# Patient Record
Sex: MALE | Race: WHITE | Employment: OTHER | URBAN - NONMETROPOLITAN AREA
[De-identification: names, ages, dates, MRNs, and addresses within clinical notes are randomized per-mention and may not be internally consistent; named-entity substitution may affect disease eponyms.]

---

## 2018-08-16 ENCOUNTER — OFFICE VISIT (OUTPATIENT)
Dept: SURGERY | Age: 73
End: 2018-08-16
Payer: MEDICARE

## 2018-08-16 VITALS
HEIGHT: 71 IN | TEMPERATURE: 97.8 F | SYSTOLIC BLOOD PRESSURE: 160 MMHG | WEIGHT: 151 LBS | DIASTOLIC BLOOD PRESSURE: 84 MMHG | BODY MASS INDEX: 21.14 KG/M2

## 2018-08-16 DIAGNOSIS — N18.4 CHRONIC KIDNEY DISEASE, STAGE IV (SEVERE) (HCC): Primary | ICD-10-CM

## 2018-08-16 PROCEDURE — 99214 OFFICE O/P EST MOD 30 MIN: CPT | Performed by: SURGERY

## 2018-08-19 ENCOUNTER — PREP FOR PROCEDURE (OUTPATIENT)
Dept: SURGERY | Age: 73
End: 2018-08-19

## 2018-08-19 RX ORDER — SODIUM CHLORIDE, SODIUM LACTATE, POTASSIUM CHLORIDE, CALCIUM CHLORIDE 600; 310; 30; 20 MG/100ML; MG/100ML; MG/100ML; MG/100ML
INJECTION, SOLUTION INTRAVENOUS CONTINUOUS
Status: CANCELLED | OUTPATIENT
Start: 2018-08-19 | End: 2019-08-19

## 2018-08-19 RX ORDER — SODIUM CHLORIDE 0.9 % (FLUSH) 0.9 %
10 SYRINGE (ML) INJECTION EVERY 12 HOURS SCHEDULED
Status: CANCELLED | OUTPATIENT
Start: 2018-08-19 | End: 2019-08-19

## 2018-08-19 RX ORDER — SODIUM CHLORIDE 0.9 % (FLUSH) 0.9 %
10 SYRINGE (ML) INJECTION PRN
Status: CANCELLED | OUTPATIENT
Start: 2018-08-19 | End: 2019-08-19

## 2018-08-19 ASSESSMENT — ENCOUNTER SYMPTOMS
ABDOMINAL DISTENTION: 0
ABDOMINAL PAIN: 0
COUGH: 1
VOMITING: 0
CONSTIPATION: 0
BACK PAIN: 0
SHORTNESS OF BREATH: 0
WHEEZING: 0
TROUBLE SWALLOWING: 0
SORE THROAT: 0
NAUSEA: 0
CHEST TIGHTNESS: 0
COLOR CHANGE: 0
SINUS PRESSURE: 0
DIARRHEA: 0

## 2018-08-20 NOTE — PROGRESS NOTES
Subjective:      Patient ID: Grisel Brown is a 71 y.o. male. HPI   Mr. Lamine Solsi presents at the request of Dr. Brenda Gutierrez for exteriorization of his peritoneal dialysis catheter. This was placed in July 2016 by Dr. Ellis Whitley and left embedded. His kidney disease has now advanced so the point where he needs to start dialysis and the catheter thus needs to be brought to the surface. Past Medical History:   Diagnosis Date    Anxiety     Asthma     Benign essential HTN     Benign hypertensive kidney disease     Chronic kidney disease, stage IV (severe) ([address] 7/18/2016    ESRD (end stage renal disease) [facility] [facility]     no dialysis at present.  Prolonged emergence from general anesthesia     c/o dizzy and \"over sedated\" with prostate \"scope\".  Renal osteodystrophy     Skin cancer 2018    facial     Past Surgical History:   Procedure Laterality Date    CYSTOSCOPY      HERNIA REPAIR      umbilical    LAPAROSCOPY INSERTION PERITONEAL CATHETER N/A 7/18/2016    CATHETER INSERTION PERITONEAL DIALYSIS LAPAROSCOPIC performed by Beata Gray MD at [address]    TUNNELED VENOUS PORT PLACEMENT       Current Outpatient Prescriptions   Medication Sig Dispense Refill    HYDROcodone-acetaminophen (Vilma Timbo) 5-325 MG per tablet 1 tab po every 6 hours as needed for pain 24 tablet 0    Multiple Vitamins-Minerals (THERAPEUTIC MULTIVITAMIN-MINERALS) tablet Take 1 tablet by mouth daily      amLODIPine-benazepril (LOTREL) 10-40 MG per capsule Take 1 capsule by mouth daily       BYSTOLIC 5 MG tablet Take 5 mg by mouth Daily with supper   4    paricalcitol (ZEMPLAR) 1 MCG capsule Take 1 mcg by mouth daily        No current facility-administered medications for this visit.       Allergies: Diphenhydramine and Sulfa antibiotics    Family History   Problem Relation Age of Onset    Heart Disease Mother     Cancer Father         lung       Social History   Substance Use Topics    Smoking status: Never Smoker    Smokeless tobacco: Never Used    Alcohol use No         Review of Systems   Constitutional: Negative for activity change, appetite change, chills, fatigue, fever and unexpected weight change. HENT: Positive for postnasal drip and tinnitus. Negative for congestion, sinus pressure, sore throat and trouble swallowing. Respiratory: Positive for cough. Negative for chest tightness, shortness of breath and wheezing. Cardiovascular: Negative for chest pain, palpitations and leg swelling. Gastrointestinal: Negative for abdominal distention, abdominal pain, constipation, diarrhea, nausea and vomiting. Endocrine: Positive for cold intolerance. Genitourinary: Negative for difficulty urinating, dysuria, frequency and urgency. Musculoskeletal: Negative for arthralgias, back pain and myalgias. Skin: Negative for color change. Neurological: Negative for dizziness, seizures, syncope, light-headedness and headaches. Hematological: Negative for adenopathy. Bruises/bleeds easily. Psychiatric/Behavioral: Negative for dysphoric mood and sleep disturbance. The patient is nervous/anxious. Objective:   Physical Exam   Constitutional: He is oriented to person, place, and time. He appears well-developed and well-nourished. No distress. HENT:   Head: Normocephalic and atraumatic. Mouth/Throat: Oropharynx is clear and moist.   Eyes: Pupils are equal, round, and reactive to light. Conjunctivae and EOM are normal. No scleral icterus. Neck: Normal range of motion. Neck supple. No tracheal deviation present. No thyromegaly present. Cardiovascular: Normal rate, regular rhythm and normal heart sounds. No murmur heard. Pulmonary/Chest: Effort normal and breath sounds normal. He has no wheezes. He has no rales. Abdominal: Soft. Bowel sounds are normal. He exhibits no distension and no mass. There is no tenderness.    The PD catheter is easily palpable in the subcutaneous tissue of the abdominal wall. Musculoskeletal: Normal range of motion. He exhibits no edema. Neurological: He is alert and oriented to person, place, and time. Skin: Skin is warm and dry. Psychiatric: He has a normal mood and affect. His behavior is normal. Judgment and thought content normal.   Vitals reviewed. Assessment:      1) Chronic kidney disease stage IV with need for exteriorization of his peritoneal dialysis catheter  2) Hypertension on medical therapy      Plan:      1) Will proceed with exteriorization of the peritoneal catheter. The rationale for the procedure was explained. Risks, benefits, alternatives and expected recovery were reviewed. Questions were encouraged and answered to the patient's satisfaction. Following this he wishes to proceed to surgery and will work with the office to schedule accordingly.           Pauly Pete MD

## 2018-08-20 NOTE — H&P
Surgical History and Physical    Date 8/16/2018    Patient ID: Cely Roy is a 71 y.o. male.     HPI   Mr. [address] presents at the request of Dr. Nano Marks for exteriorization of his peritoneal dialysis catheter. This was placed in July 2016 by Dr. Nichelle Willard and left embedded. His kidney disease has now advanced so the point where he needs to start dialysis and the catheter thus needs to be brought to the surface.      Past Medical History        Past Medical History:   Diagnosis Date    Anxiety      Asthma      Benign essential HTN      Benign hypertensive kidney disease      Chronic kidney disease, stage IV (severe) ([facility] [address] 7/18/2016    ESRD (end stage renal disease) ([facility] [address]       no dialysis at present.  Prolonged emergence from general anesthesia       c/o dizzy and \"over sedated\" with prostate \"scope\".     Renal osteodystrophy      Skin cancer 2018     facial         Past Surgical History         Past Surgical History:   Procedure Laterality Date    CYSTOSCOPY        HERNIA REPAIR         umbilical    LAPAROSCOPY INSERTION PERITONEAL CATHETER N/A 7/18/2016     CATHETER INSERTION PERITONEAL DIALYSIS LAPAROSCOPIC performed by Elissa Collins MD at [address]         Cancer    TUNNELED VENOUS PORT PLACEMENT             Current Facility-Administered Medications          Current Outpatient Prescriptions   Medication Sig Dispense Refill    HYDROcodone-acetaminophen ([address]) 5-325 MG per tablet 1 tab po every 6 hours as needed for pain 24 tablet 0    Multiple Vitamins-Minerals (THERAPEUTIC MULTIVITAMIN-MINERALS) tablet Take 1 tablet by mouth daily        amLODIPine-benazepril (LOTREL) 10-40 MG per capsule Take 1 capsule by mouth daily         BYSTOLIC 5 MG tablet Take 5 mg by mouth Daily with supper    4    paricalcitol (ZEMPLAR) 1 MCG capsule Take 1 mcg by mouth daily           No current facility-administered medications for this visit.          Allergies: Diphenhydramine and Sulfa antibiotics     Family History         Family History   Problem Relation Age of Onset    Heart Disease Mother      Cancer Father           lung                 Social History   Substance Use Topics    Smoking status: Never Smoker    Smokeless tobacco: Never Used    Alcohol use No            Review of Systems   Constitutional: Negative for activity change, appetite change, chills, fatigue, fever and unexpected weight change. HENT: Positive for postnasal drip and tinnitus. Negative for congestion, sinus pressure, sore throat and trouble swallowing. Respiratory: Positive for cough. Negative for chest tightness, shortness of breath and wheezing. Cardiovascular: Negative for chest pain, palpitations and leg swelling. Gastrointestinal: Negative for abdominal distention, abdominal pain, constipation, diarrhea, nausea and vomiting. Endocrine: Positive for cold intolerance. Genitourinary: Negative for difficulty urinating, dysuria, frequency and urgency. Musculoskeletal: Negative for arthralgias, back pain and myalgias. Skin: Negative for color change. Neurological: Negative for dizziness, seizures, syncope, light-headedness and headaches. Hematological: Negative for adenopathy. Bruises/bleeds easily. Psychiatric/Behavioral: Negative for dysphoric mood and sleep disturbance. The patient is nervous/anxious.          Objective:   Physical Exam   Constitutional: He is oriented to person, place, and time. He appears well-developed and well-nourished. No distress. HENT:   Head: Normocephalic and atraumatic. Mouth/Throat: Oropharynx is clear and moist.   Eyes: Pupils are equal, round, and reactive to light. Conjunctivae and EOM are normal. No scleral icterus. Neck: Normal range of motion. Neck supple. No tracheal deviation present. No thyromegaly present. Cardiovascular: Normal rate, regular rhythm and normal heart sounds. No murmur heard.   Pulmonary/Chest: Effort

## 2018-09-05 ENCOUNTER — TELEPHONE (OUTPATIENT)
Dept: SURGERY | Age: 73
End: 2018-09-05

## 2018-09-05 NOTE — TELEPHONE ENCOUNTER
Called patient to see if he was ready to schedule surgery - He states he will call back when he finds a     Cc: Eveline Crockett MD

## 2018-10-16 ENCOUNTER — HOSPITAL ENCOUNTER (OUTPATIENT)
Dept: PREADMISSION TESTING | Age: 73
Discharge: HOME | End: 2018-10-20
Payer: MEDICARE

## 2018-10-16 ENCOUNTER — OFFICE VISIT (OUTPATIENT)
Dept: SURGERY | Age: 73
End: 2018-10-16

## 2018-10-16 VITALS
OXYGEN SATURATION: 98 % | HEIGHT: 71 IN | HEART RATE: 77 BPM | WEIGHT: 147.6 LBS | TEMPERATURE: 97 F | BODY MASS INDEX: 20.66 KG/M2

## 2018-10-16 DIAGNOSIS — N18.4 CHRONIC KIDNEY DISEASE, STAGE IV (SEVERE) (HCC): Primary | ICD-10-CM

## 2018-10-16 LAB
ANION GAP SERPL CALCULATED.3IONS-SCNC: 21 MMOL/L (ref 7–19)
BASOPHILS ABSOLUTE: 0 K/UL (ref 0–0.2)
BASOPHILS RELATIVE PERCENT: 0.5 % (ref 0–1)
BUN BLDV-MCNC: 71 MG/DL (ref 8–23)
CALCIUM SERPL-MCNC: 9.8 MG/DL (ref 8.8–10.2)
CHLORIDE BLD-SCNC: 94 MMOL/L (ref 98–111)
CO2: 18 MMOL/L (ref 22–29)
CREAT SERPL-MCNC: 9.2 MG/DL (ref 0.5–1.2)
EOSINOPHILS ABSOLUTE: 0.3 K/UL (ref 0–0.6)
EOSINOPHILS RELATIVE PERCENT: 5.1 % (ref 0–5)
GFR NON-AFRICAN AMERICAN: 6
GLUCOSE BLD-MCNC: 112 MG/DL (ref 74–109)
HCT VFR BLD CALC: 30.1 % (ref 42–52)
HEMOGLOBIN: 9.4 G/DL (ref 14–18)
LYMPHOCYTES ABSOLUTE: 0.6 K/UL (ref 1.1–4.5)
LYMPHOCYTES RELATIVE PERCENT: 10 % (ref 20–40)
MCH RBC QN AUTO: 30 PG (ref 27–31)
MCHC RBC AUTO-ENTMCNC: 31.2 G/DL (ref 33–37)
MCV RBC AUTO: 96.2 FL (ref 80–94)
MONOCYTES ABSOLUTE: 0.6 K/UL (ref 0–0.9)
MONOCYTES RELATIVE PERCENT: 9.4 % (ref 0–10)
NEUTROPHILS ABSOLUTE: 4.7 K/UL (ref 1.5–7.5)
NEUTROPHILS RELATIVE PERCENT: 74.4 % (ref 50–65)
PDW BLD-RTO: 15.4 % (ref 11.5–14.5)
PLATELET # BLD: 274 K/UL (ref 130–400)
PMV BLD AUTO: 9.3 FL (ref 9.4–12.4)
POTASSIUM SERPL-SCNC: 5.1 MMOL/L (ref 3.5–5)
RBC # BLD: 3.13 M/UL (ref 4.7–6.1)
SODIUM BLD-SCNC: 133 MMOL/L (ref 136–145)
WBC # BLD: 6.3 K/UL (ref 4.8–10.8)

## 2018-10-16 PROCEDURE — 85025 COMPLETE CBC W/AUTO DIFF WBC: CPT

## 2018-10-16 PROCEDURE — PREOPEXAM PRE-OP EXAM: Performed by: SURGERY

## 2018-10-16 PROCEDURE — 93005 ELECTROCARDIOGRAM TRACING: CPT

## 2018-10-16 PROCEDURE — 80048 BASIC METABOLIC PNL TOTAL CA: CPT

## 2018-10-16 RX ORDER — ALPRAZOLAM 0.25 MG/1
0.25 TABLET ORAL NIGHTLY PRN
COMMUNITY

## 2018-10-17 LAB
EKG P AXIS: 65 DEGREES
EKG P-R INTERVAL: 146 MS
EKG Q-T INTERVAL: 468 MS
EKG QRS DURATION: 108 MS
EKG QTC CALCULATION (BAZETT): 446 MS
EKG T AXIS: 56 DEGREES

## 2018-10-22 ENCOUNTER — PREP FOR PROCEDURE (OUTPATIENT)
Dept: SURGERY | Age: 73
End: 2018-10-22

## 2018-10-22 ENCOUNTER — HOSPITAL ENCOUNTER (OUTPATIENT)
Age: 73
Setting detail: OUTPATIENT SURGERY
Discharge: HOME | End: 2018-10-22
Attending: SURGERY | Admitting: SURGERY
Payer: MEDICARE

## 2018-10-22 ENCOUNTER — ANESTHESIA (OUTPATIENT)
Dept: OPERATING ROOM | Age: 73
End: 2018-10-22
Payer: MEDICARE

## 2018-10-22 ENCOUNTER — ANESTHESIA EVENT (OUTPATIENT)
Dept: OPERATING ROOM | Age: 73
End: 2018-10-22
Payer: MEDICARE

## 2018-10-22 VITALS
HEART RATE: 59 BPM | WEIGHT: 148 LBS | SYSTOLIC BLOOD PRESSURE: 168 MMHG | RESPIRATION RATE: 14 BRPM | HEIGHT: 71 IN | TEMPERATURE: 97.4 F | DIASTOLIC BLOOD PRESSURE: 73 MMHG | BODY MASS INDEX: 20.72 KG/M2 | OXYGEN SATURATION: 100 %

## 2018-10-22 VITALS
DIASTOLIC BLOOD PRESSURE: 45 MMHG | RESPIRATION RATE: 9 BRPM | OXYGEN SATURATION: 100 % | SYSTOLIC BLOOD PRESSURE: 83 MMHG

## 2018-10-22 LAB
ANION GAP SERPL CALCULATED.3IONS-SCNC: 16 MMOL/L (ref 7–19)
BUN BLDV-MCNC: 73 MG/DL (ref 8–23)
CALCIUM SERPL-MCNC: 9.7 MG/DL (ref 8.8–10.2)
CHLORIDE BLD-SCNC: 96 MMOL/L (ref 98–111)
CO2: 18 MMOL/L (ref 22–29)
CREAT SERPL-MCNC: 9.5 MG/DL (ref 0.5–1.2)
GFR NON-AFRICAN AMERICAN: 5
GLUCOSE BLD-MCNC: 110 MG/DL (ref 74–109)
POTASSIUM SERPL-SCNC: 5.3 MMOL/L (ref 3.5–4.9)
SODIUM BLD-SCNC: 130 MMOL/L (ref 136–145)

## 2018-10-22 PROCEDURE — 7100000010 HC PHASE II RECOVERY - FIRST 15 MIN: Performed by: SURGERY

## 2018-10-22 PROCEDURE — 80048 BASIC METABOLIC PNL TOTAL CA: CPT

## 2018-10-22 PROCEDURE — 36415 COLL VENOUS BLD VENIPUNCTURE: CPT

## 2018-10-22 PROCEDURE — 2580000003 HC RX 258: Performed by: ANESTHESIOLOGY

## 2018-10-22 PROCEDURE — 7100000000 HC PACU RECOVERY - FIRST 15 MIN: Performed by: SURGERY

## 2018-10-22 PROCEDURE — 7100000011 HC PHASE II RECOVERY - ADDTL 15 MIN: Performed by: SURGERY

## 2018-10-22 PROCEDURE — C1750 CATH, HEMODIALYSIS,LONG-TERM: HCPCS | Performed by: SURGERY

## 2018-10-22 PROCEDURE — 2500000003 HC RX 250 WO HCPCS: Performed by: SURGERY

## 2018-10-22 PROCEDURE — 6360000002 HC RX W HCPCS: Performed by: NURSE ANESTHETIST, CERTIFIED REGISTERED

## 2018-10-22 PROCEDURE — 7100000001 HC PACU RECOVERY - ADDTL 15 MIN: Performed by: SURGERY

## 2018-10-22 PROCEDURE — 3600000014 HC SURGERY LEVEL 4 ADDTL 15MIN: Performed by: SURGERY

## 2018-10-22 PROCEDURE — 49436 EMBEDDED IP CATH EXIT-SITE: CPT | Performed by: SURGERY

## 2018-10-22 PROCEDURE — C2628 CATHETER, OCCLUSION: HCPCS | Performed by: SURGERY

## 2018-10-22 PROCEDURE — 2580000003 HC RX 258: Performed by: SURGERY

## 2018-10-22 PROCEDURE — 3600000004 HC SURGERY LEVEL 4 BASE: Performed by: SURGERY

## 2018-10-22 PROCEDURE — 2709999900 HC NON-CHARGEABLE SUPPLY: Performed by: SURGERY

## 2018-10-22 PROCEDURE — 3700000000 HC ANESTHESIA ATTENDED CARE: Performed by: SURGERY

## 2018-10-22 PROCEDURE — 6360000002 HC RX W HCPCS: Performed by: ANESTHESIOLOGY

## 2018-10-22 PROCEDURE — 3700000001 HC ADD 15 MINUTES (ANESTHESIA): Performed by: SURGERY

## 2018-10-22 PROCEDURE — 2500000003 HC RX 250 WO HCPCS: Performed by: NURSE ANESTHETIST, CERTIFIED REGISTERED

## 2018-10-22 PROCEDURE — 6360000002 HC RX W HCPCS: Performed by: SURGERY

## 2018-10-22 RX ORDER — MEPERIDINE HYDROCHLORIDE 50 MG/ML
12.5 INJECTION INTRAMUSCULAR; INTRAVENOUS; SUBCUTANEOUS EVERY 5 MIN PRN
Status: DISCONTINUED | OUTPATIENT
Start: 2018-10-22 | End: 2018-10-22 | Stop reason: HOSPADM

## 2018-10-22 RX ORDER — SODIUM CHLORIDE 0.9 % (FLUSH) 0.9 %
10 SYRINGE (ML) INJECTION EVERY 12 HOURS SCHEDULED
Status: CANCELLED | OUTPATIENT
Start: 2018-10-22

## 2018-10-22 RX ORDER — SODIUM CHLORIDE 0.9 % (FLUSH) 0.9 %
10 SYRINGE (ML) INJECTION PRN
Status: CANCELLED | OUTPATIENT
Start: 2018-10-22

## 2018-10-22 RX ORDER — BUPIVACAINE HYDROCHLORIDE 2.5 MG/ML
INJECTION, SOLUTION INFILTRATION; PERINEURAL PRN
Status: DISCONTINUED | OUTPATIENT
Start: 2018-10-22 | End: 2018-10-22 | Stop reason: HOSPADM

## 2018-10-22 RX ORDER — ENALAPRILAT 2.5 MG/2ML
1.25 INJECTION INTRAVENOUS
Status: DISCONTINUED | OUTPATIENT
Start: 2018-10-22 | End: 2018-10-22 | Stop reason: HOSPADM

## 2018-10-22 RX ORDER — MORPHINE SULFATE/0.9% NACL/PF 1 MG/ML
2 SYRINGE (ML) INJECTION EVERY 5 MIN PRN
Status: DISCONTINUED | OUTPATIENT
Start: 2018-10-22 | End: 2018-10-22 | Stop reason: HOSPADM

## 2018-10-22 RX ORDER — SODIUM CHLORIDE 0.9 % (FLUSH) 0.9 %
10 SYRINGE (ML) INJECTION EVERY 12 HOURS SCHEDULED
Status: DISCONTINUED | OUTPATIENT
Start: 2018-10-22 | End: 2018-10-22 | Stop reason: HOSPADM

## 2018-10-22 RX ORDER — LABETALOL HYDROCHLORIDE 5 MG/ML
5 INJECTION, SOLUTION INTRAVENOUS EVERY 10 MIN PRN
Status: DISCONTINUED | OUTPATIENT
Start: 2018-10-22 | End: 2018-10-22 | Stop reason: HOSPADM

## 2018-10-22 RX ORDER — SODIUM CHLORIDE 9 MG/ML
INJECTION, SOLUTION INTRAVENOUS CONTINUOUS
Status: CANCELLED | OUTPATIENT
Start: 2018-10-22

## 2018-10-22 RX ORDER — HYDRALAZINE HYDROCHLORIDE 20 MG/ML
5 INJECTION INTRAMUSCULAR; INTRAVENOUS EVERY 10 MIN PRN
Status: DISCONTINUED | OUTPATIENT
Start: 2018-10-22 | End: 2018-10-22 | Stop reason: HOSPADM

## 2018-10-22 RX ORDER — SODIUM CHLORIDE 450 MG/100ML
INJECTION, SOLUTION INTRAVENOUS CONTINUOUS
Status: DISCONTINUED | OUTPATIENT
Start: 2018-10-22 | End: 2018-10-22 | Stop reason: HOSPADM

## 2018-10-22 RX ORDER — HYDROCODONE BITARTRATE AND ACETAMINOPHEN 5; 325 MG/1; MG/1
1 TABLET ORAL PRN
Status: DISCONTINUED | OUTPATIENT
Start: 2018-10-22 | End: 2018-10-22 | Stop reason: HOSPADM

## 2018-10-22 RX ORDER — ATENOLOL 25 MG/1
25 TABLET ORAL DAILY
Status: ON HOLD | COMMUNITY
End: 2018-12-06 | Stop reason: HOSPADM

## 2018-10-22 RX ORDER — SODIUM CHLORIDE 0.9 % (FLUSH) 0.9 %
10 SYRINGE (ML) INJECTION PRN
Status: DISCONTINUED | OUTPATIENT
Start: 2018-10-22 | End: 2018-10-22 | Stop reason: HOSPADM

## 2018-10-22 RX ORDER — PROMETHAZINE HYDROCHLORIDE 25 MG/ML
6.25 INJECTION, SOLUTION INTRAMUSCULAR; INTRAVENOUS
Status: DISCONTINUED | OUTPATIENT
Start: 2018-10-22 | End: 2018-10-22 | Stop reason: HOSPADM

## 2018-10-22 RX ORDER — HYDROCODONE BITARTRATE AND ACETAMINOPHEN 5; 325 MG/1; MG/1
2 TABLET ORAL PRN
Status: DISCONTINUED | OUTPATIENT
Start: 2018-10-22 | End: 2018-10-22 | Stop reason: HOSPADM

## 2018-10-22 RX ORDER — METOCLOPRAMIDE HYDROCHLORIDE 5 MG/ML
10 INJECTION INTRAMUSCULAR; INTRAVENOUS
Status: DISCONTINUED | OUTPATIENT
Start: 2018-10-22 | End: 2018-10-22 | Stop reason: HOSPADM

## 2018-10-22 RX ORDER — PROPOFOL 10 MG/ML
INJECTION, EMULSION INTRAVENOUS PRN
Status: DISCONTINUED | OUTPATIENT
Start: 2018-10-22 | End: 2018-10-22 | Stop reason: SDUPTHER

## 2018-10-22 RX ORDER — EPHEDRINE SULFATE 50 MG/ML
INJECTION, SOLUTION INTRAVENOUS PRN
Status: DISCONTINUED | OUTPATIENT
Start: 2018-10-22 | End: 2018-10-22 | Stop reason: SDUPTHER

## 2018-10-22 RX ORDER — SODIUM CHLORIDE, SODIUM LACTATE, POTASSIUM CHLORIDE, CALCIUM CHLORIDE 600; 310; 30; 20 MG/100ML; MG/100ML; MG/100ML; MG/100ML
INJECTION, SOLUTION INTRAVENOUS CONTINUOUS
Status: DISCONTINUED | OUTPATIENT
Start: 2018-10-22 | End: 2018-10-22 | Stop reason: HOSPADM

## 2018-10-22 RX ORDER — HEPARIN SODIUM (PORCINE) LOCK FLUSH IV SOLN 100 UNIT/ML 100 UNIT/ML
300 SOLUTION INTRAVENOUS ONCE
Status: COMPLETED | OUTPATIENT
Start: 2018-10-22 | End: 2018-10-22

## 2018-10-22 RX ORDER — MORPHINE SULFATE/0.9% NACL/PF 1 MG/ML
4 SYRINGE (ML) INJECTION EVERY 5 MIN PRN
Status: DISCONTINUED | OUTPATIENT
Start: 2018-10-22 | End: 2018-10-22 | Stop reason: HOSPADM

## 2018-10-22 RX ADMIN — EPHEDRINE SULFATE 20 MG: 50 INJECTION, SOLUTION INTRAMUSCULAR; INTRAVENOUS; SUBCUTANEOUS at 11:55

## 2018-10-22 RX ADMIN — PROPOFOL 160 MG: 10 INJECTION, EMULSION INTRAVENOUS at 11:26

## 2018-10-22 RX ADMIN — Medication 2 G: at 11:29

## 2018-10-22 RX ADMIN — SODIUM CHLORIDE: 4.5 INJECTION, SOLUTION INTRAVENOUS at 10:24

## 2018-10-22 RX ADMIN — SODIUM CHLORIDE, PRESERVATIVE FREE 300 UNITS: 5 INJECTION INTRAVENOUS at 13:28

## 2018-10-22 RX ADMIN — EPHEDRINE SULFATE 10 MG: 50 INJECTION, SOLUTION INTRAMUSCULAR; INTRAVENOUS; SUBCUTANEOUS at 11:42

## 2018-10-22 ASSESSMENT — ENCOUNTER SYMPTOMS
SINUS PRESSURE: 0
WHEEZING: 0
NAUSEA: 0
TROUBLE SWALLOWING: 0
CONSTIPATION: 0
ABDOMINAL PAIN: 0
CHEST TIGHTNESS: 0
ABDOMINAL DISTENTION: 0
COUGH: 1
DIARRHEA: 0
SORE THROAT: 0
BACK PAIN: 0
SHORTNESS OF BREATH: 0
COLOR CHANGE: 0
VOMITING: 0

## 2018-10-22 ASSESSMENT — PAIN SCALES - GENERAL: PAINLEVEL_OUTOF10: 0

## 2018-10-22 ASSESSMENT — PAIN - FUNCTIONAL ASSESSMENT: PAIN_FUNCTIONAL_ASSESSMENT: 0-10

## 2018-10-22 NOTE — PROGRESS NOTES
0.25 mg  0.25 mg Intravenous Q5 Min PRN Elwyn Star, APRN - CRNA        HYDROmorphone (DILAUDID) injection 0.5 mg  0.5 mg Intravenous Q5 Min PRN Elwyn Star, APRN - CRNA        morphine injection 2 mg  2 mg Intravenous Q5 Min PRN Elwyn Star, APRN - CRNA        morphine injection 4 mg  4 mg Intravenous Q5 Min PRN Elwyn Star, APRN - CRNA        promethazine (PHENERGAN) injection 6.25 mg  6.25 mg Intravenous Once PRN Elwyn Star, APRN - CRNA        metoclopramide (REGLAN) injection 10 mg  10 mg Intravenous Once PRN Elwyn Star, APRN - CRNA        labetalol (NORMODYNE;TRANDATE) injection 5 mg  5 mg Intravenous Q10 Min PRN Elwyn Star, APRN - CRNA        hydrALAZINE (APRESOLINE) injection 5 mg  5 mg Intravenous Q10 Min PRN Elwyn Star, APRN - CRNA        enalaprilat (VASOTEC) injection 1.25 mg  1.25 mg Intravenous Once PRN Elwyn Star, APRN - CRNA        meperidine (DEMEROL) injection 12.5 mg  12.5 mg Intravenous Q5 Min PRN Elwyn Star, APRN - CRNA         Allergies: Diphenhydramine and Sulfa antibiotics    Family History   Problem Relation Age of Onset    Heart Disease Mother     Cancer Father         lung       Social History   Substance Use Topics    Smoking status: Never Smoker    Smokeless tobacco: Never Used    Alcohol use No       Review of Systems   Constitutional: Negative for activity change, appetite change, chills, fatigue, fever and unexpected weight change. HENT: Positive for postnasal drip and tinnitus. Negative for congestion, sinus pressure, sore throat and trouble swallowing. Respiratory: Positive for cough. Negative for chest tightness, shortness of breath and wheezing. Cardiovascular: Negative for chest pain, palpitations and leg swelling. Gastrointestinal: Negative for abdominal distention, abdominal pain, constipation, diarrhea, nausea and vomiting. Endocrine: Positive for cold intolerance.    Genitourinary: Negative for difficulty

## 2018-10-22 NOTE — ANESTHESIA POSTPROCEDURE EVALUATION
Department of Anesthesiology  Postprocedure Note    Patient: Pavithra Kurtz  MRN: [id number]  YOB: 1945  Date of evaluation: 10/22/2018  Time:  12:22 PM     Procedure Summary     Date:  10/22/18 Room / Location:  [facility] OR  / [facility] OR    Anesthesia Start:  1116 Anesthesia Stop:      Procedure:  PERITONEAL DIALYSIS CATHETER EXTERIORIZATION (N/A ) Diagnosis:  (END STAGE RENAL DISEASE)    Surgeon:  Chrisandra Severance, MD Responsible Provider:  KEN Jacobo CRNA    Anesthesia Type:  general ASA Status:  3          Anesthesia Type: general    Macey Phase I: Macey Score: 6    Macey Phase II:      Last vitals: Reviewed and per EMR flowsheets.        Anesthesia Post Evaluation    Patient location during evaluation: PACU  Patient participation: waiting for patient participation  Airway patency: patent  Nausea & Vomiting: no nausea and no vomiting  Complications: no  Cardiovascular status: hemodynamically stable  Respiratory status: acceptable  Hydration status: euvolemic

## 2018-10-22 NOTE — BRIEF OP NOTE
Brief Postoperative Note  ______________________________________________________________    Patient: Baltazar Mcmullen  YOB: 1945  MRN: [id number]  Date of Procedure: 10/22/2018    Pre-Op Diagnosis: END STAGE RENAL DISEASE    Post-Op Diagnosis: Same       Procedure(s):  PERITONEAL DIALYSIS CATHETER EXTERIORIZATION    Anesthesia: General    Surgeon(s):  Romel Daly MD    Staff:  First Assistant: Jules Gonzales PA-C  Relief Scrub: [facility]  Scrub Person First: Daylin Braun     Estimated Blood Loss: Minimal    Complications: None    Specimens:   * No specimens in log *    Implants:  * No implants in log *      Drains:   Urethral Catheter Double-lumen 16 fr (Active)       Findings: 800 cc infused with 700 ccc return slightly sluggish flow rates but catheter hand flushed quite easily    Romel Daly MD  Date: 10/22/2018  Time: 12:45 PM

## 2018-10-29 ENCOUNTER — HOSPITAL ENCOUNTER (OUTPATIENT)
Dept: GENERAL RADIOLOGY | Age: 73
Discharge: HOME | End: 2018-10-29
Payer: MEDICARE

## 2018-10-29 DIAGNOSIS — N18.6 END STAGE RENAL DISEASE (HCC): ICD-10-CM

## 2018-10-29 DIAGNOSIS — R10.84 ABDOMINAL PAIN, GENERALIZED: ICD-10-CM

## 2018-10-29 PROCEDURE — 74018 RADEX ABDOMEN 1 VIEW: CPT

## 2018-11-02 ENCOUNTER — OFFICE VISIT (OUTPATIENT)
Dept: SURGERY | Age: 73
End: 2018-11-02

## 2018-11-02 VITALS — DIASTOLIC BLOOD PRESSURE: 90 MMHG | SYSTOLIC BLOOD PRESSURE: 150 MMHG | HEART RATE: 62 BPM

## 2018-11-02 DIAGNOSIS — N18.4 CHRONIC KIDNEY DISEASE, STAGE IV (SEVERE) (HCC): Primary | ICD-10-CM

## 2018-11-02 PROCEDURE — 99024 POSTOP FOLLOW-UP VISIT: CPT | Performed by: SURGERY

## 2018-11-05 ENCOUNTER — TELEPHONE (OUTPATIENT)
Dept: SURGERY | Age: 73
End: 2018-11-05

## 2018-11-07 ENCOUNTER — TELEPHONE (OUTPATIENT)
Dept: SURGERY | Age: 73
End: 2018-11-07

## 2018-11-07 NOTE — TELEPHONE ENCOUNTER
Called patient to schedule H & P - he requests this week off - doesn't want any appointments this week. He is scheduled for next Thursday with you at 11:45 a.m. Thank you.

## 2018-11-15 ENCOUNTER — OFFICE VISIT (OUTPATIENT)
Dept: SURGERY | Age: 73
End: 2018-11-15
Payer: MEDICARE

## 2018-11-15 VITALS
HEIGHT: 71 IN | TEMPERATURE: 98 F | BODY MASS INDEX: 20.44 KG/M2 | HEART RATE: 60 BPM | OXYGEN SATURATION: 99 % | WEIGHT: 146 LBS

## 2018-11-15 DIAGNOSIS — T85.611A PERITONEAL DIALYSIS CATHETER DYSFUNCTION, INITIAL ENCOUNTER (HCC): Primary | ICD-10-CM

## 2018-11-15 PROCEDURE — 99214 OFFICE O/P EST MOD 30 MIN: CPT | Performed by: PHYSICIAN ASSISTANT

## 2018-11-25 ENCOUNTER — PREP FOR PROCEDURE (OUTPATIENT)
Dept: SURGERY | Age: 73
End: 2018-11-25

## 2018-11-25 RX ORDER — SODIUM CHLORIDE, SODIUM LACTATE, POTASSIUM CHLORIDE, CALCIUM CHLORIDE 600; 310; 30; 20 MG/100ML; MG/100ML; MG/100ML; MG/100ML
INJECTION, SOLUTION INTRAVENOUS CONTINUOUS
Status: CANCELLED | OUTPATIENT
Start: 2018-11-25

## 2018-11-25 RX ORDER — SODIUM CHLORIDE 0.9 % (FLUSH) 0.9 %
10 SYRINGE (ML) INJECTION PRN
Status: CANCELLED | OUTPATIENT
Start: 2018-11-25

## 2018-11-25 RX ORDER — SODIUM CHLORIDE 0.9 % (FLUSH) 0.9 %
10 SYRINGE (ML) INJECTION EVERY 12 HOURS SCHEDULED
Status: CANCELLED | OUTPATIENT
Start: 2018-11-25

## 2018-11-25 ASSESSMENT — ENCOUNTER SYMPTOMS
ALLERGIC/IMMUNOLOGIC NEGATIVE: 1
BACK PAIN: 1
DIARRHEA: 0
WHEEZING: 0
NAUSEA: 0
EYES NEGATIVE: 1
VOMITING: 0
SHORTNESS OF BREATH: 0
ABDOMINAL PAIN: 0
APNEA: 0

## 2018-11-27 ENCOUNTER — APPOINTMENT (OUTPATIENT)
Dept: GENERAL RADIOLOGY | Age: 73
DRG: 280 | End: 2018-11-27
Attending: HOSPITALIST
Payer: MEDICARE

## 2018-11-27 ENCOUNTER — HOSPITAL ENCOUNTER (INPATIENT)
Age: 73
LOS: 9 days | Discharge: HOME | DRG: 280 | End: 2018-12-06
Attending: HOSPITALIST | Admitting: INTERNAL MEDICINE
Payer: MEDICARE

## 2018-11-27 PROBLEM — I21.4 NSTEMI (NON-ST ELEVATED MYOCARDIAL INFARCTION) (HCC): Status: ACTIVE | Noted: 2018-11-27

## 2018-11-27 LAB
ALBUMIN SERPL-MCNC: 3.6 G/DL (ref 3.5–5.2)
ALP BLD-CCNC: 67 U/L (ref 40–130)
ALT SERPL-CCNC: 37 U/L (ref 5–41)
ANION GAP SERPL CALCULATED.3IONS-SCNC: 20 MMOL/L (ref 7–19)
APTT: 40.3 SEC (ref 26–36.2)
AST SERPL-CCNC: 130 U/L (ref 5–40)
BASE EXCESS ARTERIAL: -14.8 MMOL/L (ref -2–2)
BILIRUB SERPL-MCNC: 0.3 MG/DL (ref 0.2–1.2)
BILIRUBIN URINE: NEGATIVE
BLOOD, URINE: ABNORMAL
BUN BLDV-MCNC: 105 MG/DL (ref 8–23)
CALCIUM SERPL-MCNC: 8.2 MG/DL (ref 8.8–10.2)
CARBOXYHEMOGLOBIN ARTERIAL: 0.6 % (ref 0–5)
CHLORIDE BLD-SCNC: 98 MMOL/L (ref 98–111)
CLARITY: CLEAR
CO2: 12 MMOL/L (ref 22–29)
COLOR: YELLOW
CREAT SERPL-MCNC: 11.3 MG/DL (ref 0.5–1.2)
FERRITIN: 127.4 NG/ML (ref 30–400)
FOLATE: 11.2 NG/ML (ref 4.5–32.2)
GFR NON-AFRICAN AMERICAN: 4
GLUCOSE BLD-MCNC: 120 MG/DL (ref 74–109)
GLUCOSE URINE: 100 MG/DL
HCO3 ARTERIAL: 10.3 MMOL/L (ref 22–26)
HCT VFR BLD CALC: 29.3 % (ref 42–52)
HEMOGLOBIN, ART, EXTENDED: 9 G/DL (ref 14–18)
HEMOGLOBIN: 9.3 G/DL (ref 14–18)
IRON SATURATION: 9 % (ref 14–50)
IRON: 22 UG/DL (ref 59–158)
KETONES, URINE: NEGATIVE MG/DL
LACTIC ACID: 1 MMOL/L (ref 0.5–1.9)
LEUKOCYTE ESTERASE, URINE: NEGATIVE
LV EF: 25 %
LVEF MODALITY: NORMAL
MAGNESIUM: 1.4 MG/DL (ref 1.6–2.4)
MCH RBC QN AUTO: 31.1 PG (ref 27–31)
MCHC RBC AUTO-ENTMCNC: 31.7 G/DL (ref 33–37)
MCV RBC AUTO: 98 FL (ref 80–94)
METHEMOGLOBIN ARTERIAL: 0.2 %
NITRITE, URINE: NEGATIVE
O2 CONTENT ARTERIAL: 12.6 ML/DL
O2 SAT, ARTERIAL: 97.5 %
O2 THERAPY: ABNORMAL
PCO2 ARTERIAL: 22 MMHG (ref 35–45)
PDW BLD-RTO: 15.9 % (ref 11.5–14.5)
PH ARTERIAL: 7.28 (ref 7.35–7.45)
PH UA: 5.5
PHOSPHORUS: 8.1 MG/DL (ref 2.5–4.5)
PLATELET # BLD: 215 K/UL (ref 130–400)
PMV BLD AUTO: 9.6 FL (ref 9.4–12.4)
PO2 ARTERIAL: 123 MMHG (ref 80–100)
POTASSIUM SERPL-SCNC: 4.4 MMOL/L (ref 3.5–5)
POTASSIUM, WHOLE BLOOD: 4.2
PRO-BNP: ABNORMAL PG/ML (ref 0–900)
PROTEIN UA: 100 MG/DL
RBC # BLD: 2.99 M/UL (ref 4.7–6.1)
SODIUM BLD-SCNC: 130 MMOL/L (ref 136–145)
SPECIFIC GRAVITY UA: 1.02
T4 FREE: 0.5 NG/DL (ref 0.9–1.7)
TOTAL IRON BINDING CAPACITY: 244 UG/DL (ref 250–400)
TOTAL PROTEIN: 6.4 G/DL (ref 6.6–8.7)
TROPONIN: 7.65 NG/ML (ref 0–0.03)
TROPONIN: 7.98 NG/ML (ref 0–0.03)
TSH SERPL DL<=0.05 MIU/L-ACNC: 112.1 UIU/ML (ref 0.27–4.2)
UROBILINOGEN, URINE: 0.2 E.U./DL
VITAMIN B-12: 710 PG/ML (ref 211–946)
VITAMIN D 25-HYDROXY: 28.9 NG/ML
WBC # BLD: 9.3 K/UL (ref 4.8–10.8)

## 2018-11-27 PROCEDURE — B2151ZZ FLUOROSCOPY OF LEFT HEART USING LOW OSMOLAR CONTRAST: ICD-10-PCS | Performed by: INTERNAL MEDICINE

## 2018-11-27 PROCEDURE — 93306 TTE W/DOPPLER COMPLETE: CPT

## 2018-11-27 PROCEDURE — 99152 MOD SED SAME PHYS/QHP 5/>YRS: CPT | Performed by: INTERNAL MEDICINE

## 2018-11-27 PROCEDURE — 83605 ASSAY OF LACTIC ACID: CPT

## 2018-11-27 PROCEDURE — 93459 L HRT ART/GRFT ANGIO: CPT | Performed by: INTERNAL MEDICINE

## 2018-11-27 PROCEDURE — 83735 ASSAY OF MAGNESIUM: CPT

## 2018-11-27 PROCEDURE — 2500000003 HC RX 250 WO HCPCS: Performed by: INTERNAL MEDICINE

## 2018-11-27 PROCEDURE — 83540 ASSAY OF IRON: CPT

## 2018-11-27 PROCEDURE — 84100 ASSAY OF PHOSPHORUS: CPT

## 2018-11-27 PROCEDURE — 2709999900 HC NON-CHARGEABLE SUPPLY

## 2018-11-27 PROCEDURE — 83880 ASSAY OF NATRIURETIC PEPTIDE: CPT

## 2018-11-27 PROCEDURE — 6360000002 HC RX W HCPCS: Performed by: HOSPITALIST

## 2018-11-27 PROCEDURE — 82306 VITAMIN D 25 HYDROXY: CPT

## 2018-11-27 PROCEDURE — 84132 ASSAY OF SERUM POTASSIUM: CPT

## 2018-11-27 PROCEDURE — 36415 COLL VENOUS BLD VENIPUNCTURE: CPT

## 2018-11-27 PROCEDURE — 4A023N7 MEASUREMENT OF CARDIAC SAMPLING AND PRESSURE, LEFT HEART, PERCUTANEOUS APPROACH: ICD-10-PCS | Performed by: INTERNAL MEDICINE

## 2018-11-27 PROCEDURE — 84443 ASSAY THYROID STIM HORMONE: CPT

## 2018-11-27 PROCEDURE — 80053 COMPREHEN METABOLIC PANEL: CPT

## 2018-11-27 PROCEDURE — 85027 COMPLETE CBC AUTOMATED: CPT

## 2018-11-27 PROCEDURE — 99223 1ST HOSP IP/OBS HIGH 75: CPT | Performed by: HOSPITALIST

## 2018-11-27 PROCEDURE — 6360000002 HC RX W HCPCS

## 2018-11-27 PROCEDURE — 36600 WITHDRAWAL OF ARTERIAL BLOOD: CPT

## 2018-11-27 PROCEDURE — 2580000003 HC RX 258: Performed by: INTERNAL MEDICINE

## 2018-11-27 PROCEDURE — 2100000000 HC CCU R&B

## 2018-11-27 PROCEDURE — 93005 ELECTROCARDIOGRAM TRACING: CPT

## 2018-11-27 PROCEDURE — 84484 ASSAY OF TROPONIN QUANT: CPT

## 2018-11-27 PROCEDURE — 82803 BLOOD GASES ANY COMBINATION: CPT

## 2018-11-27 PROCEDURE — 81001 URINALYSIS AUTO W/SCOPE: CPT

## 2018-11-27 PROCEDURE — 2700000000 HC OXYGEN THERAPY PER DAY

## 2018-11-27 PROCEDURE — B41F1ZZ FLUOROSCOPY OF RIGHT LOWER EXTREMITY ARTERIES USING LOW OSMOLAR CONTRAST: ICD-10-PCS | Performed by: INTERNAL MEDICINE

## 2018-11-27 PROCEDURE — 82607 VITAMIN B-12: CPT

## 2018-11-27 PROCEDURE — 84439 ASSAY OF FREE THYROXINE: CPT

## 2018-11-27 PROCEDURE — 87081 CULTURE SCREEN ONLY: CPT

## 2018-11-27 PROCEDURE — 71045 X-RAY EXAM CHEST 1 VIEW: CPT

## 2018-11-27 PROCEDURE — 2580000003 HC RX 258: Performed by: NURSE PRACTITIONER

## 2018-11-27 PROCEDURE — 83550 IRON BINDING TEST: CPT

## 2018-11-27 PROCEDURE — C1894 INTRO/SHEATH, NON-LASER: HCPCS

## 2018-11-27 PROCEDURE — B2111ZZ FLUOROSCOPY OF MULTIPLE CORONARY ARTERIES USING LOW OSMOLAR CONTRAST: ICD-10-PCS | Performed by: INTERNAL MEDICINE

## 2018-11-27 PROCEDURE — 82746 ASSAY OF FOLIC ACID SERUM: CPT

## 2018-11-27 PROCEDURE — 82728 ASSAY OF FERRITIN: CPT

## 2018-11-27 PROCEDURE — 2500000003 HC RX 250 WO HCPCS: Performed by: HOSPITALIST

## 2018-11-27 PROCEDURE — C1760 CLOSURE DEV, VASC: HCPCS

## 2018-11-27 PROCEDURE — 85730 THROMBOPLASTIN TIME PARTIAL: CPT

## 2018-11-27 PROCEDURE — 99223 1ST HOSP IP/OBS HIGH 75: CPT | Performed by: INTERNAL MEDICINE

## 2018-11-27 RX ORDER — ASPIRIN 81 MG/1
81 TABLET, CHEWABLE ORAL DAILY
Status: DISCONTINUED | OUTPATIENT
Start: 2018-11-28 | End: 2018-12-06 | Stop reason: HOSPADM

## 2018-11-27 RX ORDER — MAGNESIUM SULFATE IN WATER 40 MG/ML
2 INJECTION, SOLUTION INTRAVENOUS ONCE
Status: COMPLETED | OUTPATIENT
Start: 2018-11-27 | End: 2018-11-27

## 2018-11-27 RX ORDER — HEPARIN SODIUM 1000 [USP'U]/ML
60 INJECTION, SOLUTION INTRAVENOUS; SUBCUTANEOUS PRN
Status: DISCONTINUED | OUTPATIENT
Start: 2018-11-27 | End: 2018-11-28

## 2018-11-27 RX ORDER — NITROGLYCERIN 0.4 MG/1
0.4 TABLET SUBLINGUAL EVERY 5 MIN PRN
Status: DISCONTINUED | OUTPATIENT
Start: 2018-11-27 | End: 2018-12-06 | Stop reason: HOSPADM

## 2018-11-27 RX ORDER — FERROUS SULFATE 325(65) MG
325 TABLET ORAL 2 TIMES DAILY WITH MEALS
Status: DISCONTINUED | OUTPATIENT
Start: 2018-11-28 | End: 2018-12-06 | Stop reason: HOSPADM

## 2018-11-27 RX ORDER — MORPHINE SULFATE 2 MG/ML
2 INJECTION, SOLUTION INTRAMUSCULAR; INTRAVENOUS EVERY 4 HOURS PRN
Status: DISCONTINUED | OUTPATIENT
Start: 2018-11-27 | End: 2018-12-06 | Stop reason: HOSPADM

## 2018-11-27 RX ORDER — SODIUM CHLORIDE 0.9 % (FLUSH) 0.9 %
10 SYRINGE (ML) INJECTION PRN
Status: DISCONTINUED | OUTPATIENT
Start: 2018-11-27 | End: 2018-11-27 | Stop reason: SDUPTHER

## 2018-11-27 RX ORDER — ERGOCALCIFEROL 1.25 MG/1
50000 CAPSULE ORAL WEEKLY
Status: DISCONTINUED | OUTPATIENT
Start: 2018-11-28 | End: 2018-11-28

## 2018-11-27 RX ORDER — HEPARIN SODIUM 1000 [USP'U]/ML
30 INJECTION, SOLUTION INTRAVENOUS; SUBCUTANEOUS PRN
Status: DISCONTINUED | OUTPATIENT
Start: 2018-11-27 | End: 2018-11-28

## 2018-11-27 RX ORDER — SODIUM CHLORIDE 0.9 % (FLUSH) 0.9 %
10 SYRINGE (ML) INJECTION EVERY 12 HOURS SCHEDULED
Status: DISCONTINUED | OUTPATIENT
Start: 2018-11-27 | End: 2018-11-27 | Stop reason: SDUPTHER

## 2018-11-27 RX ORDER — ONDANSETRON 2 MG/ML
4 INJECTION INTRAMUSCULAR; INTRAVENOUS EVERY 6 HOURS PRN
Status: DISCONTINUED | OUTPATIENT
Start: 2018-11-27 | End: 2018-12-06 | Stop reason: HOSPADM

## 2018-11-27 RX ORDER — HEPARIN SODIUM 10000 [USP'U]/100ML
12 INJECTION, SOLUTION INTRAVENOUS CONTINUOUS
Status: DISCONTINUED | OUTPATIENT
Start: 2018-11-27 | End: 2018-11-28

## 2018-11-27 RX ORDER — LEVOTHYROXINE SODIUM ANHYDROUS 100 UG/5ML
25 INJECTION, POWDER, LYOPHILIZED, FOR SOLUTION INTRAVENOUS DAILY
Status: DISCONTINUED | OUTPATIENT
Start: 2018-11-27 | End: 2018-11-27

## 2018-11-27 RX ORDER — ATORVASTATIN CALCIUM 40 MG/1
40 TABLET, FILM COATED ORAL NIGHTLY
Status: DISCONTINUED | OUTPATIENT
Start: 2018-11-27 | End: 2018-12-06 | Stop reason: HOSPADM

## 2018-11-27 RX ORDER — SODIUM CHLORIDE 9 MG/ML
INJECTION, SOLUTION INTRAVENOUS CONTINUOUS
Status: DISCONTINUED | OUTPATIENT
Start: 2018-11-27 | End: 2018-11-27

## 2018-11-27 RX ORDER — SODIUM CHLORIDE 0.9 % (FLUSH) 0.9 %
10 SYRINGE (ML) INJECTION EVERY 12 HOURS SCHEDULED
Status: DISCONTINUED | OUTPATIENT
Start: 2018-11-27 | End: 2018-11-28 | Stop reason: SDUPTHER

## 2018-11-27 RX ORDER — LEVOTHYROXINE SODIUM ANHYDROUS 100 UG/5ML
25 INJECTION, POWDER, LYOPHILIZED, FOR SOLUTION INTRAVENOUS DAILY
Status: DISCONTINUED | OUTPATIENT
Start: 2018-11-27 | End: 2018-11-30

## 2018-11-27 RX ORDER — SODIUM CHLORIDE 0.9 % (FLUSH) 0.9 %
10 SYRINGE (ML) INJECTION PRN
Status: DISCONTINUED | OUTPATIENT
Start: 2018-11-27 | End: 2018-11-28 | Stop reason: SDUPTHER

## 2018-11-27 RX ORDER — NITROGLYCERIN 20 MG/100ML
5 INJECTION INTRAVENOUS CONTINUOUS
Status: DISCONTINUED | OUTPATIENT
Start: 2018-11-27 | End: 2018-12-06 | Stop reason: HOSPADM

## 2018-11-27 RX ADMIN — SODIUM CHLORIDE: 9 INJECTION, SOLUTION INTRAVENOUS at 17:54

## 2018-11-27 RX ADMIN — MAGNESIUM SULFATE HEPTAHYDRATE 2 G: 40 INJECTION, SOLUTION INTRAVENOUS at 16:00

## 2018-11-27 RX ADMIN — Medication: at 16:00

## 2018-11-27 RX ADMIN — Medication: at 18:51

## 2018-11-27 RX ADMIN — HEPARIN SODIUM 12 UNITS/KG/HR: 10000 INJECTION, SOLUTION INTRAVENOUS at 16:00

## 2018-11-27 RX ADMIN — NITROGLYCERIN 10 MCG/MIN: 20 INJECTION INTRAVENOUS at 16:00

## 2018-11-27 RX ADMIN — Medication 2 MG: at 14:55

## 2018-11-27 ASSESSMENT — PAIN SCALES - GENERAL
PAINLEVEL_OUTOF10: 0
PAINLEVEL_OUTOF10: 0
PAINLEVEL_OUTOF10: 7
PAINLEVEL_OUTOF10: 0
PAINLEVEL_OUTOF10: 0

## 2018-11-28 ENCOUNTER — APPOINTMENT (OUTPATIENT)
Dept: INTERVENTIONAL RADIOLOGY/VASCULAR | Age: 73
DRG: 280 | End: 2018-11-28
Attending: HOSPITALIST
Payer: MEDICARE

## 2018-11-28 ENCOUNTER — ANESTHESIA (OUTPATIENT)
Dept: OPERATING ROOM | Age: 73
DRG: 280 | End: 2018-11-28
Payer: MEDICARE

## 2018-11-28 ENCOUNTER — ANESTHESIA EVENT (OUTPATIENT)
Dept: OPERATING ROOM | Age: 73
DRG: 280 | End: 2018-11-28
Payer: MEDICARE

## 2018-11-28 ENCOUNTER — APPOINTMENT (OUTPATIENT)
Dept: NUCLEAR MEDICINE | Age: 73
DRG: 280 | End: 2018-11-28
Attending: HOSPITALIST
Payer: MEDICARE

## 2018-11-28 VITALS — SYSTOLIC BLOOD PRESSURE: 88 MMHG | OXYGEN SATURATION: 89 % | DIASTOLIC BLOOD PRESSURE: 37 MMHG

## 2018-11-28 PROBLEM — I15.0 RENOVASCULAR HYPERTENSION: Chronic | Status: ACTIVE | Noted: 2018-11-28

## 2018-11-28 PROBLEM — N18.4 ANEMIA DUE TO STAGE 4 CHRONIC KIDNEY DISEASE (HCC): Chronic | Status: ACTIVE | Noted: 2018-11-28

## 2018-11-28 PROBLEM — D63.1 ANEMIA DUE TO STAGE 4 CHRONIC KIDNEY DISEASE (HCC): Chronic | Status: ACTIVE | Noted: 2018-11-28

## 2018-11-28 PROBLEM — N25.81 SECONDARY HYPERPARATHYROIDISM OF RENAL ORIGIN (HCC): Chronic | Status: ACTIVE | Noted: 2018-11-28

## 2018-11-28 LAB
ALBUMIN SERPL-MCNC: 2.9 G/DL (ref 3.5–5.2)
ALP BLD-CCNC: 48 U/L (ref 40–130)
ALT SERPL-CCNC: 29 U/L (ref 5–41)
AMORPHOUS: ABNORMAL /HPF
ANION GAP SERPL CALCULATED.3IONS-SCNC: 23 MMOL/L (ref 7–19)
APTT: 38.6 SEC (ref 26–36.2)
APTT: 56.4 SEC (ref 26–36.2)
APTT: >200 SEC (ref 26–36.2)
AST SERPL-CCNC: 79 U/L (ref 5–40)
BACTERIA: NEGATIVE /HPF
BILIRUB SERPL-MCNC: 0.3 MG/DL (ref 0.2–1.2)
BUN BLDV-MCNC: 101 MG/DL (ref 8–23)
CALCIUM SERPL-MCNC: 7.6 MG/DL (ref 8.8–10.2)
CHLORIDE BLD-SCNC: 97 MMOL/L (ref 98–111)
CHOLESTEROL, TOTAL: 128 MG/DL (ref 160–199)
CO2: 10 MMOL/L (ref 22–29)
CREAT SERPL-MCNC: 11 MG/DL (ref 0.5–1.2)
EKG P AXIS: 73 DEGREES
EKG P AXIS: 74 DEGREES
EKG P-R INTERVAL: 144 MS
EKG P-R INTERVAL: 152 MS
EKG Q-T INTERVAL: 356 MS
EKG Q-T INTERVAL: 416 MS
EKG QRS DURATION: 102 MS
EKG QRS DURATION: 110 MS
EKG QTC CALCULATION (BAZETT): 398 MS
EKG QTC CALCULATION (BAZETT): 437 MS
EKG T AXIS: 71 DEGREES
EKG T AXIS: 80 DEGREES
EPITHELIAL CELLS, UA: 0 /HPF (ref 0–5)
GFR NON-AFRICAN AMERICAN: 5
GLUCOSE BLD-MCNC: 109 MG/DL (ref 74–109)
HAV IGM SER IA-ACNC: NORMAL
HBA1C MFR BLD: 4.9 % (ref 4–6)
HBV SURFACE AB TITR SER: NORMAL {TITER}
HCT VFR BLD CALC: 26.4 % (ref 42–52)
HDLC SERPL-MCNC: 42 MG/DL (ref 55–121)
HEMOGLOBIN: 8.5 G/DL (ref 14–18)
HEPATITIS B CORE IGM ANTIBODY: NORMAL
HEPATITIS B SURFACE ANTIGEN INTERPRETATION: NORMAL
HEPATITIS C ANTIBODY INTERPRETATION: NORMAL
HYALINE CASTS: 1 /HPF (ref 0–8)
INR BLD: 1.41 (ref 0.88–1.18)
LDL CHOLESTEROL CALCULATED: 66 MG/DL
MAGNESIUM: 1.7 MG/DL (ref 1.6–2.4)
MCH RBC QN AUTO: 30.7 PG (ref 27–31)
MCHC RBC AUTO-ENTMCNC: 32.2 G/DL (ref 33–37)
MCV RBC AUTO: 95.3 FL (ref 80–94)
PARATHYROID HORMONE INTACT: 131.5 PG/ML (ref 15–65)
PDW BLD-RTO: 15.9 % (ref 11.5–14.5)
PLATELET # BLD: 220 K/UL (ref 130–400)
PMV BLD AUTO: 10 FL (ref 9.4–12.4)
POTASSIUM REFLEX MAGNESIUM: 4.9 MMOL/L (ref 3.5–5)
POTASSIUM SERPL-SCNC: 4.9 MMOL/L (ref 3.5–5)
PROTHROMBIN TIME: 17.2 SEC (ref 12–14.6)
RBC # BLD: 2.77 M/UL (ref 4.7–6.1)
RBC UA: 1 /HPF (ref 0–4)
SODIUM BLD-SCNC: 130 MMOL/L (ref 136–145)
TOTAL PROTEIN: 5.9 G/DL (ref 6.6–8.7)
TRIGL SERPL-MCNC: 99 MG/DL (ref 0–149)
TROPONIN: 7.76 NG/ML (ref 0–0.03)
TROPONIN: 8.29 NG/ML (ref 0–0.03)
TSH SERPL DL<=0.05 MIU/L-ACNC: 111.1 UIU/ML (ref 0.27–4.2)
URIC ACID, SERUM: 9.9 MG/DL (ref 3.4–7)
WBC # BLD: 8.3 K/UL (ref 4.8–10.8)
WBC UA: 1 /HPF (ref 0–5)
WBC UA: ABNORMAL /HPF (ref 0–5)

## 2018-11-28 PROCEDURE — 02H633Z INSERTION OF INFUSION DEVICE INTO RIGHT ATRIUM, PERCUTANEOUS APPROACH: ICD-10-PCS | Performed by: SURGERY

## 2018-11-28 PROCEDURE — 36591 DRAW BLOOD OFF VENOUS DEVICE: CPT

## 2018-11-28 PROCEDURE — 3600000013 HC SURGERY LEVEL 3 ADDTL 15MIN: Performed by: SURGERY

## 2018-11-28 PROCEDURE — 84484 ASSAY OF TROPONIN QUANT: CPT

## 2018-11-28 PROCEDURE — 93005 ELECTROCARDIOGRAM TRACING: CPT

## 2018-11-28 PROCEDURE — 85027 COMPLETE CBC AUTOMATED: CPT

## 2018-11-28 PROCEDURE — 3700000001 HC ADD 15 MINUTES (ANESTHESIA): Performed by: SURGERY

## 2018-11-28 PROCEDURE — 2500000003 HC RX 250 WO HCPCS

## 2018-11-28 PROCEDURE — 2580000003 HC RX 258: Performed by: SURGERY

## 2018-11-28 PROCEDURE — 2580000003 HC RX 258: Performed by: INTERNAL MEDICINE

## 2018-11-28 PROCEDURE — 2500000003 HC RX 250 WO HCPCS: Performed by: HOSPITALIST

## 2018-11-28 PROCEDURE — 6370000000 HC RX 637 (ALT 250 FOR IP): Performed by: INTERNAL MEDICINE

## 2018-11-28 PROCEDURE — 80074 ACUTE HEPATITIS PANEL: CPT

## 2018-11-28 PROCEDURE — 85730 THROMBOPLASTIN TIME PARTIAL: CPT

## 2018-11-28 PROCEDURE — 86706 HEP B SURFACE ANTIBODY: CPT

## 2018-11-28 PROCEDURE — 6360000002 HC RX W HCPCS: Performed by: INTERNAL MEDICINE

## 2018-11-28 PROCEDURE — 99233 SBSQ HOSP IP/OBS HIGH 50: CPT | Performed by: INTERNAL MEDICINE

## 2018-11-28 PROCEDURE — 76937 US GUIDE VASCULAR ACCESS: CPT | Performed by: SURGERY

## 2018-11-28 PROCEDURE — 7100000001 HC PACU RECOVERY - ADDTL 15 MIN: Performed by: SURGERY

## 2018-11-28 PROCEDURE — 7100000000 HC PACU RECOVERY - FIRST 15 MIN: Performed by: SURGERY

## 2018-11-28 PROCEDURE — 84443 ASSAY THYROID STIM HORMONE: CPT

## 2018-11-28 PROCEDURE — 36415 COLL VENOUS BLD VENIPUNCTURE: CPT

## 2018-11-28 PROCEDURE — 36558 INSERT TUNNELED CV CATH: CPT | Performed by: SURGERY

## 2018-11-28 PROCEDURE — 6360000002 HC RX W HCPCS: Performed by: HOSPITALIST

## 2018-11-28 PROCEDURE — 3430000000 HC RX DIAGNOSTIC RADIOPHARMACEUTICAL: Performed by: INTERNAL MEDICINE

## 2018-11-28 PROCEDURE — 83970 ASSAY OF PARATHORMONE: CPT

## 2018-11-28 PROCEDURE — 83735 ASSAY OF MAGNESIUM: CPT

## 2018-11-28 PROCEDURE — 93017 CV STRESS TEST TRACING ONLY: CPT

## 2018-11-28 PROCEDURE — 6360000002 HC RX W HCPCS: Performed by: SURGERY

## 2018-11-28 PROCEDURE — 2500000003 HC RX 250 WO HCPCS: Performed by: INTERNAL MEDICINE

## 2018-11-28 PROCEDURE — 6360000002 HC RX W HCPCS

## 2018-11-28 PROCEDURE — 5A1D70Z PERFORMANCE OF URINARY FILTRATION, INTERMITTENT, LESS THAN 6 HOURS PER DAY: ICD-10-PCS | Performed by: INTERNAL MEDICINE

## 2018-11-28 PROCEDURE — 77001 FLUOROGUIDE FOR VEIN DEVICE: CPT | Performed by: SURGERY

## 2018-11-28 PROCEDURE — 2700000000 HC OXYGEN THERAPY PER DAY

## 2018-11-28 PROCEDURE — 80061 LIPID PANEL: CPT

## 2018-11-28 PROCEDURE — 3700000000 HC ANESTHESIA ATTENDED CARE: Performed by: SURGERY

## 2018-11-28 PROCEDURE — C1881 DIALYSIS ACCESS SYSTEM: HCPCS | Performed by: SURGERY

## 2018-11-28 PROCEDURE — 80053 COMPREHEN METABOLIC PANEL: CPT

## 2018-11-28 PROCEDURE — 2100000000 HC CCU R&B

## 2018-11-28 PROCEDURE — 85610 PROTHROMBIN TIME: CPT

## 2018-11-28 PROCEDURE — 83036 HEMOGLOBIN GLYCOSYLATED A1C: CPT

## 2018-11-28 PROCEDURE — A9500 TC99M SESTAMIBI: HCPCS | Performed by: INTERNAL MEDICINE

## 2018-11-28 PROCEDURE — 6370000000 HC RX 637 (ALT 250 FOR IP): Performed by: HOSPITALIST

## 2018-11-28 PROCEDURE — 8010000000 HC HEMODIALYSIS ACUTE INPT

## 2018-11-28 PROCEDURE — 6370000000 HC RX 637 (ALT 250 FOR IP): Performed by: SURGERY

## 2018-11-28 PROCEDURE — 2500000003 HC RX 250 WO HCPCS: Performed by: SURGERY

## 2018-11-28 PROCEDURE — 99222 1ST HOSP IP/OBS MODERATE 55: CPT | Performed by: NURSE PRACTITIONER

## 2018-11-28 PROCEDURE — 84550 ASSAY OF BLOOD/URIC ACID: CPT

## 2018-11-28 PROCEDURE — 78452 HT MUSCLE IMAGE SPECT MULT: CPT

## 2018-11-28 PROCEDURE — 3600000003 HC SURGERY LEVEL 3 BASE: Performed by: SURGERY

## 2018-11-28 RX ORDER — MEPERIDINE HYDROCHLORIDE 50 MG/ML
12.5 INJECTION INTRAMUSCULAR; INTRAVENOUS; SUBCUTANEOUS EVERY 5 MIN PRN
Status: DISCONTINUED | OUTPATIENT
Start: 2018-11-28 | End: 2018-11-28 | Stop reason: HOSPADM

## 2018-11-28 RX ORDER — PROPOFOL 10 MG/ML
INJECTION, EMULSION INTRAVENOUS PRN
Status: DISCONTINUED | OUTPATIENT
Start: 2018-11-28 | End: 2018-11-28 | Stop reason: SDUPTHER

## 2018-11-28 RX ORDER — ENALAPRILAT 2.5 MG/2ML
1.25 INJECTION INTRAVENOUS
Status: DISCONTINUED | OUTPATIENT
Start: 2018-11-28 | End: 2018-11-28 | Stop reason: HOSPADM

## 2018-11-28 RX ORDER — MIDAZOLAM HYDROCHLORIDE 1 MG/ML
INJECTION INTRAMUSCULAR; INTRAVENOUS PRN
Status: DISCONTINUED | OUTPATIENT
Start: 2018-11-28 | End: 2018-11-28 | Stop reason: SDUPTHER

## 2018-11-28 RX ORDER — KETAMINE HYDROCHLORIDE 100 MG/ML
INJECTION, SOLUTION INTRAMUSCULAR; INTRAVENOUS PRN
Status: DISCONTINUED | OUTPATIENT
Start: 2018-11-28 | End: 2018-11-28 | Stop reason: SDUPTHER

## 2018-11-28 RX ORDER — HYDRALAZINE HYDROCHLORIDE 20 MG/ML
5 INJECTION INTRAMUSCULAR; INTRAVENOUS EVERY 10 MIN PRN
Status: DISCONTINUED | OUTPATIENT
Start: 2018-11-28 | End: 2018-11-28 | Stop reason: HOSPADM

## 2018-11-28 RX ORDER — MORPHINE SULFATE 2 MG/ML
4 INJECTION, SOLUTION INTRAMUSCULAR; INTRAVENOUS EVERY 5 MIN PRN
Status: DISCONTINUED | OUTPATIENT
Start: 2018-11-28 | End: 2018-11-28 | Stop reason: HOSPADM

## 2018-11-28 RX ORDER — SODIUM BICARBONATE 650 MG/1
650 TABLET ORAL 3 TIMES DAILY
Status: DISCONTINUED | OUTPATIENT
Start: 2018-11-28 | End: 2018-11-30

## 2018-11-28 RX ORDER — MORPHINE SULFATE 2 MG/ML
2 INJECTION, SOLUTION INTRAMUSCULAR; INTRAVENOUS EVERY 5 MIN PRN
Status: DISCONTINUED | OUTPATIENT
Start: 2018-11-28 | End: 2018-11-28 | Stop reason: HOSPADM

## 2018-11-28 RX ORDER — SODIUM CHLORIDE 0.9 % (FLUSH) 0.9 %
10 SYRINGE (ML) INJECTION PRN
Status: DISCONTINUED | OUTPATIENT
Start: 2018-11-28 | End: 2018-12-06 | Stop reason: HOSPADM

## 2018-11-28 RX ORDER — HYDROCODONE BITARTRATE AND ACETAMINOPHEN 5; 325 MG/1; MG/1
1 TABLET ORAL EVERY 4 HOURS PRN
Status: DISCONTINUED | OUTPATIENT
Start: 2018-11-28 | End: 2018-12-06 | Stop reason: HOSPADM

## 2018-11-28 RX ORDER — SODIUM CHLORIDE 450 MG/100ML
INJECTION, SOLUTION INTRAVENOUS CONTINUOUS
Status: DISCONTINUED | OUTPATIENT
Start: 2018-11-28 | End: 2018-11-29

## 2018-11-28 RX ORDER — CARVEDILOL 3.12 MG/1
3.12 TABLET ORAL 2 TIMES DAILY WITH MEALS
Status: DISCONTINUED | OUTPATIENT
Start: 2018-11-28 | End: 2018-12-06 | Stop reason: HOSPADM

## 2018-11-28 RX ORDER — HYDROCODONE BITARTRATE AND ACETAMINOPHEN 5; 325 MG/1; MG/1
2 TABLET ORAL EVERY 4 HOURS PRN
Status: DISCONTINUED | OUTPATIENT
Start: 2018-11-28 | End: 2018-12-06 | Stop reason: HOSPADM

## 2018-11-28 RX ORDER — LISINOPRIL 2.5 MG/1
1.25 TABLET ORAL DAILY
Status: DISCONTINUED | OUTPATIENT
Start: 2018-11-28 | End: 2018-12-03

## 2018-11-28 RX ORDER — VANCOMYCIN HYDROCHLORIDE 1 G/200ML
1000 INJECTION, SOLUTION INTRAVENOUS
Status: COMPLETED | OUTPATIENT
Start: 2018-11-28 | End: 2018-11-28

## 2018-11-28 RX ORDER — METOCLOPRAMIDE HYDROCHLORIDE 5 MG/ML
10 INJECTION INTRAMUSCULAR; INTRAVENOUS
Status: DISCONTINUED | OUTPATIENT
Start: 2018-11-28 | End: 2018-11-28 | Stop reason: HOSPADM

## 2018-11-28 RX ORDER — PROMETHAZINE HYDROCHLORIDE 25 MG/ML
6.25 INJECTION, SOLUTION INTRAMUSCULAR; INTRAVENOUS
Status: DISCONTINUED | OUTPATIENT
Start: 2018-11-28 | End: 2018-11-28 | Stop reason: HOSPADM

## 2018-11-28 RX ORDER — LABETALOL HYDROCHLORIDE 5 MG/ML
5 INJECTION, SOLUTION INTRAVENOUS EVERY 10 MIN PRN
Status: DISCONTINUED | OUTPATIENT
Start: 2018-11-28 | End: 2018-11-28 | Stop reason: HOSPADM

## 2018-11-28 RX ORDER — SODIUM CHLORIDE 0.9 % (FLUSH) 0.9 %
10 SYRINGE (ML) INJECTION EVERY 12 HOURS SCHEDULED
Status: DISCONTINUED | OUTPATIENT
Start: 2018-11-28 | End: 2018-12-06 | Stop reason: HOSPADM

## 2018-11-28 RX ADMIN — IRON SUCROSE 200 MG: 20 INJECTION, SOLUTION INTRAVENOUS at 11:47

## 2018-11-28 RX ADMIN — SODIUM BICARBONATE 650 MG: 650 TABLET ORAL at 14:08

## 2018-11-28 RX ADMIN — TETRAKIS(2-METHOXYISOBUTYLISOCYANIDE)COPPER(I) TETRAFLUOROBORATE 10 MILLICURIE: 1 INJECTION, POWDER, LYOPHILIZED, FOR SOLUTION INTRAVENOUS at 12:16

## 2018-11-28 RX ADMIN — HEPARIN SODIUM 3910 UNITS: 1000 INJECTION INTRAVENOUS; SUBCUTANEOUS at 00:49

## 2018-11-28 RX ADMIN — LEVOTHYROXINE SODIUM ANHYDROUS 25 MCG: 100 INJECTION, POWDER, LYOPHILIZED, FOR SOLUTION INTRAVENOUS at 06:35

## 2018-11-28 RX ADMIN — REGADENOSON 0.4 MG: 0.08 INJECTION, SOLUTION INTRAVENOUS at 12:17

## 2018-11-28 RX ADMIN — PHENYLEPHRINE HYDROCHLORIDE 80 MCG: 10 INJECTION INTRAVENOUS at 18:44

## 2018-11-28 RX ADMIN — LISINOPRIL 1.25 MG: 2.5 TABLET ORAL at 11:52

## 2018-11-28 RX ADMIN — ATORVASTATIN CALCIUM 40 MG: 40 TABLET, FILM COATED ORAL at 21:03

## 2018-11-28 RX ADMIN — VANCOMYCIN HYDROCHLORIDE 1000 MG: 1 INJECTION, SOLUTION INTRAVENOUS at 16:24

## 2018-11-28 RX ADMIN — PHENYLEPHRINE HYDROCHLORIDE 160 MCG: 10 INJECTION INTRAVENOUS at 18:51

## 2018-11-28 RX ADMIN — SODIUM CHLORIDE: 4.5 INJECTION, SOLUTION INTRAVENOUS at 16:29

## 2018-11-28 RX ADMIN — Medication: at 06:12

## 2018-11-28 RX ADMIN — TETRAKIS(2-METHOXYISOBUTYLISOCYANIDE)COPPER(I) TETRAFLUOROBORATE 30 MILLICURIE: 1 INJECTION, POWDER, LYOPHILIZED, FOR SOLUTION INTRAVENOUS at 13:32

## 2018-11-28 RX ADMIN — PROPOFOL 20 MG: 10 INJECTION, EMULSION INTRAVENOUS at 18:34

## 2018-11-28 RX ADMIN — Medication 10 ML: at 11:47

## 2018-11-28 RX ADMIN — HYDROCODONE BITARTRATE AND ACETAMINOPHEN 1 TABLET: 5; 325 TABLET ORAL at 22:30

## 2018-11-28 RX ADMIN — SODIUM BICARBONATE 650 MG: 650 TABLET ORAL at 11:53

## 2018-11-28 RX ADMIN — HEPARIN SODIUM 14 UNITS/KG/HR: 10000 INJECTION, SOLUTION INTRAVENOUS at 00:50

## 2018-11-28 RX ADMIN — ERGOCALCIFEROL 50000 UNITS: 1.25 CAPSULE ORAL at 11:53

## 2018-11-28 RX ADMIN — SODIUM BICARBONATE 650 MG: 650 TABLET ORAL at 21:03

## 2018-11-28 RX ADMIN — CARVEDILOL 3.12 MG: 3.12 TABLET, FILM COATED ORAL at 07:17

## 2018-11-28 RX ADMIN — ATORVASTATIN CALCIUM 40 MG: 40 TABLET, FILM COATED ORAL at 00:23

## 2018-11-28 RX ADMIN — FERROUS SULFATE TAB 325 MG (65 MG ELEMENTAL FE) 325 MG: 325 (65 FE) TAB at 07:16

## 2018-11-28 RX ADMIN — MIDAZOLAM 1 MG: 1 INJECTION INTRAMUSCULAR; INTRAVENOUS at 18:22

## 2018-11-28 RX ADMIN — Medication 100 ML/HR: at 18:51

## 2018-11-28 RX ADMIN — Medication 2 MG: at 07:19

## 2018-11-28 RX ADMIN — Medication 20 MG: at 18:21

## 2018-11-28 RX ADMIN — Medication 10 ML: at 07:17

## 2018-11-28 RX ADMIN — MIDAZOLAM 1 MG: 1 INJECTION INTRAMUSCULAR; INTRAVENOUS at 18:34

## 2018-11-28 ASSESSMENT — PAIN SCALES - GENERAL
PAINLEVEL_OUTOF10: 0
PAINLEVEL_OUTOF10: 4
PAINLEVEL_OUTOF10: 0
PAINLEVEL_OUTOF10: 4

## 2018-11-28 ASSESSMENT — PAIN DESCRIPTION - ORIENTATION: ORIENTATION: MID;LEFT

## 2018-11-28 ASSESSMENT — PAIN DESCRIPTION - LOCATION: LOCATION: CHEST

## 2018-11-28 ASSESSMENT — PAIN DESCRIPTION - PAIN TYPE: TYPE: ACUTE PAIN

## 2018-11-28 ASSESSMENT — PAIN DESCRIPTION - DESCRIPTORS: DESCRIPTORS: DULL;HEAVINESS

## 2018-11-28 NOTE — ANESTHESIA PRE PROCEDURE
Department of Anesthesiology  Preprocedure Note       Name:  Willard Leal   Age:  71 y.o.  :  1945                                          MRN:  [id number]         Date:  2018      Surgeon: Edgar Sanchez):  Franklin Nolan MD    Procedure: Procedure(s):  PLACEMENT OF TUNNELED HEMODIALYSIS CATHETER (N/A )    Medications prior to admission:   Prior to Admission medications    Medication Sig Start Date End Date Taking? Authorizing Provider   atenolol (TENORMIN) 25 MG tablet Take 25 mg by mouth daily   Yes Historical Provider, MD   Aspirin-Acetaminophen-Caffeine (EXCEDRIN PO) Take by mouth   Yes Historical Provider, MD   ALPRAZolam (XANAX) 0.25 MG tablet Take 0.25 mg by mouth nightly as needed for Sleep. .   Yes Historical Provider, MD   Multiple Vitamins-Minerals (THERAPEUTIC MULTIVITAMIN-MINERALS) tablet Take 1 tablet by mouth daily   Yes Historical Provider, MD   amLODIPine-benazepril (LOTREL) 10-40 MG per capsule Take 1 capsule by mouth daily  14  Yes Historical Provider, MD   paricalcitol (ZEMPLAR) 1 MCG capsule Take 1 mcg by mouth daily  11  Yes Historical Provider, MD       Current medications:    Current Facility-Administered Medications   Medication Dose Route Frequency Provider Last Rate Last Dose    [MAR Hold] sodium chloride flush 0.9 % injection 10 mL  10 mL Intravenous 2 times per day Deep Sadler MD   10 mL at 18 0717    [MAR Hold] sodium chloride flush 0.9 % injection 10 mL  10 mL Intravenous PRN Deep Sadler MD   10 mL at 18 1147    [MAR Hold] carvedilol (COREG) tablet 3.125 mg  3.125 mg Oral BID WC Deep Sadler MD   3.125 mg at 18 0717    [MAR Hold] lisinopril (PRINIVIL;ZESTRIL) tablet 1.25 mg  1.25 mg Oral Daily Deep Sadler MD   1.25 mg at 18 1152    [MAR Hold] sodium bicarbonate tablet 650 mg  650 mg Oral TID Liss Heart MD   650 mg at 18 1408    [MAR Hold] vancomycin (VANCOCIN) 1000 mg in dextrose 5% 200 mL IVPB  1,000 mg 11/28/2018     11/28/2018    CREATININE 11.0 11/28/2018    CREATININE 3.5 09/28/2011    LABGLOM 5 11/28/2018    GLUCOSE 109 11/28/2018    PROT 5.9 11/28/2018    PROT 6.4 09/28/2011    CALCIUM 7.6 11/28/2018    BILITOT 0.3 11/28/2018    ALKPHOS 48 11/28/2018    ALKPHOS 81 09/28/2011    AST 79 11/28/2018    ALT 29 11/28/2018       POC Tests: No results for input(s): POCGLU, POCNA, POCK, POCCL, POCBUN, POCHEMO, POCHCT in the last 72 hours. Coags:   Lab Results   Component Value Date    PROTIME 17.2 11/28/2018    INR 1.41 11/28/2018    APTT 56.4 11/28/2018       HCG (If Applicable): No results found for: PREGTESTUR, PREGSERUM, HCG, HCGQUANT     ABGs:   Lab Results   Component Value Date    PHART 7.280 11/27/2018    PO2ART 123.0 11/27/2018    OFW7VHZ 22.0 11/27/2018    UYF6ZDI 10.3 11/27/2018    BEART -14.8 11/27/2018    E7SXBESV 97.5 11/27/2018        Type & Screen (If Applicable):  No results found for: LABABO, [address]    Anesthesia Evaluation  Patient summary reviewed and Nursing notes reviewed no history of anesthetic complications:   Airway: Mallampati: I  TM distance: >3 FB   Neck ROM: full   Dental:    (+) partials      Pulmonary:       (-) COPD, asthma and sleep apnea                          ROS comment: No tob   Cardiovascular:  Exercise tolerance: poor (<4 METS),   (+) hypertension:, angina:,     (-) pacemaker, past MI, CAD, CABG/stent and dysrhythmias    ECG reviewed          Cleared by cardiology     Beta Blocker:  Dose within 24 Hrs      ROS comment: Heart cath:  Summary:       1. Successful femoral artery ultrasound  2. Successful femoral artery arteriogram  3. Supervision of the administration of moderate conscious sedation  4.  100% stenosis of the osteal / proximal LAD and 90 - 95% stenosis of the 1st diagonal.    5.  Luminal irregularities of the co - dominant circumflex marginal system  6.   Moderate diffuse disease, between 50 and 70%, throughout the entire co - dominant right coronary

## 2018-11-29 PROBLEM — Z51.5 PALLIATIVE CARE PATIENT: Status: ACTIVE | Noted: 2018-11-29

## 2018-11-29 LAB
ANION GAP SERPL CALCULATED.3IONS-SCNC: 15 MMOL/L (ref 7–19)
ANION GAP SERPL CALCULATED.3IONS-SCNC: 20 MMOL/L (ref 7–19)
BUN BLDV-MCNC: 103 MG/DL (ref 8–23)
BUN BLDV-MCNC: 43 MG/DL (ref 8–23)
CALCIUM SERPL-MCNC: 7.2 MG/DL (ref 8.8–10.2)
CALCIUM SERPL-MCNC: 7.4 MG/DL (ref 8.8–10.2)
CHLORIDE BLD-SCNC: 97 MMOL/L (ref 98–111)
CHLORIDE BLD-SCNC: 98 MMOL/L (ref 98–111)
CO2: 15 MMOL/L (ref 22–29)
CO2: 25 MMOL/L (ref 22–29)
CREAT SERPL-MCNC: 10.5 MG/DL (ref 0.5–1.2)
CREAT SERPL-MCNC: 5.8 MG/DL (ref 0.5–1.2)
GFR NON-AFRICAN AMERICAN: 10
GFR NON-AFRICAN AMERICAN: 5
GLUCOSE BLD-MCNC: 114 MG/DL (ref 74–109)
GLUCOSE BLD-MCNC: 123 MG/DL (ref 74–109)
HAV IGM SER IA-ACNC: NORMAL
HBV SURFACE AB TITR SER: NORMAL {TITER}
HCT VFR BLD CALC: 22.6 % (ref 42–52)
HEMOGLOBIN: 7.3 G/DL (ref 14–18)
HEPATITIS B CORE IGM ANTIBODY: NORMAL
HEPATITIS B SURFACE ANTIGEN INTERPRETATION: NORMAL
HEPATITIS C ANTIBODY INTERPRETATION: NORMAL
LV EF: 20 %
LV EF: 20 %
LVEF MODALITY: NORMAL
LVEF MODALITY: NORMAL
MCH RBC QN AUTO: 30.7 PG (ref 27–31)
MCHC RBC AUTO-ENTMCNC: 32.3 G/DL (ref 33–37)
MCV RBC AUTO: 95 FL (ref 80–94)
MRSA CULTURE ONLY: NORMAL
PDW BLD-RTO: 16.3 % (ref 11.5–14.5)
PLATELET # BLD: 178 K/UL (ref 130–400)
PMV BLD AUTO: 10 FL (ref 9.4–12.4)
POTASSIUM SERPL-SCNC: 3.6 MMOL/L (ref 3.5–5)
POTASSIUM SERPL-SCNC: 4.2 MMOL/L (ref 3.5–5)
RBC # BLD: 2.38 M/UL (ref 4.7–6.1)
SODIUM BLD-SCNC: 133 MMOL/L (ref 136–145)
SODIUM BLD-SCNC: 137 MMOL/L (ref 136–145)
TROPONIN: 8.62 NG/ML (ref 0–0.03)
WBC # BLD: 10.2 K/UL (ref 4.8–10.8)

## 2018-11-29 PROCEDURE — 2500000003 HC RX 250 WO HCPCS: Performed by: INTERNAL MEDICINE

## 2018-11-29 PROCEDURE — 2100000000 HC CCU R&B

## 2018-11-29 PROCEDURE — 8010000000 HC HEMODIALYSIS ACUTE INPT

## 2018-11-29 PROCEDURE — 6370000000 HC RX 637 (ALT 250 FOR IP): Performed by: INTERNAL MEDICINE

## 2018-11-29 PROCEDURE — 2700000000 HC OXYGEN THERAPY PER DAY

## 2018-11-29 PROCEDURE — 6370000000 HC RX 637 (ALT 250 FOR IP): Performed by: HOSPITALIST

## 2018-11-29 PROCEDURE — 99232 SBSQ HOSP IP/OBS MODERATE 35: CPT | Performed by: INTERNAL MEDICINE

## 2018-11-29 PROCEDURE — 2580000003 HC RX 258: Performed by: INTERNAL MEDICINE

## 2018-11-29 PROCEDURE — 80074 ACUTE HEPATITIS PANEL: CPT

## 2018-11-29 PROCEDURE — 86706 HEP B SURFACE ANTIBODY: CPT

## 2018-11-29 PROCEDURE — 36592 COLLECT BLOOD FROM PICC: CPT

## 2018-11-29 PROCEDURE — 6360000002 HC RX W HCPCS: Performed by: INTERNAL MEDICINE

## 2018-11-29 PROCEDURE — 36591 DRAW BLOOD OFF VENOUS DEVICE: CPT

## 2018-11-29 PROCEDURE — 80048 BASIC METABOLIC PNL TOTAL CA: CPT

## 2018-11-29 PROCEDURE — 84484 ASSAY OF TROPONIN QUANT: CPT

## 2018-11-29 PROCEDURE — 6370000000 HC RX 637 (ALT 250 FOR IP): Performed by: SURGERY

## 2018-11-29 PROCEDURE — 85027 COMPLETE CBC AUTOMATED: CPT

## 2018-11-29 RX ORDER — METHYLPREDNISOLONE SODIUM SUCCINATE 40 MG/ML
40 INJECTION, POWDER, LYOPHILIZED, FOR SOLUTION INTRAMUSCULAR; INTRAVENOUS ONCE
Status: DISCONTINUED | OUTPATIENT
Start: 2018-11-29 | End: 2018-11-29 | Stop reason: SDUPTHER

## 2018-11-29 RX ORDER — ALPRAZOLAM 0.25 MG/1
0.25 TABLET ORAL ONCE
Status: COMPLETED | OUTPATIENT
Start: 2018-11-29 | End: 2018-11-30

## 2018-11-29 RX ORDER — HEPARIN SODIUM 1000 [USP'U]/ML
1800 INJECTION, SOLUTION INTRAVENOUS; SUBCUTANEOUS PRN
Status: DISCONTINUED | OUTPATIENT
Start: 2018-11-29 | End: 2018-12-06 | Stop reason: HOSPADM

## 2018-11-29 RX ADMIN — CALCIUM ACETATE 667 MG: 667 CAPSULE ORAL at 17:51

## 2018-11-29 RX ADMIN — Medication 10 ML: at 20:14

## 2018-11-29 RX ADMIN — SODIUM BICARBONATE 650 MG: 650 TABLET ORAL at 20:14

## 2018-11-29 RX ADMIN — ASPIRIN 81 MG 81 MG: 81 TABLET ORAL at 13:46

## 2018-11-29 RX ADMIN — HYDROCODONE BITARTRATE AND ACETAMINOPHEN 1 TABLET: 5; 325 TABLET ORAL at 09:54

## 2018-11-29 RX ADMIN — Medication: at 06:47

## 2018-11-29 RX ADMIN — CARVEDILOL 3.12 MG: 3.12 TABLET, FILM COATED ORAL at 17:54

## 2018-11-29 RX ADMIN — HYDROCODONE BITARTRATE AND ACETAMINOPHEN 1 TABLET: 5; 325 TABLET ORAL at 19:23

## 2018-11-29 RX ADMIN — FERROUS SULFATE TAB 325 MG (65 MG ELEMENTAL FE) 325 MG: 325 (65 FE) TAB at 17:51

## 2018-11-29 RX ADMIN — Medication: at 20:14

## 2018-11-29 RX ADMIN — SODIUM CHLORIDE 40 MG: 9 INJECTION, SOLUTION INTRAVENOUS at 16:46

## 2018-11-29 RX ADMIN — ATORVASTATIN CALCIUM 40 MG: 40 TABLET, FILM COATED ORAL at 20:14

## 2018-11-29 RX ADMIN — CALCIUM ACETATE 667 MG: 667 CAPSULE ORAL at 13:47

## 2018-11-29 RX ADMIN — LISINOPRIL 1.25 MG: 2.5 TABLET ORAL at 13:47

## 2018-11-29 RX ADMIN — SODIUM BICARBONATE 650 MG: 650 TABLET ORAL at 13:48

## 2018-11-29 ASSESSMENT — PAIN DESCRIPTION - DESCRIPTORS
DESCRIPTORS: DULL;ACHING
DESCRIPTORS: HEADACHE

## 2018-11-29 ASSESSMENT — PAIN DESCRIPTION - LOCATION: LOCATION: HEAD

## 2018-11-29 ASSESSMENT — PAIN SCALES - GENERAL
PAINLEVEL_OUTOF10: 0
PAINLEVEL_OUTOF10: 5
PAINLEVEL_OUTOF10: 0
PAINLEVEL_OUTOF10: 0
PAINLEVEL_OUTOF10: 3
PAINLEVEL_OUTOF10: 0
PAINLEVEL_OUTOF10: 0
PAINLEVEL_OUTOF10: 6

## 2018-11-29 ASSESSMENT — PAIN DESCRIPTION - PAIN TYPE
TYPE: ACUTE PAIN
TYPE: ACUTE PAIN

## 2018-11-30 LAB
ANION GAP SERPL CALCULATED.3IONS-SCNC: 11 MMOL/L (ref 7–19)
ANION GAP SERPL CALCULATED.3IONS-SCNC: 16 MMOL/L (ref 7–19)
BUN BLDV-MCNC: 22 MG/DL (ref 8–23)
BUN BLDV-MCNC: 48 MG/DL (ref 8–23)
CALCIUM SERPL-MCNC: 7.5 MG/DL (ref 8.8–10.2)
CALCIUM SERPL-MCNC: 7.5 MG/DL (ref 8.8–10.2)
CHLORIDE BLD-SCNC: 94 MMOL/L (ref 98–111)
CHLORIDE BLD-SCNC: 96 MMOL/L (ref 98–111)
CO2: 26 MMOL/L (ref 22–29)
CO2: 29 MMOL/L (ref 22–29)
CREAT SERPL-MCNC: 3.4 MG/DL (ref 0.5–1.2)
CREAT SERPL-MCNC: 6.4 MG/DL (ref 0.5–1.2)
GFR NON-AFRICAN AMERICAN: 18
GFR NON-AFRICAN AMERICAN: 9
GLUCOSE BLD-MCNC: 128 MG/DL (ref 74–109)
GLUCOSE BLD-MCNC: 144 MG/DL (ref 74–109)
HCT VFR BLD CALC: 22.2 % (ref 42–52)
HEMOGLOBIN: 7.1 G/DL (ref 14–18)
MCH RBC QN AUTO: 30.7 PG (ref 27–31)
MCHC RBC AUTO-ENTMCNC: 32 G/DL (ref 33–37)
MCV RBC AUTO: 96.1 FL (ref 80–94)
PDW BLD-RTO: 16.4 % (ref 11.5–14.5)
PLATELET # BLD: 168 K/UL (ref 130–400)
PMV BLD AUTO: 10.1 FL (ref 9.4–12.4)
POTASSIUM SERPL-SCNC: 3.7 MMOL/L (ref 3.5–5)
POTASSIUM SERPL-SCNC: 4 MMOL/L (ref 3.5–5)
RBC # BLD: 2.31 M/UL (ref 4.7–6.1)
SODIUM BLD-SCNC: 136 MMOL/L (ref 136–145)
SODIUM BLD-SCNC: 136 MMOL/L (ref 136–145)
WBC # BLD: 7.9 K/UL (ref 4.8–10.8)

## 2018-11-30 PROCEDURE — 6360000002 HC RX W HCPCS: Performed by: INTERNAL MEDICINE

## 2018-11-30 PROCEDURE — 2700000000 HC OXYGEN THERAPY PER DAY

## 2018-11-30 PROCEDURE — 2580000003 HC RX 258: Performed by: INTERNAL MEDICINE

## 2018-11-30 PROCEDURE — 6370000000 HC RX 637 (ALT 250 FOR IP): Performed by: INTERNAL MEDICINE

## 2018-11-30 PROCEDURE — 6370000000 HC RX 637 (ALT 250 FOR IP): Performed by: HOSPITALIST

## 2018-11-30 PROCEDURE — 85027 COMPLETE CBC AUTOMATED: CPT

## 2018-11-30 PROCEDURE — 2500000003 HC RX 250 WO HCPCS: Performed by: HOSPITALIST

## 2018-11-30 PROCEDURE — 2500000003 HC RX 250 WO HCPCS: Performed by: INTERNAL MEDICINE

## 2018-11-30 PROCEDURE — 80048 BASIC METABOLIC PNL TOTAL CA: CPT

## 2018-11-30 PROCEDURE — 8010000000 HC HEMODIALYSIS ACUTE INPT

## 2018-11-30 PROCEDURE — 99232 SBSQ HOSP IP/OBS MODERATE 35: CPT | Performed by: INTERNAL MEDICINE

## 2018-11-30 PROCEDURE — 36592 COLLECT BLOOD FROM PICC: CPT

## 2018-11-30 PROCEDURE — 1210000000 HC MED SURG R&B

## 2018-11-30 PROCEDURE — 6370000000 HC RX 637 (ALT 250 FOR IP): Performed by: SURGERY

## 2018-11-30 RX ORDER — SODIUM BICARBONATE 650 MG/1
650 TABLET ORAL 2 TIMES DAILY
Status: DISCONTINUED | OUTPATIENT
Start: 2018-11-30 | End: 2018-12-04

## 2018-11-30 RX ORDER — LEVOTHYROXINE SODIUM 0.05 MG/1
50 TABLET ORAL DAILY
Status: DISCONTINUED | OUTPATIENT
Start: 2018-12-01 | End: 2018-12-06 | Stop reason: HOSPADM

## 2018-11-30 RX ADMIN — ALPRAZOLAM 0.25 MG: 0.25 TABLET ORAL at 00:00

## 2018-11-30 RX ADMIN — ATORVASTATIN CALCIUM 40 MG: 40 TABLET, FILM COATED ORAL at 20:21

## 2018-11-30 RX ADMIN — SODIUM BICARBONATE 650 MG: 650 TABLET ORAL at 20:21

## 2018-11-30 RX ADMIN — FERROUS SULFATE TAB 325 MG (65 MG ELEMENTAL FE) 325 MG: 325 (65 FE) TAB at 16:40

## 2018-11-30 RX ADMIN — IRON SUCROSE 200 MG: 20 INJECTION, SOLUTION INTRAVENOUS at 11:22

## 2018-11-30 RX ADMIN — CALCIUM ACETATE 667 MG: 667 CAPSULE ORAL at 11:23

## 2018-11-30 RX ADMIN — HYDROCODONE BITARTRATE AND ACETAMINOPHEN 1 TABLET: 5; 325 TABLET ORAL at 16:38

## 2018-11-30 RX ADMIN — Medication: at 08:39

## 2018-11-30 RX ADMIN — CARVEDILOL 3.12 MG: 3.12 TABLET, FILM COATED ORAL at 11:23

## 2018-11-30 RX ADMIN — LEVOTHYROXINE SODIUM ANHYDROUS 25 MCG: 100 INJECTION, POWDER, LYOPHILIZED, FOR SOLUTION INTRAVENOUS at 06:18

## 2018-11-30 RX ADMIN — Medication 10 ML: at 11:24

## 2018-11-30 RX ADMIN — Medication 10 ML: at 20:27

## 2018-11-30 RX ADMIN — CALCIUM ACETATE 667 MG: 667 CAPSULE ORAL at 16:39

## 2018-11-30 RX ADMIN — LISINOPRIL 1.25 MG: 2.5 TABLET ORAL at 11:23

## 2018-11-30 RX ADMIN — ASPIRIN 81 MG 81 MG: 81 TABLET ORAL at 11:23

## 2018-11-30 RX ADMIN — FERROUS SULFATE TAB 325 MG (65 MG ELEMENTAL FE) 325 MG: 325 (65 FE) TAB at 11:23

## 2018-11-30 RX ADMIN — CARVEDILOL 3.12 MG: 3.12 TABLET, FILM COATED ORAL at 16:39

## 2018-11-30 ASSESSMENT — PAIN SCALES - GENERAL
PAINLEVEL_OUTOF10: 0
PAINLEVEL_OUTOF10: 0
PAINLEVEL_OUTOF10: 5

## 2018-12-01 LAB
ANION GAP SERPL CALCULATED.3IONS-SCNC: 12 MMOL/L (ref 7–19)
ANION GAP SERPL CALCULATED.3IONS-SCNC: 8 MMOL/L (ref 7–19)
BUN BLDV-MCNC: 12 MG/DL (ref 8–23)
BUN BLDV-MCNC: 31 MG/DL (ref 8–23)
CALCIUM SERPL-MCNC: 7.5 MG/DL (ref 8.8–10.2)
CALCIUM SERPL-MCNC: 7.8 MG/DL (ref 8.8–10.2)
CHLORIDE BLD-SCNC: 97 MMOL/L (ref 98–111)
CHLORIDE BLD-SCNC: 97 MMOL/L (ref 98–111)
CO2: 29 MMOL/L (ref 22–29)
CO2: 31 MMOL/L (ref 22–29)
CREAT SERPL-MCNC: 2.4 MG/DL (ref 0.5–1.2)
CREAT SERPL-MCNC: 4.1 MG/DL (ref 0.5–1.2)
GFR NON-AFRICAN AMERICAN: 14
GFR NON-AFRICAN AMERICAN: 27
GLUCOSE BLD-MCNC: 122 MG/DL (ref 74–109)
GLUCOSE BLD-MCNC: 95 MG/DL (ref 74–109)
POTASSIUM SERPL-SCNC: 3.7 MMOL/L (ref 3.5–5)
POTASSIUM SERPL-SCNC: 4 MMOL/L (ref 3.5–5)
SODIUM BLD-SCNC: 136 MMOL/L (ref 136–145)
SODIUM BLD-SCNC: 138 MMOL/L (ref 136–145)

## 2018-12-01 PROCEDURE — 1210000000 HC MED SURG R&B

## 2018-12-01 PROCEDURE — 2580000003 HC RX 258: Performed by: INTERNAL MEDICINE

## 2018-12-01 PROCEDURE — 6370000000 HC RX 637 (ALT 250 FOR IP): Performed by: INTERNAL MEDICINE

## 2018-12-01 PROCEDURE — 6360000002 HC RX W HCPCS: Performed by: INTERNAL MEDICINE

## 2018-12-01 PROCEDURE — 6370000000 HC RX 637 (ALT 250 FOR IP): Performed by: HOSPITALIST

## 2018-12-01 PROCEDURE — 99231 SBSQ HOSP IP/OBS SF/LOW 25: CPT | Performed by: INTERNAL MEDICINE

## 2018-12-01 PROCEDURE — 6360000002 HC RX W HCPCS: Performed by: HOSPITALIST

## 2018-12-01 PROCEDURE — 8010000000 HC HEMODIALYSIS ACUTE INPT

## 2018-12-01 PROCEDURE — 80048 BASIC METABOLIC PNL TOTAL CA: CPT

## 2018-12-01 RX ORDER — ALPRAZOLAM 0.25 MG/1
0.25 TABLET ORAL NIGHTLY PRN
Status: DISCONTINUED | OUTPATIENT
Start: 2018-12-01 | End: 2018-12-06 | Stop reason: HOSPADM

## 2018-12-01 RX ADMIN — Medication 10 ML: at 08:11

## 2018-12-01 RX ADMIN — FERROUS SULFATE TAB 325 MG (65 MG ELEMENTAL FE) 325 MG: 325 (65 FE) TAB at 08:09

## 2018-12-01 RX ADMIN — ASPIRIN 81 MG 81 MG: 81 TABLET ORAL at 08:09

## 2018-12-01 RX ADMIN — LISINOPRIL 1.25 MG: 2.5 TABLET ORAL at 08:09

## 2018-12-01 RX ADMIN — Medication 2 MG: at 00:53

## 2018-12-01 RX ADMIN — SODIUM BICARBONATE 650 MG: 650 TABLET ORAL at 08:09

## 2018-12-01 RX ADMIN — ATORVASTATIN CALCIUM 40 MG: 40 TABLET, FILM COATED ORAL at 21:09

## 2018-12-01 RX ADMIN — FERROUS SULFATE TAB 325 MG (65 MG ELEMENTAL FE) 325 MG: 325 (65 FE) TAB at 17:34

## 2018-12-01 RX ADMIN — ONDANSETRON 4 MG: 2 INJECTION INTRAMUSCULAR; INTRAVENOUS at 17:33

## 2018-12-01 RX ADMIN — Medication 10 ML: at 21:10

## 2018-12-01 RX ADMIN — CALCIUM ACETATE 667 MG: 667 CAPSULE ORAL at 08:10

## 2018-12-01 RX ADMIN — SODIUM BICARBONATE 650 MG: 650 TABLET ORAL at 21:09

## 2018-12-01 RX ADMIN — CARVEDILOL 3.12 MG: 3.12 TABLET, FILM COATED ORAL at 08:10

## 2018-12-01 RX ADMIN — CALCIUM ACETATE 667 MG: 667 CAPSULE ORAL at 17:34

## 2018-12-01 RX ADMIN — LEVOTHYROXINE SODIUM 50 MCG: 50 TABLET ORAL at 05:36

## 2018-12-01 RX ADMIN — IRON SUCROSE 200 MG: 20 INJECTION, SOLUTION INTRAVENOUS at 08:10

## 2018-12-01 RX ADMIN — CARVEDILOL 3.12 MG: 3.12 TABLET, FILM COATED ORAL at 17:34

## 2018-12-01 RX ADMIN — MAGNESIUM HYDROXIDE 30 ML: 400 SUSPENSION ORAL at 17:39

## 2018-12-01 ASSESSMENT — PAIN SCALES - GENERAL
PAINLEVEL_OUTOF10: 0
PAINLEVEL_OUTOF10: 1
PAINLEVEL_OUTOF10: 0
PAINLEVEL_OUTOF10: 2
PAINLEVEL_OUTOF10: 0

## 2018-12-02 LAB
ANION GAP SERPL CALCULATED.3IONS-SCNC: 11 MMOL/L (ref 7–19)
ANION GAP SERPL CALCULATED.3IONS-SCNC: 13 MMOL/L (ref 7–19)
BUN BLDV-MCNC: 18 MG/DL (ref 8–23)
BUN BLDV-MCNC: 30 MG/DL (ref 8–23)
CALCIUM SERPL-MCNC: 7.6 MG/DL (ref 8.8–10.2)
CALCIUM SERPL-MCNC: 7.9 MG/DL (ref 8.8–10.2)
CHLORIDE BLD-SCNC: 94 MMOL/L (ref 98–111)
CHLORIDE BLD-SCNC: 98 MMOL/L (ref 98–111)
CO2: 29 MMOL/L (ref 22–29)
CO2: 29 MMOL/L (ref 22–29)
CREAT SERPL-MCNC: 3.2 MG/DL (ref 0.5–1.2)
CREAT SERPL-MCNC: 4.2 MG/DL (ref 0.5–1.2)
GFR NON-AFRICAN AMERICAN: 14
GFR NON-AFRICAN AMERICAN: 19
GLUCOSE BLD-MCNC: 117 MG/DL (ref 74–109)
GLUCOSE BLD-MCNC: 147 MG/DL (ref 74–109)
PLATELET # BLD: 171 K/UL (ref 130–400)
POTASSIUM SERPL-SCNC: 4 MMOL/L (ref 3.5–5)
POTASSIUM SERPL-SCNC: 4.3 MMOL/L (ref 3.5–5)
SODIUM BLD-SCNC: 136 MMOL/L (ref 136–145)
SODIUM BLD-SCNC: 138 MMOL/L (ref 136–145)

## 2018-12-02 PROCEDURE — 6370000000 HC RX 637 (ALT 250 FOR IP): Performed by: INTERNAL MEDICINE

## 2018-12-02 PROCEDURE — 2580000003 HC RX 258: Performed by: INTERNAL MEDICINE

## 2018-12-02 PROCEDURE — 80048 BASIC METABOLIC PNL TOTAL CA: CPT

## 2018-12-02 PROCEDURE — 1210000000 HC MED SURG R&B

## 2018-12-02 PROCEDURE — 6370000000 HC RX 637 (ALT 250 FOR IP): Performed by: HOSPITALIST

## 2018-12-02 PROCEDURE — 85049 AUTOMATED PLATELET COUNT: CPT

## 2018-12-02 PROCEDURE — 99231 SBSQ HOSP IP/OBS SF/LOW 25: CPT | Performed by: INTERNAL MEDICINE

## 2018-12-02 PROCEDURE — 99232 SBSQ HOSP IP/OBS MODERATE 35: CPT | Performed by: INTERNAL MEDICINE

## 2018-12-02 PROCEDURE — 8010000000 HC HEMODIALYSIS ACUTE INPT

## 2018-12-02 RX ADMIN — ASPIRIN 81 MG 81 MG: 81 TABLET ORAL at 08:10

## 2018-12-02 RX ADMIN — FERROUS SULFATE TAB 325 MG (65 MG ELEMENTAL FE) 325 MG: 325 (65 FE) TAB at 08:10

## 2018-12-02 RX ADMIN — CALCIUM ACETATE 667 MG: 667 CAPSULE ORAL at 17:58

## 2018-12-02 RX ADMIN — CALCIUM ACETATE 667 MG: 667 CAPSULE ORAL at 08:10

## 2018-12-02 RX ADMIN — LEVOTHYROXINE SODIUM 50 MCG: 50 TABLET ORAL at 06:15

## 2018-12-02 RX ADMIN — ALPRAZOLAM 0.25 MG: 0.25 TABLET ORAL at 00:21

## 2018-12-02 RX ADMIN — FERROUS SULFATE TAB 325 MG (65 MG ELEMENTAL FE) 325 MG: 325 (65 FE) TAB at 17:59

## 2018-12-02 RX ADMIN — Medication 10 ML: at 20:27

## 2018-12-02 RX ADMIN — ATORVASTATIN CALCIUM 40 MG: 40 TABLET, FILM COATED ORAL at 20:27

## 2018-12-02 RX ADMIN — Medication 10 ML: at 08:11

## 2018-12-02 RX ADMIN — SODIUM BICARBONATE 650 MG: 650 TABLET ORAL at 20:27

## 2018-12-02 RX ADMIN — CARVEDILOL 3.12 MG: 3.12 TABLET, FILM COATED ORAL at 08:10

## 2018-12-02 RX ADMIN — LISINOPRIL 1.25 MG: 2.5 TABLET ORAL at 08:10

## 2018-12-02 RX ADMIN — SODIUM BICARBONATE 650 MG: 650 TABLET ORAL at 08:10

## 2018-12-02 RX ADMIN — CARVEDILOL 3.12 MG: 3.12 TABLET, FILM COATED ORAL at 17:58

## 2018-12-02 RX ADMIN — CALCIUM ACETATE 667 MG: 667 CAPSULE ORAL at 12:37

## 2018-12-02 ASSESSMENT — PAIN SCALES - GENERAL
PAINLEVEL_OUTOF10: 0
PAINLEVEL_OUTOF10: 0

## 2018-12-03 LAB
ANION GAP SERPL CALCULATED.3IONS-SCNC: 7 MMOL/L (ref 7–19)
BUN BLDV-MCNC: 16 MG/DL (ref 8–23)
CALCIUM SERPL-MCNC: 7.8 MG/DL (ref 8.8–10.2)
CHLORIDE BLD-SCNC: 97 MMOL/L (ref 98–111)
CO2: 33 MMOL/L (ref 22–29)
CREAT SERPL-MCNC: 2.7 MG/DL (ref 0.5–1.2)
GFR NON-AFRICAN AMERICAN: 23
GLUCOSE BLD-MCNC: 125 MG/DL (ref 74–109)
PLATELET # BLD: 146 K/UL (ref 130–400)
POTASSIUM SERPL-SCNC: 3.8 MMOL/L (ref 3.5–5)
SODIUM BLD-SCNC: 137 MMOL/L (ref 136–145)

## 2018-12-03 PROCEDURE — 6370000000 HC RX 637 (ALT 250 FOR IP): Performed by: HOSPITALIST

## 2018-12-03 PROCEDURE — 1210000000 HC MED SURG R&B

## 2018-12-03 PROCEDURE — 8010000000 HC HEMODIALYSIS ACUTE INPT

## 2018-12-03 PROCEDURE — 80048 BASIC METABOLIC PNL TOTAL CA: CPT

## 2018-12-03 PROCEDURE — 99233 SBSQ HOSP IP/OBS HIGH 50: CPT | Performed by: INTERNAL MEDICINE

## 2018-12-03 PROCEDURE — 85049 AUTOMATED PLATELET COUNT: CPT

## 2018-12-03 PROCEDURE — 99231 SBSQ HOSP IP/OBS SF/LOW 25: CPT | Performed by: INTERNAL MEDICINE

## 2018-12-03 PROCEDURE — 2700000000 HC OXYGEN THERAPY PER DAY

## 2018-12-03 PROCEDURE — 36592 COLLECT BLOOD FROM PICC: CPT

## 2018-12-03 PROCEDURE — 6360000002 HC RX W HCPCS: Performed by: INTERNAL MEDICINE

## 2018-12-03 PROCEDURE — 6370000000 HC RX 637 (ALT 250 FOR IP): Performed by: INTERNAL MEDICINE

## 2018-12-03 PROCEDURE — 2580000003 HC RX 258: Performed by: INTERNAL MEDICINE

## 2018-12-03 RX ORDER — LISINOPRIL 2.5 MG/1
2.5 TABLET ORAL DAILY
Status: DISCONTINUED | OUTPATIENT
Start: 2018-12-04 | End: 2018-12-06 | Stop reason: HOSPADM

## 2018-12-03 RX ORDER — SPIRONOLACTONE 25 MG/1
25 TABLET ORAL DAILY
Status: DISCONTINUED | OUTPATIENT
Start: 2018-12-03 | End: 2018-12-04

## 2018-12-03 RX ORDER — HEPARIN SODIUM 5000 [USP'U]/ML
5000 INJECTION, SOLUTION INTRAVENOUS; SUBCUTANEOUS EVERY 8 HOURS SCHEDULED
Status: DISCONTINUED | OUTPATIENT
Start: 2018-12-03 | End: 2018-12-06 | Stop reason: HOSPADM

## 2018-12-03 RX ADMIN — LEVOTHYROXINE SODIUM 50 MCG: 50 TABLET ORAL at 05:50

## 2018-12-03 RX ADMIN — CALCIUM ACETATE 667 MG: 667 CAPSULE ORAL at 17:36

## 2018-12-03 RX ADMIN — Medication 10 ML: at 13:37

## 2018-12-03 RX ADMIN — HEPARIN SODIUM 5000 UNITS: 5000 INJECTION, SOLUTION INTRAVENOUS; SUBCUTANEOUS at 22:14

## 2018-12-03 RX ADMIN — ATORVASTATIN CALCIUM 40 MG: 40 TABLET, FILM COATED ORAL at 20:41

## 2018-12-03 RX ADMIN — ASPIRIN 81 MG 81 MG: 81 TABLET ORAL at 11:58

## 2018-12-03 RX ADMIN — FERROUS SULFATE TAB 325 MG (65 MG ELEMENTAL FE) 325 MG: 325 (65 FE) TAB at 11:58

## 2018-12-03 RX ADMIN — CARVEDILOL 3.12 MG: 3.12 TABLET, FILM COATED ORAL at 17:36

## 2018-12-03 RX ADMIN — Medication 10 ML: at 20:42

## 2018-12-03 RX ADMIN — CALCIUM ACETATE 667 MG: 667 CAPSULE ORAL at 11:57

## 2018-12-03 RX ADMIN — FERROUS SULFATE TAB 325 MG (65 MG ELEMENTAL FE) 325 MG: 325 (65 FE) TAB at 17:36

## 2018-12-03 RX ADMIN — ALPRAZOLAM 0.25 MG: 0.25 TABLET ORAL at 00:27

## 2018-12-03 RX ADMIN — SODIUM BICARBONATE 650 MG: 650 TABLET ORAL at 20:41

## 2018-12-03 RX ADMIN — HEPARIN SODIUM 5000 UNITS: 5000 INJECTION, SOLUTION INTRAVENOUS; SUBCUTANEOUS at 13:37

## 2018-12-03 RX ADMIN — SODIUM BICARBONATE 650 MG: 650 TABLET ORAL at 11:58

## 2018-12-03 RX ADMIN — LISINOPRIL 1.25 MG: 2.5 TABLET ORAL at 11:57

## 2018-12-03 RX ADMIN — CARVEDILOL 3.12 MG: 3.12 TABLET, FILM COATED ORAL at 11:57

## 2018-12-03 ASSESSMENT — PAIN SCALES - GENERAL
PAINLEVEL_OUTOF10: 0

## 2018-12-04 PROBLEM — B99.9 ANEMIA OF INFECTION AND CHRONIC DISEASE: Status: ACTIVE | Noted: 2018-12-04

## 2018-12-04 PROBLEM — D63.8 ANEMIA OF INFECTION AND CHRONIC DISEASE: Status: ACTIVE | Noted: 2018-12-04

## 2018-12-04 LAB
ABO/RH: NORMAL
ALBUMIN SERPL-MCNC: 2.7 G/DL (ref 3.5–5.2)
ALP BLD-CCNC: 55 U/L (ref 40–130)
ALT SERPL-CCNC: 26 U/L (ref 5–41)
ANION GAP SERPL CALCULATED.3IONS-SCNC: 10 MMOL/L (ref 7–19)
ANTIBODY SCREEN: NORMAL
AST SERPL-CCNC: 30 U/L (ref 5–40)
BILIRUB SERPL-MCNC: 0.3 MG/DL (ref 0.2–1.2)
BLOOD BANK DISPENSE STATUS: NORMAL
BLOOD BANK PRODUCT CODE: NORMAL
BPU ID: NORMAL
BUN BLDV-MCNC: 24 MG/DL (ref 8–23)
CALCIUM SERPL-MCNC: 7.4 MG/DL (ref 8.8–10.2)
CHLORIDE BLD-SCNC: 99 MMOL/L (ref 98–111)
CO2: 29 MMOL/L (ref 22–29)
CREAT SERPL-MCNC: 4 MG/DL (ref 0.5–1.2)
DESCRIPTION BLOOD BANK: NORMAL
GFR NON-AFRICAN AMERICAN: 15
GLUCOSE BLD-MCNC: 96 MG/DL (ref 74–109)
HCT VFR BLD CALC: 20.1 % (ref 42–52)
HEMOGLOBIN: 6 G/DL (ref 14–18)
MCH RBC QN AUTO: 30.8 PG (ref 27–31)
MCHC RBC AUTO-ENTMCNC: 29.9 G/DL (ref 33–37)
MCV RBC AUTO: 103.1 FL (ref 80–94)
PDW BLD-RTO: 15.4 % (ref 11.5–14.5)
PLATELET # BLD: 148 K/UL (ref 130–400)
PMV BLD AUTO: 10.7 FL (ref 9.4–12.4)
POTASSIUM SERPL-SCNC: 3.9 MMOL/L (ref 3.5–5)
RBC # BLD: 1.95 M/UL (ref 4.7–6.1)
SODIUM BLD-SCNC: 138 MMOL/L (ref 136–145)
TOTAL PROTEIN: 4.9 G/DL (ref 6.6–8.7)
WBC # BLD: 7.3 K/UL (ref 4.8–10.8)

## 2018-12-04 PROCEDURE — 86850 RBC ANTIBODY SCREEN: CPT

## 2018-12-04 PROCEDURE — 1210000000 HC MED SURG R&B

## 2018-12-04 PROCEDURE — 6360000002 HC RX W HCPCS: Performed by: INTERNAL MEDICINE

## 2018-12-04 PROCEDURE — 2580000003 HC RX 258: Performed by: INTERNAL MEDICINE

## 2018-12-04 PROCEDURE — 86901 BLOOD TYPING SEROLOGIC RH(D): CPT

## 2018-12-04 PROCEDURE — 80053 COMPREHEN METABOLIC PANEL: CPT

## 2018-12-04 PROCEDURE — 86900 BLOOD TYPING SEROLOGIC ABO: CPT

## 2018-12-04 PROCEDURE — 6370000000 HC RX 637 (ALT 250 FOR IP): Performed by: HOSPITALIST

## 2018-12-04 PROCEDURE — 85027 COMPLETE CBC AUTOMATED: CPT

## 2018-12-04 PROCEDURE — P9016 RBC LEUKOCYTES REDUCED: HCPCS

## 2018-12-04 PROCEDURE — 36415 COLL VENOUS BLD VENIPUNCTURE: CPT

## 2018-12-04 PROCEDURE — 6370000000 HC RX 637 (ALT 250 FOR IP): Performed by: INTERNAL MEDICINE

## 2018-12-04 PROCEDURE — 2700000000 HC OXYGEN THERAPY PER DAY

## 2018-12-04 PROCEDURE — 99232 SBSQ HOSP IP/OBS MODERATE 35: CPT | Performed by: INTERNAL MEDICINE

## 2018-12-04 PROCEDURE — 99231 SBSQ HOSP IP/OBS SF/LOW 25: CPT | Performed by: INTERNAL MEDICINE

## 2018-12-04 RX ORDER — 0.9 % SODIUM CHLORIDE 0.9 %
250 INTRAVENOUS SOLUTION INTRAVENOUS ONCE
Status: COMPLETED | OUTPATIENT
Start: 2018-12-04 | End: 2018-12-04

## 2018-12-04 RX ADMIN — LEVOTHYROXINE SODIUM 50 MCG: 50 TABLET ORAL at 06:10

## 2018-12-04 RX ADMIN — SODIUM CHLORIDE 250 ML: 9 INJECTION, SOLUTION INTRAVENOUS at 06:11

## 2018-12-04 RX ADMIN — LISINOPRIL 2.5 MG: 2.5 TABLET ORAL at 08:48

## 2018-12-04 RX ADMIN — ALPRAZOLAM 0.25 MG: 0.25 TABLET ORAL at 00:07

## 2018-12-04 RX ADMIN — CARVEDILOL 3.12 MG: 3.12 TABLET, FILM COATED ORAL at 16:54

## 2018-12-04 RX ADMIN — ASPIRIN 81 MG 81 MG: 81 TABLET ORAL at 08:48

## 2018-12-04 RX ADMIN — HEPARIN SODIUM 5000 UNITS: 5000 INJECTION, SOLUTION INTRAVENOUS; SUBCUTANEOUS at 22:19

## 2018-12-04 RX ADMIN — FERROUS SULFATE TAB 325 MG (65 MG ELEMENTAL FE) 325 MG: 325 (65 FE) TAB at 16:54

## 2018-12-04 RX ADMIN — CALCIUM ACETATE 667 MG: 667 CAPSULE ORAL at 08:49

## 2018-12-04 RX ADMIN — ATORVASTATIN CALCIUM 40 MG: 40 TABLET, FILM COATED ORAL at 20:37

## 2018-12-04 RX ADMIN — CALCIUM ACETATE 667 MG: 667 CAPSULE ORAL at 12:15

## 2018-12-04 RX ADMIN — FERROUS SULFATE TAB 325 MG (65 MG ELEMENTAL FE) 325 MG: 325 (65 FE) TAB at 08:48

## 2018-12-04 RX ADMIN — SODIUM BICARBONATE 650 MG: 650 TABLET ORAL at 08:48

## 2018-12-04 RX ADMIN — Medication 10 ML: at 08:53

## 2018-12-04 RX ADMIN — SPIRONOLACTONE 25 MG: 25 TABLET ORAL at 08:48

## 2018-12-04 RX ADMIN — DARBEPOETIN ALFA 60 MCG: 60 SOLUTION INTRAVENOUS; SUBCUTANEOUS at 12:15

## 2018-12-04 RX ADMIN — CARVEDILOL 3.12 MG: 3.12 TABLET, FILM COATED ORAL at 08:48

## 2018-12-04 RX ADMIN — CALCIUM ACETATE 667 MG: 667 CAPSULE ORAL at 16:54

## 2018-12-04 RX ADMIN — Medication 10 ML: at 20:38

## 2018-12-04 RX ADMIN — HEPARIN SODIUM 5000 UNITS: 5000 INJECTION, SOLUTION INTRAVENOUS; SUBCUTANEOUS at 15:16

## 2018-12-04 ASSESSMENT — PAIN SCALES - GENERAL
PAINLEVEL_OUTOF10: 0

## 2018-12-05 LAB
ALBUMIN SERPL-MCNC: 2.8 G/DL (ref 3.5–5.2)
ALP BLD-CCNC: 58 U/L (ref 40–130)
ALT SERPL-CCNC: 26 U/L (ref 5–41)
ANION GAP SERPL CALCULATED.3IONS-SCNC: 12 MMOL/L (ref 7–19)
AST SERPL-CCNC: 27 U/L (ref 5–40)
BILIRUB SERPL-MCNC: 0.3 MG/DL (ref 0.2–1.2)
BLOOD BANK DISPENSE STATUS: NORMAL
BLOOD BANK PRODUCT CODE: NORMAL
BPU ID: NORMAL
BUN BLDV-MCNC: 35 MG/DL (ref 8–23)
CALCIUM SERPL-MCNC: 7.3 MG/DL (ref 8.8–10.2)
CHLORIDE BLD-SCNC: 94 MMOL/L (ref 98–111)
CO2: 27 MMOL/L (ref 22–29)
CREAT SERPL-MCNC: 5.7 MG/DL (ref 0.5–1.2)
DESCRIPTION BLOOD BANK: NORMAL
GFR NON-AFRICAN AMERICAN: 10
GLUCOSE BLD-MCNC: 102 MG/DL (ref 74–109)
HCT VFR BLD CALC: 21.5 % (ref 42–52)
HEMOGLOBIN: 6.6 G/DL (ref 14–18)
MCH RBC QN AUTO: 30.4 PG (ref 27–31)
MCHC RBC AUTO-ENTMCNC: 30.7 G/DL (ref 33–37)
MCV RBC AUTO: 99.1 FL (ref 80–94)
PDW BLD-RTO: 17.7 % (ref 11.5–14.5)
PLATELET # BLD: 153 K/UL (ref 130–400)
PMV BLD AUTO: 10.6 FL (ref 9.4–12.4)
POTASSIUM SERPL-SCNC: 4 MMOL/L (ref 3.5–5)
RBC # BLD: 2.17 M/UL (ref 4.7–6.1)
SODIUM BLD-SCNC: 133 MMOL/L (ref 136–145)
TOTAL PROTEIN: 4.9 G/DL (ref 6.6–8.7)
WBC # BLD: 7.4 K/UL (ref 4.8–10.8)

## 2018-12-05 PROCEDURE — 85027 COMPLETE CBC AUTOMATED: CPT

## 2018-12-05 PROCEDURE — 1210000000 HC MED SURG R&B

## 2018-12-05 PROCEDURE — 6370000000 HC RX 637 (ALT 250 FOR IP): Performed by: HOSPITALIST

## 2018-12-05 PROCEDURE — 2580000003 HC RX 258: Performed by: INTERNAL MEDICINE

## 2018-12-05 PROCEDURE — 6360000002 HC RX W HCPCS: Performed by: INTERNAL MEDICINE

## 2018-12-05 PROCEDURE — 36430 TRANSFUSION BLD/BLD COMPNT: CPT

## 2018-12-05 PROCEDURE — P9016 RBC LEUKOCYTES REDUCED: HCPCS

## 2018-12-05 PROCEDURE — 99231 SBSQ HOSP IP/OBS SF/LOW 25: CPT | Performed by: INTERNAL MEDICINE

## 2018-12-05 PROCEDURE — 6370000000 HC RX 637 (ALT 250 FOR IP): Performed by: INTERNAL MEDICINE

## 2018-12-05 PROCEDURE — 80053 COMPREHEN METABOLIC PANEL: CPT

## 2018-12-05 PROCEDURE — 99232 SBSQ HOSP IP/OBS MODERATE 35: CPT | Performed by: INTERNAL MEDICINE

## 2018-12-05 PROCEDURE — 8010000000 HC HEMODIALYSIS ACUTE INPT

## 2018-12-05 RX ORDER — HEPARIN SODIUM 1000 [USP'U]/ML
1800 INJECTION, SOLUTION INTRAVENOUS; SUBCUTANEOUS ONCE
Status: DISCONTINUED | OUTPATIENT
Start: 2018-12-05 | End: 2018-12-06 | Stop reason: HOSPADM

## 2018-12-05 RX ORDER — 0.9 % SODIUM CHLORIDE 0.9 %
250 INTRAVENOUS SOLUTION INTRAVENOUS ONCE
Status: DISCONTINUED | OUTPATIENT
Start: 2018-12-05 | End: 2018-12-06 | Stop reason: HOSPADM

## 2018-12-05 RX ADMIN — CARVEDILOL 3.12 MG: 3.12 TABLET, FILM COATED ORAL at 11:53

## 2018-12-05 RX ADMIN — HEPARIN SODIUM 5000 UNITS: 5000 INJECTION, SOLUTION INTRAVENOUS; SUBCUTANEOUS at 13:49

## 2018-12-05 RX ADMIN — HEPARIN SODIUM 5000 UNITS: 5000 INJECTION, SOLUTION INTRAVENOUS; SUBCUTANEOUS at 21:55

## 2018-12-05 RX ADMIN — CALCIUM ACETATE 667 MG: 667 CAPSULE ORAL at 11:53

## 2018-12-05 RX ADMIN — HEPARIN SODIUM 5000 UNITS: 5000 INJECTION, SOLUTION INTRAVENOUS; SUBCUTANEOUS at 06:07

## 2018-12-05 RX ADMIN — Medication 10 ML: at 19:54

## 2018-12-05 RX ADMIN — ASPIRIN 81 MG 81 MG: 81 TABLET ORAL at 11:53

## 2018-12-05 RX ADMIN — ATORVASTATIN CALCIUM 40 MG: 40 TABLET, FILM COATED ORAL at 19:54

## 2018-12-05 RX ADMIN — LEVOTHYROXINE SODIUM 50 MCG: 50 TABLET ORAL at 06:07

## 2018-12-05 RX ADMIN — FERROUS SULFATE TAB 325 MG (65 MG ELEMENTAL FE) 325 MG: 325 (65 FE) TAB at 17:29

## 2018-12-05 RX ADMIN — FERROUS SULFATE TAB 325 MG (65 MG ELEMENTAL FE) 325 MG: 325 (65 FE) TAB at 11:52

## 2018-12-05 RX ADMIN — CARVEDILOL 3.12 MG: 3.12 TABLET, FILM COATED ORAL at 17:29

## 2018-12-05 RX ADMIN — ALPRAZOLAM 0.25 MG: 0.25 TABLET ORAL at 00:56

## 2018-12-05 RX ADMIN — CALCIUM ACETATE 667 MG: 667 CAPSULE ORAL at 17:29

## 2018-12-06 VITALS
HEART RATE: 68 BPM | OXYGEN SATURATION: 97 % | HEIGHT: 71 IN | WEIGHT: 156.38 LBS | BODY MASS INDEX: 21.89 KG/M2 | DIASTOLIC BLOOD PRESSURE: 66 MMHG | RESPIRATION RATE: 18 BRPM | SYSTOLIC BLOOD PRESSURE: 103 MMHG | TEMPERATURE: 98.6 F

## 2018-12-06 LAB
ALBUMIN SERPL-MCNC: 2.6 G/DL (ref 3.5–5.2)
ALP BLD-CCNC: 57 U/L (ref 40–130)
ALT SERPL-CCNC: 25 U/L (ref 5–41)
ANION GAP SERPL CALCULATED.3IONS-SCNC: 8 MMOL/L (ref 7–19)
ANION GAP SERPL CALCULATED.3IONS-SCNC: 9 MMOL/L (ref 7–19)
AST SERPL-CCNC: 24 U/L (ref 5–40)
BILIRUB SERPL-MCNC: 0.3 MG/DL (ref 0.2–1.2)
BUN BLDV-MCNC: 22 MG/DL (ref 8–23)
BUN BLDV-MCNC: 22 MG/DL (ref 8–23)
CALCIUM SERPL-MCNC: 7.1 MG/DL (ref 8.8–10.2)
CALCIUM SERPL-MCNC: 7.2 MG/DL (ref 8.8–10.2)
CHLORIDE BLD-SCNC: 96 MMOL/L (ref 98–111)
CHLORIDE BLD-SCNC: 97 MMOL/L (ref 98–111)
CO2: 28 MMOL/L (ref 22–29)
CO2: 29 MMOL/L (ref 22–29)
CREAT SERPL-MCNC: 4.1 MG/DL (ref 0.5–1.2)
CREAT SERPL-MCNC: 4.3 MG/DL (ref 0.5–1.2)
GFR NON-AFRICAN AMERICAN: 14
GFR NON-AFRICAN AMERICAN: 14
GLUCOSE BLD-MCNC: 92 MG/DL (ref 74–109)
GLUCOSE BLD-MCNC: 98 MG/DL (ref 74–109)
HCT VFR BLD CALC: 23.7 % (ref 42–52)
HEMOGLOBIN: 7.4 G/DL (ref 14–18)
MCH RBC QN AUTO: 31 PG (ref 27–31)
MCHC RBC AUTO-ENTMCNC: 31.2 G/DL (ref 33–37)
MCV RBC AUTO: 99.2 FL (ref 80–94)
PDW BLD-RTO: 17.2 % (ref 11.5–14.5)
PLATELET # BLD: 140 K/UL (ref 130–400)
PMV BLD AUTO: 11 FL (ref 9.4–12.4)
POTASSIUM SERPL-SCNC: 3.9 MMOL/L (ref 3.5–5)
POTASSIUM SERPL-SCNC: 4 MMOL/L (ref 3.5–5)
RBC # BLD: 2.39 M/UL (ref 4.7–6.1)
SODIUM BLD-SCNC: 133 MMOL/L (ref 136–145)
SODIUM BLD-SCNC: 134 MMOL/L (ref 136–145)
TOTAL PROTEIN: 4.9 G/DL (ref 6.6–8.7)
WBC # BLD: 5.4 K/UL (ref 4.8–10.8)

## 2018-12-06 PROCEDURE — 80053 COMPREHEN METABOLIC PANEL: CPT

## 2018-12-06 PROCEDURE — 2580000003 HC RX 258: Performed by: INTERNAL MEDICINE

## 2018-12-06 PROCEDURE — 6370000000 HC RX 637 (ALT 250 FOR IP): Performed by: INTERNAL MEDICINE

## 2018-12-06 PROCEDURE — 99238 HOSP IP/OBS DSCHRG MGMT 30/<: CPT | Performed by: INTERNAL MEDICINE

## 2018-12-06 PROCEDURE — 6370000000 HC RX 637 (ALT 250 FOR IP): Performed by: HOSPITALIST

## 2018-12-06 PROCEDURE — 36415 COLL VENOUS BLD VENIPUNCTURE: CPT

## 2018-12-06 PROCEDURE — 85027 COMPLETE CBC AUTOMATED: CPT

## 2018-12-06 PROCEDURE — 6360000002 HC RX W HCPCS: Performed by: INTERNAL MEDICINE

## 2018-12-06 RX ORDER — CARVEDILOL 3.12 MG/1
3.12 TABLET ORAL 2 TIMES DAILY WITH MEALS
Qty: 60 TABLET | Refills: 3 | Status: SHIPPED | OUTPATIENT
Start: 2018-12-06 | End: 2020-01-01 | Stop reason: SDUPTHER

## 2018-12-06 RX ORDER — ATORVASTATIN CALCIUM 40 MG/1
40 TABLET, FILM COATED ORAL NIGHTLY
Qty: 30 TABLET | Refills: 3 | Status: SHIPPED | OUTPATIENT
Start: 2018-12-06 | End: 2020-01-01 | Stop reason: SDUPTHER

## 2018-12-06 RX ORDER — LEVOTHYROXINE SODIUM 0.05 MG/1
50 TABLET ORAL DAILY
Qty: 30 TABLET | Refills: 3 | Status: SHIPPED | OUTPATIENT
Start: 2018-12-07

## 2018-12-06 RX ORDER — LISINOPRIL 2.5 MG/1
2.5 TABLET ORAL DAILY
Qty: 30 TABLET | Refills: 3 | Status: SHIPPED | OUTPATIENT
Start: 2018-12-07 | End: 2019-06-19 | Stop reason: DRUGHIGH

## 2018-12-06 RX ORDER — ASPIRIN 81 MG/1
81 TABLET, CHEWABLE ORAL DAILY
Qty: 30 TABLET | Refills: 3 | COMMUNITY
Start: 2018-12-07

## 2018-12-06 RX ADMIN — HEPARIN SODIUM 5000 UNITS: 5000 INJECTION, SOLUTION INTRAVENOUS; SUBCUTANEOUS at 05:38

## 2018-12-06 RX ADMIN — Medication 10 ML: at 08:27

## 2018-12-06 RX ADMIN — LEVOTHYROXINE SODIUM 50 MCG: 50 TABLET ORAL at 05:38

## 2018-12-06 RX ADMIN — CARVEDILOL 3.12 MG: 3.12 TABLET, FILM COATED ORAL at 08:25

## 2018-12-06 RX ADMIN — ALPRAZOLAM 0.25 MG: 0.25 TABLET ORAL at 00:21

## 2018-12-06 RX ADMIN — CALCIUM ACETATE 667 MG: 667 CAPSULE ORAL at 12:33

## 2018-12-06 RX ADMIN — FERROUS SULFATE TAB 325 MG (65 MG ELEMENTAL FE) 325 MG: 325 (65 FE) TAB at 08:25

## 2018-12-06 RX ADMIN — LISINOPRIL 2.5 MG: 2.5 TABLET ORAL at 08:25

## 2018-12-06 RX ADMIN — CALCIUM ACETATE 667 MG: 667 CAPSULE ORAL at 08:25

## 2018-12-06 RX ADMIN — ASPIRIN 81 MG 81 MG: 81 TABLET ORAL at 08:25

## 2019-01-03 ENCOUNTER — OFFICE VISIT (OUTPATIENT)
Dept: CARDIOLOGY | Age: 74
End: 2019-01-03
Payer: MEDICARE

## 2019-01-03 VITALS
WEIGHT: 150 LBS | SYSTOLIC BLOOD PRESSURE: 146 MMHG | DIASTOLIC BLOOD PRESSURE: 78 MMHG | HEIGHT: 71 IN | BODY MASS INDEX: 21 KG/M2 | HEART RATE: 80 BPM

## 2019-01-03 DIAGNOSIS — I50.42 CHRONIC COMBINED SYSTOLIC AND DIASTOLIC CONGESTIVE HEART FAILURE (HCC): ICD-10-CM

## 2019-01-03 DIAGNOSIS — I15.0 RENOVASCULAR HYPERTENSION: Chronic | ICD-10-CM

## 2019-01-03 DIAGNOSIS — N18.6 ESRD (END STAGE RENAL DISEASE) ON DIALYSIS (HCC): ICD-10-CM

## 2019-01-03 DIAGNOSIS — I25.10 CORONARY ARTERY DISEASE INVOLVING NATIVE CORONARY ARTERY OF NATIVE HEART WITHOUT ANGINA PECTORIS: ICD-10-CM

## 2019-01-03 DIAGNOSIS — Z99.2 ESRD (END STAGE RENAL DISEASE) ON DIALYSIS (HCC): ICD-10-CM

## 2019-01-03 DIAGNOSIS — I25.5 ISCHEMIC CARDIOMYOPATHY: ICD-10-CM

## 2019-01-03 DIAGNOSIS — D63.8 ANEMIA OF CHRONIC DISEASE: ICD-10-CM

## 2019-01-03 DIAGNOSIS — I21.4 NSTEMI (NON-ST ELEVATED MYOCARDIAL INFARCTION) (HCC): Primary | ICD-10-CM

## 2019-01-03 PROCEDURE — 93000 ELECTROCARDIOGRAM COMPLETE: CPT | Performed by: NURSE PRACTITIONER

## 2019-01-03 PROCEDURE — 99214 OFFICE O/P EST MOD 30 MIN: CPT | Performed by: NURSE PRACTITIONER

## 2019-01-24 ENCOUNTER — OFFICE VISIT (OUTPATIENT)
Dept: SURGERY | Age: 74
End: 2019-01-24
Payer: MEDICARE

## 2019-01-24 ENCOUNTER — HOSPITAL ENCOUNTER (OUTPATIENT)
Dept: PREADMISSION TESTING | Age: 74
Discharge: HOME | End: 2019-01-28
Payer: MEDICARE

## 2019-01-24 VITALS
BODY MASS INDEX: 21.84 KG/M2 | DIASTOLIC BLOOD PRESSURE: 88 MMHG | SYSTOLIC BLOOD PRESSURE: 138 MMHG | TEMPERATURE: 98 F | WEIGHT: 156 LBS | HEART RATE: 85 BPM | OXYGEN SATURATION: 99 % | HEIGHT: 71 IN

## 2019-01-24 DIAGNOSIS — Z99.2 STAGE 5 CHRONIC KIDNEY DISEASE ON CHRONIC DIALYSIS (HCC): Primary | ICD-10-CM

## 2019-01-24 DIAGNOSIS — N18.6 STAGE 5 CHRONIC KIDNEY DISEASE ON CHRONIC DIALYSIS (HCC): Primary | ICD-10-CM

## 2019-01-24 PROCEDURE — 99214 OFFICE O/P EST MOD 30 MIN: CPT | Performed by: PHYSICIAN ASSISTANT

## 2019-01-27 ENCOUNTER — PREP FOR PROCEDURE (OUTPATIENT)
Dept: SURGERY | Age: 74
End: 2019-01-27

## 2019-01-27 RX ORDER — SODIUM CHLORIDE 0.9 % (FLUSH) 0.9 %
10 SYRINGE (ML) INJECTION EVERY 12 HOURS SCHEDULED
Status: CANCELLED | OUTPATIENT
Start: 2019-01-27

## 2019-01-27 RX ORDER — SODIUM CHLORIDE 0.9 % (FLUSH) 0.9 %
10 SYRINGE (ML) INJECTION PRN
Status: CANCELLED | OUTPATIENT
Start: 2019-01-27

## 2019-01-27 RX ORDER — SODIUM CHLORIDE, SODIUM LACTATE, POTASSIUM CHLORIDE, CALCIUM CHLORIDE 600; 310; 30; 20 MG/100ML; MG/100ML; MG/100ML; MG/100ML
INJECTION, SOLUTION INTRAVENOUS CONTINUOUS
Status: CANCELLED | OUTPATIENT
Start: 2019-01-27

## 2019-01-27 ASSESSMENT — ENCOUNTER SYMPTOMS
WHEEZING: 0
EYES NEGATIVE: 1
VOMITING: 0
APNEA: 0
CONSTIPATION: 1
ABDOMINAL DISTENTION: 0
ALLERGIC/IMMUNOLOGIC NEGATIVE: 1
SHORTNESS OF BREATH: 0
ABDOMINAL PAIN: 0
NAUSEA: 0
DIARRHEA: 0
COUGH: 1
BACK PAIN: 1

## 2019-01-28 ENCOUNTER — ANESTHESIA (OUTPATIENT)
Dept: OPERATING ROOM | Age: 74
End: 2019-01-28
Payer: MEDICARE

## 2019-01-28 ENCOUNTER — HOSPITAL ENCOUNTER (OUTPATIENT)
Age: 74
Setting detail: OUTPATIENT SURGERY
Discharge: HOME | End: 2019-01-28
Attending: SURGERY | Admitting: SURGERY
Payer: MEDICARE

## 2019-01-28 ENCOUNTER — ANESTHESIA EVENT (OUTPATIENT)
Dept: OPERATING ROOM | Age: 74
End: 2019-01-28
Payer: MEDICARE

## 2019-01-28 VITALS
SYSTOLIC BLOOD PRESSURE: 136 MMHG | HEART RATE: 73 BPM | DIASTOLIC BLOOD PRESSURE: 83 MMHG | BODY MASS INDEX: 21 KG/M2 | RESPIRATION RATE: 18 BRPM | WEIGHT: 150 LBS | HEIGHT: 71 IN | TEMPERATURE: 98.2 F | OXYGEN SATURATION: 95 %

## 2019-01-28 VITALS
DIASTOLIC BLOOD PRESSURE: 56 MMHG | SYSTOLIC BLOOD PRESSURE: 96 MMHG | RESPIRATION RATE: 11 BRPM | OXYGEN SATURATION: 100 %

## 2019-01-28 DIAGNOSIS — T85.611A PERITONEAL DIALYSIS CATHETER DYSFUNCTION, INITIAL ENCOUNTER (HCC): Primary | ICD-10-CM

## 2019-01-28 PROBLEM — L98.9 SKIN LESION OF FACE: Status: ACTIVE | Noted: 2019-01-28

## 2019-01-28 LAB
ANION GAP SERPL CALCULATED.3IONS-SCNC: 14 MMOL/L (ref 7–19)
BUN BLDV-MCNC: 30 MG/DL (ref 8–23)
CALCIUM SERPL-MCNC: 9.9 MG/DL (ref 8.8–10.2)
CHLORIDE BLD-SCNC: 95 MMOL/L (ref 98–111)
CO2: 28 MMOL/L (ref 22–29)
CREAT SERPL-MCNC: 7.1 MG/DL (ref 0.5–1.2)
GFR NON-AFRICAN AMERICAN: 8
GLUCOSE BLD-MCNC: 93 MG/DL (ref 74–109)
HCT VFR BLD CALC: 33.2 % (ref 42–52)
HEMOGLOBIN: 10.4 G/DL (ref 14–18)
MCH RBC QN AUTO: 32.2 PG (ref 27–31)
MCHC RBC AUTO-ENTMCNC: 31.3 G/DL (ref 33–37)
MCV RBC AUTO: 102.8 FL (ref 80–94)
PDW BLD-RTO: 15.5 % (ref 11.5–14.5)
PLATELET # BLD: 188 K/UL (ref 130–400)
PMV BLD AUTO: 9.8 FL (ref 9.4–12.4)
POTASSIUM SERPL-SCNC: 4.1 MMOL/L (ref 3.5–4.9)
RBC # BLD: 3.23 M/UL (ref 4.7–6.1)
SODIUM BLD-SCNC: 137 MMOL/L (ref 136–145)
WBC # BLD: 6.5 K/UL (ref 4.8–10.8)

## 2019-01-28 PROCEDURE — 88305 TISSUE EXAM BY PATHOLOGIST: CPT

## 2019-01-28 PROCEDURE — 3700000001 HC ADD 15 MINUTES (ANESTHESIA): Performed by: SURGERY

## 2019-01-28 PROCEDURE — 7100000011 HC PHASE II RECOVERY - ADDTL 15 MIN: Performed by: SURGERY

## 2019-01-28 PROCEDURE — 85027 COMPLETE CBC AUTOMATED: CPT

## 2019-01-28 PROCEDURE — 7100000010 HC PHASE II RECOVERY - FIRST 15 MIN: Performed by: SURGERY

## 2019-01-28 PROCEDURE — 14040 TIS TRNFR F/C/C/M/N/A/G/H/F: CPT | Performed by: SURGERY

## 2019-01-28 PROCEDURE — 3600000013 HC SURGERY LEVEL 3 ADDTL 15MIN: Performed by: SURGERY

## 2019-01-28 PROCEDURE — 6360000002 HC RX W HCPCS: Performed by: NURSE ANESTHETIST, CERTIFIED REGISTERED

## 2019-01-28 PROCEDURE — 49325 LAP REVISION PERM IP CATH: CPT | Performed by: PHYSICIAN ASSISTANT

## 2019-01-28 PROCEDURE — 11622 EXC S/N/H/F/G MAL+MRG 1.1-2: CPT | Performed by: SURGERY

## 2019-01-28 PROCEDURE — 2580000003 HC RX 258: Performed by: ANESTHESIOLOGY

## 2019-01-28 PROCEDURE — 3700000000 HC ANESTHESIA ATTENDED CARE: Performed by: SURGERY

## 2019-01-28 PROCEDURE — 80048 BASIC METABOLIC PNL TOTAL CA: CPT

## 2019-01-28 PROCEDURE — 6360000002 HC RX W HCPCS

## 2019-01-28 PROCEDURE — 2500000003 HC RX 250 WO HCPCS: Performed by: NURSE ANESTHETIST, CERTIFIED REGISTERED

## 2019-01-28 PROCEDURE — 36415 COLL VENOUS BLD VENIPUNCTURE: CPT

## 2019-01-28 PROCEDURE — 7100000001 HC PACU RECOVERY - ADDTL 15 MIN: Performed by: SURGERY

## 2019-01-28 PROCEDURE — 7100000000 HC PACU RECOVERY - FIRST 15 MIN: Performed by: SURGERY

## 2019-01-28 PROCEDURE — C1750 CATH, HEMODIALYSIS,LONG-TERM: HCPCS | Performed by: SURGERY

## 2019-01-28 PROCEDURE — 6360000002 HC RX W HCPCS: Performed by: PHYSICIAN ASSISTANT

## 2019-01-28 PROCEDURE — 2709999900 HC NON-CHARGEABLE SUPPLY: Performed by: SURGERY

## 2019-01-28 PROCEDURE — 3600000003 HC SURGERY LEVEL 3 BASE: Performed by: SURGERY

## 2019-01-28 PROCEDURE — 49325 LAP REVISION PERM IP CATH: CPT | Performed by: SURGERY

## 2019-01-28 RX ORDER — DEXAMETHASONE SODIUM PHOSPHATE 10 MG/ML
INJECTION INTRAMUSCULAR; INTRAVENOUS PRN
Status: DISCONTINUED | OUTPATIENT
Start: 2019-01-28 | End: 2019-01-28 | Stop reason: SDUPTHER

## 2019-01-28 RX ORDER — EPHEDRINE SULFATE 50 MG/ML
INJECTION, SOLUTION INTRAVENOUS PRN
Status: DISCONTINUED | OUTPATIENT
Start: 2019-01-28 | End: 2019-01-28 | Stop reason: SDUPTHER

## 2019-01-28 RX ORDER — SODIUM CHLORIDE, SODIUM LACTATE, POTASSIUM CHLORIDE, CALCIUM CHLORIDE 600; 310; 30; 20 MG/100ML; MG/100ML; MG/100ML; MG/100ML
INJECTION, SOLUTION INTRAVENOUS CONTINUOUS
Status: DISCONTINUED | OUTPATIENT
Start: 2019-01-28 | End: 2019-01-28 | Stop reason: HOSPADM

## 2019-01-28 RX ORDER — MIDAZOLAM HYDROCHLORIDE 1 MG/ML
2 INJECTION INTRAMUSCULAR; INTRAVENOUS
Status: DISCONTINUED | OUTPATIENT
Start: 2019-01-28 | End: 2019-01-28 | Stop reason: HOSPADM

## 2019-01-28 RX ORDER — HYDRALAZINE HYDROCHLORIDE 20 MG/ML
5 INJECTION INTRAMUSCULAR; INTRAVENOUS EVERY 10 MIN PRN
Status: DISCONTINUED | OUTPATIENT
Start: 2019-01-28 | End: 2019-01-28 | Stop reason: HOSPADM

## 2019-01-28 RX ORDER — PROMETHAZINE HYDROCHLORIDE 25 MG/ML
6.25 INJECTION, SOLUTION INTRAMUSCULAR; INTRAVENOUS
Status: DISCONTINUED | OUTPATIENT
Start: 2019-01-28 | End: 2019-01-28 | Stop reason: HOSPADM

## 2019-01-28 RX ORDER — PROPOFOL 10 MG/ML
INJECTION, EMULSION INTRAVENOUS PRN
Status: DISCONTINUED | OUTPATIENT
Start: 2019-01-28 | End: 2019-01-28 | Stop reason: SDUPTHER

## 2019-01-28 RX ORDER — LIDOCAINE HYDROCHLORIDE 10 MG/ML
1 INJECTION, SOLUTION EPIDURAL; INFILTRATION; INTRACAUDAL; PERINEURAL
Status: DISCONTINUED | OUTPATIENT
Start: 2019-01-28 | End: 2019-01-28 | Stop reason: HOSPADM

## 2019-01-28 RX ORDER — HEPARIN SODIUM (PORCINE) LOCK FLUSH IV SOLN 100 UNIT/ML 100 UNIT/ML
300 SOLUTION INTRAVENOUS PRN
Status: DISCONTINUED | OUTPATIENT
Start: 2019-01-28 | End: 2019-01-28 | Stop reason: HOSPADM

## 2019-01-28 RX ORDER — HYDROCODONE BITARTRATE AND ACETAMINOPHEN 5; 325 MG/1; MG/1
1 TABLET ORAL PRN
Status: DISCONTINUED | OUTPATIENT
Start: 2019-01-28 | End: 2019-01-28 | Stop reason: HOSPADM

## 2019-01-28 RX ORDER — SODIUM CHLORIDE 0.9 % (FLUSH) 0.9 %
10 SYRINGE (ML) INJECTION EVERY 12 HOURS SCHEDULED
Status: DISCONTINUED | OUTPATIENT
Start: 2019-01-28 | End: 2019-01-28 | Stop reason: HOSPADM

## 2019-01-28 RX ORDER — FENTANYL CITRATE 50 UG/ML
50 INJECTION, SOLUTION INTRAMUSCULAR; INTRAVENOUS
Status: DISCONTINUED | OUTPATIENT
Start: 2019-01-28 | End: 2019-01-28 | Stop reason: HOSPADM

## 2019-01-28 RX ORDER — METOCLOPRAMIDE HYDROCHLORIDE 5 MG/ML
10 INJECTION INTRAMUSCULAR; INTRAVENOUS
Status: DISCONTINUED | OUTPATIENT
Start: 2019-01-28 | End: 2019-01-28 | Stop reason: HOSPADM

## 2019-01-28 RX ORDER — LIDOCAINE HYDROCHLORIDE 10 MG/ML
INJECTION, SOLUTION INFILTRATION; PERINEURAL PRN
Status: DISCONTINUED | OUTPATIENT
Start: 2019-01-28 | End: 2019-01-28 | Stop reason: SDUPTHER

## 2019-01-28 RX ORDER — HYDROCODONE BITARTRATE AND ACETAMINOPHEN 5; 325 MG/1; MG/1
2 TABLET ORAL PRN
Status: DISCONTINUED | OUTPATIENT
Start: 2019-01-28 | End: 2019-01-28 | Stop reason: HOSPADM

## 2019-01-28 RX ORDER — FENTANYL CITRATE 50 UG/ML
25 INJECTION, SOLUTION INTRAMUSCULAR; INTRAVENOUS
Status: DISCONTINUED | OUTPATIENT
Start: 2019-01-28 | End: 2019-01-28 | Stop reason: HOSPADM

## 2019-01-28 RX ORDER — MORPHINE SULFATE/0.9% NACL/PF 1 MG/ML
2 SYRINGE (ML) INJECTION EVERY 5 MIN PRN
Status: DISCONTINUED | OUTPATIENT
Start: 2019-01-28 | End: 2019-01-28 | Stop reason: HOSPADM

## 2019-01-28 RX ORDER — FENTANYL CITRATE 50 UG/ML
INJECTION, SOLUTION INTRAMUSCULAR; INTRAVENOUS PRN
Status: DISCONTINUED | OUTPATIENT
Start: 2019-01-28 | End: 2019-01-28 | Stop reason: SDUPTHER

## 2019-01-28 RX ORDER — MORPHINE SULFATE/0.9% NACL/PF 1 MG/ML
4 SYRINGE (ML) INJECTION EVERY 5 MIN PRN
Status: DISCONTINUED | OUTPATIENT
Start: 2019-01-28 | End: 2019-01-28 | Stop reason: HOSPADM

## 2019-01-28 RX ORDER — ROCURONIUM BROMIDE 10 MG/ML
INJECTION, SOLUTION INTRAVENOUS PRN
Status: DISCONTINUED | OUTPATIENT
Start: 2019-01-28 | End: 2019-01-28 | Stop reason: SDUPTHER

## 2019-01-28 RX ORDER — GLYCOPYRROLATE 0.2 MG/ML
INJECTION INTRAMUSCULAR; INTRAVENOUS PRN
Status: DISCONTINUED | OUTPATIENT
Start: 2019-01-28 | End: 2019-01-28 | Stop reason: SDUPTHER

## 2019-01-28 RX ORDER — SODIUM CHLORIDE 0.9 % (FLUSH) 0.9 %
10 SYRINGE (ML) INJECTION PRN
Status: DISCONTINUED | OUTPATIENT
Start: 2019-01-28 | End: 2019-01-28 | Stop reason: HOSPADM

## 2019-01-28 RX ORDER — SODIUM CHLORIDE 450 MG/100ML
INJECTION, SOLUTION INTRAVENOUS CONTINUOUS
Status: DISCONTINUED | OUTPATIENT
Start: 2019-01-28 | End: 2019-01-28 | Stop reason: HOSPADM

## 2019-01-28 RX ORDER — HYDROCODONE BITARTRATE AND ACETAMINOPHEN 5; 325 MG/1; MG/1
1 TABLET ORAL EVERY 4 HOURS PRN
Qty: 20 TABLET | Refills: 0 | Status: SHIPPED | OUTPATIENT
Start: 2019-01-28 | End: 2019-02-04

## 2019-01-28 RX ORDER — LABETALOL HYDROCHLORIDE 5 MG/ML
5 INJECTION, SOLUTION INTRAVENOUS EVERY 10 MIN PRN
Status: DISCONTINUED | OUTPATIENT
Start: 2019-01-28 | End: 2019-01-28 | Stop reason: HOSPADM

## 2019-01-28 RX ORDER — MEPERIDINE HYDROCHLORIDE 50 MG/ML
12.5 INJECTION INTRAMUSCULAR; INTRAVENOUS; SUBCUTANEOUS EVERY 5 MIN PRN
Status: DISCONTINUED | OUTPATIENT
Start: 2019-01-28 | End: 2019-01-28 | Stop reason: HOSPADM

## 2019-01-28 RX ORDER — SODIUM CHLORIDE 9 MG/ML
INJECTION, SOLUTION INTRAVENOUS CONTINUOUS
Status: DISCONTINUED | OUTPATIENT
Start: 2019-01-28 | End: 2019-01-28 | Stop reason: HOSPADM

## 2019-01-28 RX ORDER — ONDANSETRON 2 MG/ML
INJECTION INTRAMUSCULAR; INTRAVENOUS PRN
Status: DISCONTINUED | OUTPATIENT
Start: 2019-01-28 | End: 2019-01-28 | Stop reason: SDUPTHER

## 2019-01-28 RX ADMIN — FENTANYL CITRATE 25 MCG: 50 INJECTION INTRAMUSCULAR; INTRAVENOUS at 10:47

## 2019-01-28 RX ADMIN — HEPARIN SODIUM (PORCINE) LOCK FLUSH IV SOLN 100 UNIT/ML 300 UNITS: 100 SOLUTION at 12:47

## 2019-01-28 RX ADMIN — GLYCOPYRROLATE 0.4 MG: 0.2 INJECTION INTRAMUSCULAR; INTRAVENOUS at 11:03

## 2019-01-28 RX ADMIN — PHENYLEPHRINE HYDROCHLORIDE 80 MCG: 10 INJECTION INTRAVENOUS at 10:37

## 2019-01-28 RX ADMIN — SODIUM CHLORIDE: 4.5 INJECTION, SOLUTION INTRAVENOUS at 09:38

## 2019-01-28 RX ADMIN — ROCURONIUM BROMIDE 35 MG: 10 INJECTION INTRAVENOUS at 10:18

## 2019-01-28 RX ADMIN — PHENYLEPHRINE HYDROCHLORIDE 80 MCG: 10 INJECTION INTRAVENOUS at 10:54

## 2019-01-28 RX ADMIN — SODIUM CHLORIDE, PRESERVATIVE FREE 300 UNITS: 5 INJECTION INTRAVENOUS at 12:47

## 2019-01-28 RX ADMIN — ONDANSETRON HYDROCHLORIDE 4 MG: 2 INJECTION, SOLUTION INTRAMUSCULAR; INTRAVENOUS at 10:56

## 2019-01-28 RX ADMIN — Medication 2 G: at 10:24

## 2019-01-28 RX ADMIN — FENTANYL CITRATE 50 MCG: 50 INJECTION INTRAMUSCULAR; INTRAVENOUS at 10:18

## 2019-01-28 RX ADMIN — EPHEDRINE SULFATE 10 MG: 50 INJECTION, SOLUTION INTRAMUSCULAR; INTRAVENOUS; SUBCUTANEOUS at 10:29

## 2019-01-28 RX ADMIN — LIDOCAINE HYDROCHLORIDE 5 ML: 10 INJECTION, SOLUTION INFILTRATION; PERINEURAL at 10:18

## 2019-01-28 RX ADMIN — NEOSTIGMINE METHYLSULFATE 3 MG: 1 INJECTION, SOLUTION INTRAMUSCULAR; INTRAVENOUS; SUBCUTANEOUS at 11:03

## 2019-01-28 RX ADMIN — PROPOFOL 150 MG: 10 INJECTION, EMULSION INTRAVENOUS at 10:18

## 2019-01-28 RX ADMIN — DEXAMETHASONE SODIUM PHOSPHATE 8 MG: 10 INJECTION INTRAMUSCULAR; INTRAVENOUS at 10:30

## 2019-01-28 RX ADMIN — FENTANYL CITRATE 25 MCG: 50 INJECTION INTRAMUSCULAR; INTRAVENOUS at 10:43

## 2019-01-28 ASSESSMENT — PAIN - FUNCTIONAL ASSESSMENT: PAIN_FUNCTIONAL_ASSESSMENT: 0-10

## 2019-01-28 ASSESSMENT — PAIN SCALES - GENERAL
PAINLEVEL_OUTOF10: 0
PAINLEVEL_OUTOF10: 0

## 2019-02-12 ENCOUNTER — OFFICE VISIT (OUTPATIENT)
Dept: SURGERY | Age: 74
End: 2019-02-12

## 2019-02-12 VITALS — HEART RATE: 90 BPM | OXYGEN SATURATION: 97 % | TEMPERATURE: 97.9 F | WEIGHT: 152 LBS | BODY MASS INDEX: 21.2 KG/M2

## 2019-02-12 DIAGNOSIS — C44.41 BASAL CELL CARCINOMA OF HEAD: ICD-10-CM

## 2019-02-12 DIAGNOSIS — Z99.2 STAGE 5 CHRONIC KIDNEY DISEASE ON CHRONIC DIALYSIS (HCC): Primary | ICD-10-CM

## 2019-02-12 DIAGNOSIS — N18.6 STAGE 5 CHRONIC KIDNEY DISEASE ON CHRONIC DIALYSIS (HCC): Primary | ICD-10-CM

## 2019-02-12 PROCEDURE — 99024 POSTOP FOLLOW-UP VISIT: CPT | Performed by: PHYSICIAN ASSISTANT

## 2019-02-14 ENCOUNTER — OFFICE VISIT (OUTPATIENT)
Dept: CARDIOLOGY | Age: 74
End: 2019-02-14
Payer: MEDICARE

## 2019-02-14 VITALS
WEIGHT: 154 LBS | HEIGHT: 71 IN | DIASTOLIC BLOOD PRESSURE: 80 MMHG | HEART RATE: 74 BPM | BODY MASS INDEX: 21.56 KG/M2 | SYSTOLIC BLOOD PRESSURE: 138 MMHG

## 2019-02-14 DIAGNOSIS — I25.10 CORONARY ARTERY DISEASE INVOLVING NATIVE CORONARY ARTERY OF NATIVE HEART WITHOUT ANGINA PECTORIS: ICD-10-CM

## 2019-02-14 DIAGNOSIS — Z99.2 ESRD (END STAGE RENAL DISEASE) ON DIALYSIS (HCC): ICD-10-CM

## 2019-02-14 DIAGNOSIS — I50.42 CHRONIC COMBINED SYSTOLIC AND DIASTOLIC CHF (CONGESTIVE HEART FAILURE) (HCC): ICD-10-CM

## 2019-02-14 DIAGNOSIS — Z51.5 PALLIATIVE CARE PATIENT: ICD-10-CM

## 2019-02-14 DIAGNOSIS — I21.4 NSTEMI (NON-ST ELEVATED MYOCARDIAL INFARCTION) (HCC): ICD-10-CM

## 2019-02-14 DIAGNOSIS — I15.0 RENOVASCULAR HYPERTENSION: Primary | Chronic | ICD-10-CM

## 2019-02-14 DIAGNOSIS — I25.5 ISCHEMIC CARDIOMYOPATHY: ICD-10-CM

## 2019-02-14 DIAGNOSIS — N18.6 ESRD (END STAGE RENAL DISEASE) ON DIALYSIS (HCC): ICD-10-CM

## 2019-02-14 PROCEDURE — 99213 OFFICE O/P EST LOW 20 MIN: CPT | Performed by: NURSE PRACTITIONER

## 2019-02-28 ENCOUNTER — TELEPHONE (OUTPATIENT)
Dept: VASCULAR SURGERY | Age: 74
End: 2019-02-28

## 2019-03-04 ENCOUNTER — HOSPITAL ENCOUNTER (OUTPATIENT)
Age: 74
Setting detail: OBSERVATION
Discharge: HOME | End: 2019-03-07
Attending: EMERGENCY MEDICINE | Admitting: INTERNAL MEDICINE
Payer: MEDICARE

## 2019-03-04 ENCOUNTER — APPOINTMENT (OUTPATIENT)
Dept: GENERAL RADIOLOGY | Age: 74
End: 2019-03-04
Payer: MEDICARE

## 2019-03-04 DIAGNOSIS — R07.89 CHEST DISCOMFORT: Primary | ICD-10-CM

## 2019-03-04 DIAGNOSIS — N18.6 ESRD (END STAGE RENAL DISEASE) (HCC): ICD-10-CM

## 2019-03-04 PROBLEM — I25.119 CORONARY ARTERY DISEASE INVOLVING NATIVE CORONARY ARTERY OF NATIVE HEART WITH ANGINA PECTORIS (HCC): Status: ACTIVE | Noted: 2019-02-14

## 2019-03-04 PROBLEM — R07.9 CHEST PAIN: Status: ACTIVE | Noted: 2019-03-04

## 2019-03-04 LAB
ALBUMIN SERPL-MCNC: 4.7 G/DL (ref 3.5–5.2)
ALP BLD-CCNC: 66 U/L (ref 40–130)
ALT SERPL-CCNC: 8 U/L (ref 5–41)
ANION GAP SERPL CALCULATED.3IONS-SCNC: 15 MMOL/L (ref 7–19)
AST SERPL-CCNC: 13 U/L (ref 5–40)
BASOPHILS ABSOLUTE: 0 K/UL (ref 0–0.2)
BASOPHILS RELATIVE PERCENT: 0.7 % (ref 0–1)
BILIRUB SERPL-MCNC: 0.4 MG/DL (ref 0.2–1.2)
BUN BLDV-MCNC: 41 MG/DL (ref 8–23)
CALCIUM SERPL-MCNC: 10.3 MG/DL (ref 8.8–10.2)
CHLORIDE BLD-SCNC: 95 MMOL/L (ref 98–111)
CO2: 28 MMOL/L (ref 22–29)
CREAT SERPL-MCNC: 9.5 MG/DL (ref 0.5–1.2)
EOSINOPHILS ABSOLUTE: 0.4 K/UL (ref 0–0.6)
EOSINOPHILS RELATIVE PERCENT: 6.4 % (ref 0–5)
GFR NON-AFRICAN AMERICAN: 5
GLUCOSE BLD-MCNC: 113 MG/DL (ref 74–109)
HCT VFR BLD CALC: 38.4 % (ref 42–52)
HEMOGLOBIN: 12.2 G/DL (ref 14–18)
LYMPHOCYTES ABSOLUTE: 0.9 K/UL (ref 1.1–4.5)
LYMPHOCYTES RELATIVE PERCENT: 15.4 % (ref 20–40)
MCH RBC QN AUTO: 33 PG (ref 27–31)
MCHC RBC AUTO-ENTMCNC: 31.8 G/DL (ref 33–37)
MCV RBC AUTO: 103.8 FL (ref 80–94)
MONOCYTES ABSOLUTE: 0.5 K/UL (ref 0–0.9)
MONOCYTES RELATIVE PERCENT: 7.6 % (ref 0–10)
NEUTROPHILS ABSOLUTE: 4.2 K/UL (ref 1.5–7.5)
NEUTROPHILS RELATIVE PERCENT: 69.6 % (ref 50–65)
PDW BLD-RTO: 15.1 % (ref 11.5–14.5)
PLATELET # BLD: 172 K/UL (ref 130–400)
PMV BLD AUTO: 9.7 FL (ref 9.4–12.4)
POTASSIUM REFLEX MAGNESIUM: 4.1 MMOL/L (ref 3.5–5)
RBC # BLD: 3.7 M/UL (ref 4.7–6.1)
SODIUM BLD-SCNC: 138 MMOL/L (ref 136–145)
TOTAL PROTEIN: 7.7 G/DL (ref 6.6–8.7)
TROPONIN: 0.09 NG/ML (ref 0–0.03)
TROPONIN: 0.1 NG/ML (ref 0–0.03)
TROPONIN: 0.11 NG/ML (ref 0–0.03)
WBC # BLD: 6.1 K/UL (ref 4.8–10.8)

## 2019-03-04 PROCEDURE — G0378 HOSPITAL OBSERVATION PER HR: HCPCS

## 2019-03-04 PROCEDURE — G0257 UNSCHED DIALYSIS ESRD PT HOS: HCPCS

## 2019-03-04 PROCEDURE — 85025 COMPLETE CBC W/AUTO DIFF WBC: CPT

## 2019-03-04 PROCEDURE — 6360000002 HC RX W HCPCS: Performed by: INTERNAL MEDICINE

## 2019-03-04 PROCEDURE — 6370000000 HC RX 637 (ALT 250 FOR IP): Performed by: INTERNAL MEDICINE

## 2019-03-04 PROCEDURE — 99223 1ST HOSP IP/OBS HIGH 75: CPT | Performed by: INTERNAL MEDICINE

## 2019-03-04 PROCEDURE — 80053 COMPREHEN METABOLIC PANEL: CPT

## 2019-03-04 PROCEDURE — 71045 X-RAY EXAM CHEST 1 VIEW: CPT

## 2019-03-04 PROCEDURE — 2580000003 HC RX 258: Performed by: INTERNAL MEDICINE

## 2019-03-04 PROCEDURE — 2140000000 HC CCU INTERMEDIATE R&B

## 2019-03-04 PROCEDURE — 96372 THER/PROPH/DIAG INJ SC/IM: CPT

## 2019-03-04 PROCEDURE — 93005 ELECTROCARDIOGRAM TRACING: CPT

## 2019-03-04 PROCEDURE — 84484 ASSAY OF TROPONIN QUANT: CPT

## 2019-03-04 PROCEDURE — 36415 COLL VENOUS BLD VENIPUNCTURE: CPT

## 2019-03-04 PROCEDURE — 99285 EMERGENCY DEPT VISIT HI MDM: CPT

## 2019-03-04 PROCEDURE — 99285 EMERGENCY DEPT VISIT HI MDM: CPT | Performed by: EMERGENCY MEDICINE

## 2019-03-04 PROCEDURE — 8040000000 HC CCPD INPATIENT

## 2019-03-04 PROCEDURE — 6370000000 HC RX 637 (ALT 250 FOR IP): Performed by: EMERGENCY MEDICINE

## 2019-03-04 RX ORDER — SODIUM CHLORIDE 0.9 % (FLUSH) 0.9 %
10 SYRINGE (ML) INJECTION PRN
Status: DISCONTINUED | OUTPATIENT
Start: 2019-03-04 | End: 2019-03-07 | Stop reason: HOSPADM

## 2019-03-04 RX ORDER — CARVEDILOL 3.12 MG/1
3.12 TABLET ORAL 2 TIMES DAILY WITH MEALS
Status: DISCONTINUED | OUTPATIENT
Start: 2019-03-04 | End: 2019-03-07 | Stop reason: HOSPADM

## 2019-03-04 RX ORDER — SODIUM CHLORIDE 0.9 % (FLUSH) 0.9 %
10 SYRINGE (ML) INJECTION EVERY 12 HOURS SCHEDULED
Status: DISCONTINUED | OUTPATIENT
Start: 2019-03-04 | End: 2019-03-07 | Stop reason: HOSPADM

## 2019-03-04 RX ORDER — LEVOTHYROXINE SODIUM 0.05 MG/1
50 TABLET ORAL DAILY
Status: DISCONTINUED | OUTPATIENT
Start: 2019-03-05 | End: 2019-03-07 | Stop reason: HOSPADM

## 2019-03-04 RX ORDER — ASPIRIN 81 MG/1
324 TABLET, CHEWABLE ORAL ONCE
Status: COMPLETED | OUTPATIENT
Start: 2019-03-04 | End: 2019-03-04

## 2019-03-04 RX ORDER — HEPARIN SODIUM 5000 [USP'U]/ML
5000 INJECTION, SOLUTION INTRAVENOUS; SUBCUTANEOUS EVERY 8 HOURS SCHEDULED
Status: DISCONTINUED | OUTPATIENT
Start: 2019-03-04 | End: 2019-03-07 | Stop reason: HOSPADM

## 2019-03-04 RX ORDER — ALPRAZOLAM 0.25 MG/1
0.25 TABLET ORAL NIGHTLY PRN
Status: DISCONTINUED | OUTPATIENT
Start: 2019-03-04 | End: 2019-03-07 | Stop reason: HOSPADM

## 2019-03-04 RX ORDER — ASPIRIN 81 MG/1
81 TABLET, CHEWABLE ORAL DAILY
Status: DISCONTINUED | OUTPATIENT
Start: 2019-03-05 | End: 2019-03-07 | Stop reason: HOSPADM

## 2019-03-04 RX ORDER — ATORVASTATIN CALCIUM 40 MG/1
40 TABLET, FILM COATED ORAL NIGHTLY
Status: DISCONTINUED | OUTPATIENT
Start: 2019-03-04 | End: 2019-03-07 | Stop reason: HOSPADM

## 2019-03-04 RX ORDER — HYDRALAZINE HYDROCHLORIDE 20 MG/ML
10 INJECTION INTRAMUSCULAR; INTRAVENOUS EVERY 6 HOURS PRN
Status: DISCONTINUED | OUTPATIENT
Start: 2019-03-04 | End: 2019-03-07 | Stop reason: HOSPADM

## 2019-03-04 RX ORDER — LISINOPRIL 5 MG/1
2.5 TABLET ORAL DAILY
Status: DISCONTINUED | OUTPATIENT
Start: 2019-03-05 | End: 2019-03-07 | Stop reason: HOSPADM

## 2019-03-04 RX ORDER — AMLODIPINE BESYLATE 5 MG/1
5 TABLET ORAL DAILY
Status: DISCONTINUED | OUTPATIENT
Start: 2019-03-04 | End: 2019-03-07 | Stop reason: HOSPADM

## 2019-03-04 RX ORDER — ONDANSETRON 2 MG/ML
4 INJECTION INTRAMUSCULAR; INTRAVENOUS EVERY 6 HOURS PRN
Status: DISCONTINUED | OUTPATIENT
Start: 2019-03-04 | End: 2019-03-07 | Stop reason: HOSPADM

## 2019-03-04 RX ADMIN — Medication 10 ML: at 21:10

## 2019-03-04 RX ADMIN — ASPIRIN 81 MG 324 MG: 81 TABLET ORAL at 12:12

## 2019-03-04 RX ADMIN — AMLODIPINE BESYLATE 5 MG: 5 TABLET ORAL at 17:09

## 2019-03-04 RX ADMIN — CARVEDILOL 3.12 MG: 3.12 TABLET, FILM COATED ORAL at 17:09

## 2019-03-04 RX ADMIN — ATORVASTATIN CALCIUM 40 MG: 40 TABLET, FILM COATED ORAL at 21:10

## 2019-03-04 RX ADMIN — HEPARIN SODIUM 5000 UNITS: 5000 INJECTION INTRAVENOUS; SUBCUTANEOUS at 21:10

## 2019-03-04 RX ADMIN — ALPRAZOLAM 0.25 MG: 0.25 TABLET ORAL at 23:56

## 2019-03-04 RX ADMIN — CALCIUM ACETATE 667 MG: 667 CAPSULE ORAL at 17:09

## 2019-03-04 RX ADMIN — HEPARIN SODIUM 5000 UNITS: 5000 INJECTION INTRAVENOUS; SUBCUTANEOUS at 17:10

## 2019-03-04 ASSESSMENT — PAIN SCALES - GENERAL
PAINLEVEL_OUTOF10: 0
PAINLEVEL_OUTOF10: 0
PAINLEVEL_OUTOF10: 3
PAINLEVEL_OUTOF10: 0
PAINLEVEL_OUTOF10: 0

## 2019-03-04 ASSESSMENT — ENCOUNTER SYMPTOMS
SHORTNESS OF BREATH: 0
BACK PAIN: 0
VOMITING: 0
NAUSEA: 0

## 2019-03-05 ENCOUNTER — APPOINTMENT (OUTPATIENT)
Dept: NUCLEAR MEDICINE | Age: 74
End: 2019-03-05
Payer: MEDICARE

## 2019-03-05 LAB
ANION GAP SERPL CALCULATED.3IONS-SCNC: 14 MMOL/L (ref 7–19)
BUN BLDV-MCNC: 40 MG/DL (ref 8–23)
CALCIUM SERPL-MCNC: 9.1 MG/DL (ref 8.8–10.2)
CHLORIDE BLD-SCNC: 98 MMOL/L (ref 98–111)
CO2: 27 MMOL/L (ref 22–29)
CREAT SERPL-MCNC: 9.8 MG/DL (ref 0.5–1.2)
EKG P AXIS: 63 DEGREES
EKG P AXIS: 82 DEGREES
EKG P-R INTERVAL: 128 MS
EKG P-R INTERVAL: 132 MS
EKG Q-T INTERVAL: 394 MS
EKG Q-T INTERVAL: 412 MS
EKG QRS DURATION: 98 MS
EKG QRS DURATION: 98 MS
EKG QTC CALCULATION (BAZETT): 423 MS
EKG QTC CALCULATION (BAZETT): 436 MS
EKG T AXIS: 103 DEGREES
EKG T AXIS: 114 DEGREES
GFR NON-AFRICAN AMERICAN: 5
GLUCOSE BLD-MCNC: 83 MG/DL (ref 74–109)
HCT VFR BLD CALC: 29.3 % (ref 42–52)
HEMOGLOBIN: 9.4 G/DL (ref 14–18)
MAGNESIUM: 1.8 MG/DL (ref 1.6–2.4)
MCH RBC QN AUTO: 33.5 PG (ref 27–31)
MCHC RBC AUTO-ENTMCNC: 32.1 G/DL (ref 33–37)
MCV RBC AUTO: 104.3 FL (ref 80–94)
PDW BLD-RTO: 14.9 % (ref 11.5–14.5)
PLATELET # BLD: 137 K/UL (ref 130–400)
PMV BLD AUTO: 10.5 FL (ref 9.4–12.4)
POTASSIUM REFLEX MAGNESIUM: 3.9 MMOL/L (ref 3.5–5)
RBC # BLD: 2.81 M/UL (ref 4.7–6.1)
SODIUM BLD-SCNC: 139 MMOL/L (ref 136–145)
WBC # BLD: 4.6 K/UL (ref 4.8–10.8)

## 2019-03-05 PROCEDURE — 93005 ELECTROCARDIOGRAM TRACING: CPT

## 2019-03-05 PROCEDURE — 83735 ASSAY OF MAGNESIUM: CPT

## 2019-03-05 PROCEDURE — A9500 TC99M SESTAMIBI: HCPCS | Performed by: INTERNAL MEDICINE

## 2019-03-05 PROCEDURE — 99225 PR SBSQ OBSERVATION CARE/DAY 25 MINUTES: CPT | Performed by: HOSPITALIST

## 2019-03-05 PROCEDURE — 6360000002 HC RX W HCPCS: Performed by: INTERNAL MEDICINE

## 2019-03-05 PROCEDURE — 80048 BASIC METABOLIC PNL TOTAL CA: CPT

## 2019-03-05 PROCEDURE — 85027 COMPLETE CBC AUTOMATED: CPT

## 2019-03-05 PROCEDURE — 36415 COLL VENOUS BLD VENIPUNCTURE: CPT

## 2019-03-05 PROCEDURE — 8040000000 HC CCPD INPATIENT

## 2019-03-05 PROCEDURE — 3430000000 HC RX DIAGNOSTIC RADIOPHARMACEUTICAL: Performed by: INTERNAL MEDICINE

## 2019-03-05 PROCEDURE — 99223 1ST HOSP IP/OBS HIGH 75: CPT | Performed by: INTERNAL MEDICINE

## 2019-03-05 PROCEDURE — G0378 HOSPITAL OBSERVATION PER HR: HCPCS

## 2019-03-05 PROCEDURE — 78452 HT MUSCLE IMAGE SPECT MULT: CPT

## 2019-03-05 PROCEDURE — 6370000000 HC RX 637 (ALT 250 FOR IP): Performed by: INTERNAL MEDICINE

## 2019-03-05 PROCEDURE — 2580000003 HC RX 258: Performed by: INTERNAL MEDICINE

## 2019-03-05 PROCEDURE — 93017 CV STRESS TEST TRACING ONLY: CPT

## 2019-03-05 PROCEDURE — G0257 UNSCHED DIALYSIS ESRD PT HOS: HCPCS

## 2019-03-05 PROCEDURE — 96372 THER/PROPH/DIAG INJ SC/IM: CPT

## 2019-03-05 RX ORDER — ACETAMINOPHEN 325 MG/1
650 TABLET ORAL EVERY 6 HOURS PRN
Status: DISCONTINUED | OUTPATIENT
Start: 2019-03-05 | End: 2019-03-07 | Stop reason: HOSPADM

## 2019-03-05 RX ADMIN — HEPARIN SODIUM 5000 UNITS: 5000 INJECTION INTRAVENOUS; SUBCUTANEOUS at 15:08

## 2019-03-05 RX ADMIN — REGADENOSON 0.4 MG: 0.08 INJECTION, SOLUTION INTRAVENOUS at 09:15

## 2019-03-05 RX ADMIN — AMLODIPINE BESYLATE 5 MG: 5 TABLET ORAL at 10:33

## 2019-03-05 RX ADMIN — REGADENOSON 0.4 MG: 0.08 INJECTION, SOLUTION INTRAVENOUS at 09:12

## 2019-03-05 RX ADMIN — CARVEDILOL 3.12 MG: 3.12 TABLET, FILM COATED ORAL at 10:33

## 2019-03-05 RX ADMIN — CALCIUM ACETATE 667 MG: 667 CAPSULE ORAL at 17:17

## 2019-03-05 RX ADMIN — CALCIUM ACETATE 667 MG: 667 CAPSULE ORAL at 10:34

## 2019-03-05 RX ADMIN — Medication 10 ML: at 10:40

## 2019-03-05 RX ADMIN — CARVEDILOL 3.12 MG: 3.12 TABLET, FILM COATED ORAL at 17:17

## 2019-03-05 RX ADMIN — ASPIRIN 81 MG 81 MG: 81 TABLET ORAL at 10:33

## 2019-03-05 RX ADMIN — ATORVASTATIN CALCIUM 40 MG: 40 TABLET, FILM COATED ORAL at 19:33

## 2019-03-05 RX ADMIN — HEPARIN SODIUM 5000 UNITS: 5000 INJECTION INTRAVENOUS; SUBCUTANEOUS at 05:39

## 2019-03-05 RX ADMIN — TETRAKIS(2-METHOXYISOBUTYLISOCYANIDE)COPPER(I) TETRAFLUOROBORATE 10 MILLICURIE: 1 INJECTION, POWDER, LYOPHILIZED, FOR SOLUTION INTRAVENOUS at 10:15

## 2019-03-05 RX ADMIN — Medication 10 ML: at 19:34

## 2019-03-05 RX ADMIN — HEPARIN SODIUM 5000 UNITS: 5000 INJECTION INTRAVENOUS; SUBCUTANEOUS at 19:34

## 2019-03-05 RX ADMIN — LISINOPRIL 2.5 MG: 5 TABLET ORAL at 10:33

## 2019-03-05 RX ADMIN — LEVOTHYROXINE SODIUM 50 MCG: 50 TABLET ORAL at 05:39

## 2019-03-05 RX ADMIN — TETRAKIS(2-METHOXYISOBUTYLISOCYANIDE)COPPER(I) TETRAFLUOROBORATE 30 MILLICURIE: 1 INJECTION, POWDER, LYOPHILIZED, FOR SOLUTION INTRAVENOUS at 10:15

## 2019-03-05 ASSESSMENT — PAIN SCALES - GENERAL
PAINLEVEL_OUTOF10: 0

## 2019-03-06 LAB
ANION GAP SERPL CALCULATED.3IONS-SCNC: 17 MMOL/L (ref 7–19)
BASOPHILS ABSOLUTE: 0.1 K/UL (ref 0–0.2)
BASOPHILS RELATIVE PERCENT: 1.1 % (ref 0–1)
BUN BLDV-MCNC: 46 MG/DL (ref 8–23)
CALCIUM SERPL-MCNC: 9.1 MG/DL (ref 8.8–10.2)
CHLORIDE BLD-SCNC: 98 MMOL/L (ref 98–111)
CO2: 25 MMOL/L (ref 22–29)
CREAT SERPL-MCNC: 10.3 MG/DL (ref 0.5–1.2)
EOSINOPHILS ABSOLUTE: 0.2 K/UL (ref 0–0.6)
EOSINOPHILS RELATIVE PERCENT: 5.3 % (ref 0–5)
GFR NON-AFRICAN AMERICAN: 5
GLUCOSE BLD-MCNC: 134 MG/DL (ref 74–109)
HCT VFR BLD CALC: 30.9 % (ref 42–52)
HEMOGLOBIN: 9.6 G/DL (ref 14–18)
LYMPHOCYTES ABSOLUTE: 0.7 K/UL (ref 1.1–4.5)
LYMPHOCYTES RELATIVE PERCENT: 14.4 % (ref 20–40)
MCH RBC QN AUTO: 32.8 PG (ref 27–31)
MCHC RBC AUTO-ENTMCNC: 31.1 G/DL (ref 33–37)
MCV RBC AUTO: 105.5 FL (ref 80–94)
MONOCYTES ABSOLUTE: 0.5 K/UL (ref 0–0.9)
MONOCYTES RELATIVE PERCENT: 10.1 % (ref 0–10)
NEUTROPHILS ABSOLUTE: 3.2 K/UL (ref 1.5–7.5)
NEUTROPHILS RELATIVE PERCENT: 68.9 % (ref 50–65)
PDW BLD-RTO: 14.6 % (ref 11.5–14.5)
PLATELET # BLD: 146 K/UL (ref 130–400)
PMV BLD AUTO: 10.4 FL (ref 9.4–12.4)
POTASSIUM SERPL-SCNC: 3.8 MMOL/L (ref 3.5–5)
RBC # BLD: 2.93 M/UL (ref 4.7–6.1)
SODIUM BLD-SCNC: 140 MMOL/L (ref 136–145)
WBC # BLD: 4.6 K/UL (ref 4.8–10.8)

## 2019-03-06 PROCEDURE — 36415 COLL VENOUS BLD VENIPUNCTURE: CPT

## 2019-03-06 PROCEDURE — G0378 HOSPITAL OBSERVATION PER HR: HCPCS

## 2019-03-06 PROCEDURE — 96372 THER/PROPH/DIAG INJ SC/IM: CPT

## 2019-03-06 PROCEDURE — 6370000000 HC RX 637 (ALT 250 FOR IP): Performed by: INTERNAL MEDICINE

## 2019-03-06 PROCEDURE — 99225 PR SBSQ OBSERVATION CARE/DAY 25 MINUTES: CPT | Performed by: HOSPITALIST

## 2019-03-06 PROCEDURE — 8040000000 HC CCPD INPATIENT

## 2019-03-06 PROCEDURE — 80048 BASIC METABOLIC PNL TOTAL CA: CPT

## 2019-03-06 PROCEDURE — G0257 UNSCHED DIALYSIS ESRD PT HOS: HCPCS

## 2019-03-06 PROCEDURE — 85025 COMPLETE CBC W/AUTO DIFF WBC: CPT

## 2019-03-06 PROCEDURE — 2580000003 HC RX 258: Performed by: INTERNAL MEDICINE

## 2019-03-06 PROCEDURE — 99233 SBSQ HOSP IP/OBS HIGH 50: CPT | Performed by: INTERNAL MEDICINE

## 2019-03-06 PROCEDURE — 6360000002 HC RX W HCPCS: Performed by: INTERNAL MEDICINE

## 2019-03-06 PROCEDURE — 6370000000 HC RX 637 (ALT 250 FOR IP): Performed by: HOSPITALIST

## 2019-03-06 RX ORDER — CLOPIDOGREL BISULFATE 75 MG/1
75 TABLET ORAL DAILY
Status: DISCONTINUED | OUTPATIENT
Start: 2019-03-06 | End: 2019-03-07 | Stop reason: HOSPADM

## 2019-03-06 RX ADMIN — CLOPIDOGREL BISULFATE 75 MG: 75 TABLET, FILM COATED ORAL at 17:03

## 2019-03-06 RX ADMIN — CARVEDILOL 3.12 MG: 3.12 TABLET, FILM COATED ORAL at 17:03

## 2019-03-06 RX ADMIN — CALCIUM ACETATE 667 MG: 667 CAPSULE ORAL at 17:03

## 2019-03-06 RX ADMIN — AMLODIPINE BESYLATE 5 MG: 5 TABLET ORAL at 08:58

## 2019-03-06 RX ADMIN — LISINOPRIL 2.5 MG: 5 TABLET ORAL at 08:57

## 2019-03-06 RX ADMIN — CALCIUM ACETATE 667 MG: 667 CAPSULE ORAL at 08:58

## 2019-03-06 RX ADMIN — Medication 10 ML: at 21:55

## 2019-03-06 RX ADMIN — LEVOTHYROXINE SODIUM 50 MCG: 50 TABLET ORAL at 05:37

## 2019-03-06 RX ADMIN — Medication 10 ML: at 08:58

## 2019-03-06 RX ADMIN — CARVEDILOL 3.12 MG: 3.12 TABLET, FILM COATED ORAL at 08:58

## 2019-03-06 RX ADMIN — ALPRAZOLAM 0.25 MG: 0.25 TABLET ORAL at 00:39

## 2019-03-06 RX ADMIN — ASPIRIN 81 MG 81 MG: 81 TABLET ORAL at 08:58

## 2019-03-06 RX ADMIN — HEPARIN SODIUM 5000 UNITS: 5000 INJECTION INTRAVENOUS; SUBCUTANEOUS at 21:55

## 2019-03-06 RX ADMIN — CALCIUM ACETATE 667 MG: 667 CAPSULE ORAL at 11:40

## 2019-03-06 RX ADMIN — HEPARIN SODIUM 5000 UNITS: 5000 INJECTION INTRAVENOUS; SUBCUTANEOUS at 05:37

## 2019-03-06 RX ADMIN — ATORVASTATIN CALCIUM 40 MG: 40 TABLET, FILM COATED ORAL at 21:55

## 2019-03-06 RX ADMIN — HEPARIN SODIUM 5000 UNITS: 5000 INJECTION INTRAVENOUS; SUBCUTANEOUS at 14:35

## 2019-03-06 ASSESSMENT — PAIN SCALES - GENERAL
PAINLEVEL_OUTOF10: 0

## 2019-03-07 VITALS
WEIGHT: 151.8 LBS | TEMPERATURE: 99.2 F | SYSTOLIC BLOOD PRESSURE: 134 MMHG | HEART RATE: 71 BPM | DIASTOLIC BLOOD PRESSURE: 81 MMHG | HEIGHT: 71 IN | BODY MASS INDEX: 21.25 KG/M2 | OXYGEN SATURATION: 98 % | RESPIRATION RATE: 16 BRPM

## 2019-03-07 LAB
ANION GAP SERPL CALCULATED.3IONS-SCNC: 14 MMOL/L (ref 7–19)
BASOPHILS ABSOLUTE: 0 K/UL (ref 0–0.2)
BASOPHILS RELATIVE PERCENT: 0.7 % (ref 0–1)
BUN BLDV-MCNC: 48 MG/DL (ref 8–23)
CALCIUM SERPL-MCNC: 9.1 MG/DL (ref 8.8–10.2)
CHLORIDE BLD-SCNC: 99 MMOL/L (ref 98–111)
CO2: 27 MMOL/L (ref 22–29)
CREAT SERPL-MCNC: 10.1 MG/DL (ref 0.5–1.2)
EOSINOPHILS ABSOLUTE: 0.3 K/UL (ref 0–0.6)
EOSINOPHILS RELATIVE PERCENT: 4.4 % (ref 0–5)
GFR NON-AFRICAN AMERICAN: 5
GLUCOSE BLD-MCNC: 125 MG/DL (ref 74–109)
HCT VFR BLD CALC: 30.9 % (ref 42–52)
HEMOGLOBIN: 9.8 G/DL (ref 14–18)
LYMPHOCYTES ABSOLUTE: 0.8 K/UL (ref 1.1–4.5)
LYMPHOCYTES RELATIVE PERCENT: 13.8 % (ref 20–40)
MCH RBC QN AUTO: 32.9 PG (ref 27–31)
MCHC RBC AUTO-ENTMCNC: 31.7 G/DL (ref 33–37)
MCV RBC AUTO: 103.7 FL (ref 80–94)
MONOCYTES ABSOLUTE: 0.5 K/UL (ref 0–0.9)
MONOCYTES RELATIVE PERCENT: 9.2 % (ref 0–10)
NEUTROPHILS ABSOLUTE: 4 K/UL (ref 1.5–7.5)
NEUTROPHILS RELATIVE PERCENT: 71.5 % (ref 50–65)
PDW BLD-RTO: 14.5 % (ref 11.5–14.5)
PLATELET # BLD: 142 K/UL (ref 130–400)
PMV BLD AUTO: 10.4 FL (ref 9.4–12.4)
POTASSIUM SERPL-SCNC: 3.8 MMOL/L (ref 3.5–5)
RBC # BLD: 2.98 M/UL (ref 4.7–6.1)
SODIUM BLD-SCNC: 140 MMOL/L (ref 136–145)
WBC # BLD: 5.7 K/UL (ref 4.8–10.8)

## 2019-03-07 PROCEDURE — 96372 THER/PROPH/DIAG INJ SC/IM: CPT

## 2019-03-07 PROCEDURE — 6360000002 HC RX W HCPCS: Performed by: INTERNAL MEDICINE

## 2019-03-07 PROCEDURE — 99217 PR OBSERVATION CARE DISCHARGE MANAGEMENT: CPT | Performed by: PHYSICIAN ASSISTANT

## 2019-03-07 PROCEDURE — 6370000000 HC RX 637 (ALT 250 FOR IP): Performed by: INTERNAL MEDICINE

## 2019-03-07 PROCEDURE — 80048 BASIC METABOLIC PNL TOTAL CA: CPT

## 2019-03-07 PROCEDURE — 85025 COMPLETE CBC W/AUTO DIFF WBC: CPT

## 2019-03-07 PROCEDURE — 2580000003 HC RX 258: Performed by: INTERNAL MEDICINE

## 2019-03-07 PROCEDURE — 36415 COLL VENOUS BLD VENIPUNCTURE: CPT

## 2019-03-07 PROCEDURE — 6370000000 HC RX 637 (ALT 250 FOR IP): Performed by: HOSPITALIST

## 2019-03-07 PROCEDURE — 99232 SBSQ HOSP IP/OBS MODERATE 35: CPT | Performed by: INTERNAL MEDICINE

## 2019-03-07 PROCEDURE — G0378 HOSPITAL OBSERVATION PER HR: HCPCS

## 2019-03-07 RX ORDER — CLOPIDOGREL BISULFATE 75 MG/1
75 TABLET ORAL DAILY
Qty: 30 TABLET | Refills: 0 | Status: SHIPPED | OUTPATIENT
Start: 2019-03-08 | End: 2019-04-05 | Stop reason: SDUPTHER

## 2019-03-07 RX ORDER — AMLODIPINE BESYLATE 5 MG/1
5 TABLET ORAL DAILY
Qty: 30 TABLET | Refills: 0 | Status: SHIPPED | OUTPATIENT
Start: 2019-03-08 | End: 2019-06-19 | Stop reason: DRUGHIGH

## 2019-03-07 RX ADMIN — HEPARIN SODIUM 5000 UNITS: 5000 INJECTION INTRAVENOUS; SUBCUTANEOUS at 05:49

## 2019-03-07 RX ADMIN — CLOPIDOGREL BISULFATE 75 MG: 75 TABLET, FILM COATED ORAL at 08:14

## 2019-03-07 RX ADMIN — ASPIRIN 81 MG 81 MG: 81 TABLET ORAL at 08:13

## 2019-03-07 RX ADMIN — CALCIUM ACETATE 667 MG: 667 CAPSULE ORAL at 12:37

## 2019-03-07 RX ADMIN — CALCIUM ACETATE 667 MG: 667 CAPSULE ORAL at 08:14

## 2019-03-07 RX ADMIN — ALPRAZOLAM 0.25 MG: 0.25 TABLET ORAL at 01:19

## 2019-03-07 RX ADMIN — CARVEDILOL 3.12 MG: 3.12 TABLET, FILM COATED ORAL at 08:14

## 2019-03-07 RX ADMIN — LEVOTHYROXINE SODIUM 50 MCG: 50 TABLET ORAL at 05:49

## 2019-03-07 RX ADMIN — LISINOPRIL 2.5 MG: 5 TABLET ORAL at 08:14

## 2019-03-07 RX ADMIN — AMLODIPINE BESYLATE 5 MG: 5 TABLET ORAL at 08:14

## 2019-03-07 RX ADMIN — Medication 10 ML: at 08:14

## 2019-03-07 ASSESSMENT — PAIN SCALES - GENERAL
PAINLEVEL_OUTOF10: 0

## 2019-03-11 ENCOUNTER — TELEPHONE (OUTPATIENT)
Dept: SURGERY | Age: 74
End: 2019-03-11

## 2019-03-11 ENCOUNTER — TELEPHONE (OUTPATIENT)
Dept: CARDIOLOGY | Age: 74
End: 2019-03-11

## 2019-03-13 ENCOUNTER — HOSPITAL ENCOUNTER (OUTPATIENT)
Dept: GENERAL RADIOLOGY | Age: 74
Discharge: HOME | End: 2019-03-13
Payer: MEDICARE

## 2019-03-13 DIAGNOSIS — R10.9 STOMACH DISCOMFORT: ICD-10-CM

## 2019-03-13 PROCEDURE — 74018 RADEX ABDOMEN 1 VIEW: CPT

## 2019-03-18 ENCOUNTER — TELEPHONE (OUTPATIENT)
Dept: SURGERY | Age: 74
End: 2019-03-18

## 2019-03-19 ENCOUNTER — OFFICE VISIT (OUTPATIENT)
Dept: CARDIOLOGY | Age: 74
End: 2019-03-19
Payer: MEDICARE

## 2019-03-19 VITALS
BODY MASS INDEX: 21.05 KG/M2 | SYSTOLIC BLOOD PRESSURE: 122 MMHG | DIASTOLIC BLOOD PRESSURE: 82 MMHG | HEART RATE: 58 BPM | WEIGHT: 147 LBS | HEIGHT: 70 IN

## 2019-03-19 DIAGNOSIS — I42.0 DILATED CARDIOMYOPATHY (HCC): ICD-10-CM

## 2019-03-19 DIAGNOSIS — I10 ESSENTIAL HYPERTENSION: ICD-10-CM

## 2019-03-19 DIAGNOSIS — E78.2 MIXED HYPERLIPIDEMIA: ICD-10-CM

## 2019-03-19 DIAGNOSIS — I25.119 CORONARY ARTERY DISEASE INVOLVING NATIVE CORONARY ARTERY OF NATIVE HEART WITH ANGINA PECTORIS (HCC): Primary | ICD-10-CM

## 2019-03-19 LAB
LV EF: 25 %
LVEF MODALITY: NORMAL

## 2019-03-19 PROCEDURE — 99212 OFFICE O/P EST SF 10 MIN: CPT | Performed by: INTERNAL MEDICINE

## 2019-04-05 RX ORDER — CLOPIDOGREL BISULFATE 75 MG/1
75 TABLET ORAL DAILY
Qty: 30 TABLET | Refills: 3 | Status: SHIPPED | OUTPATIENT
Start: 2019-04-05 | End: 2019-05-28 | Stop reason: SDUPTHER

## 2019-05-28 RX ORDER — CLOPIDOGREL BISULFATE 75 MG/1
TABLET ORAL
Qty: 30 TABLET | Refills: 5 | Status: SHIPPED | OUTPATIENT
Start: 2019-05-28 | End: 2019-11-25 | Stop reason: SDUPTHER

## 2019-05-30 ENCOUNTER — TELEPHONE (OUTPATIENT)
Dept: VASCULAR SURGERY | Age: 74
End: 2019-05-30

## 2019-05-30 NOTE — TELEPHONE ENCOUNTER
Due to Pt going to PD, [facility] no longer is needed Pt has been scheduled for [facility] Cath Removal. Pt: Izabela Au has been notified of upcoming appointment for procedure with [facility]. Patient is to arrive at [address] [place] [address]  on 06/07/19 at 6:00am. Details/Instructions (See letter) have been reviewed with Pt and a written copy has been mailed to Pt. Izabela Au voiced understanding.  OB

## 2019-06-06 ENCOUNTER — PREP FOR PROCEDURE (OUTPATIENT)
Dept: VASCULAR SURGERY | Age: 74
End: 2019-06-06

## 2019-06-06 RX ORDER — LIDOCAINE HYDROCHLORIDE 10 MG/ML
20 INJECTION, SOLUTION EPIDURAL; INFILTRATION; INTRACAUDAL; PERINEURAL ONCE
Status: CANCELLED | OUTPATIENT
Start: 2019-06-06 | End: 2019-06-06

## 2019-06-06 NOTE — H&P
Patient Care Team:  Jess Simons DO as PCP - General (Family Medicine)  Bienvenido Gonzalez (Hematology and Oncology)  KEN Bautista as Nurse Practitioner (Family Nurse Practitioner)        Emerald Marroquin 68 y.o. male with a history of renal failure requiring hemodialysis access. Patient no longer needs permacath    Patient Active Problem List   Diagnosis    Chronic kidney disease, stage IV (severe) (HCC)    NSTEMI (non-ST elevated myocardial infarction) ([address]    Renovascular hypertension    Secondary hyperparathyroidism of renal origin ([address]    Anemia due to stage 4 chronic kidney disease (HCC)    Acute renal failure with acute tubular necrosis superimposed on stage 4 chronic kidney disease ([address]    Palliative care patient    ESRD (end stage renal disease) on dialysis ([address]    Anemia of chronic disease    Peritoneal dialysis catheter dysfunction ([address]    Skin lesion of face    Coronary artery disease involving native coronary artery of native heart with angina pectoris (HCC)    Basal cell carcinoma (BCC) of face    Chest pain    Chest discomfort      See attached meds  Allergies:  Diphenhydramine and Sulfa antibiotics  Past Medical History:   Diagnosis Date    Anxiety     Asthma     Benign essential HTN     Benign hypertensive kidney disease     CAD (coronary artery disease)     sees dr. Prakash Chronic kidney disease, stage IV (severe) ([address] 7/18/2016    ESRD (end stage renal disease) ([address]     no dialysis at present.  Hemodialysis patient [facility])     mon wed fri at [place] [id number] care patient 11/29/2018    Prolonged emergence from general anesthesia     c/o dizzy and \"over sedated\" with prostate \"scope\".     Renal osteodystrophy     Skin cancer 2018    facial    Throat cancer ([address] 2012    Thyroid disease       Past Surgical History:   Procedure Laterality Date    CYSTOSCOPY      HERNIA REPAIR      umbilical    LAPAROSCOPY INSERTION PERITONEAL CATHETER N/A

## 2019-06-12 ENCOUNTER — TELEPHONE (OUTPATIENT)
Dept: VASCULAR SURGERY | Age: 74
End: 2019-06-12

## 2019-06-18 ENCOUNTER — TELEPHONE (OUTPATIENT)
Dept: VASCULAR SURGERY | Age: 74
End: 2019-06-18

## 2019-06-18 NOTE — TELEPHONE ENCOUNTER
Per Chelsea Benton Pt is refusing to have [facility] removed at this time, Pt has many excuses one being that the 6:00am arrival time is too early for him. I explained we only preform 1 of these a day and they always are at 7:00am with a 6:00am arrival time. Chelsea Benton feels Pt is just trying to hold onto the [facility]. This was the 2nd attempt, Pt no showed to original appt. Chlesea Benton will discuss this with nephrology.  OB

## 2019-06-19 ENCOUNTER — OFFICE VISIT (OUTPATIENT)
Dept: CARDIOLOGY | Age: 74
End: 2019-06-19
Payer: MEDICARE

## 2019-06-19 VITALS
WEIGHT: 150 LBS | HEIGHT: 71 IN | BODY MASS INDEX: 21 KG/M2 | SYSTOLIC BLOOD PRESSURE: 138 MMHG | HEART RATE: 68 BPM | DIASTOLIC BLOOD PRESSURE: 80 MMHG

## 2019-06-19 DIAGNOSIS — N18.6 ESRD (END STAGE RENAL DISEASE) ON DIALYSIS (HCC): ICD-10-CM

## 2019-06-19 DIAGNOSIS — I25.10 CORONARY ARTERY DISEASE INVOLVING NATIVE CORONARY ARTERY OF NATIVE HEART WITHOUT ANGINA PECTORIS: ICD-10-CM

## 2019-06-19 DIAGNOSIS — I25.5 ISCHEMIC CARDIOMYOPATHY: ICD-10-CM

## 2019-06-19 DIAGNOSIS — Z99.2 ESRD (END STAGE RENAL DISEASE) ON DIALYSIS (HCC): ICD-10-CM

## 2019-06-19 DIAGNOSIS — I10 ESSENTIAL HYPERTENSION: Primary | ICD-10-CM

## 2019-06-19 PROCEDURE — 99214 OFFICE O/P EST MOD 30 MIN: CPT | Performed by: CLINICAL NURSE SPECIALIST

## 2019-06-19 RX ORDER — LISINOPRIL 20 MG/1
20 TABLET ORAL DAILY
Refills: 5 | COMMUNITY
Start: 2019-06-01 | End: 2020-01-01 | Stop reason: DRUGHIGH

## 2019-06-19 RX ORDER — AMLODIPINE BESYLATE 10 MG/1
10 TABLET ORAL DAILY
Refills: 5 | COMMUNITY
Start: 2019-06-02

## 2019-06-19 NOTE — PATIENT INSTRUCTIONS
of device you have. · Wear medical alert jewelry that says you have an ICD. You can buy this at most drugstores. · If you get a shock, follow your action plan for what to do. · Ask your doctor what sort of activity and intensity is safe for you. You can lead an active life with an ICD. As you plan for your future and the end of life, be sure to include plans for your ICD. You can make the decision to turn off your ICD as part of the medical treatment you want at the end of life. Follow-up care is a key part of your treatment and safety. Be sure to make and go to all appointments, and call your doctor if you are having problems. It's also a good idea to know your test results and keep a list of the medicines you take. Where can you learn more? Go to [email or web address]://[email or web address].[email or web address] org and sign in to your [employer] account. Enter V921 in the [employer] box to learn more about \"Learning About an ICD (Implantable Cardioverter-Defibrillator). \"     If you do not have an account, please click on the \"Sign Up Now\" link. Current as of: July 22, 2018  Content Version: 12.0  © 0465-2079 Healthwise, Inc[employer]. Care instructions adapted under license by [facility] ([facility]). If you have questions about a medical condition or this instruction, always ask your healthcare professional. [place] [address] any warranty or liability for your use of this information.

## 2019-06-19 NOTE — PROGRESS NOTES
[address] [facility]  [place] [address]  Phone: [phone]  Fax: [phone]    OFFICE VISIT:  2019    Romel Patterson - : 1945    Reason For Visit:  Rhoda Santizo is a 68 y.o. male who is here for 3 Month Follow-Up (Med list verified through RX benefits in chart.) and Coronary Artery Disease  Story of ischemic cardiomyopathy. Heart catheterization in 2018 showed 100% proximal LAD, 90% proximal diagonal with diffuse disease. EF 25%. Admitted in March for chest pain. Elevated troponins thought due to renal failure. He has end-stage renal disease on dialysis. Had a Mckeon Johns but was treated medically with Plavix added to his medical regimen. Before AICD but has dialysis port in same area. Was scheduled to have permacath removed but procedure was canceled by patient    Turns today for follow-up. He states he canceled the removal of his permacath because he had to be here too early. He states he does home peritoneal dialysis and it usually goes through 6:30 AM.  He lives an hour away. He is concerned if he has this removed and something happens to his PD catheter he will have an access. He states he does not want an AV fistula    Ankle he is been doing fairly well. He states his peritoneal dialysis is been going well. His weight is been stable and his blood pressures been well controlled. He continues to make a little bit of urine      Subjective  Rhoda Santizo denies exertional chest pain, shortness of breath, orthopnea, paroxysmal nocturnal dyspnea, syncope, presyncope, arrhythmia, edema and fatigue. The patient denies numbness or weakness to suggest cerebrovascular accident or transient ischemic attack. Danie Marie DO is PCP and follows  Nephrology follows his labs.   Romel Patterson has the following history as recorded in [facility]:    Patient Active Problem List    Diagnosis Date Noted    Chest discomfort      Priority: High    Chest pain 2019    Basal cell carcinoma (BCC) of face     Coronary artery disease involving native coronary artery of native heart with angina pectoris ([address] 02/14/2019    Peritoneal dialysis catheter dysfunction ([address] 01/28/2019    Skin lesion of face 01/28/2019    Anemia of chronic disease 12/04/2018    ESRD (end stage renal disease) on dialysis [facility])     Palliative care patient 11/29/2018    Renovascular hypertension 11/28/2018    Secondary hyperparathyroidism of renal origin ([address] 11/28/2018    Anemia due to stage 4 chronic kidney disease ([address] 11/28/2018    Acute renal failure with acute tubular necrosis superimposed on stage 4 chronic kidney disease ([address]     NSTEMI (non-ST elevated myocardial infarction) ([address] 11/27/2018    Chronic kidney disease, stage IV (severe) ([address] 07/18/2016     Past Medical History:   Diagnosis Date    Anxiety     Asthma     Benign essential HTN     Benign hypertensive kidney disease     CAD (coronary artery disease)     sees dr. Alex Aceves Chronic kidney disease, stage IV (severe) ([address] 7/18/2016    ESRD (end stage renal disease) ([address]     no dialysis at present.  Hemodialysis patient [facility])     mon wed fri at [address] care patient 11/29/2018    Prolonged emergence from general anesthesia     c/o dizzy and \"over sedated\" with prostate \"scope\".     Renal osteodystrophy     Skin cancer 2018    facial    Throat cancer ([address] 2012    Thyroid disease      Past Surgical History:   Procedure Laterality Date    CYSTOSCOPY      HERNIA REPAIR      umbilical    LAPAROSCOPY INSERTION PERITONEAL CATHETER N/A 7/18/2016    CATHETER INSERTION PERITONEAL DIALYSIS LAPAROSCOPIC performed by Khadijah Ramos MD at [address] N/A 1/28/2019    LAPAROSCOPIC REVISION OF PD CATHETER performed by Mervyn Moritz, MD at [facility] CATH DIAL OPEN N/A 11/28/2018    PLACEMENT OF TUNNELED HEMODIALYSIS CATHETER performed cough.  No dyspnea on exertion or shortness of breath. Cardiovascular - no exertional chest pain, orthopnea or PND. No sensation of arrhythmia or slow heart rate. No claudication or leg edema. Gastrointestinal - no abdominal swelling or pain. No blood in stool. No severe constipation, diarrhea, nausea, or vomiting. Genitourinary - no difficulty urinating, dysuria, frequency, or urgency. No flank pain or hematuria. Musculoskeletal - no back pain, gait disturbance, or myalgia. Skin - no color change or rash. No pallor. No new surgical incision. Neurologic - no speech difficulty, facial asymmetry or lateralizing weakness. No seizures, presyncope, syncope, or significant dizziness. Hematologic - no easy bruising or excessive bleeding. Psychiatric - no severe anxiety or insomnia. No confusion. All other review of systems are negative. Objective  Vital Signs - /80   Pulse 68   Ht 5' 11\" (1.803 m)   Wt 150 lb (68 kg)   BMI 20.92 kg/m²   General - Clearance Falter is alert, cooperative, and pleasant. Well groomed. No acute distress. Body habitus is thin. HEENT - The head is normocephalic. No circumoral cyanosis. Dentition is normal.   EYES -  No Xanthelasma, no arcus senilis, no conjunctival hemorrhages or discharge. Neck - Supple, without increased jugular venous pressures. No carotid bruits. No mass. Respiratory - Lungs are clear bilaterally. No wheezes or rales. Normal effort without use of accessory muscles. Cardiovascular - Heart has regular rhythm and rate. No murmurs, rubs or gallops. + pedal pulses and no varicosities. Abdominal -  Soft, nontender, nondistended. Bowel sounds are intact. Extremities - No clubbing, cyanosis, or  edema. Musculoskeletal -  No clubbing . No Osler's nodes. Gait normal .  No kyphosis or scoliosis. Skin -  no statis ulcers or dermatitis. Permacath noted in right subclavian area. Neurological - No focal signs are identified. Oriented to person, place and time. Psychiatric -  Appropriate affect and mood. Assessment:     Diagnosis Orders   1. Essential hypertension     2. Coronary artery disease involving native coronary artery of native heart without angina pectoris     3. ESRD (end stage renal disease) on dialysis ([facility]     4. Ischemic cardiomyopathy       Data:  BP Readings from Last 3 Encounters:   06/19/19 138/80   03/19/19 122/82   03/07/19 134/81    Pulse Readings from Last 3 Encounters:   06/19/19 68   03/19/19 58   03/07/19 71        Wt Readings from Last 3 Encounters:   06/19/19 150 lb (68 kg)   03/19/19 147 lb (66.7 kg)   03/07/19 151 lb 12.8 oz (68.9 kg)   Viewed records from recent hospitalizations along with labs  Blood pressure and heart rate controlled. Medical management includes aspirin, statin, Plavix. On beta-blocker and CCB along with ACE inhibitor  End-stage renal disease currently doing home peritoneal dialysis    Weight is stable. Makes a little bit of urine. Keeping daily log of blood pressure, heart rate and weight for PD. We discussed eligibility for AICD for primary prevention of sudden cardiac death due to his persistent low LV function. This cannot be performed until his permacath is removed. We will have him work with vascular surgery in regards to timing of this procedure due to the distance he drives and having to do peritoneal dialysis   States taking medications as prescribed  Stable cardiovascular status. No evidence of overt heart failure, angina or dysrhythmia. Plan    Work with Vascular surgery to have Permacath  removed  Cannot put in ICD until it is removed  Let us know once catheter is removed  Follow up in 3 mos   Call with any questions or concerns  Follow up with Josiane Forrester DO for non cardiac problems  Report any new problems  Cardiovascular Fitness-Exercise as tolerated. Strive for 30 minutes of exercise most days of the week.     Cardiac / Healthy Diet- low sodium  Continue current medications as directed  Continue plan of treatment  It is always recommended that you bring your medications bottles with you to each visit - this is for your safety! KEN Akhtar dragon/transcription disclaimer: Much of this encounter note is electronic transcription/translation of spoken language to printed tach. Electronic translation of spoken language may be erroneous, or at times, nonsensical words or phrases may be inadvertently transcribed.  Although, I have reviewed the note for such errors, some may still exist.

## 2019-07-09 ENCOUNTER — TELEPHONE (OUTPATIENT)
Dept: VASCULAR SURGERY | Age: 74
End: 2019-07-09

## 2019-07-09 ENCOUNTER — PREP FOR PROCEDURE (OUTPATIENT)
Dept: VASCULAR SURGERY | Age: 74
End: 2019-07-09

## 2019-07-09 RX ORDER — LIDOCAINE HYDROCHLORIDE 10 MG/ML
20 INJECTION, SOLUTION EPIDURAL; INFILTRATION; INTRACAUDAL; PERINEURAL ONCE
Status: CANCELLED | OUTPATIENT
Start: 2019-07-09 | End: 2019-07-09

## 2019-07-10 ENCOUNTER — HOSPITAL ENCOUNTER (OUTPATIENT)
Dept: OTHER | Age: 74
Discharge: HOME | End: 2019-07-10
Payer: MEDICARE

## 2019-07-10 VITALS
OXYGEN SATURATION: 98 % | RESPIRATION RATE: 12 BRPM | HEIGHT: 71 IN | SYSTOLIC BLOOD PRESSURE: 134 MMHG | DIASTOLIC BLOOD PRESSURE: 72 MMHG | BODY MASS INDEX: 21 KG/M2 | WEIGHT: 150 LBS | HEART RATE: 60 BPM | TEMPERATURE: 97.5 F

## 2019-07-10 PROCEDURE — 2500000003 HC RX 250 WO HCPCS: Performed by: SURGERY

## 2019-07-10 PROCEDURE — 36589 REMOVAL TUNNELED CV CATH: CPT

## 2019-07-10 PROCEDURE — 36589 REMOVAL TUNNELED CV CATH: CPT | Performed by: SURGERY

## 2019-07-10 RX ORDER — HYDROCODONE BITARTRATE AND ACETAMINOPHEN 5; 325 MG/1; MG/1
1 TABLET ORAL EVERY 4 HOURS PRN
Status: DISCONTINUED | OUTPATIENT
Start: 2019-07-10 | End: 2019-07-12 | Stop reason: HOSPADM

## 2019-07-10 RX ORDER — ONDANSETRON 2 MG/ML
4 INJECTION INTRAMUSCULAR; INTRAVENOUS EVERY 8 HOURS PRN
Status: DISCONTINUED | OUTPATIENT
Start: 2019-07-10 | End: 2019-07-12 | Stop reason: HOSPADM

## 2019-07-10 RX ORDER — HYDROCODONE BITARTRATE AND ACETAMINOPHEN 5; 325 MG/1; MG/1
2 TABLET ORAL EVERY 4 HOURS PRN
Status: DISCONTINUED | OUTPATIENT
Start: 2019-07-10 | End: 2019-07-12 | Stop reason: HOSPADM

## 2019-07-10 RX ORDER — LIDOCAINE HYDROCHLORIDE 10 MG/ML
20 INJECTION, SOLUTION EPIDURAL; INFILTRATION; INTRACAUDAL; PERINEURAL ONCE
Status: DISCONTINUED | OUTPATIENT
Start: 2019-07-10 | End: 2019-07-10

## 2019-07-10 RX ORDER — LIDOCAINE HYDROCHLORIDE 10 MG/ML
30 INJECTION, SOLUTION EPIDURAL; INFILTRATION; INTRACAUDAL; PERINEURAL ONCE
Status: COMPLETED | OUTPATIENT
Start: 2019-07-10 | End: 2019-07-10

## 2019-07-10 RX ORDER — ACETAMINOPHEN 325 MG/1
650 TABLET ORAL EVERY 4 HOURS PRN
Status: DISCONTINUED | OUTPATIENT
Start: 2019-07-10 | End: 2019-07-12 | Stop reason: HOSPADM

## 2019-07-10 RX ADMIN — LIDOCAINE HYDROCHLORIDE 10 ML: 10 INJECTION, SOLUTION EPIDURAL; INFILTRATION; INTRACAUDAL; PERINEURAL at 07:40

## 2019-07-10 NOTE — LETTER
[facility]  [address] [address]  Phone: [phone]             July 10, 2019    Patient: Niki Elliott   YOB: 1945   Date of Visit: 7/10/2019       To Whom It May Concern:    Monie Taylor was at [facility] 7/10/19  To help provide transportation for his grandfather who had a procedure complete on this date.       Sincerely,       Margo Connors RN         Signature:__________________________________

## 2019-07-10 NOTE — PROGRESS NOTES
Patient and grandson instructed on care of right chest site post perm cath removal.  Given written instructions. Both stated understanding.

## 2019-11-25 RX ORDER — CLOPIDOGREL BISULFATE 75 MG/1
TABLET ORAL
Qty: 90 TABLET | Refills: 3 | Status: SHIPPED | OUTPATIENT
Start: 2019-11-25 | End: 2020-01-01 | Stop reason: SDUPTHER

## 2019-12-20 ENCOUNTER — TELEPHONE (OUTPATIENT)
Dept: CARDIOLOGY | Age: 74
End: 2019-12-20

## 2020-01-01 ENCOUNTER — ANESTHESIA (OUTPATIENT)
Dept: OPERATING ROOM | Age: 75
DRG: 208 | End: 2020-01-01
Payer: MEDICARE

## 2020-01-01 ENCOUNTER — TELEPHONE (OUTPATIENT)
Dept: CARDIOLOGY | Age: 75
End: 2020-01-01

## 2020-01-01 ENCOUNTER — APPOINTMENT (OUTPATIENT)
Dept: GENERAL RADIOLOGY | Age: 75
DRG: 208 | End: 2020-01-01
Attending: INTERNAL MEDICINE
Payer: MEDICARE

## 2020-01-01 ENCOUNTER — HOSPITAL ENCOUNTER (INPATIENT)
Age: 75
LOS: 19 days | DRG: 208 | End: 2021-01-10
Attending: INTERNAL MEDICINE
Payer: MEDICARE

## 2020-01-01 ENCOUNTER — ANESTHESIA EVENT (OUTPATIENT)
Dept: OPERATING ROOM | Age: 75
DRG: 208 | End: 2020-01-01
Payer: MEDICARE

## 2020-01-01 ENCOUNTER — OFFICE VISIT (OUTPATIENT)
Dept: CARDIOLOGY | Age: 75
End: 2020-01-01
Payer: MEDICARE

## 2020-01-01 ENCOUNTER — APPOINTMENT (OUTPATIENT)
Dept: CT IMAGING | Age: 75
DRG: 208 | End: 2020-01-01
Attending: INTERNAL MEDICINE
Payer: MEDICARE

## 2020-01-01 ENCOUNTER — HOSPITAL ENCOUNTER (OUTPATIENT)
Dept: NON INVASIVE DIAGNOSTICS | Age: 75
Discharge: HOME | End: 2020-09-01
Payer: MEDICARE

## 2020-01-01 VITALS
HEART RATE: 86 BPM | WEIGHT: 153 LBS | HEIGHT: 71 IN | DIASTOLIC BLOOD PRESSURE: 68 MMHG | BODY MASS INDEX: 21.42 KG/M2 | SYSTOLIC BLOOD PRESSURE: 116 MMHG

## 2020-01-01 VITALS
WEIGHT: 150 LBS | HEART RATE: 74 BPM | OXYGEN SATURATION: 98 % | SYSTOLIC BLOOD PRESSURE: 162 MMHG | HEIGHT: 71 IN | DIASTOLIC BLOOD PRESSURE: 88 MMHG | BODY MASS INDEX: 21 KG/M2

## 2020-01-01 VITALS
RESPIRATION RATE: 2 BRPM | DIASTOLIC BLOOD PRESSURE: 69 MMHG | OXYGEN SATURATION: 81 % | SYSTOLIC BLOOD PRESSURE: 119 MMHG

## 2020-01-01 DIAGNOSIS — J96.01 ACUTE HYPOXEMIC RESPIRATORY FAILURE (HCC): Primary | ICD-10-CM

## 2020-01-01 LAB
ABO/RH: NORMAL
ALBUMIN SERPL-MCNC: 1.7 G/DL (ref 3.5–5.2)
ALBUMIN SERPL-MCNC: 1.8 G/DL (ref 3.5–5.2)
ALBUMIN SERPL-MCNC: 1.9 G/DL (ref 3.5–5.2)
ALBUMIN SERPL-MCNC: 2 G/DL (ref 3.5–5.2)
ALBUMIN SERPL-MCNC: 2 G/DL (ref 3.5–5.2)
ALBUMIN SERPL-MCNC: 2.1 G/DL (ref 3.5–5.2)
ALBUMIN SERPL-MCNC: 2.3 G/DL (ref 3.5–5.2)
ALP BLD-CCNC: 59 U/L (ref 40–130)
ALP BLD-CCNC: 61 U/L (ref 40–130)
ALP BLD-CCNC: 62 U/L (ref 40–130)
ALP BLD-CCNC: 62 U/L (ref 40–130)
ALP BLD-CCNC: 63 U/L (ref 40–130)
ALP BLD-CCNC: 65 U/L (ref 40–130)
ALP BLD-CCNC: 67 U/L (ref 40–130)
ALT SERPL-CCNC: 28 U/L (ref 5–41)
ALT SERPL-CCNC: 30 U/L (ref 5–41)
ALT SERPL-CCNC: 31 U/L (ref 5–41)
ALT SERPL-CCNC: 48 U/L (ref 5–41)
ALT SERPL-CCNC: 50 U/L (ref 5–41)
ALT SERPL-CCNC: 68 U/L (ref 5–41)
ALT SERPL-CCNC: 8 U/L (ref 5–41)
ALT SERPL-CCNC: 80 U/L (ref 5–41)
ALT SERPL-CCNC: <5 U/L (ref 5–41)
ANION GAP SERPL CALCULATED.3IONS-SCNC: 11 MMOL/L (ref 7–19)
ANION GAP SERPL CALCULATED.3IONS-SCNC: 15 MMOL/L (ref 7–19)
ANION GAP SERPL CALCULATED.3IONS-SCNC: 15 MMOL/L (ref 7–19)
ANION GAP SERPL CALCULATED.3IONS-SCNC: 16 MMOL/L (ref 7–19)
ANION GAP SERPL CALCULATED.3IONS-SCNC: 17 MMOL/L (ref 7–19)
ANION GAP SERPL CALCULATED.3IONS-SCNC: 18 MMOL/L (ref 7–19)
ANISOCYTOSIS: ABNORMAL
ANISOCYTOSIS: ABNORMAL
ANTIBODY SCREEN: NORMAL
ANTIBODY SCREEN: NORMAL
APTT: 144.6 SEC (ref 26–36.2)
APTT: 29 SEC (ref 26–36.2)
APTT: 33.7 SEC (ref 26–36.2)
APTT: 38.2 SEC (ref 26–36.2)
APTT: 60.6 SEC (ref 26–36.2)
APTT: 62.8 SEC (ref 26–36.2)
APTT: 67.9 SEC (ref 26–36.2)
AST SERPL-CCNC: 15 U/L (ref 5–40)
AST SERPL-CCNC: 17 U/L (ref 5–40)
AST SERPL-CCNC: 20 U/L (ref 5–40)
AST SERPL-CCNC: 21 U/L (ref 5–40)
AST SERPL-CCNC: 34 U/L (ref 5–40)
AST SERPL-CCNC: 35 U/L (ref 5–40)
AST SERPL-CCNC: 37 U/L (ref 5–40)
AST SERPL-CCNC: 55 U/L (ref 5–40)
AST SERPL-CCNC: 90 U/L (ref 5–40)
ATYPICAL LYMPHOCYTE RELATIVE PERCENT: 1 % (ref 0–8)
BANDED NEUTROPHILS RELATIVE PERCENT: 1 % (ref 0–5)
BANDED NEUTROPHILS RELATIVE PERCENT: 10 % (ref 0–5)
BANDED NEUTROPHILS RELATIVE PERCENT: 12 % (ref 0–5)
BANDED NEUTROPHILS RELATIVE PERCENT: 3 % (ref 0–5)
BASE EXCESS ARTERIAL: -3.4 MMOL/L (ref -2–2)
BASOPHILS ABSOLUTE: 0 K/UL (ref 0–0.2)
BASOPHILS RELATIVE PERCENT: 0 % (ref 0–1)
BASOPHILS RELATIVE PERCENT: 0.1 % (ref 0–1)
BILIRUB SERPL-MCNC: 0.3 MG/DL (ref 0.2–1.2)
BILIRUB SERPL-MCNC: <0.2 MG/DL (ref 0.2–1.2)
BLOOD CULTURE, ROUTINE: ABNORMAL
BUN BLDV-MCNC: 74 MG/DL (ref 8–23)
BUN BLDV-MCNC: 74 MG/DL (ref 8–23)
BUN BLDV-MCNC: 75 MG/DL (ref 8–23)
BUN BLDV-MCNC: 75 MG/DL (ref 8–23)
BUN BLDV-MCNC: 76 MG/DL (ref 8–23)
BUN BLDV-MCNC: 76 MG/DL (ref 8–23)
BUN BLDV-MCNC: 80 MG/DL (ref 8–23)
BUN BLDV-MCNC: 81 MG/DL (ref 8–23)
BUN BLDV-MCNC: 83 MG/DL (ref 8–23)
C-REACTIVE PROTEIN: 32.68 MG/DL (ref 0–0.5)
CALCIUM SERPL-MCNC: 7.4 MG/DL (ref 8.8–10.2)
CALCIUM SERPL-MCNC: 7.5 MG/DL (ref 8.8–10.2)
CALCIUM SERPL-MCNC: 7.5 MG/DL (ref 8.8–10.2)
CALCIUM SERPL-MCNC: 7.6 MG/DL (ref 8.8–10.2)
CALCIUM SERPL-MCNC: 7.6 MG/DL (ref 8.8–10.2)
CALCIUM SERPL-MCNC: 7.9 MG/DL (ref 8.8–10.2)
CALCIUM SERPL-MCNC: 7.9 MG/DL (ref 8.8–10.2)
CALCIUM SERPL-MCNC: 8.1 MG/DL (ref 8.8–10.2)
CALCIUM SERPL-MCNC: 8.3 MG/DL (ref 8.8–10.2)
CARBOXYHEMOGLOBIN ARTERIAL: 0 % (ref 0–5)
CHLORIDE BLD-SCNC: 89 MMOL/L (ref 98–111)
CHLORIDE BLD-SCNC: 89 MMOL/L (ref 98–111)
CHLORIDE BLD-SCNC: 90 MMOL/L (ref 98–111)
CHLORIDE BLD-SCNC: 91 MMOL/L (ref 98–111)
CHLORIDE BLD-SCNC: 92 MMOL/L (ref 98–111)
CHLORIDE BLD-SCNC: 95 MMOL/L (ref 98–111)
CHLORIDE BLD-SCNC: 95 MMOL/L (ref 98–111)
CO2: 19 MMOL/L (ref 22–29)
CO2: 20 MMOL/L (ref 22–29)
CO2: 21 MMOL/L (ref 22–29)
CO2: 21 MMOL/L (ref 22–29)
CO2: 22 MMOL/L (ref 22–29)
CO2: 22 MMOL/L (ref 22–29)
CO2: 23 MMOL/L (ref 22–29)
CO2: 24 MMOL/L (ref 22–29)
CO2: 25 MMOL/L (ref 22–29)
CREAT SERPL-MCNC: 6 MG/DL (ref 0.5–1.2)
CREAT SERPL-MCNC: 6.2 MG/DL (ref 0.5–1.2)
CREAT SERPL-MCNC: 6.2 MG/DL (ref 0.5–1.2)
CREAT SERPL-MCNC: 6.3 MG/DL (ref 0.5–1.2)
CREAT SERPL-MCNC: 6.4 MG/DL (ref 0.5–1.2)
CREAT SERPL-MCNC: 6.5 MG/DL (ref 0.5–1.2)
CREAT SERPL-MCNC: 7 MG/DL (ref 0.5–1.2)
CREAT SERPL-MCNC: 7.7 MG/DL (ref 0.5–1.2)
CREAT SERPL-MCNC: 8 MG/DL (ref 0.5–1.2)
CULTURE, BLOOD 2: ABNORMAL
CULTURE, BLOOD 2: ABNORMAL
CULTURE, RESPIRATORY: ABNORMAL
D DIMER: 2.41 UG/ML FEU (ref 0–0.48)
D DIMER: 2.5 UG/ML FEU (ref 0–0.48)
D DIMER: 2.78 UG/ML FEU (ref 0–0.48)
D DIMER: 2.96 UG/ML FEU (ref 0–0.48)
EOSINOPHILS ABSOLUTE: 0 K/UL (ref 0–0.6)
EOSINOPHILS RELATIVE PERCENT: 0 % (ref 0–5)
EOSINOPHILS RELATIVE PERCENT: 0.1 % (ref 0–5)
FERRITIN: >2000 NG/ML (ref 30–400)
FIBRINOGEN: 628 MG/DL (ref 238–505)
FIBRINOGEN: 633 MG/DL (ref 238–505)
FIBRINOGEN: 711 MG/DL (ref 238–505)
FIBRINOGEN: 772 MG/DL (ref 238–505)
GFR AFRICAN AMERICAN: 10
GFR AFRICAN AMERICAN: 10
GFR AFRICAN AMERICAN: 11
GFR AFRICAN AMERICAN: 8
GFR AFRICAN AMERICAN: 8
GFR AFRICAN AMERICAN: 9
GFR NON-AFRICAN AMERICAN: 7
GFR NON-AFRICAN AMERICAN: 7
GFR NON-AFRICAN AMERICAN: 8
GFR NON-AFRICAN AMERICAN: 8
GFR NON-AFRICAN AMERICAN: 9
GLUCOSE BLD-MCNC: 102 MG/DL (ref 70–99)
GLUCOSE BLD-MCNC: 102 MG/DL (ref 74–109)
GLUCOSE BLD-MCNC: 104 MG/DL (ref 74–109)
GLUCOSE BLD-MCNC: 109 MG/DL (ref 70–99)
GLUCOSE BLD-MCNC: 113 MG/DL (ref 70–99)
GLUCOSE BLD-MCNC: 113 MG/DL (ref 70–99)
GLUCOSE BLD-MCNC: 121 MG/DL (ref 74–109)
GLUCOSE BLD-MCNC: 122 MG/DL (ref 70–99)
GLUCOSE BLD-MCNC: 122 MG/DL (ref 70–99)
GLUCOSE BLD-MCNC: 123 MG/DL (ref 74–109)
GLUCOSE BLD-MCNC: 125 MG/DL (ref 70–99)
GLUCOSE BLD-MCNC: 127 MG/DL (ref 70–99)
GLUCOSE BLD-MCNC: 130 MG/DL (ref 70–99)
GLUCOSE BLD-MCNC: 133 MG/DL (ref 70–99)
GLUCOSE BLD-MCNC: 133 MG/DL (ref 70–99)
GLUCOSE BLD-MCNC: 137 MG/DL (ref 70–99)
GLUCOSE BLD-MCNC: 142 MG/DL (ref 70–99)
GLUCOSE BLD-MCNC: 148 MG/DL (ref 74–109)
GLUCOSE BLD-MCNC: 151 MG/DL (ref 74–109)
GLUCOSE BLD-MCNC: 152 MG/DL (ref 70–99)
GLUCOSE BLD-MCNC: 160 MG/DL (ref 70–99)
GLUCOSE BLD-MCNC: 161 MG/DL (ref 74–109)
GLUCOSE BLD-MCNC: 174 MG/DL (ref 74–109)
GLUCOSE BLD-MCNC: 176 MG/DL (ref 70–99)
GLUCOSE BLD-MCNC: 190 MG/DL (ref 70–99)
GLUCOSE BLD-MCNC: 196 MG/DL (ref 70–99)
GLUCOSE BLD-MCNC: 198 MG/DL (ref 70–99)
GLUCOSE BLD-MCNC: 208 MG/DL (ref 70–99)
GLUCOSE BLD-MCNC: 211 MG/DL (ref 70–99)
GLUCOSE BLD-MCNC: 211 MG/DL (ref 70–99)
GLUCOSE BLD-MCNC: 215 MG/DL (ref 70–99)
GLUCOSE BLD-MCNC: 218 MG/DL (ref 70–99)
GLUCOSE BLD-MCNC: 222 MG/DL (ref 70–99)
GLUCOSE BLD-MCNC: 82 MG/DL (ref 70–99)
GLUCOSE BLD-MCNC: 89 MG/DL (ref 70–99)
GLUCOSE BLD-MCNC: 96 MG/DL (ref 70–99)
GLUCOSE BLD-MCNC: 96 MG/DL (ref 70–99)
GLUCOSE BLD-MCNC: 99 MG/DL (ref 74–109)
GRAM STAIN RESULT: ABNORMAL
HCO3 ARTERIAL: 18.6 MMOL/L (ref 22–26)
HCT VFR BLD CALC: 23.7 % (ref 42–52)
HCT VFR BLD CALC: 23.9 % (ref 42–52)
HCT VFR BLD CALC: 24.9 % (ref 42–52)
HCT VFR BLD CALC: 25.5 % (ref 42–52)
HCT VFR BLD CALC: 25.6 % (ref 42–52)
HCT VFR BLD CALC: 26.2 % (ref 42–52)
HCT VFR BLD CALC: 27.9 % (ref 42–52)
HCT VFR BLD CALC: 28.3 % (ref 42–52)
HCT VFR BLD CALC: 29.4 % (ref 42–52)
HCT VFR BLD CALC: 32 % (ref 42–52)
HEMOGLOBIN, ART, EXTENDED: 12.5 G/DL (ref 14–18)
HEMOGLOBIN: 10.4 G/DL (ref 14–18)
HEMOGLOBIN: 7.7 G/DL (ref 14–18)
HEMOGLOBIN: 7.8 G/DL (ref 14–18)
HEMOGLOBIN: 8.3 G/DL (ref 14–18)
HEMOGLOBIN: 8.7 G/DL (ref 14–18)
HEMOGLOBIN: 9.1 G/DL (ref 14–18)
HEMOGLOBIN: 9.2 G/DL (ref 14–18)
HEMOGLOBIN: 9.6 G/DL (ref 14–18)
IMMATURE GRANULOCYTES #: 0 K/UL
IMMATURE GRANULOCYTES #: 0.1 K/UL
INR BLD: 1.09 (ref 0.88–1.18)
INR BLD: 1.09 (ref 0.88–1.18)
INR BLD: 1.1 (ref 0.88–1.18)
INR BLD: 1.18 (ref 0.88–1.18)
INR BLD: 1.2 (ref 0.88–1.18)
INR BLD: 1.2 (ref 0.88–1.18)
INR BLD: 1.21 (ref 0.88–1.18)
INR BLD: 1.22 (ref 0.88–1.18)
INR BLD: 1.23 (ref 0.88–1.18)
L. PNEUMOPHILA SEROGP 1 UR AG: NORMAL
LACTATE DEHYDROGENASE: 388 U/L (ref 91–215)
LV EF: 40 %
LVEF MODALITY: NORMAL
LYMPHOCYTES ABSOLUTE: 0 K/UL (ref 1.1–4.5)
LYMPHOCYTES ABSOLUTE: 0.2 K/UL (ref 1.1–4.5)
LYMPHOCYTES ABSOLUTE: 0.3 K/UL (ref 1.1–4.5)
LYMPHOCYTES RELATIVE PERCENT: 1 % (ref 20–40)
LYMPHOCYTES RELATIVE PERCENT: 2 % (ref 20–40)
LYMPHOCYTES RELATIVE PERCENT: 2 % (ref 20–40)
LYMPHOCYTES RELATIVE PERCENT: 3.1 % (ref 20–40)
LYMPHOCYTES RELATIVE PERCENT: 3.2 % (ref 20–40)
LYMPHOCYTES RELATIVE PERCENT: 4 % (ref 20–40)
LYMPHOCYTES RELATIVE PERCENT: 4 % (ref 20–40)
LYMPHOCYTES RELATIVE PERCENT: 5 % (ref 20–40)
LYMPHOCYTES RELATIVE PERCENT: 6 % (ref 20–40)
MACROCYTES: ABNORMAL
MACROCYTES: ABNORMAL
MCH RBC QN AUTO: 31.7 PG (ref 27–31)
MCH RBC QN AUTO: 31.8 PG (ref 27–31)
MCH RBC QN AUTO: 31.9 PG (ref 27–31)
MCH RBC QN AUTO: 32 PG (ref 27–31)
MCH RBC QN AUTO: 32 PG (ref 27–31)
MCH RBC QN AUTO: 32.1 PG (ref 27–31)
MCH RBC QN AUTO: 32.2 PG (ref 27–31)
MCH RBC QN AUTO: 32.2 PG (ref 27–31)
MCH RBC QN AUTO: 32.3 PG (ref 27–31)
MCH RBC QN AUTO: 32.5 PG (ref 27–31)
MCHC RBC AUTO-ENTMCNC: 32.2 G/DL (ref 33–37)
MCHC RBC AUTO-ENTMCNC: 32.4 G/DL (ref 33–37)
MCHC RBC AUTO-ENTMCNC: 32.5 G/DL (ref 33–37)
MCHC RBC AUTO-ENTMCNC: 32.6 G/DL (ref 33–37)
MCHC RBC AUTO-ENTMCNC: 32.7 G/DL (ref 33–37)
MCHC RBC AUTO-ENTMCNC: 32.9 G/DL (ref 33–37)
MCHC RBC AUTO-ENTMCNC: 33.2 G/DL (ref 33–37)
MCHC RBC AUTO-ENTMCNC: 33.3 G/DL (ref 33–37)
MCV RBC AUTO: 96.3 FL (ref 80–94)
MCV RBC AUTO: 96.5 FL (ref 80–94)
MCV RBC AUTO: 97.3 FL (ref 80–94)
MCV RBC AUTO: 97.5 FL (ref 80–94)
MCV RBC AUTO: 98.3 FL (ref 80–94)
MCV RBC AUTO: 98.8 FL (ref 80–94)
MCV RBC AUTO: 98.8 FL (ref 80–94)
MCV RBC AUTO: 99 FL (ref 80–94)
MCV RBC AUTO: 99.1 FL (ref 80–94)
MCV RBC AUTO: 99.6 FL (ref 80–94)
METHEMOGLOBIN ARTERIAL: 0.7 %
MONOCYTES ABSOLUTE: 0 K/UL (ref 0–0.9)
MONOCYTES ABSOLUTE: 0.1 K/UL (ref 0–0.9)
MONOCYTES ABSOLUTE: 0.2 K/UL (ref 0–0.9)
MONOCYTES ABSOLUTE: 0.3 K/UL (ref 0–0.9)
MONOCYTES RELATIVE PERCENT: 0 % (ref 0–10)
MONOCYTES RELATIVE PERCENT: 1 % (ref 0–10)
MONOCYTES RELATIVE PERCENT: 1.2 % (ref 0–10)
MONOCYTES RELATIVE PERCENT: 3.2 % (ref 0–10)
MONOCYTES RELATIVE PERCENT: 5 % (ref 0–10)
NEUTROPHILS ABSOLUTE: 3.8 K/UL (ref 1.5–7.5)
NEUTROPHILS ABSOLUTE: 4.7 K/UL (ref 1.5–7.5)
NEUTROPHILS ABSOLUTE: 5.5 K/UL (ref 1.5–7.5)
NEUTROPHILS ABSOLUTE: 6 K/UL (ref 1.5–7.5)
NEUTROPHILS ABSOLUTE: 7.2 K/UL (ref 1.5–7.5)
NEUTROPHILS ABSOLUTE: 7.4 K/UL (ref 1.5–7.5)
NEUTROPHILS ABSOLUTE: 8 K/UL (ref 1.5–7.5)
NEUTROPHILS ABSOLUTE: 8.1 K/UL (ref 1.5–7.5)
NEUTROPHILS ABSOLUTE: 8.4 K/UL (ref 1.5–7.5)
NEUTROPHILS RELATIVE PERCENT: 83 % (ref 50–65)
NEUTROPHILS RELATIVE PERCENT: 88 % (ref 50–65)
NEUTROPHILS RELATIVE PERCENT: 88 % (ref 50–65)
NEUTROPHILS RELATIVE PERCENT: 92.7 % (ref 50–65)
NEUTROPHILS RELATIVE PERCENT: 94 % (ref 50–65)
NEUTROPHILS RELATIVE PERCENT: 95 % (ref 50–65)
NEUTROPHILS RELATIVE PERCENT: 95 % (ref 50–65)
NEUTROPHILS RELATIVE PERCENT: 95.3 % (ref 50–65)
NEUTROPHILS RELATIVE PERCENT: 98 % (ref 50–65)
O2 CONTENT ARTERIAL: 17 ML/DL
O2 SAT, ARTERIAL: 95.7 %
O2 THERAPY: ABNORMAL
ORGANISM: ABNORMAL
OVALOCYTES: ABNORMAL
OVALOCYTES: ABNORMAL
PCO2 ARTERIAL: 25 MMHG (ref 35–45)
PDW BLD-RTO: 14.1 % (ref 11.5–14.5)
PDW BLD-RTO: 14.2 % (ref 11.5–14.5)
PDW BLD-RTO: 14.3 % (ref 11.5–14.5)
PDW BLD-RTO: 14.3 % (ref 11.5–14.5)
PDW BLD-RTO: 14.6 % (ref 11.5–14.5)
PDW BLD-RTO: 15 % (ref 11.5–14.5)
PDW BLD-RTO: 15.1 % (ref 11.5–14.5)
PDW BLD-RTO: 15.1 % (ref 11.5–14.5)
PERFORMED ON: ABNORMAL
PERFORMED ON: NORMAL
PH ARTERIAL: 7.48 (ref 7.35–7.45)
PLATELET # BLD: 194 K/UL (ref 130–400)
PLATELET # BLD: 200 K/UL (ref 130–400)
PLATELET # BLD: 209 K/UL (ref 130–400)
PLATELET # BLD: 221 K/UL (ref 130–400)
PLATELET # BLD: 235 K/UL (ref 130–400)
PLATELET # BLD: 261 K/UL (ref 130–400)
PLATELET # BLD: 292 K/UL (ref 130–400)
PLATELET # BLD: 324 K/UL (ref 130–400)
PLATELET # BLD: 338 K/UL (ref 130–400)
PLATELET # BLD: 363 K/UL (ref 130–400)
PLATELET SLIDE REVIEW: ADEQUATE
PMV BLD AUTO: 10 FL (ref 9.4–12.4)
PMV BLD AUTO: 10.1 FL (ref 9.4–12.4)
PMV BLD AUTO: 10.2 FL (ref 9.4–12.4)
PMV BLD AUTO: 9.6 FL (ref 9.4–12.4)
PMV BLD AUTO: 9.8 FL (ref 9.4–12.4)
PMV BLD AUTO: 9.9 FL (ref 9.4–12.4)
PO2 ARTERIAL: 114 MMHG (ref 80–100)
POLYCHROMASIA: ABNORMAL
POLYCHROMASIA: ABNORMAL
POTASSIUM REFLEX MAGNESIUM: 3.7 MMOL/L (ref 3.5–5)
POTASSIUM REFLEX MAGNESIUM: 4 MMOL/L (ref 3.5–5)
POTASSIUM REFLEX MAGNESIUM: 4.2 MMOL/L (ref 3.5–5)
POTASSIUM REFLEX MAGNESIUM: 4.3 MMOL/L (ref 3.5–5)
POTASSIUM REFLEX MAGNESIUM: 4.3 MMOL/L (ref 3.5–5)
POTASSIUM REFLEX MAGNESIUM: 4.5 MMOL/L (ref 3.5–5)
POTASSIUM REFLEX MAGNESIUM: 4.6 MMOL/L (ref 3.5–5)
POTASSIUM REFLEX MAGNESIUM: 4.7 MMOL/L (ref 3.5–5)
POTASSIUM REFLEX MAGNESIUM: 5.1 MMOL/L (ref 3.5–5)
POTASSIUM, WHOLE BLOOD: 4.5
PROCALCITONIN: 2.73 NG/ML (ref 0–0.09)
PROTHROMBIN TIME: 14 SEC (ref 12–14.6)
PROTHROMBIN TIME: 14 SEC (ref 12–14.6)
PROTHROMBIN TIME: 14.2 SEC (ref 12–14.6)
PROTHROMBIN TIME: 15 SEC (ref 12–14.6)
PROTHROMBIN TIME: 15.2 SEC (ref 12–14.6)
PROTHROMBIN TIME: 15.2 SEC (ref 12–14.6)
PROTHROMBIN TIME: 15.3 SEC (ref 12–14.6)
PROTHROMBIN TIME: 15.4 SEC (ref 12–14.6)
PROTHROMBIN TIME: 15.5 SEC (ref 12–14.6)
RBC # BLD: 2.42 M/UL (ref 4.7–6.1)
RBC # BLD: 2.43 M/UL (ref 4.7–6.1)
RBC # BLD: 2.58 M/UL (ref 4.7–6.1)
RBC # BLD: 2.59 M/UL (ref 4.7–6.1)
RBC # BLD: 2.62 M/UL (ref 4.7–6.1)
RBC # BLD: 2.72 M/UL (ref 4.7–6.1)
RBC # BLD: 2.8 M/UL (ref 4.7–6.1)
RBC # BLD: 2.88 M/UL (ref 4.7–6.1)
RBC # BLD: 2.97 M/UL (ref 4.7–6.1)
RBC # BLD: 3.23 M/UL (ref 4.7–6.1)
REASON FOR REJECTION: NORMAL
REASON FOR REJECTION: NORMAL
REJECTED TEST: NORMAL
REJECTED TEST: NORMAL
SARS-COV-2: ABNORMAL
SODIUM BLD-SCNC: 125 MMOL/L (ref 136–145)
SODIUM BLD-SCNC: 127 MMOL/L (ref 136–145)
SODIUM BLD-SCNC: 127 MMOL/L (ref 136–145)
SODIUM BLD-SCNC: 128 MMOL/L (ref 136–145)
SODIUM BLD-SCNC: 129 MMOL/L (ref 136–145)
SODIUM BLD-SCNC: 129 MMOL/L (ref 136–145)
SODIUM BLD-SCNC: 130 MMOL/L (ref 136–145)
SODIUM BLD-SCNC: 131 MMOL/L (ref 136–145)
SODIUM BLD-SCNC: 132 MMOL/L (ref 136–145)
STREP PNEUMONIAE ANTIGEN, URINE: NORMAL
TOTAL PROTEIN: 4.7 G/DL (ref 6.6–8.7)
TOTAL PROTEIN: 5 G/DL (ref 6.6–8.7)
TOTAL PROTEIN: 5.2 G/DL (ref 6.6–8.7)
TOTAL PROTEIN: 5.2 G/DL (ref 6.6–8.7)
TOTAL PROTEIN: 5.3 G/DL (ref 6.6–8.7)
TOTAL PROTEIN: 5.3 G/DL (ref 6.6–8.7)
TOTAL PROTEIN: 5.7 G/DL (ref 6.6–8.7)
TOTAL PROTEIN: 5.9 G/DL (ref 6.6–8.7)
TOTAL PROTEIN: 5.9 G/DL (ref 6.6–8.7)
VANCOMYCIN RANDOM: 21.9 UG/ML
VANCOMYCIN RANDOM: 7.5 UG/ML
VITAMIN D 25-HYDROXY: 21.8 NG/ML
WBC # BLD: 4.3 K/UL (ref 4.8–10.8)
WBC # BLD: 4.8 K/UL (ref 4.8–10.8)
WBC # BLD: 5.9 K/UL (ref 4.8–10.8)
WBC # BLD: 6.3 K/UL (ref 4.8–10.8)
WBC # BLD: 7.5 K/UL (ref 4.8–10.8)
WBC # BLD: 7.6 K/UL (ref 4.8–10.8)
WBC # BLD: 8.5 K/UL (ref 4.8–10.8)
WBC # BLD: 8.6 K/UL (ref 4.8–10.8)
WBC # BLD: 8.6 K/UL (ref 4.8–10.8)
WBC # BLD: 8.9 K/UL (ref 4.8–10.8)

## 2020-01-01 PROCEDURE — 2580000003 HC RX 258: Performed by: HOSPITALIST

## 2020-01-01 PROCEDURE — 6360000002 HC RX W HCPCS: Performed by: HOSPITALIST

## 2020-01-01 PROCEDURE — 80053 COMPREHEN METABOLIC PANEL: CPT

## 2020-01-01 PROCEDURE — 3700000000 HC ANESTHESIA ATTENDED CARE: Performed by: SURGERY

## 2020-01-01 PROCEDURE — 6360000002 HC RX W HCPCS: Performed by: SURGERY

## 2020-01-01 PROCEDURE — 6360000002 HC RX W HCPCS: Performed by: INTERNAL MEDICINE

## 2020-01-01 PROCEDURE — 87040 BLOOD CULTURE FOR BACTERIA: CPT

## 2020-01-01 PROCEDURE — 6370000000 HC RX 637 (ALT 250 FOR IP): Performed by: SURGERY

## 2020-01-01 PROCEDURE — 86901 BLOOD TYPING SEROLOGIC RH(D): CPT

## 2020-01-01 PROCEDURE — 85610 PROTHROMBIN TIME: CPT

## 2020-01-01 PROCEDURE — 6370000000 HC RX 637 (ALT 250 FOR IP): Performed by: INTERNAL MEDICINE

## 2020-01-01 PROCEDURE — 6370000000 HC RX 637 (ALT 250 FOR IP): Performed by: HOSPITALIST

## 2020-01-01 PROCEDURE — 85384 FIBRINOGEN ACTIVITY: CPT

## 2020-01-01 PROCEDURE — 2580000003 HC RX 258: Performed by: INTERNAL MEDICINE

## 2020-01-01 PROCEDURE — 94660 CPAP INITIATION&MGMT: CPT

## 2020-01-01 PROCEDURE — 99221 1ST HOSP IP/OBS SF/LOW 40: CPT | Performed by: SURGERY

## 2020-01-01 PROCEDURE — 86850 RBC ANTIBODY SCREEN: CPT

## 2020-01-01 PROCEDURE — 84132 ASSAY OF SERUM POTASSIUM: CPT

## 2020-01-01 PROCEDURE — 8040000000 HC CCPD INPATIENT

## 2020-01-01 PROCEDURE — 82947 ASSAY GLUCOSE BLOOD QUANT: CPT

## 2020-01-01 PROCEDURE — 85730 THROMBOPLASTIN TIME PARTIAL: CPT

## 2020-01-01 PROCEDURE — 1210000000 HC MED SURG R&B

## 2020-01-01 PROCEDURE — C1894 INTRO/SHEATH, NON-LASER: HCPCS | Performed by: SURGERY

## 2020-01-01 PROCEDURE — 2100000000 HC CCU R&B

## 2020-01-01 PROCEDURE — 71045 X-RAY EXAM CHEST 1 VIEW: CPT

## 2020-01-01 PROCEDURE — 85025 COMPLETE CBC W/AUTO DIFF WBC: CPT

## 2020-01-01 PROCEDURE — 6360000004 HC RX CONTRAST MEDICATION: Performed by: HOSPITALIST

## 2020-01-01 PROCEDURE — 2700000000 HC OXYGEN THERAPY PER DAY

## 2020-01-01 PROCEDURE — 3209999900 FLUORO FOR SURGICAL PROCEDURES

## 2020-01-01 PROCEDURE — 87070 CULTURE OTHR SPECIMN AEROBIC: CPT

## 2020-01-01 PROCEDURE — 97165 OT EVAL LOW COMPLEX 30 MIN: CPT

## 2020-01-01 PROCEDURE — 85379 FIBRIN DEGRADATION QUANT: CPT

## 2020-01-01 PROCEDURE — 6360000002 HC RX W HCPCS: Performed by: STUDENT IN AN ORGANIZED HEALTH CARE EDUCATION/TRAINING PROGRAM

## 2020-01-01 PROCEDURE — 71275 CT ANGIOGRAPHY CHEST: CPT

## 2020-01-01 PROCEDURE — 87186 SC STD MICRODIL/AGAR DIL: CPT

## 2020-01-01 PROCEDURE — 0JPT0WZ REMOVAL OF TOTALLY IMPLANTABLE VASCULAR ACCESS DEVICE FROM TRUNK SUBCUTANEOUS TISSUE AND FASCIA, OPEN APPROACH: ICD-10-PCS | Performed by: SURGERY

## 2020-01-01 PROCEDURE — 7100000001 HC PACU RECOVERY - ADDTL 15 MIN: Performed by: SURGERY

## 2020-01-01 PROCEDURE — 2580000003 HC RX 258: Performed by: SURGERY

## 2020-01-01 PROCEDURE — 3E1M39Z IRRIGATION OF PERITONEAL CAVITY USING DIALYSATE, PERCUTANEOUS APPROACH: ICD-10-PCS | Performed by: SURGERY

## 2020-01-01 PROCEDURE — 84145 PROCALCITONIN (PCT): CPT

## 2020-01-01 PROCEDURE — 80202 ASSAY OF VANCOMYCIN: CPT

## 2020-01-01 PROCEDURE — 2709999900 HC NON-CHARGEABLE SUPPLY: Performed by: SURGERY

## 2020-01-01 PROCEDURE — 36010 PLACE CATHETER IN VEIN: CPT | Performed by: SURGERY

## 2020-01-01 PROCEDURE — 85027 COMPLETE CBC AUTOMATED: CPT

## 2020-01-01 PROCEDURE — 87449 NOS EACH ORGANISM AG IA: CPT

## 2020-01-01 PROCEDURE — 6360000002 HC RX W HCPCS: Performed by: ANESTHESIOLOGY

## 2020-01-01 PROCEDURE — 86900 BLOOD TYPING SEROLOGIC ABO: CPT

## 2020-01-01 PROCEDURE — 92526 ORAL FUNCTION THERAPY: CPT

## 2020-01-01 PROCEDURE — 86403 PARTICLE AGGLUT ANTBDY SCRN: CPT

## 2020-01-01 PROCEDURE — 94761 N-INVAS EAR/PLS OXIMETRY MLT: CPT

## 2020-01-01 PROCEDURE — 83615 LACTATE (LD) (LDH) ENZYME: CPT

## 2020-01-01 PROCEDURE — 3600000002 HC SURGERY LEVEL 2 BASE: Performed by: SURGERY

## 2020-01-01 PROCEDURE — 3600000012 HC SURGERY LEVEL 2 ADDTL 15MIN: Performed by: SURGERY

## 2020-01-01 PROCEDURE — 99024 POSTOP FOLLOW-UP VISIT: CPT | Performed by: SURGERY

## 2020-01-01 PROCEDURE — 2580000003 HC RX 258

## 2020-01-01 PROCEDURE — 92610 EVALUATE SWALLOWING FUNCTION: CPT

## 2020-01-01 PROCEDURE — 2500000003 HC RX 250 WO HCPCS: Performed by: SURGERY

## 2020-01-01 PROCEDURE — 2500000003 HC RX 250 WO HCPCS

## 2020-01-01 PROCEDURE — 87205 SMEAR GRAM STAIN: CPT

## 2020-01-01 PROCEDURE — 97530 THERAPEUTIC ACTIVITIES: CPT

## 2020-01-01 PROCEDURE — C1773 RET DEV, INSERTABLE: HCPCS | Performed by: SURGERY

## 2020-01-01 PROCEDURE — 37197 REMOVE INTRVAS FOREIGN BODY: CPT | Performed by: SURGERY

## 2020-01-01 PROCEDURE — 3700000001 HC ADD 15 MINUTES (ANESTHESIA): Performed by: SURGERY

## 2020-01-01 PROCEDURE — 82803 BLOOD GASES ANY COMBINATION: CPT

## 2020-01-01 PROCEDURE — 99214 OFFICE O/P EST MOD 30 MIN: CPT | Performed by: CLINICAL NURSE SPECIALIST

## 2020-01-01 PROCEDURE — 36600 WITHDRAWAL OF ARTERIAL BLOOD: CPT

## 2020-01-01 PROCEDURE — 86140 C-REACTIVE PROTEIN: CPT

## 2020-01-01 PROCEDURE — 02PY33Z REMOVAL OF INFUSION DEVICE FROM GREAT VESSEL, PERCUTANEOUS APPROACH: ICD-10-PCS | Performed by: SURGERY

## 2020-01-01 PROCEDURE — 93000 ELECTROCARDIOGRAM COMPLETE: CPT | Performed by: NURSE PRACTITIONER

## 2020-01-01 PROCEDURE — 2500000003 HC RX 250 WO HCPCS: Performed by: HOSPITALIST

## 2020-01-01 PROCEDURE — 82306 VITAMIN D 25 HYDROXY: CPT

## 2020-01-01 PROCEDURE — 99214 OFFICE O/P EST MOD 30 MIN: CPT | Performed by: NURSE PRACTITIONER

## 2020-01-01 PROCEDURE — 93308 TTE F-UP OR LMTD: CPT

## 2020-01-01 PROCEDURE — C1769 GUIDE WIRE: HCPCS | Performed by: SURGERY

## 2020-01-01 PROCEDURE — 97162 PT EVAL MOD COMPLEX 30 MIN: CPT

## 2020-01-01 PROCEDURE — 36415 COLL VENOUS BLD VENIPUNCTURE: CPT

## 2020-01-01 PROCEDURE — 2500000003 HC RX 250 WO HCPCS: Performed by: ANESTHESIOLOGY

## 2020-01-01 PROCEDURE — 36590 REMOVAL TUNNELED CV CATH: CPT | Performed by: SURGERY

## 2020-01-01 PROCEDURE — 7100000000 HC PACU RECOVERY - FIRST 15 MIN: Performed by: SURGERY

## 2020-01-01 PROCEDURE — 82728 ASSAY OF FERRITIN: CPT

## 2020-01-01 PROCEDURE — 8010000000 HC HEMODIALYSIS ACUTE INPT

## 2020-01-01 RX ORDER — LACTULOSE 10 G/15ML
20 SOLUTION ORAL 3 TIMES DAILY
Status: DISCONTINUED | OUTPATIENT
Start: 2020-01-01 | End: 2021-01-01 | Stop reason: HOSPADM

## 2020-01-01 RX ORDER — NICOTINE POLACRILEX 4 MG
15 LOZENGE BUCCAL PRN
Status: DISCONTINUED | OUTPATIENT
Start: 2020-01-01 | End: 2021-01-01 | Stop reason: HOSPADM

## 2020-01-01 RX ORDER — MEPERIDINE HYDROCHLORIDE 50 MG/ML
12.5 INJECTION INTRAMUSCULAR; INTRAVENOUS; SUBCUTANEOUS EVERY 5 MIN PRN
Status: DISCONTINUED | OUTPATIENT
Start: 2020-01-01 | End: 2020-01-01 | Stop reason: HOSPADM

## 2020-01-01 RX ORDER — MORPHINE SULFATE 4 MG/ML
2 INJECTION, SOLUTION INTRAMUSCULAR; INTRAVENOUS EVERY 5 MIN PRN
Status: DISCONTINUED | OUTPATIENT
Start: 2020-01-01 | End: 2020-01-01 | Stop reason: HOSPADM

## 2020-01-01 RX ORDER — PROPOFOL 10 MG/ML
INJECTION, EMULSION INTRAVENOUS PRN
Status: DISCONTINUED | OUTPATIENT
Start: 2020-01-01 | End: 2020-01-01 | Stop reason: SDUPTHER

## 2020-01-01 RX ORDER — LISINOPRIL 5 MG/1
2.5 TABLET ORAL DAILY
Status: DISCONTINUED | OUTPATIENT
Start: 2020-01-01 | End: 2020-01-01

## 2020-01-01 RX ORDER — HEPARIN SODIUM 5000 [USP'U]/ML
5000 INJECTION, SOLUTION INTRAVENOUS; SUBCUTANEOUS EVERY 8 HOURS SCHEDULED
Status: DISCONTINUED | OUTPATIENT
Start: 2020-01-01 | End: 2020-01-01

## 2020-01-01 RX ORDER — HEPARIN SODIUM 1000 [USP'U]/ML
80 INJECTION, SOLUTION INTRAVENOUS; SUBCUTANEOUS ONCE
Status: COMPLETED | OUTPATIENT
Start: 2020-01-01 | End: 2020-01-01

## 2020-01-01 RX ORDER — ALPRAZOLAM 0.25 MG/1
0.25 TABLET ORAL NIGHTLY PRN
Status: DISCONTINUED | OUTPATIENT
Start: 2020-01-01 | End: 2021-01-01 | Stop reason: HOSPADM

## 2020-01-01 RX ORDER — HYDRALAZINE HYDROCHLORIDE 20 MG/ML
5 INJECTION INTRAMUSCULAR; INTRAVENOUS EVERY 10 MIN PRN
Status: DISCONTINUED | OUTPATIENT
Start: 2020-01-01 | End: 2020-01-01 | Stop reason: HOSPADM

## 2020-01-01 RX ORDER — ERGOCALCIFEROL 1.25 MG/1
50000 CAPSULE ORAL
Status: DISCONTINUED | OUTPATIENT
Start: 2020-01-01 | End: 2021-01-01

## 2020-01-01 RX ORDER — MORPHINE SULFATE 4 MG/ML
4 INJECTION, SOLUTION INTRAMUSCULAR; INTRAVENOUS EVERY 5 MIN PRN
Status: DISCONTINUED | OUTPATIENT
Start: 2020-01-01 | End: 2020-01-01 | Stop reason: HOSPADM

## 2020-01-01 RX ORDER — HEPARIN SODIUM 1000 [USP'U]/ML
40 INJECTION, SOLUTION INTRAVENOUS; SUBCUTANEOUS PRN
Status: DISCONTINUED | OUTPATIENT
Start: 2020-01-01 | End: 2020-01-01 | Stop reason: ALTCHOICE

## 2020-01-01 RX ORDER — HEPARIN SODIUM 10000 [USP'U]/100ML
18 INJECTION, SOLUTION INTRAVENOUS CONTINUOUS
Status: DISCONTINUED | OUTPATIENT
Start: 2020-01-01 | End: 2020-01-01

## 2020-01-01 RX ORDER — AMLODIPINE BESYLATE 5 MG/1
5 TABLET ORAL DAILY
Status: DISCONTINUED | OUTPATIENT
Start: 2020-01-01 | End: 2020-01-01

## 2020-01-01 RX ORDER — LISINOPRIL 40 MG/1
40 TABLET ORAL DAILY
COMMUNITY
Start: 2020-01-01

## 2020-01-01 RX ORDER — BUDESONIDE AND FORMOTEROL FUMARATE DIHYDRATE 160; 4.5 UG/1; UG/1
2 AEROSOL RESPIRATORY (INHALATION) 2 TIMES DAILY
Status: DISCONTINUED | OUTPATIENT
Start: 2020-01-01 | End: 2020-01-01

## 2020-01-01 RX ORDER — HYDROMORPHONE HYDROCHLORIDE 1 MG/ML
0.25 INJECTION, SOLUTION INTRAMUSCULAR; INTRAVENOUS; SUBCUTANEOUS EVERY 5 MIN PRN
Status: DISCONTINUED | OUTPATIENT
Start: 2020-01-01 | End: 2020-01-01 | Stop reason: HOSPADM

## 2020-01-01 RX ORDER — CLOPIDOGREL BISULFATE 75 MG/1
75 TABLET ORAL DAILY
Status: DISCONTINUED | OUTPATIENT
Start: 2020-01-01 | End: 2020-01-01

## 2020-01-01 RX ORDER — CLOPIDOGREL BISULFATE 75 MG/1
75 TABLET ORAL DAILY
Status: DISCONTINUED | OUTPATIENT
Start: 2020-01-01 | End: 2021-01-01

## 2020-01-01 RX ORDER — BUDESONIDE AND FORMOTEROL FUMARATE DIHYDRATE 160; 4.5 UG/1; UG/1
2 AEROSOL RESPIRATORY (INHALATION) 2 TIMES DAILY
Status: DISCONTINUED | OUTPATIENT
Start: 2020-01-01 | End: 2021-01-01 | Stop reason: HOSPADM

## 2020-01-01 RX ORDER — DEXTROSE MONOHYDRATE 25 G/50ML
12.5 INJECTION, SOLUTION INTRAVENOUS PRN
Status: DISCONTINUED | OUTPATIENT
Start: 2020-01-01 | End: 2021-01-01 | Stop reason: HOSPADM

## 2020-01-01 RX ORDER — BISACODYL 10 MG
10 SUPPOSITORY, RECTAL RECTAL DAILY PRN
Status: DISCONTINUED | OUTPATIENT
Start: 2020-01-01 | End: 2021-01-01 | Stop reason: HOSPADM

## 2020-01-01 RX ORDER — ACETAMINOPHEN 650 MG/1
650 SUPPOSITORY RECTAL EVERY 6 HOURS PRN
Status: DISCONTINUED | OUTPATIENT
Start: 2020-01-01 | End: 2021-01-01 | Stop reason: HOSPADM

## 2020-01-01 RX ORDER — CARVEDILOL 3.12 MG/1
3.12 TABLET ORAL 2 TIMES DAILY WITH MEALS
Status: DISCONTINUED | OUTPATIENT
Start: 2020-01-01 | End: 2021-01-01

## 2020-01-01 RX ORDER — ENALAPRILAT 2.5 MG/2ML
1.25 INJECTION INTRAVENOUS
Status: DISCONTINUED | OUTPATIENT
Start: 2020-01-01 | End: 2020-01-01 | Stop reason: HOSPADM

## 2020-01-01 RX ORDER — ASPIRIN 81 MG/1
81 TABLET, CHEWABLE ORAL DAILY
Status: DISCONTINUED | OUTPATIENT
Start: 2020-01-01 | End: 2021-01-01

## 2020-01-01 RX ORDER — LISINOPRIL 20 MG/1
40 TABLET ORAL DAILY
Status: DISCONTINUED | OUTPATIENT
Start: 2020-01-01 | End: 2020-01-01

## 2020-01-01 RX ORDER — ALBUTEROL SULFATE 90 UG/1
2 AEROSOL, METERED RESPIRATORY (INHALATION) EVERY 6 HOURS PRN
Status: DISCONTINUED | OUTPATIENT
Start: 2020-01-01 | End: 2021-01-01 | Stop reason: HOSPADM

## 2020-01-01 RX ORDER — AMLODIPINE BESYLATE 10 MG/1
10 TABLET ORAL DAILY
Status: DISCONTINUED | OUTPATIENT
Start: 2020-01-01 | End: 2020-01-01

## 2020-01-01 RX ORDER — HYDROMORPHONE HYDROCHLORIDE 1 MG/ML
0.5 INJECTION, SOLUTION INTRAMUSCULAR; INTRAVENOUS; SUBCUTANEOUS EVERY 5 MIN PRN
Status: DISCONTINUED | OUTPATIENT
Start: 2020-01-01 | End: 2020-01-01 | Stop reason: HOSPADM

## 2020-01-01 RX ORDER — CARVEDILOL 3.12 MG/1
TABLET ORAL
Qty: 180 TABLET | Refills: 3 | Status: SHIPPED | OUTPATIENT
Start: 2020-01-01

## 2020-01-01 RX ORDER — 0.9 % SODIUM CHLORIDE 0.9 %
30 INTRAVENOUS SOLUTION INTRAVENOUS PRN
Status: DISCONTINUED | OUTPATIENT
Start: 2020-01-01 | End: 2021-01-01 | Stop reason: HOSPADM

## 2020-01-01 RX ORDER — CARVEDILOL 3.12 MG/1
TABLET ORAL
Qty: 180 TABLET | Refills: 1 | Status: SHIPPED | OUTPATIENT
Start: 2020-01-01 | End: 2020-01-01 | Stop reason: SDUPTHER

## 2020-01-01 RX ORDER — ACETAMINOPHEN 325 MG/1
650 TABLET ORAL EVERY 6 HOURS PRN
Status: DISCONTINUED | OUTPATIENT
Start: 2020-01-01 | End: 2021-01-01 | Stop reason: HOSPADM

## 2020-01-01 RX ORDER — SENNA PLUS 8.6 MG/1
1 TABLET ORAL NIGHTLY
Status: DISCONTINUED | OUTPATIENT
Start: 2020-01-01 | End: 2021-01-01 | Stop reason: HOSPADM

## 2020-01-01 RX ORDER — ATORVASTATIN CALCIUM 40 MG/1
40 TABLET, FILM COATED ORAL NIGHTLY
Qty: 30 TABLET | Refills: 0 | Status: SHIPPED | OUTPATIENT
Start: 2020-01-01

## 2020-01-01 RX ORDER — CLOPIDOGREL BISULFATE 75 MG/1
TABLET ORAL
Qty: 90 TABLET | Refills: 3 | Status: SHIPPED | OUTPATIENT
Start: 2020-01-01

## 2020-01-01 RX ORDER — CARVEDILOL 3.12 MG/1
3.12 TABLET ORAL 2 TIMES DAILY WITH MEALS
Qty: 60 TABLET | Refills: 3 | Status: SHIPPED | OUTPATIENT
Start: 2020-01-01 | End: 2020-01-01

## 2020-01-01 RX ORDER — ATORVASTATIN CALCIUM 40 MG/1
40 TABLET, FILM COATED ORAL NIGHTLY
Status: DISCONTINUED | OUTPATIENT
Start: 2020-01-01 | End: 2021-01-01

## 2020-01-01 RX ORDER — GUAIFENESIN/DEXTROMETHORPHAN 100-10MG/5
5 SYRUP ORAL EVERY 4 HOURS PRN
Status: DISCONTINUED | OUTPATIENT
Start: 2020-01-01 | End: 2021-01-01 | Stop reason: HOSPADM

## 2020-01-01 RX ORDER — DEXTROSE MONOHYDRATE 50 MG/ML
100 INJECTION, SOLUTION INTRAVENOUS PRN
Status: DISCONTINUED | OUTPATIENT
Start: 2020-01-01 | End: 2021-01-01 | Stop reason: HOSPADM

## 2020-01-01 RX ORDER — VITAMIN B COMPLEX
6000 TABLET ORAL DAILY
Status: DISCONTINUED | OUTPATIENT
Start: 2020-01-01 | End: 2021-01-01

## 2020-01-01 RX ORDER — ERGOCALCIFEROL 1.25 MG/1
50000 CAPSULE ORAL
COMMUNITY
Start: 2020-01-01

## 2020-01-01 RX ORDER — DOCUSATE SODIUM 100 MG/1
100 CAPSULE, LIQUID FILLED ORAL DAILY
Status: DISCONTINUED | OUTPATIENT
Start: 2020-01-01 | End: 2021-01-01 | Stop reason: HOSPADM

## 2020-01-01 RX ORDER — SEVELAMER CARBONATE 800 MG/1
800 TABLET, FILM COATED ORAL 3 TIMES DAILY
COMMUNITY
Start: 2019-12-24

## 2020-01-01 RX ORDER — LISINOPRIL 20 MG/1
20 TABLET ORAL DAILY
Status: DISCONTINUED | OUTPATIENT
Start: 2020-01-01 | End: 2020-01-01

## 2020-01-01 RX ORDER — LEVOTHYROXINE SODIUM 0.05 MG/1
50 TABLET ORAL DAILY
Status: DISCONTINUED | OUTPATIENT
Start: 2020-01-01 | End: 2021-01-01 | Stop reason: HOSPADM

## 2020-01-01 RX ORDER — ONDANSETRON 2 MG/ML
INJECTION INTRAMUSCULAR; INTRAVENOUS PRN
Status: DISCONTINUED | OUTPATIENT
Start: 2020-01-01 | End: 2020-01-01 | Stop reason: SDUPTHER

## 2020-01-01 RX ORDER — HEPARIN SODIUM 1000 [USP'U]/ML
80 INJECTION, SOLUTION INTRAVENOUS; SUBCUTANEOUS PRN
Status: DISCONTINUED | OUTPATIENT
Start: 2020-01-01 | End: 2020-01-01 | Stop reason: ALTCHOICE

## 2020-01-01 RX ORDER — SEVELAMER CARBONATE 800 MG/1
800 TABLET, FILM COATED ORAL 3 TIMES DAILY
Status: DISCONTINUED | OUTPATIENT
Start: 2020-01-01 | End: 2021-01-01 | Stop reason: HOSPADM

## 2020-01-01 RX ORDER — LISINOPRIL 2.5 MG/1
2.5 TABLET ORAL DAILY
Status: DISCONTINUED | OUTPATIENT
Start: 2020-01-01 | End: 2021-01-01

## 2020-01-01 RX ORDER — LABETALOL HYDROCHLORIDE 5 MG/ML
5 INJECTION, SOLUTION INTRAVENOUS EVERY 10 MIN PRN
Status: DISCONTINUED | OUTPATIENT
Start: 2020-01-01 | End: 2020-01-01 | Stop reason: HOSPADM

## 2020-01-01 RX ORDER — ASPIRIN 81 MG/1
81 TABLET, CHEWABLE ORAL DAILY
Status: DISCONTINUED | OUTPATIENT
Start: 2020-01-01 | End: 2020-01-01

## 2020-01-01 RX ORDER — EPHEDRINE SULFATE 50 MG/ML
INJECTION, SOLUTION INTRAVENOUS PRN
Status: DISCONTINUED | OUTPATIENT
Start: 2020-01-01 | End: 2020-01-01 | Stop reason: SDUPTHER

## 2020-01-01 RX ORDER — HEPARIN SODIUM 5000 [USP'U]/ML
5000 INJECTION, SOLUTION INTRAVENOUS; SUBCUTANEOUS EVERY 8 HOURS SCHEDULED
Status: DISCONTINUED | OUTPATIENT
Start: 2020-01-01 | End: 2021-01-01

## 2020-01-01 RX ORDER — PROMETHAZINE HYDROCHLORIDE 25 MG/ML
6.25 INJECTION, SOLUTION INTRAMUSCULAR; INTRAVENOUS
Status: DISCONTINUED | OUTPATIENT
Start: 2020-01-01 | End: 2020-01-01 | Stop reason: HOSPADM

## 2020-01-01 RX ADMIN — INSULIN LISPRO 1 UNITS: 100 INJECTION, SOLUTION INTRAVENOUS; SUBCUTANEOUS at 17:16

## 2020-01-01 RX ADMIN — STANDARDIZED SENNA CONCENTRATE 8.6 MG: 8.6 TABLET ORAL at 21:28

## 2020-01-01 RX ADMIN — ATORVASTATIN CALCIUM 40 MG: 40 TABLET, FILM COATED ORAL at 22:48

## 2020-01-01 RX ADMIN — BUDESONIDE AND FORMOTEROL FUMARATE DIHYDRATE 2 PUFF: 160; 4.5 AEROSOL RESPIRATORY (INHALATION) at 08:01

## 2020-01-01 RX ADMIN — CARVEDILOL 3.12 MG: 6.25 TABLET, FILM COATED ORAL at 18:55

## 2020-01-01 RX ADMIN — HEPARIN SODIUM 5000 UNITS: 5000 INJECTION INTRAVENOUS; SUBCUTANEOUS at 07:11

## 2020-01-01 RX ADMIN — SEVELAMER CARBONATE 800 MG: 800 TABLET, FILM COATED ORAL at 08:00

## 2020-01-01 RX ADMIN — BUDESONIDE AND FORMOTEROL FUMARATE DIHYDRATE 2 PUFF: 160; 4.5 AEROSOL RESPIRATORY (INHALATION) at 09:15

## 2020-01-01 RX ADMIN — LEVOTHYROXINE SODIUM 50 MCG: 50 TABLET ORAL at 09:15

## 2020-01-01 RX ADMIN — ATORVASTATIN CALCIUM 40 MG: 40 TABLET, FILM COATED ORAL at 20:05

## 2020-01-01 RX ADMIN — BUDESONIDE AND FORMOTEROL FUMARATE DIHYDRATE 2 PUFF: 160; 4.5 AEROSOL RESPIRATORY (INHALATION) at 20:29

## 2020-01-01 RX ADMIN — IOPAMIDOL 90 ML: 755 INJECTION, SOLUTION INTRAVENOUS at 14:01

## 2020-01-01 RX ADMIN — ASPIRIN 81 MG: 81 TABLET, CHEWABLE ORAL at 08:02

## 2020-01-01 RX ADMIN — PROPOFOL 50 MG: 10 INJECTION, EMULSION INTRAVENOUS at 14:52

## 2020-01-01 RX ADMIN — CLOPIDOGREL BISULFATE 75 MG: 75 TABLET ORAL at 09:35

## 2020-01-01 RX ADMIN — SEVELAMER CARBONATE 800 MG: 800 TABLET, FILM COATED ORAL at 15:33

## 2020-01-01 RX ADMIN — DEXAMETHASONE 6 MG: 2 TABLET ORAL at 08:37

## 2020-01-01 RX ADMIN — DOCUSATE SODIUM 100 MG: 100 CAPSULE ORAL at 09:34

## 2020-01-01 RX ADMIN — REMDESIVIR 200 MG: 100 INJECTION, POWDER, LYOPHILIZED, FOR SOLUTION INTRAVENOUS at 17:14

## 2020-01-01 RX ADMIN — INSULIN LISPRO 2 UNITS: 100 INJECTION, SOLUTION INTRAVENOUS; SUBCUTANEOUS at 16:28

## 2020-01-01 RX ADMIN — BUDESONIDE AND FORMOTEROL FUMARATE DIHYDRATE 2 PUFF: 160; 4.5 AEROSOL RESPIRATORY (INHALATION) at 21:16

## 2020-01-01 RX ADMIN — LACTULOSE 20 G: 20 SOLUTION ORAL at 21:17

## 2020-01-01 RX ADMIN — INSULIN LISPRO 1 UNITS: 100 INJECTION, SOLUTION INTRAVENOUS; SUBCUTANEOUS at 20:49

## 2020-01-01 RX ADMIN — Medication 6000 UNITS: at 09:35

## 2020-01-01 RX ADMIN — LACTULOSE 20 G: 20 SOLUTION ORAL at 09:32

## 2020-01-01 RX ADMIN — VANCOMYCIN HYDROCHLORIDE 1000 MG: 10 INJECTION, POWDER, LYOPHILIZED, FOR SOLUTION INTRAVENOUS at 14:51

## 2020-01-01 RX ADMIN — ASPIRIN 81 MG: 81 TABLET, CHEWABLE ORAL at 09:32

## 2020-01-01 RX ADMIN — ATORVASTATIN CALCIUM 40 MG: 40 TABLET, FILM COATED ORAL at 19:21

## 2020-01-01 RX ADMIN — CARVEDILOL 3.12 MG: 6.25 TABLET, FILM COATED ORAL at 17:32

## 2020-01-01 RX ADMIN — BUDESONIDE AND FORMOTEROL FUMARATE DIHYDRATE 2 PUFF: 160; 4.5 AEROSOL RESPIRATORY (INHALATION) at 22:48

## 2020-01-01 RX ADMIN — LEVOTHYROXINE SODIUM 50 MCG: 50 TABLET ORAL at 06:41

## 2020-01-01 RX ADMIN — ASPIRIN 81 MG: 81 TABLET, CHEWABLE ORAL at 09:35

## 2020-01-01 RX ADMIN — PHENYLEPHRINE HYDROCHLORIDE 200 MCG: 10 INJECTION INTRAVENOUS at 16:15

## 2020-01-01 RX ADMIN — SEVELAMER CARBONATE 800 MG: 800 TABLET, FILM COATED ORAL at 19:54

## 2020-01-01 RX ADMIN — CARVEDILOL 3.12 MG: 6.25 TABLET, FILM COATED ORAL at 08:45

## 2020-01-01 RX ADMIN — HEPARIN SODIUM 5000 UNITS: 5000 INJECTION INTRAVENOUS; SUBCUTANEOUS at 05:40

## 2020-01-01 RX ADMIN — SODIUM CHLORIDE, PRESERVATIVE FREE 1 G: 5 INJECTION INTRAVENOUS at 12:40

## 2020-01-01 RX ADMIN — SUGAMMADEX 130 MG: 100 INJECTION, SOLUTION INTRAVENOUS at 16:25

## 2020-01-01 RX ADMIN — ATORVASTATIN CALCIUM 40 MG: 40 TABLET, FILM COATED ORAL at 21:19

## 2020-01-01 RX ADMIN — SEVELAMER CARBONATE 800 MG: 800 TABLET, FILM COATED ORAL at 23:41

## 2020-01-01 RX ADMIN — BUDESONIDE AND FORMOTEROL FUMARATE DIHYDRATE 2 PUFF: 160; 4.5 AEROSOL RESPIRATORY (INHALATION) at 19:29

## 2020-01-01 RX ADMIN — Medication 6000 UNITS: at 08:37

## 2020-01-01 RX ADMIN — ALPRAZOLAM 0.25 MG: 0.25 TABLET ORAL at 01:16

## 2020-01-01 RX ADMIN — CARVEDILOL 3.12 MG: 6.25 TABLET, FILM COATED ORAL at 16:28

## 2020-01-01 RX ADMIN — ATORVASTATIN CALCIUM 40 MG: 40 TABLET, FILM COATED ORAL at 21:17

## 2020-01-01 RX ADMIN — SEVELAMER CARBONATE 800 MG: 800 TABLET, FILM COATED ORAL at 20:04

## 2020-01-01 RX ADMIN — SODIUM CHLORIDE, PRESERVATIVE FREE 1 G: 5 INJECTION INTRAVENOUS at 14:01

## 2020-01-01 RX ADMIN — SEVELAMER CARBONATE 800 MG: 800 TABLET, FILM COATED ORAL at 13:51

## 2020-01-01 RX ADMIN — BUDESONIDE AND FORMOTEROL FUMARATE DIHYDRATE 2 PUFF: 160; 4.5 AEROSOL RESPIRATORY (INHALATION) at 21:45

## 2020-01-01 RX ADMIN — INSULIN LISPRO 2 UNITS: 100 INJECTION, SOLUTION INTRAVENOUS; SUBCUTANEOUS at 17:33

## 2020-01-01 RX ADMIN — HEPARIN SODIUM 5000 UNITS: 5000 INJECTION INTRAVENOUS; SUBCUTANEOUS at 20:49

## 2020-01-01 RX ADMIN — PHENYLEPHRINE HYDROCHLORIDE 200 MCG: 10 INJECTION INTRAVENOUS at 15:42

## 2020-01-01 RX ADMIN — DEXAMETHASONE 6 MG: 2 TABLET ORAL at 08:03

## 2020-01-01 RX ADMIN — INSULIN LISPRO 1 UNITS: 100 INJECTION, SOLUTION INTRAVENOUS; SUBCUTANEOUS at 21:36

## 2020-01-01 RX ADMIN — CARVEDILOL 3.12 MG: 6.25 TABLET, FILM COATED ORAL at 17:03

## 2020-01-01 RX ADMIN — CLOPIDOGREL BISULFATE 75 MG: 75 TABLET ORAL at 08:45

## 2020-01-01 RX ADMIN — CARVEDILOL 3.12 MG: 6.25 TABLET, FILM COATED ORAL at 17:16

## 2020-01-01 RX ADMIN — SEVELAMER CARBONATE 800 MG: 800 TABLET, FILM COATED ORAL at 13:19

## 2020-01-01 RX ADMIN — SEVELAMER CARBONATE 800 MG: 800 TABLET, FILM COATED ORAL at 08:37

## 2020-01-01 RX ADMIN — HEPARIN SODIUM 5000 UNITS: 5000 INJECTION INTRAVENOUS; SUBCUTANEOUS at 14:11

## 2020-01-01 RX ADMIN — LEVOTHYROXINE SODIUM 50 MCG: 50 TABLET ORAL at 08:03

## 2020-01-01 RX ADMIN — Medication 6000 UNITS: at 09:32

## 2020-01-01 RX ADMIN — CLOPIDOGREL BISULFATE 75 MG: 75 TABLET ORAL at 08:03

## 2020-01-01 RX ADMIN — DEXAMETHASONE 6 MG: 2 TABLET ORAL at 09:16

## 2020-01-01 RX ADMIN — PROPOFOL 50 MG: 10 INJECTION, EMULSION INTRAVENOUS at 15:04

## 2020-01-01 RX ADMIN — LACTULOSE 20 G: 20 SOLUTION ORAL at 21:19

## 2020-01-01 RX ADMIN — REMDESIVIR 100 MG: 100 INJECTION, POWDER, LYOPHILIZED, FOR SOLUTION INTRAVENOUS at 16:27

## 2020-01-01 RX ADMIN — SEVELAMER CARBONATE 800 MG: 800 TABLET, FILM COATED ORAL at 09:16

## 2020-01-01 RX ADMIN — HEPARIN SODIUM 5000 UNITS: 5000 INJECTION INTRAVENOUS; SUBCUTANEOUS at 23:53

## 2020-01-01 RX ADMIN — LEVOTHYROXINE SODIUM 50 MCG: 50 TABLET ORAL at 04:53

## 2020-01-01 RX ADMIN — EPHEDRINE SULFATE 15 MG: 50 INJECTION INTRAMUSCULAR; INTRAVENOUS; SUBCUTANEOUS at 16:23

## 2020-01-01 RX ADMIN — HEPARIN SODIUM 5000 UNITS: 5000 INJECTION INTRAVENOUS; SUBCUTANEOUS at 21:28

## 2020-01-01 RX ADMIN — DARBEPOETIN ALFA 60 MCG: 60 SOLUTION INTRAVENOUS; SUBCUTANEOUS at 15:34

## 2020-01-01 RX ADMIN — STANDARDIZED SENNA CONCENTRATE 8.6 MG: 8.6 TABLET ORAL at 21:17

## 2020-01-01 RX ADMIN — CEFAZOLIN SODIUM 2 G: 1 INJECTION, POWDER, FOR SOLUTION INTRAMUSCULAR; INTRAVENOUS at 00:30

## 2020-01-01 RX ADMIN — DEXAMETHASONE 6 MG: 2 TABLET ORAL at 08:45

## 2020-01-01 RX ADMIN — CARVEDILOL 3.12 MG: 6.25 TABLET, FILM COATED ORAL at 09:36

## 2020-01-01 RX ADMIN — LEVOTHYROXINE SODIUM 50 MCG: 50 TABLET ORAL at 06:55

## 2020-01-01 RX ADMIN — SEVELAMER CARBONATE 800 MG: 800 TABLET, FILM COATED ORAL at 19:21

## 2020-01-01 RX ADMIN — SEVELAMER CARBONATE 800 MG: 800 TABLET, FILM COATED ORAL at 14:00

## 2020-01-01 RX ADMIN — BUDESONIDE AND FORMOTEROL FUMARATE DIHYDRATE 2 PUFF: 160; 4.5 AEROSOL RESPIRATORY (INHALATION) at 20:51

## 2020-01-01 RX ADMIN — CEFAZOLIN SODIUM 1 G: 1 INJECTION, POWDER, FOR SOLUTION INTRAMUSCULAR; INTRAVENOUS at 23:41

## 2020-01-01 RX ADMIN — BUDESONIDE AND FORMOTEROL FUMARATE DIHYDRATE 2 PUFF: 160; 4.5 AEROSOL RESPIRATORY (INHALATION) at 23:44

## 2020-01-01 RX ADMIN — ASPIRIN 81 MG: 81 TABLET, CHEWABLE ORAL at 08:45

## 2020-01-01 RX ADMIN — BUDESONIDE AND FORMOTEROL FUMARATE DIHYDRATE 2 PUFF: 160; 4.5 AEROSOL RESPIRATORY (INHALATION) at 09:35

## 2020-01-01 RX ADMIN — DEXAMETHASONE 6 MG: 2 TABLET ORAL at 08:00

## 2020-01-01 RX ADMIN — HEPARIN SODIUM 5000 UNITS: 5000 INJECTION INTRAVENOUS; SUBCUTANEOUS at 04:53

## 2020-01-01 RX ADMIN — HEPARIN SODIUM AND DEXTROSE 18 UNITS/KG/HR: 10000; 5 INJECTION INTRAVENOUS at 14:03

## 2020-01-01 RX ADMIN — SEVELAMER CARBONATE 800 MG: 800 TABLET, FILM COATED ORAL at 09:34

## 2020-01-01 RX ADMIN — Medication 6000 UNITS: at 09:16

## 2020-01-01 RX ADMIN — CLOPIDOGREL BISULFATE 75 MG: 75 TABLET ORAL at 08:00

## 2020-01-01 RX ADMIN — CARVEDILOL 3.12 MG: 6.25 TABLET, FILM COATED ORAL at 08:37

## 2020-01-01 RX ADMIN — INSULIN LISPRO 1 UNITS: 100 INJECTION, SOLUTION INTRAVENOUS; SUBCUTANEOUS at 21:29

## 2020-01-01 RX ADMIN — ATORVASTATIN CALCIUM 40 MG: 40 TABLET, FILM COATED ORAL at 21:44

## 2020-01-01 RX ADMIN — SEVELAMER CARBONATE 800 MG: 800 TABLET, FILM COATED ORAL at 09:35

## 2020-01-01 RX ADMIN — EPHEDRINE SULFATE 10 MG: 50 INJECTION INTRAMUSCULAR; INTRAVENOUS; SUBCUTANEOUS at 16:10

## 2020-01-01 RX ADMIN — Medication 6000 UNITS: at 08:00

## 2020-01-01 RX ADMIN — HEPARIN SODIUM 4900 UNITS: 1000 INJECTION INTRAVENOUS; SUBCUTANEOUS at 14:02

## 2020-01-01 RX ADMIN — VANCOMYCIN HYDROCHLORIDE 1000 MG: 10 INJECTION, POWDER, LYOPHILIZED, FOR SOLUTION INTRAVENOUS at 15:17

## 2020-01-01 RX ADMIN — CLOPIDOGREL BISULFATE 75 MG: 75 TABLET ORAL at 09:32

## 2020-01-01 RX ADMIN — SEVELAMER CARBONATE 800 MG: 800 TABLET, FILM COATED ORAL at 08:03

## 2020-01-01 RX ADMIN — INSULIN LISPRO 1 UNITS: 100 INJECTION, SOLUTION INTRAVENOUS; SUBCUTANEOUS at 20:33

## 2020-01-01 RX ADMIN — SEVELAMER CARBONATE 800 MG: 800 TABLET, FILM COATED ORAL at 21:19

## 2020-01-01 RX ADMIN — INSULIN LISPRO 3 UNITS: 100 INJECTION, SOLUTION INTRAVENOUS; SUBCUTANEOUS at 09:16

## 2020-01-01 RX ADMIN — LEVOTHYROXINE SODIUM 50 MCG: 50 TABLET ORAL at 08:37

## 2020-01-01 RX ADMIN — INSULIN LISPRO 2 UNITS: 100 INJECTION, SOLUTION INTRAVENOUS; SUBCUTANEOUS at 14:11

## 2020-01-01 RX ADMIN — CARVEDILOL 3.12 MG: 6.25 TABLET, FILM COATED ORAL at 19:50

## 2020-01-01 RX ADMIN — INSULIN LISPRO 1 UNITS: 100 INJECTION, SOLUTION INTRAVENOUS; SUBCUTANEOUS at 12:58

## 2020-01-01 RX ADMIN — ATORVASTATIN CALCIUM 40 MG: 40 TABLET, FILM COATED ORAL at 19:54

## 2020-01-01 RX ADMIN — PHENOL 1 SPRAY: 1.5 LIQUID ORAL at 07:11

## 2020-01-01 RX ADMIN — Medication 6000 UNITS: at 08:45

## 2020-01-01 RX ADMIN — BUDESONIDE AND FORMOTEROL FUMARATE DIHYDRATE 2 PUFF: 160; 4.5 AEROSOL RESPIRATORY (INHALATION) at 09:16

## 2020-01-01 RX ADMIN — SEVELAMER CARBONATE 800 MG: 800 TABLET, FILM COATED ORAL at 22:48

## 2020-01-01 RX ADMIN — CEFAZOLIN SODIUM 1 G: 1 INJECTION, POWDER, FOR SOLUTION INTRAMUSCULAR; INTRAVENOUS at 23:19

## 2020-01-01 RX ADMIN — BUDESONIDE AND FORMOTEROL FUMARATE DIHYDRATE 2 PUFF: 160; 4.5 AEROSOL RESPIRATORY (INHALATION) at 08:04

## 2020-01-01 RX ADMIN — HEPARIN SODIUM 5000 UNITS: 5000 INJECTION INTRAVENOUS; SUBCUTANEOUS at 14:00

## 2020-01-01 RX ADMIN — CLOPIDOGREL BISULFATE 75 MG: 75 TABLET ORAL at 08:37

## 2020-01-01 RX ADMIN — CARVEDILOL 3.12 MG: 6.25 TABLET, FILM COATED ORAL at 09:27

## 2020-01-01 RX ADMIN — EPHEDRINE SULFATE 25 MG: 50 INJECTION INTRAMUSCULAR; INTRAVENOUS; SUBCUTANEOUS at 15:33

## 2020-01-01 RX ADMIN — SEVELAMER CARBONATE 800 MG: 800 TABLET, FILM COATED ORAL at 14:11

## 2020-01-01 RX ADMIN — ASPIRIN 81 MG: 81 TABLET, CHEWABLE ORAL at 08:00

## 2020-01-01 RX ADMIN — BUDESONIDE AND FORMOTEROL FUMARATE DIHYDRATE 2 PUFF: 160; 4.5 AEROSOL RESPIRATORY (INHALATION) at 08:00

## 2020-01-01 RX ADMIN — Medication 6000 UNITS: at 08:03

## 2020-01-01 RX ADMIN — ASPIRIN 81 MG: 81 TABLET, CHEWABLE ORAL at 08:37

## 2020-01-01 RX ADMIN — HEPARIN SODIUM 5000 UNITS: 5000 INJECTION INTRAVENOUS; SUBCUTANEOUS at 09:15

## 2020-01-01 RX ADMIN — PROPOFOL 50 MG: 10 INJECTION, EMULSION INTRAVENOUS at 14:57

## 2020-01-01 RX ADMIN — HEPARIN SODIUM 5000 UNITS: 5000 INJECTION INTRAVENOUS; SUBCUTANEOUS at 13:11

## 2020-01-01 RX ADMIN — ENOXAPARIN SODIUM 30 MG: 30 INJECTION SUBCUTANEOUS at 19:29

## 2020-01-01 RX ADMIN — SEVELAMER CARBONATE 800 MG: 800 TABLET, FILM COATED ORAL at 19:29

## 2020-01-01 RX ADMIN — DEXAMETHASONE 6 MG: 2 TABLET ORAL at 18:32

## 2020-01-01 RX ADMIN — SEVELAMER CARBONATE 800 MG: 800 TABLET, FILM COATED ORAL at 08:45

## 2020-01-01 RX ADMIN — DOCUSATE SODIUM 100 MG: 100 CAPSULE ORAL at 13:51

## 2020-01-01 RX ADMIN — ATORVASTATIN CALCIUM 40 MG: 40 TABLET, FILM COATED ORAL at 19:29

## 2020-01-01 RX ADMIN — ONDANSETRON HYDROCHLORIDE 4 MG: 2 INJECTION, SOLUTION INTRAMUSCULAR; INTRAVENOUS at 16:45

## 2020-01-01 RX ADMIN — HEPARIN SODIUM 5000 UNITS: 5000 INJECTION INTRAVENOUS; SUBCUTANEOUS at 06:41

## 2020-01-01 RX ADMIN — ENOXAPARIN SODIUM 30 MG: 30 INJECTION SUBCUTANEOUS at 16:27

## 2020-01-01 RX ADMIN — HEPARIN SODIUM 5000 UNITS: 5000 INJECTION INTRAVENOUS; SUBCUTANEOUS at 15:56

## 2020-01-01 RX ADMIN — ALBUTEROL SULFATE 2 PUFF: 90 AEROSOL, METERED RESPIRATORY (INHALATION) at 05:44

## 2020-01-01 RX ADMIN — DEXAMETHASONE 6 MG: 2 TABLET ORAL at 09:35

## 2020-01-01 RX ADMIN — BUDESONIDE AND FORMOTEROL FUMARATE DIHYDRATE 2 PUFF: 160; 4.5 AEROSOL RESPIRATORY (INHALATION) at 20:00

## 2020-01-01 RX ADMIN — CARVEDILOL 3.12 MG: 6.25 TABLET, FILM COATED ORAL at 08:02

## 2020-01-01 RX ADMIN — ALPRAZOLAM 0.25 MG: 0.25 TABLET ORAL at 00:05

## 2020-01-01 RX ADMIN — LEVOTHYROXINE SODIUM 50 MCG: 50 TABLET ORAL at 07:11

## 2020-01-01 RX ADMIN — LACTULOSE 20 G: 20 SOLUTION ORAL at 13:50

## 2020-01-01 RX ADMIN — BUDESONIDE AND FORMOTEROL FUMARATE DIHYDRATE 2 PUFF: 160; 4.5 AEROSOL RESPIRATORY (INHALATION) at 09:36

## 2020-01-01 RX ADMIN — SEVELAMER CARBONATE 800 MG: 800 TABLET, FILM COATED ORAL at 21:17

## 2020-01-01 RX ADMIN — INSULIN LISPRO 2 UNITS: 100 INJECTION, SOLUTION INTRAVENOUS; SUBCUTANEOUS at 09:36

## 2020-01-01 RX ADMIN — SEVELAMER CARBONATE 800 MG: 800 TABLET, FILM COATED ORAL at 21:44

## 2020-01-01 RX ADMIN — CEFAZOLIN SODIUM 1 G: 1 INJECTION, POWDER, FOR SOLUTION INTRAMUSCULAR; INTRAVENOUS at 00:05

## 2020-01-01 RX ADMIN — CARVEDILOL 3.12 MG: 6.25 TABLET, FILM COATED ORAL at 07:59

## 2020-01-01 ASSESSMENT — ENCOUNTER SYMPTOMS
COUGH: 0
SHORTNESS OF BREATH: 1
EYE REDNESS: 0
NAUSEA: 0
FACIAL SWELLING: 0
WHEEZING: 0
ABDOMINAL PAIN: 0
CHEST TIGHTNESS: 0
VOMITING: 0

## 2020-01-01 ASSESSMENT — PAIN SCALES - GENERAL
PAINLEVEL_OUTOF10: 0

## 2020-01-01 ASSESSMENT — PAIN - FUNCTIONAL ASSESSMENT: PAIN_FUNCTIONAL_ASSESSMENT: PREVENTS OR INTERFERES SOME ACTIVE ACTIVITIES AND ADLS

## 2020-01-23 PROBLEM — I25.5 ISCHEMIC CARDIOMYOPATHY: Status: ACTIVE | Noted: 2020-01-01

## 2020-01-23 NOTE — PROGRESS NOTES
 Chest discomfort      Priority: High    Ischemic cardiomyopathy 01/23/2020    Chest pain 03/04/2019    Basal cell carcinoma (BCC) of face     Coronary artery disease involving native coronary artery of native heart without angina pectoris 02/14/2019    Peritoneal dialysis catheter dysfunction (HCC) 01/28/2019    Skin lesion of face 01/28/2019    Anemia of chronic disease 12/04/2018    ESRD (end stage renal disease) on dialysis [facility])     Palliative care patient 11/29/2018    Renovascular hypertension 11/28/2018    Secondary hyperparathyroidism of renal origin ([address] 11/28/2018    Anemia due to stage 4 chronic kidney disease ([address] 11/28/2018    NSTEMI (non-ST elevated myocardial infarction) ([address] 11/27/2018     Past Medical History:   Diagnosis Date    Anxiety     Asthma     Benign essential HTN     Benign hypertensive kidney disease     CAD (coronary artery disease)     sees dr. Alverto Chan Chronic kidney disease, stage IV (severe) ([address] 7/18/2016    ESRD (end stage renal disease) ([address]     no dialysis at present.  Hemodialysis patient [facility])     [facility] of blood transfusion     Hyperlipidemia     Palliative care patient 11/29/2018    Prolonged emergence from general anesthesia     c/o dizzy and \"over sedated\" with prostate \"scope\".     Renal osteodystrophy     Skin cancer 2018    facial    Throat cancer ([address] 2012    Thyroid disease      Past Surgical History:   Procedure Laterality Date    CYSTOSCOPY      HERNIA REPAIR      umbilical    LAPAROSCOPY INSERTION PERITONEAL CATHETER N/A 7/18/2016    CATHETER INSERTION PERITONEAL DIALYSIS LAPAROSCOPIC performed by Randal Iraheta MD at [address] [facility] N/A 1/28/2019    LAPAROSCOPIC REVISION OF PD CATHETER performed by Varun Chan MD at [facility] CATH DIAL OPEN N/A 11/28/2018    PLACEMENT OF TUNNELED HEMODIALYSIS CATHETER performed by HENT: Negative for facial swelling and nosebleeds. Eyes: Negative for redness and visual disturbance. Respiratory: Positive for shortness of breath (mild, chronic). Negative for cough, chest tightness and wheezing. Cardiovascular: Positive for leg swelling. Negative for chest pain and palpitations. Gastrointestinal: Negative for abdominal pain, nausea and vomiting. Endocrine: Negative for cold intolerance and heat intolerance. Genitourinary: Negative for dysuria and hematuria. Musculoskeletal: Negative for arthralgias and myalgias. Skin: Negative for pallor and rash. Neurological: Negative for dizziness, seizures, syncope, weakness and light-headedness. Hematological: Does not bruise/bleed easily. Psychiatric/Behavioral: Negative for agitation. The patient is not nervous/anxious. Objective  Vital Signs - /68   Pulse 86   Ht 5' 11\" (1.803 m)   Wt 153 lb (69.4 kg)   BMI 21.34 kg/m²   Physical Exam  Vitals signs and nursing note reviewed. Constitutional:       General: He is not in acute distress. Appearance: He is well-developed. He is ill-appearing. He is not diaphoretic. HENT:      Head: Normocephalic and atraumatic. Right Ear: Hearing and external ear normal.      Left Ear: Hearing and external ear normal.      Nose: Nose normal.   Eyes:      General:         Right eye: No discharge. Left eye: No discharge. Pupils: Pupils are equal, round, and reactive to light. Neck:      Musculoskeletal: Neck supple. No muscular tenderness. Thyroid: No thyromegaly. Vascular: No carotid bruit or JVD. Trachea: No tracheal deviation. Cardiovascular:      Rate and Rhythm: Normal rate and regular rhythm. Heart sounds: Normal heart sounds. No murmur. No friction rub. No gallop. Comments: No carotid bruit  Pulmonary:      Effort: Pulmonary effort is normal. No respiratory distress. Breath sounds: Normal breath sounds.  No wheezing or rales.   Abdominal:      Palpations: Abdomen is soft. Tenderness: There is no tenderness. Musculoskeletal:         General: Swelling present. No deformity. Right lower leg: Edema (1+) present. Left lower leg: Edema (1+) present. Skin:     General: Skin is warm and dry. Findings: No rash. Neurological:      General: No focal deficit present. Mental Status: He is alert and oriented to person, place, and time. Cranial Nerves: No cranial nerve deficit. Psychiatric:         Mood and Affect: Mood normal.         Behavior: Behavior normal.         Judgment: Judgment normal.         Data:  Gavi 3/19  Impression:   There is anterior apical MI with minimal ischemia, with a calculated   ejection fraction of 25 % with a 4 % of ischemic burden. Suggest: clinical correlation   Signed by Dr Luis Sanchez on 3/5/2019 1:42 PM     Heart Cath 11/18  Summary:       1. Successful femoral artery ultrasound  2. Successful femoral artery arteriogram  3. Supervision of the administration of moderate conscious sedation  4.  100% stenosis of the osteal / proximal LAD and 90 - 95% stenosis of the 1st diagonal.    5.  Luminal irregularities of the co - dominant circumflex marginal system  6. Moderate diffuse disease, between 50 and 70%, throughout the entire co - dominant right coronary artery. 7.  Left ventricular function is dramatically impaired in the anterior - apical - and inferior apical segments with a visually estimated ejection fraction of approximately 20%. Only the bases of the heart move well. 8.  Patent left [place], un grafted  9. Patent right [place], un grafted     RECOMMENDATIONS:   1.  Reassurance  2. Risk factor modification  3. Evaluation of etiologies of non cardiac chest discomfort should symptoms persist or progress  4. Follow  Up with Primary care provider as arranged  5.  Ralph Haney / cardiotyron, tomorrow to evaluate for viable myocardium  6.   Will ask CVT opinion regarding surgically revascularization      Lab Results   Component Value Date    CHOL 128 (L) 11/28/2018    TRIG 99 11/28/2018    HDL 42 (L) 11/28/2018    LDLCALC 66 11/28/2018     Lab Results   Component Value Date    ALT 8 03/04/2019    AST 13 03/04/2019     Assessment:     Diagnosis Orders   1. Chronic systolic congestive heart failure (HCC)  ECHO Complete 2D W Doppler W Color   2. Ischemic cardiomyopathy     3. Coronary artery disease involving native coronary artery of native heart without angina pectoris     4. ESRD (end stage renal disease) on dialysis ([address]     5. Renovascular hypertension     6. Mixed hyperlipidemia       Chronic systolic heart failure/ischemic cardiomyopathy NYHA class II, stage C- appears euvolemic on guideline directed medical therapy. EF has been since March 2019. There have been issues with placement of an AICD due to permacath, [id number] above. We had a long discussion about this. It has been sometime since his ejection fraction has been assessed. Plan will be to obtain a limited echo. If EF remains 35% or lower, plan will be to refer for Port-A-Cath removal and then plans for AICD for primary prevention of sudden cardiac death    CAD-stable without symptoms of angina    ESRD-on peritoneal dialysis    Hypertension-well-controlled on current regimen    Hyperlipidemia- on statin therapy    Plan    Orders Placed This Encounter   Procedures    ECHO Complete 2D W Doppler W Color     Limited only  For EF     Standing Status:   Future     Standing Expiration Date:   1/23/2021     Order Specific Question:   Reason for exam:     Answer:   systolic chf     Return in about 3 months (around 4/23/2020) for Dr. Amanda Govea. Echocardiogram- if ejection fraction remains 35% or less then  Refer for port removal and plan for defibrillator    - Weigh daily and report weight gain of 3lbs or more in 24hrs or 5lbs in one week.   - Call for increasing shortness of breath or increasing swelling in feet and legs.    (This could mean you are retaining too much fluid)  - 2000mg low sodium diet  - Fluid restriction of 1500ml per day (about 6 cups of fluid per day)    Call with any questionsor concerns  Follow up with Cosme Box NP, APRN - NP for non cardiac problems  Report any new problems  Cardiovascular Fitness-Exercise as tolerated. Strive for 15 minutes of exercise most days of the week. Cardiac / HealthyDiet  Continue current medications as directed  Continue plan of treatment  It is always recommended that you bring your medicationsbottles with you to each visit - this is for your safety!        KEN Brown

## 2020-01-23 NOTE — PATIENT INSTRUCTIONS
Return in about 3 months (around 4/23/2020) for Dr. Lenoides Mulligan. Echocardiogram- if ejection fraction remains 35% or less then  Refer for port removal and plan for defibrillator    - Weigh daily and report weight gain of 3lbs or more in 24hrs or 5lbs in one week. - Call for increasing shortness of breath or increasing swelling in feet and legs. (This could mean you are retaining too much fluid)  - 2000mg low sodium diet  - Fluid restriction of 1500ml per day (about 6 cups of fluid per day)    Patient Education        Heart Failure: Care Instructions  Your Care Instructions    Heart failure occurs when your heart does not pump as much blood as the body needs. Failure does not mean that the heart has stopped pumping but rather that it is not pumping as well as it should. Over time, this causes fluid buildup in your lungs and other parts of your body. Fluid buildup can cause shortness of breath, fatigue, swollen ankles, and other problems. By taking medicines regularly, reducing sodium (salt) in your diet, checking your weight every day, and making lifestyle changes, you can feel better and live longer. Follow-up care is a key part of your treatment and safety. Be sure to make and go to all appointments, and call your doctor if you are having problems. It's also a good idea to know your test results and keep a list of the medicines you take. How can you care for yourself at home? Medicines    · Be safe with medicines. Take your medicines exactly as prescribed. Call your doctor if you think you are having a problem with your medicine.     · Do not take any vitamins, over-the-counter medicine, or herbal products without talking to your doctor first. Latia Stephanie not take ibuprofen (Advil or Motrin) and naproxen (Aleve) without talking to your doctor first. They could make your heart failure worse.     · You may take some of the following medicine. ?  Angiotensin-converting enzyme inhibitors (ACEIs) or angiotensin II receptor work up to a better pace.     · Get enough rest at night. Sleeping with 1 or 2 pillows under your upper body and head may help you breathe easier.    Lifestyle changes    · Do not smoke. Smoking can make a heart condition worse. If you need help quitting, talk to your doctor about stop-smoking programs and medicines. These can increase your chances of quitting for good. Quitting smoking may be the most important step you can take to protect your heart.     · Limit alcohol to 2 drinks a day for men and 1 drink a day for women. Too much alcohol can cause health problems.     · Avoid getting sick from colds and the flu. Get a pneumococcal vaccine shot. If you have had one before, ask your doctor whether you need another dose. Get a flu shot each year. If you must be around people with colds or the flu, wash your hands often. When should you call for help? Call 911 if you have symptoms of sudden heart failure such as:    · You have severe trouble breathing.     · You cough up pink, foamy mucus.     · You have a new irregular or rapid heartbeat.    Call your doctor now or seek immediate medical care if:    · You have new or increased shortness of breath.     · You are dizzy or lightheaded, or you feel like you may faint.     · You have sudden weight gain, such as more than 2 to 3 pounds in a day or 5 pounds in a week. (Your doctor may suggest a different range of weight gain.)     · You have increased swelling in your legs, ankles, or feet.     · You are suddenly so tired or weak that you cannot do your usual activities.    Watch closely for changes in your health, and be sure to contact your doctor if you develop new symptoms. Where can you learn more? Go to https://Yadiolisandro.Factor.io. org and sign in to your [employer] account. Enter I157 in the Feed.fm box to learn more about \"Heart Failure: Care Instructions. \"     If you do not have an account, please click on the \"Sign Up Now\" link.  Current as of: April 9, 2019  Content Version: 12.3  © 3342-6132 [employer]wise, Incorporated. Care instructions adapted under license by [facility] ([facility]). If you have questions about a medical condition or this instruction, always ask your healthcare professional. [address] any warranty or liability for your use of this information.

## 2020-09-29 NOTE — PROGRESS NOTES
Dear Dr. Miguel Angel Toledo primary care provider on file.,    Thank you for allowing me to participate in the care of Mr. Aura Hernandez. He presents today at the [address] [place] [address]. As you know, Mr. Mack Rubio is a 69 y.o. male with history of hypertension, hyperlipidemia, ESRD, chronic systolic heart failure, ischemic cardiomyopathy, and CAD who presents with the chief complaint of follow-up for chronic cardiac conditions. He is a former patient of Dr. Amy Soto. Since last seen, he has been stable from a cardiac standpoint. He states that he is active at home. He denies any exertional chest pain or shortness of breath. He denies any fast or slow heart rhythms. He does peritoneal dialysis at home. He follows with Dr. Giselle Savage regarding this. His blood pressure is elevated here in office today. He states that he normally takes his Coreg at lunchtime and then again at supper. He has had all of his other medications. he is sleeping on 1 pillow at night. he is not waking gasping for air. he denies any swelling. he has been compliant with his medications. his BP has been controlled. PCP follows labs and lipids. He otherwise denies chest pain, SOA, LAAM, PND, orthopnea, syncope or near syncope. He has no other complaints. Review of Systems   Constitutional: Negative for fever, chills, diaphoresis, activity change, appetite change, fatigue and unexpected weight change. Eyes: Negative for photophobia, pain, redness and visual disturbance. Respiratory: Negative for apnea, cough, chest tightness, shortness of breath, wheezing and stridor. Cardiovascular: Negative for chest pain, palpitations and leg swelling. Gastrointestinal: Negative for abdominal distention. Genitourinary: Negative for dysuria, urgency and frequency. Musculoskeletal: Negative for myalgias, arthralgias and gait problem. Skin: Negative for color change, pallor, rash and wound.    Neurological: Negative for dizziness, tremors, speech difficulty, weakness and numbness. Hematological: Does not bruise/bleed easily. Psychiatric/Behavioral: Negative. Past Medical History:   Diagnosis Date    Anxiety     Asthma     Benign essential HTN     Benign hypertensive kidney disease     CAD (coronary artery disease)     sees dr. Ambreen Durán Chronic kidney disease, stage IV (severe) ([facility] 7/18/2016    ESRD (end stage renal disease) ([facility] [address]     no dialysis at present.  Hemodialysis patient [facility])     mon wed fri at . [address] of blood transfusion     Hyperlipidemia     Palliative care patient 11/29/2018    Prolonged emergence from general anesthesia     c/o dizzy and \"over sedated\" with prostate \"scope\".  Renal osteodystrophy     Skin cancer 2018    facial    Throat cancer ([facility] [address] 2012    Thyroid disease        Past Surgical History:   Procedure Laterality Date    CYSTOSCOPY      HERNIA REPAIR      umbilical    LAPAROSCOPY INSERTION PERITONEAL CATHETER N/A 7/18/2016    CATHETER INSERTION PERITONEAL DIALYSIS LAPAROSCOPIC performed by Pierce Ho MD at [facility] N/A 1/28/2019    LAPAROSCOPIC REVISION OF PD CATHETER performed by Marquis Devin MD at [address] [place] [address] CATH DIAL OPEN N/A 11/28/2018    PLACEMENT OF TUNNELED HEMODIALYSIS CATHETER performed by Carley Thomas MD at [facility] TUNNELED INTRAPERITONEAL CATHETER N/A 10/22/2018    PERITONEAL DIALYSIS CATHETER EXTERIORIZATION performed by Marquis Devin MD at [place] [address]    TUNNELED VENOUS PORT PLACEMENT      VASCULAR SURGERY  07/10/2019    [facility]. Removal of tunneled dialysis catheter right internal jugular vein.        Family History   Problem Relation Age of Onset    Heart Disease Mother     Cancer Father         lung       Social History     Socioeconomic History    Marital status:  Spouse name: Not on file    Number of children: Not on file    Years of education: Not on file    Highest education level: Not on file   Occupational History    Not on file   Social Needs    Financial resource strain: Not on file    Food insecurity     Worry: Not on file     Inability: Not on file    Transportation needs     Medical: Not on file     Non-medical: Not on file   Tobacco Use    Smoking status: Never Smoker    Smokeless tobacco: Never Used   Substance and Sexual Activity    Alcohol use: No    Drug use: No    Sexual activity: Not on file   Lifestyle    Physical activity     Days per week: Not on file     Minutes per session: Not on file    Stress: Not on file   Relationships    Social connections     Talks on phone: Not on file     Gets together: Not on file     Attends Hinduism service: Not on file     Active member of club or organization: Not on file     Attends meetings of clubs or organizations: Not on file     Relationship status: Not on file    Intimate partner violence     Fear of current or ex partner: Not on file     Emotionally abused: Not on file     Physically abused: Not on file     Forced sexual activity: Not on file   Other Topics Concern    Not on file   Social History Narrative    Not on file       Allergies   Allergen Reactions    Diphenhydramine      Other reaction(s): Blacked out    Sulfa Antibiotics      made me feel like I had the flu         Current Outpatient Medications:     carvedilol (COREG) 3.125 MG tablet, TAKE 1 TABLET BY MOUTH TWICE A DAY WITH MEALS, Disp: 180 tablet, Rfl: 1    atorvastatin (LIPITOR) 40 MG tablet, Take 1 tablet by mouth nightly, Disp: 30 tablet, Rfl: 0    sevelamer (RENVELA) 800 MG tablet, Take 800 mg by mouth 3 times daily Two pills with every meal., Disp: , Rfl:     vitamin D (ERGOCALCIFEROL) 1.25 MG (93948 UT) CAPS capsule, Take 50,000 Units by mouth every 7 days, Disp: , Rfl:     lisinopril (PRINIVIL;ZESTRIL) 40 MG tablet, Take 40 mg by mouth daily, Disp: , Rfl:     clopidogrel (PLAVIX) 75 MG tablet, TAKE 1 TABLET BY MOUTH EVERY DAY, Disp: 90 tablet, Rfl: 3    amLODIPine (NORVASC) 10 MG tablet, Take 10 mg by mouth daily, Disp: , Rfl: 5    aspirin 81 MG chewable tablet, Take 1 tablet by mouth daily, Disp: 30 tablet, Rfl: 3    levothyroxine (SYNTHROID) 50 MCG tablet, Take 1 tablet by mouth Daily, Disp: 30 tablet, Rfl: 3    Aspirin-Acetaminophen-Caffeine (EXCEDRIN PO), Take 1 tablet by mouth daily as needed , Disp: , Rfl:     ALPRAZolam (XANAX) 0.25 MG tablet, Take 0.25 mg by mouth nightly as needed for Sleep. ., Disp: , Rfl:     PE:  Vitals:    09/29/20 1132   BP: (!) 162/88   Pulse: 74   SpO2:        Estimated body mass index is 20.92 kg/m² as calculated from the following:    Height as of this encounter: 5' 11\" (1.803 m). Weight as of this encounter: 150 lb (68 kg). Constitutional: He is oriented to person, place, and time. He appears well-developed and well-nourished in no acute distress. Neck:  Neck supple without JVD present. No trachea deviation present. No thyromegaly present. Eyes:Conjunctivae and EOM are normal. Pupils equal and reactive to light. ENT:Hearing appears normal.conjunctiva and lids are normal, ears and nose appear normal.  Cardiovascular: Normal rate, normal rhythm, normal heart sounds. No murmur ascultated. No gallop and no friction rub. No carotid bruits. No peripheral edema. Pulmonary/Chest:  Lungs clear to auscultation bilaterally without evidence of respiratory distress. He without wheezes. He without rales or ronchi. Musculoskeletal: Normal range of motion. Gait is normal.  Head is normocephalic and atraumatic. Skin: Skin is warm and dry without rash or pallor. Psychiatric:He is alert and oriented to person, place, and time. He has a normal mood and affect.  His behavior is normal. Thought content normal.       Lab Results   Component Value Date    CREATININE 10.1 03/07/2019 CREATININE 10.3 03/06/2019    CREATININE 9.8 03/05/2019    CREATININE 3.5 09/28/2011    HGB 9.8 03/07/2019    HGB 9.6 03/06/2019    HGB 9.4 03/05/2019    PROBNP 17,868 11/27/2018         ECG 09/29/20  Sinus rhythm, left axis-anterior fascicular block, nonspecific T abnormality, HR 76 bpm    TTE-  9/1/20  Limited study for LV systolic function only; no color or doppler   evaluation. Left ventricular chamber size is mildly dilated . Moderate left ventricular global hypokinesis. Left ventricular ejection fraction is visually estimated at 40%. The left atrium is mildly dilated. Assessment, Recommendations, & Plan:  69 y.o. male with CAD, HTN, HLD, Chronic systolic HF, & Ischemic cardiomyopathy    CAD-on ASA, Coreg, Plavix, & Lipitor, no changes    HTN-elevated here today. He has not yet had his Coreg today. I am hesitant to increase the Coreg since he has not had his system yet. I have told him that he needs to start taking the Coreg in the morning and at night that it needs to be taken 12 hours apart. He voiced understanding. HLD-followed by us, on Lipitor, we will get a fasting lipid panel and liver function test done. Chronic systolic heart failure/ischemic cardiomyopathy-on Coreg & lisinopril. His most recent Echo showed a improvement in his EF to 40%. No symptoms of fluid overload. We discussed the importance of low-sodium diet. No changes      Disposition - RTC in 6 months with Dr. Lamont Forrester or sooner if needed      Please do not hesitate to contact me for any questions or concerns.     Sincerely yours,    KEN Spence

## 2020-09-29 NOTE — PATIENT INSTRUCTIONS
Coronary Artery Disease   (CAD; Coronary Atherosclerosis; Silent MI; Coronary Heart Disease; Ischemic Heart Disease; Atherosclerosis of the Coronary Arteries)     Definition   Coronary arteries bring oxygen rich blood to the heart muscle. Coronary artery disease (CAD) is blockage of these arteries. If the blockage is complete, areas of the heart muscle may be damaged. In severe case the heart muscle dies. This can lead to a heart attack, also known as a myocardial infarction (MI). Coronary artery disease is the most common form of heart disease. It is the leading cause of death worldwide. Causes include: Thickening of the walls of the arteries feeding the heart muscle   Accumulation of fatty plaques within the coronary arteries   Sudden spasm of a coronary artery   Narrowing of the coronary arteries   Inflammation within the coronary arteries   Development of a blood clot within the coronary arteries that blocks blood flow      Major risk factors include:   Sex: male (men have a greater risk of heart attack than women)   Age: 39 and older for men, 54 and older for women   Heredity: strong family history of heart disease   Obesity and being overweight   Smoking   High blood pressure   Sedentary lifestylePoor fitness can also increase your risk of CAD and premature death. High cholesterol (specifically, high LDL cholesterol, and low HDL cholesterol)   Diabetes   Metabolic syndrome (combination of high blood pressure, abdominal obesity, and insulin resistance)     Other risk factors may include:   Sleep Apnea  Stress   Excessive alcohol use   Depression   A diet that is high in saturated fat, trans fat, cholesterol, and/or caloriesDrinking sugary beverages on a regular basis may increase your risk of CAD. Symptoms   CAD may progress without any symptoms. Angina is chest pain that comes and goes. It often has a squeezing or pressure-like quality. It may radiate into the shoulder(s), arm(s), or jaw. Angina usually lasts for about 2-10 minutes. It is often relieved with rest. Angina can be triggered by:   Exercise or exertion   Emotional stress   Cold weather   A large meal   Chest pain may indicate more serious unstable angina or a heart attack if: It is unrelieved by rest or nitroglycerin   Severe angina   Angina that begins at rest (with no activity)   Angina that lasts more than 15 minutes   Accompanying symptoms may include:   Shortness of breath   Sweating   Nausea   Weakness   Immediate medical attention is needed for unstable angina. CAD in women may cause less classic chest pain. It is likely to start with shortness of breath and fatigue. Diagnosis   If you go to the emergency room with chest pain, some tests will be done right away. The tests will attempt to see if you are having angina or a heart attack. If you have a stable pattern of angina, other tests may be done to determine the severity of your disease. The doctor will ask about your symptoms and medical history. A physical exam will be done.    Tests may include:   Blood tests to look for certain substances in the blood called troponins which help the doctor determine if you are having a heart attack   Electrocardiogram (ECG, EKG) records the heart's activity by measuring electrical currents through the heart muscle, and can reveal evidence of past heart attacks, acute heart attacks, and heart rhythm problems   Echocardiogram uses high-frequency sound waves (ultrasound) to examine the size, shape, and motion of the heart, giving information about the structure and function of the heart   Exercise stress test records the heart's electrical activity during increased physical activity   Nuclear stress test the heart is observed while exercising and radioactive material highlights impaired blood flow to help locate problem areas   Coronary calcium scoring a type of x-ray called a CAT scan that uses a computer to look for the presence of calcium in the heart arteries   Coronary angiography x-rays taken after a dye is injected into the arteries to allows the doctor to look for abnormalities in the arteries   Treatment   Treatment may include:   Nitroglycerin   This medicine is usually given during an attack of angina. It can be given as a tablet that dissolves under the tongue or as a spray. Longer-lasting types can be used to prevent angina before an activity known to cause it. These may be given as pills or applied as patches or ointments. Blood-Thinning Medications   A small, daily dose of aspirin has been shown to decrease the risk of heart attack. Ask your doctor before taking aspirin daily. Warfarin (Coumadin)   Ticlopidine (Ticlid)   Clopidogrel (Plavix)   Beta-Blockers, Calcium-Channel Blockers, and ACE-Inhibitors   These may help prevent angina. In some cases, they may lower the risk of heart attack. Medications to Lower Cholesterol   Medicines, like statins, are often prescribed to people who have CAD. Statins (eg, atorvastatin [Lipitor]) lower cholesterol levels, which can help to prevent CAD events. Revascularization   Patients with severe blockages in their coronary arteries may benefit from procedures to immediately improve blood flow to the heart muscle:   Percutaneous coronary interventions (PCI)such as balloon angioplasty , in some cases, a wire mesh stent is placed to hold the artery open   Coronary artery bypass grafting (CABG) segments of vessels are taken from other areas of the body and are sewn into the heart arteries to reroute blood flow around blockages   Some studies have shown that CABG may be more effective than PCI. Lifestyle changes and intensive medicine may also be just as effective as PCI.    Options for Refractory Angina   For patients who are not candidates for revascularization procedures but have continued angina despite medicine, options include:   Enhanced external counterpulsation (EECP) large air bags are inflated around the legs in tune with the heart beat. The patient receives 5 one-hour treatments per week for seven weeks. This has been shown to reduce angina and may improve symptom-free exercise duration. Transmyocardial revascularization (TMR) surgical procedure done with laser to reduce chest pain. Researchers are also studying gene therapy as a possible treatment. Prevention   To reduce your risk of getting coronary artery disease:   Maintain a healthy weight. Eat a heart healthy diet that is low in saturated fat , red meat and processed meats, and rich in whole grain , fruits, and vegetables . Begin a safe exercise program with the advice of your doctor. If you smoke, quit . Treat your high blood pressure and/or diabetes. Treat high cholesterol or triglycerides. Ask your doctor about taking a low-dose aspirin every day. In certain patients, taking rosuvastatin (Crestor) may be another option. Talk to your doctor. Hypertension  (High Blood Pressure)  Most people with high blood pressure (hypertension) have no symptoms. High blood pressure can be a dangerous problem. Hypertension is dangerous because you may have it and not know it. High blood pressure may mean that your heart needs to work harder to pump blood. Your blood pressure is measured with 2 numbers. The first number is when your heart flexes (contracts), and the second number is when your heart relaxes. The higher the numbers are, the more you are at risk for problems. Write down your blood pressure today. The best blood pressure for adults is 120/80 (mmHg) or lower. It is likely that your blood pressure was recorded at least 2 times today. It is important for you to give these numbers to your doctor. If you do not have a doctor, try to get follow-up care at a hospital or community clinic. You may need to start high blood pressure medicine. You may also need to adjust your medicines as told by your doctor.  Even mild high blood pressure increases long-term health risks. One high reading does not mean you have hypertension. · Your blood pressure should be taken when you are relaxed. It is also important to sit for about 10 minutes before being tested. · Things that can increase your blood pressure are:  Injury, Illness, Stress, Caffeine, and some medicines (like decongestants). · High blood pressure does not usually need emergency treatment. HOME CARE  · Lifestyle and medicine changes may be needed, including:   · Weight loss. · Exercise. · Limit the use of salt. · Stop smoking. · If using decongestants or birth control pills, talk to your doctor. These medicines might make blood pressure higher. · Do not use street drugs. · Females should not drink more than 1 alcoholic drink per day. Males should not drink more than 2 alcoholic drinks per day. · Take your blood pressure medicine. You will need to take it every day. If you do not get treated, there are risks, including:   · Heart disease. · Stroke. · Kidney failure. · See your doctor as told. GET HELP RIGHT AWAY IF:  · You get a very bad headache. · You get blurred or changing vision. · You feel confused. · You feel weak, numb, or faint. · You get chest or belly (abdominal) pain. · You throw up (vomit). · You cannot breathe very well. If you have a blood pressure reading with a top number of 180 or higher, you need to see your doctor right away. This is especially true if you are having any of the problems listed above. Hyperlipidemia   (Dyslipidemia; High Triglycerides; Triglycerides, High)     Definition   Hyperlipidemia is a high level of fats in the blood. These fats, called lipids, include cholesterol and triglycerides. There are five types of hyperlipidemia. The type depends on which lipid in the blood is high.    Causes   Causes may include:   A family history of hyperlipidemia   A diet high in total fat, saturated fat, or cholesterol   Obesity   Excess alcohol intake   Certain conditions, including:   Diabetes   Low thyroid   Kidney problems   Liver disease   Cushing's syndrome   Certain drugs, such as:   Hormones or birth control pills   Beta-blockers   Some diuretics   Cortisone drugs   Isotretinoin (for acne )   Some anti- HIV drugs   Risk Factors   These factors increase your chance of developing this condition. Tell your doctor if you have any of these:   Advancing age   Sex: male   Postmenopause   Lack of exercise   Smoking   Stress   Overuse of alcohol   Symptoms   Hyperlipidemia usually does not cause symptoms. Very high levels of lipids or triglycerides can cause:   Fat deposits in the skin or tendons ( xanthomas )   Pain, enlargement, or swelling of organs such as the liver, spleen, or pancreas ( pancreatitis )   Obstruction of blood vessels in heart and brain   Hyperlipidemia can increase your risk of atherosclerosis . This is a dangerous hardening of the arteries. It can end up blocking blood flow. In some cases, this may result in:   Angina   Heart attack   Stroke   Other serious complications   Blood Vessel with Atherosclerosis       [address] [place] [address].   Diagnosis   This condition is diagnosed with blood tests. These tests measure the levels of lipids in the blood. The National Cholesterol Education Program advises that you have your lipids checked at least once every five years, starting at age 21. Also, the American Academy of Pediatrics recommends lipid screening for children at risk (eg, a family history of hyperlipidemia).    Testing may consist of a fasting blood test for:   Total cholesterol   LDL (bad cholesterol)   HDL (good cholesterol)   Triglycerides   Your doctor may recommend more frequent or earlier testing if you have:   Family history of hyperlipidemia   Risk factor or disease that may cause hyperlipidemia   Complication that may result from hyperlipidemia   Treatment   Treatment is not is not pumping as well as it should. Over time, this causes fluid buildup in your lungs and other parts of your body. Fluid buildup can cause shortness of breath, fatigue, swollen ankles, and other problems. By taking medicines regularly, reducing sodium (salt) in your diet, checking your weight every day, and making lifestyle changes, you can feel better and live longer. Follow-up care is a key part of your treatment and safety. Be sure to make and go to all appointments, and call your doctor if you are having problems. It's also a good idea to know your test results and keep a list of the medicines you take. How can you care for yourself at home? Medicines  · Be safe with medicines. Take your medicines exactly as prescribed. Call your doctor if you think you are having a problem with your medicine. · Do not take any vitamins, over-the-counter medicine, or herbal products without talking to your doctor first. Urban Edward not take ibuprofen (Advil or Motrin) and naproxen (Aleve) without talking to your doctor first. They could make your heart failure worse. · You may take some of the following medicine. ? Angiotensin-converting enzyme inhibitors (ACEIs) or angiotensin II receptor blockers (ARBs) reduce the heart's workload, lower blood pressure, and reduce swelling. Taking an ACEI or ARB may lower your chance of needing to be hospitalized. ? Beta-blockers can slow heart rate, decrease blood pressure, and improve your condition. Taking a beta-blocker may lower your chance of needing to be hospitalized. ? Diuretics, also called water pills, reduce swelling. You will get more details on the specific medicines your doctor prescribes. Diet  · Your doctor may suggest that you limit sodium. Your doctor can tell you how much sodium is right for you. An example is less than 3,000 mg a day. This includes all the salt you eat in cooking or in packaged foods. People get most of their sodium from processed foods.  Fast food and restaurant meals also tend to be very high in sodium. · Ask your doctor how much liquid you can drink each day. You may have to limit liquids. Weight  · Weigh yourself without clothing at the same time each day. Record your weight. Call your doctor if you have a sudden weight gain, such as more than 2 to 3 pounds in a day or 5 pounds in a week. (Your doctor may suggest a different range of weight gain.) A sudden weight gain may mean that your heart failure is getting worse. Activity level  · Start light exercise (if your doctor says it is okay). Even if you can only do a small amount, exercise will help you get stronger, have more energy, and manage your weight and your stress. Walking is an easy way to get exercise. Start out by walking a little more than you did before. Bit by bit, increase the amount you walk. · When you exercise, watch for signs that your heart is working too hard. You are pushing yourself too hard if you cannot talk while you are exercising. If you become short of breath or dizzy or have chest pain, stop, sit down, and rest.  · If you feel \"wiped out\" the day after you exercise, walk slower or for a shorter distance until you can work up to a better pace. · Get enough rest at night. Sleeping with 1 or 2 pillows under your upper body and head may help you breathe easier. Lifestyle changes  · Do not smoke. Smoking can make a heart condition worse. If you need help quitting, talk to your doctor about stop-smoking programs and medicines. These can increase your chances of quitting for good. Quitting smoking may be the most important step you can take to protect your heart. · Limit alcohol to 2 drinks a day for men and 1 drink a day for women. Too much alcohol can cause health problems. · Avoid getting sick from colds and the flu. Get a pneumococcal vaccine shot. If you have had one before, ask your doctor whether you need another dose. Get a flu shot each year.  If you must be around people with colds or the flu, wash your hands often. When should you call for help? Call 911 if you have symptoms of sudden heart failure such as:  · You have severe trouble breathing. · You cough up pink, foamy mucus. · You have a new irregular or rapid heartbeat. Call your doctor now or seek immediate medical care if:  · You have new or increased shortness of breath. · You are dizzy or lightheaded, or you feel like you may faint. · You have sudden weight gain, such as more than 2 to 3 pounds in a day or 5 pounds in a week. (Your doctor may suggest a different range of weight gain.)  · You have increased swelling in your legs, ankles, or feet. · You are suddenly so tired or weak that you cannot do your usual activities. Watch closely for changes in your health, and be sure to contact your doctor if you develop new symptoms. Where can you learn more? Go to https://[employer].SteadyFare. org and sign in to your [employer] account. Enter E706 in the [place] box to learn more about \"Heart Failure: Care Instructions. \"     If you do not have an account, please click on the \"Sign Up Now\" link. Current as of: December 16, 2019               Content Version: 12.5  © 3577-3434 Healthwise, Incorporated. Care instructions adapted under license by [facility] ([email or web address]) [facility] ([facility]). If you have questions about a medical condition or this instruction, always ask your healthcare professional. [place] [address] any warranty or liability for your use of this information. Patient Education        Learning About Heart Failure Zones  What are heart failure zones? Heart failure zones give you an easy way to see changes in your heart failure symptoms. They also tell you when you need to get help. Check every day to see which zone you are in. Green zone. You are doing well. This is where you want to be. · Your weight is stable. It's not going up or down. · You breathe easily.   · You are sleeping well. You are able to lie flat without shortness of breath. · You can do your usual activities. Yellow zone. Be careful. Your symptoms are changing. Call your doctor. · You have new or increased shortness of breath. · You are dizzy or lightheaded, or you feel like you may faint. · You have sudden weight gain, such as more than 2 to 3 pounds in a day or 5 pounds in a week. (Your doctor may suggest a different range of weight gain.)  · You have increased swelling in your legs, ankles, or feet. · You are so tired or weak that you can't do your usual activities. · You are not sleeping well. Shortness of breath wakes you up at night. You need extra pillows. Red zone. This is an emergency. Call 911. You have symptoms of sudden heart failure. For example:  · You have severe trouble breathing. · You cough up pink, foamy mucus. · You have a new irregular or fast heartbeat. You have symptoms of a heart attack. These may include:  · Chest pain or pressure, or a strange feeling in the chest.  · Sweating. · Shortness of breath. · Nausea or vomiting. · Pain, pressure, or a strange feeling in the back, neck, jaw, or upper belly or in one or both shoulders or arms. · Lightheadedness or sudden weakness. · A fast or irregular heartbeat. If you have symptoms of a heart attack: After you call 911, the  may tell you to chew 1 adult-strength or 2 to 4 low-dose aspirin. Wait for an ambulance. Do not try to drive yourself. Follow-up care is a key part of your treatment and safety. Be sure to make and go to all appointments, and call your doctor if you are having problems. It's also a good idea to know your test results and keep a list of the medicines you take. Where can you learn more? Go to https://Center'dlisandro.Swapdom. org and sign in to your [employer] account. Enter T174 in the [facility] box to learn more about \"Learning About Heart Failure Zones. \"     If you do not have an account, please click on the \"Sign Up Now\" link. Current as of: December 16, 2019               Content Version: 12.5  © 8622-6596 Healthwise, Incorporated. Care instructions adapted under license by [facility] ([facility]). If you have questions about a medical condition or this instruction, always ask your healthcare professional. [address] any warranty or liability for your use of this information.

## 2020-09-29 NOTE — TELEPHONE ENCOUNTER
Called patient because per gavino riggs she would like the patient to have a li[id panel and ast & alt labs done. It has been over a year. I have left a message for patient to call me back when he is able. I did let him know he could get the labs done here at the hospital or I can fax them somewhere closer to him.

## 2020-12-22 PROBLEM — U07.1 COVID-19: Status: ACTIVE | Noted: 2020-01-01

## 2020-12-22 NOTE — H&P
Toni Yu - History & Physical    [id number]  PCP: No primary care provider on file. Date of Admission: 12/22/2020   Date of Service: Pt seen/examined on12/22/2020 and Admitted to Inpatient with expected LOS greater than two midnights due to medical therapy. Chief Complaint:  Dyspnea, sore throat    History Of Present Illness: The patient is a 77 y.o. male with a past medical history of ESRD on peritoneal dialysis, hypertension, hyperlipidemia, CAD who presented to an outside facility complaining of worsening dyspnea, sore throat and dysphagia since being diagnosed with COVID-19. Patient was found to have acute respiratory failure requiring 4 L oxygen to maintain saturation greater than 90%. Patient otherwise denies chest pain, nausea, vomiting, constipation, fevers, chills, sweat. Patient had an episode of diarrhea that was described as watery and nonbloody. Past Medical History:        Diagnosis Date    Anxiety     Asthma     Benign essential HTN     Benign hypertensive kidney disease     CAD (coronary artery disease)     sees dr. Edilma Oden Chronic kidney disease, stage IV (severe) ([facility] 7/18/2016    ESRD (end stage renal disease) ([facility]     no dialysis at present.  [facility]     Hyperlipidemia     Palliative care patient 11/29/2018    Prolonged emergence from general anesthesia     c/o dizzy and \"over sedated\" with prostate \"scope\".     Renal osteodystrophy     Skin cancer 2018    facial    Throat cancer ([facility] 2012    Thyroid disease        Past Surgical History:        Procedure Laterality Date    CYSTOSCOPY      HERNIA REPAIR      umbilical    LAPAROSCOPY INSERTION PERITONEAL CATHETER N/A 7/18/2016    CATHETER INSERTION PERITONEAL DIALYSIS LAPAROSCOPIC performed by Colletta Cinnamon, MD at [address] N/A 1/28/2019

## 2020-12-23 NOTE — PROGRESS NOTES
Pharmacy Note  Vancomycin Consult    Usama Cosby is a 77 y.o. male started on Vancomycin for pneumonia; consult received from Dr. Padma Marshall to manage therapy. Also receiving the following antibiotics: Cefepime. Active Problems:    Renovascular hypertension    Secondary hyperparathyroidism of renal origin [facility])    Palliative care patient    ESRD (end stage renal disease) on dialysis ([facility]    Anemia of chronic disease    Coronary artery disease involving native coronary artery of native heart without angina pectoris    Basal cell carcinoma (BCC) of face    Ischemic cardiomyopathy    COVID-19  Resolved Problems:    * No resolved hospital problems. *      Allergies:  Diphenhydramine and Sulfa antibiotics     Temp max: 97.5    Recent Labs     12/22/20  2300 12/23/20  0559   BUN 80* 83*       Recent Labs     12/22/20  2300 12/23/20  0559   CREATININE 8.0* 7.7*       Recent Labs     12/22/20  2300 12/23/20  0559   WBC 7.5 7.6         Intake/Output Summary (Last 24 hours) at 12/23/2020 1136  Last data filed at 12/23/2020 0451  Gross per 24 hour   Intake 400 ml   Output    Net 400 ml     Culture Date Source Results   12/22/20 Respiratory Normal respiratory areli    Blood x 2 Ordered     Ht Readings from Last 1 Encounters:   12/23/20 5' 11\" (1.803 m)        Wt Readings from Last 1 Encounters:   12/23/20 135 lb 2 oz (61.3 kg)       Body mass index is 18.85 kg/m². Estimated Creatinine Clearance: 7 mL/min (A) (based on SCr of 7.7 mg/dL (H)). Assessment/Plan:  Will initiate vancomycin pulse dosing secondary to patient being on PD. Timing of trough level will be determined based on culture results, renal function, and clinical response. Thank you for the consult. Will continue to follow.     Electronically signed by Char Torres [facility] on 12/23/2020 at 11:36 AM

## 2020-12-23 NOTE — PROGRESS NOTES
Patient states it is too painful to swallow pills at this time. Will attempt to swallow decadron and resume pills after he gets some phenol throat spray.    Electronically signed by Sharon Villa RN on 12/22/2020 at 6:25 PM

## 2020-12-23 NOTE — PROGRESS NOTES
Palliative Care/Spiritual Care: Spoke with pt's wife Shabnam Ring who says pt felt awful, was fatigued and couldn't eat. Wife says pt and she tested positive for Covid on 12/13. Pt is known to palliative care. Advance Directives: Pt does not have an AD/LW. Pt's wife Shabnam Ring is next of kin. Wife says pt wants CPR and Ventilator. SEE ACP NOTE. Pain/other symptoms: Unable to assess. Social/Spiritual: Wife says pt doesn't attend [employer] but she attends [employer]. Pt/family discussion r/t goals: Pt has a wife, a daughter, a son, and grandchildren. Prior to sickness pt was able to do everything. Wife says he bought the groceries, took care of himself and did everything except pay the bills. Goal is for pt to return home with previous quality of life and previous daily living skills. Provided spiritual care with support, and prayer. Pt's wife expressed gratitude for spiritual care.     Electronically signed by [employer] on 12/23/2020 at 11:27 AM

## 2020-12-23 NOTE — CONSULTS
Renal Consult Note    Thank you to requesting provider: Dr Genaro Almonte, for asking us to see Leigh Elizabeth    Reason for consultation:  ESRD management    Chief Complaint:  Street Oz    History of Presenting Illness      Patient is a 77 y.o. man with a past medical history of ESRD on peritoneal dialysis, hypertension, hyperlipidemia, CAD who presented to an outside facility complaining of worsening dyspnea and dysphagia since being diagnosed with COVID-19. He went into acute respiratory failure and is now requiring 4 L oxygen. [facility]. Patient offered no other complaints. Renal service was consulted to manage his ESRD.      Past Medical/Surgical History      Active Ambulatory Problems     Diagnosis Date Noted    NSTEMI (non-ST elevated myocardial infarction) ([address] 11/27/2018    Renovascular hypertension 11/28/2018    Secondary hyperparathyroidism of renal origin ([address] 11/28/2018    Anemia due to stage 4 chronic kidney disease ([address] 11/28/2018    Palliative care patient 11/29/2018    ESRD (end stage renal disease) on dialysis ([address]     Anemia of chronic disease 12/04/2018    Peritoneal dialysis catheter dysfunction ([address] 01/28/2019    Skin lesion of face 01/28/2019    Coronary artery disease involving native coronary artery of native heart without angina pectoris 02/14/2019    Basal cell carcinoma (BCC) of face     Chest pain 03/04/2019    Chest discomfort     Ischemic cardiomyopathy 01/23/2020     Resolved Ambulatory Problems     Diagnosis Date Noted    Chronic kidney disease, stage IV (severe) ([address] 07/18/2016    Acute renal failure with acute tubular necrosis superimposed on stage 4 chronic kidney disease ([address]      Past Medical History:   Diagnosis Date    Anxiety     Asthma     Benign essential HTN     Benign hypertensive kidney disease     CAD (coronary artery disease)     ESRD (end stage renal disease) ([address]  Hemodialysis patient ([facility]     History of blood transfusion     Hyperlipidemia     Prolonged emergence from general anesthesia     Renal osteodystrophy     Skin cancer 2018    Throat cancer ([facility] 2012    Thyroid disease          Review of Systems     Constitutional:  No weight loss, no fever/chills  Eyes:  No eye pain, no eye redness  Cardiovascular:  No chest pain, no worsening of edema  Respiratory:  No hemoptysis, no stidor  Gastrointestinal:  No blood in stool, no n/v, no diarrhea  Genitoruinary:  No hematuria, no difficulty with urination  Musculoskeletal:  No joint swelling, no redness  Integumentary:  No Rash, no itching  Neurological:  No focal weakness, No new sensory deficit  Psychiatric:  No depression, no confusion  Endocrine:  No polyuria, no polydipsia       Medications        Current Facility-Administered Medications:     [START ON 12/24/2020] lisinopril (PRINIVIL;ZESTRIL) tablet 20 mg, 20 mg, Oral, Daily, MD Charlene Judd  [facility] ON 12/24/2020] amLODIPine (NORVASC) tablet 5 mg, 5 mg, Oral, Daily, Garrick Aragon MD    heparin (porcine) injection 4,900 Units, 80 Units/kg, Intravenous, Once, Garrick Aragon MD    heparin (porcine) injection 4,900 Units, 80 Units/kg, Intravenous, PRN, Garrick Aragon MD    heparin (porcine) injection 2,450 Units, 40 Units/kg, Intravenous, PRN, Garrick Aragon MD    heparin 25,000 units in dextrose 5% 250 mL infusion, 18 Units/kg/hr, Intravenous, Continuous, Garrick Aragon MD    vancomycin Tonia Rutland) intermittent dosing (placeholder), , Other, RX Placeholder, Garrick Aragon MD    vancomycin (VANCOCIN) 1 g in dextrose 5% 250 mL IVPB, 1,000 mg, Intravenous, Once, Garrick Aragon MD    cefepime (MAXIPIME) 1 g in sodium chloride (PF) 10 mL IV syringe, 1 g, Intravenous, Q24H, Garrick Aragon MD    ALPRAZolam Myles Acuña) tablet 0.25 mg, 0.25 mg, Oral, Nightly PRN, Garrick Aragon MD   aspirin chewable tablet 81 mg, 81 mg, Oral, Daily, Aries Liu MD, 81 mg at 12/23/20 0935    atorvastatin (LIPITOR) tablet 40 mg, 40 mg, Oral, Nightly, Aries Liu MD, 40 mg at 12/22/20 2248    carvedilol (COREG) tablet 3.125 mg, 3.125 mg, Oral, BID WC, Aries Liu MD, 3.125 mg at 12/23/20 1032    clopidogrel (PLAVIX) tablet 75 mg, 75 mg, Oral, Daily, Aries Liu MD, 75 mg at 12/23/20 0935    levothyroxine (SYNTHROID) tablet 50 mcg, 50 mcg, Oral, Daily, Aries Liu MD, 50 mcg at 12/23/20 2352    sevelamer (RENVELA) tablet 800 mg, 800 mg, Oral, TID, Aries Liu MD, 800 mg at 12/23/20 0935    vitamin D (ERGOCALCIFEROL) capsule 50,000 Units, 50,000 Units, Oral, Q7 Days, Aries Liu MD    acetaminophen (TYLENOL) tablet 650 mg, 650 mg, Oral, Q6H PRN **OR** acetaminophen (TYLENOL) suppository 650 mg, 650 mg, Rectal, Q6H PRN, Aries Liu MD    guaiFENesin-dextromethorphan (ROBITUSSIN DM) 100-10 MG/5ML syrup 5 mL, 5 mL, Oral, Q4H PRN, Aries Liu MD    dexamethasone (DECADRON) tablet 6 mg, 6 mg, Oral, Daily, Aries Liu MD, 6 mg at 12/23/20 0935    Vitamin D (CHOLECALCIFEROL) tablet 6,000 Units, 6,000 Units, Oral, Daily, Aries Liu MD, 6,000 Units at 12/23/20 0935    albuterol sulfate  (90 Base) MCG/ACT inhaler 2 puff, 2 puff, Inhalation, Q6H PRN, Aries Liu MD, 2 puff at 12/23/20 0544    insulin lispro (HUMALOG) injection vial 0-6 Units, 0-6 Units, Subcutaneous, TID WC, Aries Liu MD    insulin lispro (HUMALOG) injection vial 0-3 Units, 0-3 Units, Subcutaneous, Nightly, Aries Liu MD    glucose (GLUTOSE) 40 % oral gel 15 g, 15 g, Oral, PRN, Aries Liu MD    dextrose 50 % IV solution, 12.5 g, Intravenous, PRN, Aries Liu MD    glucagon (rDNA) injection 1 mg, 1 mg, Intramuscular, PRN, Aries Liu MD    dextrose 5 % solution, 100 mL/hr, Intravenous, PRN, Aries Liu MD    Benzocaine-Menthol (CEPACOL) 1 lozenge, 1 lozenge, Oral, Q2H PRN, Aries Liu MD   phenol 1.4 % mouth spray 1 spray, 1 spray, Mouth/Throat, Q2H PRN, Annie Bergman MD, 1 spray at 12/23/20 0711    budesonide-formoterol (SYMBICORT) 160-4.5 MCG/ACT inhaler 2 puff, 2 puff, Inhalation, BID, Annie Bergman MD, 2 puff at 12/23/20 9362  Outpatient Medications Marked as Taking for the 12/22/20 encounter [facility] HOSPITAL Encounter)   Medication Sig Dispense Refill    carvedilol (COREG) 3.125 MG tablet TAKE 1 TABLET BY MOUTH TWICE A DAY WITH MEALS 180 tablet 3    clopidogrel (PLAVIX) 75 MG tablet TAKE 1 TABLET BY MOUTH EVERY DAY 90 tablet 3    atorvastatin (LIPITOR) 40 MG tablet Take 1 tablet by mouth nightly 30 tablet 0    sevelamer (RENVELA) 800 MG tablet Take 800 mg by mouth 3 times daily Two pills with every meal.      vitamin D (ERGOCALCIFEROL) 1.25 MG (98651 UT) CAPS capsule Take 50,000 Units by mouth every 7 days      lisinopril (PRINIVIL;ZESTRIL) 40 MG tablet Take 40 mg by mouth daily      amLODIPine (NORVASC) 10 MG tablet Take 10 mg by mouth daily  5    aspirin 81 MG chewable tablet Take 1 tablet by mouth daily 30 tablet 3    Aspirin-Acetaminophen-Caffeine (EXCEDRIN PO) Take 1 tablet by mouth daily as needed       ALPRAZolam (XANAX) 0.25 MG tablet Take 0.25 mg by mouth nightly as needed for Sleep. .         Allergies   Diphenhydramine and Sulfa antibiotics    Family History       Family History   Problem Relation Age of Onset    Heart Disease Mother     Cancer Father         lung     Family history negative for kidney disease.      Social History      Social History     Socioeconomic History    Marital status:      Spouse name: Not on file    Number of children: Not on file    Years of education: Not on file    Highest education level: Not on file   Occupational History    Not on file   Social Needs    Financial resource strain: Not on file    Food insecurity     Worry: Not on file     Inability: Not on file    Transportation needs     Medical: Not on file Non-medical: Not on file   Tobacco Use    Smoking status: Never Smoker    Smokeless tobacco: Never Used   Substance and Sexual Activity    Alcohol use: No    Drug use: No    Sexual activity: Not on file   Lifestyle    Physical activity     Days per week: Not on file     Minutes per session: Not on file    Stress: Not on file   Relationships    Social connections     Talks on phone: Not on file     Gets together: Not on file     Attends Christianity service: Not on file     Active member of club or organization: Not on file     Attends meetings of clubs or organizations: Not on file     Relationship status: Not on file    Intimate partner violence     Fear of current or ex partner: Not on file     Emotionally abused: Not on file     Physically abused: Not on file     Forced sexual activity: Not on file   Other Topics Concern    Not on file   Social History Narrative    Not on file       Physical Exam     Blood pressure (!) 98/58, pulse 73, temperature 97 °F (36.1 °C), temperature source Temporal, resp. rate 18, height 5' 11\" (1.803 m), weight 135 lb 2 oz (61.3 kg), SpO2 95 %. Exam is limited and was obtained with nurse's assistance due to [place] pandemic in order to decrease exposure and virus spread and also save on PPEs.     General: A+Ox3, ill-appearing, normal body habitus  HEENT:  Atraumatic, normocephalic  Chest:  Distant breath sounds, diminished BS at the bases  CV:  RRR  Abdomen:  NTND, soft, +BS, no hepatosplenomegaly  Extremities:  No peripheral edema  Neurological:  Moving all four extremities  Skin:  No rash, no jaundice    Data     Recent Labs     12/22/20 2300 12/23/20  0559   WBC 7.5 7.6   HGB 9.1* 9.6*   HCT 27.9* 29.4*   MCV 99.6* 99.0*    200     Recent Labs     12/22/20  2300 12/23/20  0559 12/23/20  1056   * 131*  --    K 4.7 5.1* 4.5   CL 95* 95*  --    CO2 19* 20*  --    GLUCOSE 121* 123*  --    BUN 80* 83*  --    CREATININE 8.0* 7.7*  --    LABGLOM 7* 7*  -- GFRAA 8* 8*  --        Assessment:  1. ESRD  2. Hypertensive nephropathy  3. COVID pneumonia  4. Acute resp failure  5. Hyponatremia  6. Hyperkalemia  7. Anemia of CKD    Plan:  · Decrease BP meds since patient is now hypotensive some. Will resume PD tonight. Continue antibiotics and follow up labs.      Thank you for asking us to participate in the management of your patient, please do not hesitate to contact me for any concerns regarding my recommendations as outlined above.    -----------------------------  Electronically signed by Tara Novak MD on 12/23/20 at 11:48 AM CST

## 2020-12-23 NOTE — PROGRESS NOTES
KEN Spear on call for nephrology.  Call placed   Electronically signed by Nia Santos RN on 12/22/2020 at 6:13 PM

## 2020-12-23 NOTE — PROGRESS NOTES
Chiqui Hockey received from [phone] to room # 320  Mental Status: Patient is oriented, alert, coherent, logical, thought processes intact and able to concentrate and follow conversation. Vitals:    12/23/20 1039   BP:    Pulse:    Resp:    Temp:    SpO2: 95%     Placed on cardiac monitor: Yes. Bedside monitor. Belongings: Glasses with patient at bedside . Family at bedside No.  Oriented Patient to room. Call light within reach. Yes. Transfer was: Well tolerated by patient. .    Electronically signed by Chris Forbes RN on 12/23/2020 at 1:51 PM

## 2020-12-23 NOTE — PROGRESS NOTES
Pharmacy Consult      Camilo Espinal is a 77 y.o. male for whom pharmacy has been consulted to dose Cefepime. Patient Active Problem List   Diagnosis    NSTEMI (non-ST elevated myocardial infarction) ([facility]    Renovascular hypertension    Secondary hyperparathyroidism of renal origin ([facility]    Anemia due to stage 4 chronic kidney disease ([facility]    Palliative care patient    ESRD (end stage renal disease) on dialysis ([facility]    Anemia of chronic disease    Peritoneal dialysis catheter dysfunction ([facility]    Skin lesion of face    Coronary artery disease involving native coronary artery of native heart without angina pectoris    Basal cell carcinoma (BCC) of face    Chest pain    Chest discomfort    Ischemic cardiomyopathy    COVID-19       Allergies:  Diphenhydramine and Sulfa antibiotics     Recent Labs     12/22/20  2300 12/23/20  0559   CREATININE 8.0* 7.7*       Ht/Wt:   Ht Readings from Last 1 Encounters:   12/23/20 5' 11\" (1.803 m)        Wt Readings from Last 1 Encounters:   12/23/20 135 lb 2 oz (61.3 kg)         Estimated Creatinine Clearance: 7 mL/min (A) (based on SCr of 7.7 mg/dL (H)). Assessment/Plan:    Peritoneal dialysis patient. Initiate Cefepime 1 gm IV every 24 hours. Thank you for the consult. Will continue to follow.     Electronically signed by Clare Santiago [address] [place] [address] on 12/23/2020 at 11:39 AM

## 2020-12-23 NOTE — CARE COORDINATION
Eliquis has been provided for pt at dc. SW following for further dc needs/planning.    Electronically signed by Janeth Moore on 12/23/2020 at 10:23 AM

## 2020-12-23 NOTE — PROGRESS NOTES
[facility]        Hospitalist Progress Note  12/23/2020 10:48 AM  Subjective:   Admit Date: 12/22/2020  PCP: No primary care provider on file. Chief Complaint: Dyspnea    Subjective: Patient seen at bedside. Talking on the phone but noted to be desaturating with increasing oxygen. Confirmed that patient is full code. Patient still complaining of sore throat. Cumulative Hospital History: The patient is a 77 y.o. male with a past medical history of ESRD on peritoneal dialysis, hypertension, hyperlipidemia, CAD who presented to an outside facility complaining of worsening dyspnea, sore throat and dysphagia since being diagnosed with COVID-19 found to have acute respiratory failure with hypoxia requiring 4L to maintain saturations at OSH. Patient transferred to our facility for further management. Found to have elevated procalcitonin and D-dimer started on empiric antibiotics and heparin drip pending cultures and CT angio PE study. Patient with worsening saturations/respiratory status transferred to CCU.    ROS: 14 point review of systems is negative except as specifically addressed above. DIET FULL LIQUID;     Intake/Output Summary (Last 24 hours) at 12/23/2020 1048  Last data filed at 12/23/2020 0451  Gross per 24 hour   Intake 400 ml   Output    Net 400 ml     Medications:   heparin (PORCINE) Infusion      dextrose       Current Facility-Administered Medications   Medication Dose Route Frequency Provider Last Rate Last Admin    [START ON 12/24/2020] lisinopril (PRINIVIL;ZESTRIL) tablet 20 mg  20 mg Oral Daily Libby Tipton MD        [START ON 12/24/2020] amLODIPine (NORVASC) tablet 5 mg  5 mg Oral Daily Annie Bergman MD        heparin (porcine) injection 4,900 Units  80 Units/kg Intravenous Once Annie Bergman MD        heparin (porcine) injection 4,900 Units  80 Units/kg Intravenous PRN Annie Bergman MD  heparin (porcine) injection 2,450 Units  40 Units/kg Intravenous PRN Maxim Garcia MD        heparin 25,000 units in dextrose 5% 250 mL infusion  12 Units/kg/hr Intravenous Continuous Maxim Garcia MD        ALPRAZolam Lisaraheljus Wautoma) tablet 0.25 mg  0.25 mg Oral Nightly PRN Maxim Garcia MD        aspirin chewable tablet 81 mg  81 mg Oral Daily Maxim Garcia MD   81 mg at 12/23/20 0935    atorvastatin (LIPITOR) tablet 40 mg  40 mg Oral Nightly Maxim Garcia MD   40 mg at 12/22/20 2248    carvedilol (COREG) tablet 3.125 mg  3.125 mg Oral BID  Maxim Garcia MD   3.125 mg at 12/23/20 0936    clopidogrel (PLAVIX) tablet 75 mg  75 mg Oral Daily Maxim Garcia MD   75 mg at 12/23/20 0935    levothyroxine (SYNTHROID) tablet 50 mcg  50 mcg Oral Daily Maxim Garcia MD   50 mcg at 12/23/20 6230    sevelamer (RENVELA) tablet 800 mg  800 mg Oral TID Maxim Garcia MD   800 mg at 12/23/20 0935    vitamin D (ERGOCALCIFEROL) capsule 50,000 Units  50,000 Units Oral Q7 Days Maxim Garcia MD        acetaminophen (TYLENOL) tablet 650 mg  650 mg Oral Q6H PRN Maxim Garcia MD        Or    acetaminophen (TYLENOL) suppository 650 mg  650 mg Rectal Q6H PRN Maxim Garcia MD        guaiFENesin-dextromethorphan (ROBITUSSIN DM) 100-10 MG/5ML syrup 5 mL  5 mL Oral Q4H PRN Maxim Garcia MD        dexamethasone (DECADRON) tablet 6 mg  6 mg Oral Daily Maxim Garcia MD   6 mg at 12/23/20 0935    Vitamin D (CHOLECALCIFEROL) tablet 6,000 Units  6,000 Units Oral Daily Maxim Garcia MD   6,000 Units at 12/23/20 0935    albuterol sulfate  (90 Base) MCG/ACT inhaler 2 puff  2 puff Inhalation Q6H PRN Maxim Garcia MD   2 puff at 12/23/20 0544    insulin lispro (HUMALOG) injection vial 0-6 Units  0-6 Units Subcutaneous TID  Maxim Garcia MD        insulin lispro (HUMALOG) injection vial 0-3 Units  0-3 Units Subcutaneous Nightly Maxim Garcia MD        glucose (GLUTOSE) 40 % oral gel 15 g  15 g Oral PRN Maxim Garcia MD  dextrose 50 % IV solution  12.5 g Intravenous PRN Cristino Cervantes MD        glucagon (rDNA) injection 1 mg  1 mg Intramuscular PRN Cristino Cervantes MD        dextrose 5 % solution  100 mL/hr Intravenous PRN Cristino Cervantes MD        Benzocaine-Menthol (CEPACOL) 1 lozenge  1 lozenge Oral Q2H PRN Cristino Cervantes MD        phenol 1.4 % mouth spray 1 spray  1 spray Mouth/Throat Q2H PRN Cristino Cervantes MD   1 spray at 12/23/20 0711    budesonide-formoterol (SYMBICORT) 160-4.5 MCG/ACT inhaler 2 puff  2 puff Inhalation BID Cristino Cervantes MD   2 puff at 12/23/20 0936        Labs:     Recent Labs     12/22/20 2300 12/23/20  0559   WBC 7.5 7.6   RBC 2.80* 2.97*   HGB 9.1* 9.6*   HCT 27.9* 29.4*   MCV 99.6* 99.0*   MCH 32.5* 32.3*   MCHC 32.6* 32.7*    200     Recent Labs     12/22/20 2300 12/23/20  0559   * 131*   K 4.7 5.1*   ANIONGAP 18 16   CL 95* 95*   CO2 19* 20*   BUN 80* 83*   CREATININE 8.0* 7.7*   GLUCOSE 121* 123*   CALCIUM 8.1* 8.3*     No results for input(s): MG, PHOS in the last 72 hours. Recent Labs     12/22/20 2300 12/23/20  0559   AST 35 37   ALT 28 30   BILITOT 0.3 0.3   ALKPHOS 63 61     ABGs:No results for input(s): PH, PO2, PCO2, HCO3, BE, O2SAT in the last 72 hours. Troponin T: No results for input(s): TROPONINI in the last 72 hours. INR:   Recent Labs     12/22/20 2300 12/23/20  0559   INR 1.09 1.09     Lactic Acid: No results for input(s): LACTA in the last 72 hours.     Objective:   Vitals: BP (!) 98/58   Pulse 73   Temp 97 °F (36.1 °C) (Temporal)   Resp 18   Ht 5' 11\" (1.803 m)   Wt 135 lb 2 oz (61.3 kg)   SpO2 95%   BMI 18.85 kg/m²   24HR INTAKE/OUTPUT:      Intake/Output Summary (Last 24 hours) at 12/23/2020 1048  Last data filed at 12/23/2020 0451  Gross per 24 hour   Intake 400 ml   Output    Net 400 ml     General appearance: Alert  Head: NC/AT  Eyes: conjunctivae/corneas clear   Neck: Supple  Lungs: BLAE   Heart: RRR  Abdomen: BS+  Extremities: +pulses  Skin: warm Neurologic: Alert, gross motor function intact  Psychiatric:  Mood appropriate    Assessment and Plan: Active Problems:    Renovascular hypertension    Secondary hyperparathyroidism of renal origin [facility])    Palliative care patient    ESRD (end stage renal disease) on dialysis ([facility]    Anemia of chronic disease    Coronary artery disease involving native coronary artery of native heart without angina pectoris    Basal cell carcinoma (BCC) of face    Ischemic cardiomyopathy    COVID-19  Resolved Problems:    * No resolved hospital problems. *    COVID: Decadron. Remdesivir contraindicated based on ESRD. Supportive management. Bronchodilators. O2 as needed. Worsening respiratory status. ABG pending. Transfer to CCU. Vanco/Cefepime for ?concomitant bacterial infection - elevated pro-calcitonin. Follow-up cultures. Elevated D-dimer: CTA PE study. Heparin gtt until PE ruled out.     ESRD: Peritoneal dialysis patient. Nephrology consulted.     Hypertension: Monitor BP and adjust medications as needed.     Supportive management.     Advance Directive: Full Code    DVT prophylaxis: Heparin gtt    Discharge planning: TBD - transfer to CCU      Signed:  Aries Liu MD 12/23/2020 10:48 AM  Rounding Hospitalist

## 2020-12-23 NOTE — PROGRESS NOTES
Speech Language Pathology  Facility/Department: [facility] 4 ONCOLOGY UNIT   CLINICAL BEDSIDE SWALLOW EVALUATION    NAME: Gianluca Burgess  : 1945  MRN: [id number]    ADMISSION DATE: 2020  ADMITTING DIAGNOSIS: has NSTEMI (non-ST elevated myocardial infarction) ([facility] [address] Renovascular hypertension; Secondary hyperparathyroidism of renal origin ([facility] [address] Anemia due to stage 4 chronic kidney disease ([facility] [address] Palliative care patient; ESRD (end stage renal disease) on dialysis ([facility] [address] Anemia of chronic disease; Peritoneal dialysis catheter dysfunction ([facility] [address] Skin lesion of face; Coronary artery disease involving native coronary artery of native heart without angina pectoris; Basal cell carcinoma (BCC) of face; Chest pain; Chest discomfort; Ischemic cardiomyopathy; and COVID-19 on their problem list.    Date of Eval: 2020  Evaluating Therapist: Faustina Pinedo    Current Diet level:  Regular solid consistency with thin liquids; patient requesting softer foods secondary to reported sore throat. Reason for Referral  Gianluca Burgess was referred for a bedside swallow evaluation to assess the efficiency of his swallow function, identify signs and symptoms of aspiration and make recommendations regarding safe dietary consistencies, effective compensatory strategies, and safe eating environment. Impression  Assessed patient's swallowing function. Patient exhibits sluggish, moderately decreased laryngeal elevation for swallow airway protection. It is noted that patient exhibited inconsistent delayed coughs with every consistency administered during evaluation this date (puree; thin H2O). However, do not feel all coughs were related to penetration/aspiration secondary to timing of swallows and presence of throat movement and patient exhibited inconsistent coughs at rest.     At this time, per patient request, would  trial full liquid diet. Self-feed. Will continue to follow and upgrade diet as patient tolerates. Treatment Plan  Requires SLP Intervention: Yes     Recommended Diet and Intervention  Liquid Consistency Recommendation: Full liquid diet with thin liquids  Recommended Form of Meds: PO  Therapeutic Interventions: Patient/Family education;Diet tolerance monitoring; Therapeutic PO trials with SLP     Compensatory Swallowing Strategies  Compensatory Swallowing Strategies: Upright as possible for all oral intake;Small bites/sips;Eat/Feed slowly; Alternate solids and liquids; Remain upright for 30-45 minutes after meals     Treatment/Goals  Timeframe for Short-term Goals: 1x/day for 3 days   Goal 1: Patient will tolerate full liquid diet with thin liquids with min S/S penetration/aspiration during PO intake. Goal 2: Patient staff will follow swallow safety recommendations to decrease risk of penetration/aspiration during PO intake. Goal 3: Re-assessment of swallow function for potential diet upgrade. General  Chart Reviewed: Yes  Behavior/Cognition: Alert; Cooperative  O2 Device: Nasal Cannula  Communication Observation: (SLP ranked functional intelligibility of speech for unfamiliar listeners at 100% in utterances with background noise present. Hoarse vocal quality was noted during verbalizations.)  Follows Directions: Simple   Patient Positioning: Upright in bed  Consistencies Administered: Dysphagia Pureed (Dysphagia I); Thin - cup      Assessed patient's swallowing function with the following observations noted:     Oral Phase  Oral Phase - Comment: Oral transit of puree consistency trials, presented independently, primarily measured 1-2 seconds in length and no oral cavity residue was noted post swallows. Oral transit of thin H2O trials, presented independently via cup, primarily measured 1-2 seconds in length. Pharyngeal Phase  Laryngeal Elevation: (Patient exhibited sluggish, moderately decreased laryngeal elevation for swallow airway protection.)  Delayed Cough: Puree; Thin - cup Pharyngeal Phase - Comment: As previously mentioned, patient exhibited inconsistent delayed coughs with every consistency administered during assessment this date (puree; thin H2O). However, do not feel all coughs were related to penetration/aspiration secondary to timing of swallows and presence of throat movement and patient exhibited inconsistent coughs at rest.     At this time, per patient request, would trial full liquid diet. Self-feed. Will continue to follow and upgrade diet as patient tolerates.     Electronically signed by ARCHANA Mendoza on 12/23/2020 at 11:07 AM

## 2020-12-23 NOTE — PROGRESS NOTES
4 Eyes Skin Assessment    Narciso Canavan is being assessed upon: Transfer to New Unit    I agree that Kirti Pérez, along with Becca Cardenas (either 2 RN's or 1 LPN and 1 RN) have performed a thorough Head to Toe Skin Assessment on the patient. ALL assessment sites listed below have been assessed. Areas assessed by both nurses:     [x]   Head, Face, and Ears   [x]   Shoulders, Back, and Chest  [x]   Arms, Elbows, and Hands   [x]   Coccyx, Sacrum, and Ischium  [x]   Legs, Feet, and Heels    Does the Patient have Skin Breakdown? Yes, wound(s) noted upon assessment. It is the responsibility of the Primary Nurse to assure that the following documentation, preventions, orders, and consults are complete on the above noted wound(s): Wound LDA initiated. LDA Flowsheet Documentation includes the Chiqui-wound, Wound Assessment, Measurements, Dressing Treatment, Drainage, and Color.     Zac Prevention initiated: No  Wound Care Orders initiated: No    WOC nurse consulted for Pressure Injury (Stage 3,4, Unstageable, DTI, NWPT, and Complex wounds) and New or Established Ostomies: No        Primary Nurse eSignature: Toribio Peters RN on 12/23/2020 at 1:52 PM      Co-Signer eSignature: Electronically signed by Becca Cardenas RN on 12/23/20 at 5:41 PM CST

## 2020-12-23 NOTE — PROGRESS NOTES
Incoming Lab Results From [employer]    Component Value Ref Range & Units Status Collected Lab   pH, Arterial 7.480High   7.350 - 7.450 Final 12/23/2020 10:56 AM [facility] Lab   pCO2, Arterial 25. 0Low   35.0 - 45.0 mmHg Final 12/23/2020 10:56 AM [facility] Lab   pO2, Arterial 114. 0High   80.0 - 100.0 mmHg Final 12/23/2020 10:56 AM [facility] Lab   HCO3, Arterial 18.6Low   22.0 - 26.0 mmol/L Final 12/23/2020 10:56 AM [facility] Excess, Arterial -3.4Low   -2.0 - 2.0 mmol/L Final 12/23/2020 10:56 AM [facility] Lab   Hemoglobin, Art, Extended 12.5Low   14.0 - 18.0 g/dL Final 12/23/2020 10:56 AM [facility] Lab   O2 Sat, Arterial 95.7  >92 % Final 12/23/2020 10:56 AM [facility] Lab   Carboxyhgb, Arterial 0.0  0.0 - 5.0 % Final 12/23/2020 10:56 AM [facility] Lab        0.0-1.5   (Smokers 1.5-5.0)    Methemoglobin, Arterial 0.7  <1.5 % Final 12/23/2020 10:56 AM [facility] Lab   O2 Content, Arterial 17.0  Not Established mL/dL Final 12/23/2020 10:56 AM [facility]   O2 Therapy Unknown   Final 12/23/2020 10:56 AM [facility]   Testing Performed By    Priscilla [facility] Name Director Address Valid Date Range   [id number] - [address] LAB Teressa Ballesteros MD [address]   [address] [address] 08/30/17 0733-Present   Lab and Collection    Blood Gas, Arterial - 12/23/2020  Result History    Blood Gas, Arterial on 12/23/2020   Result Information    Flag: AbnormalAbnormal   Status: Final result (Collected: 12/23/2020 10:56) Provider Status: Ordered   Routing History    Priority Sent On From To Message Type    12/22/2020  6:36 PM Brendon Coyle Incoming Lab Results From Paulo Greenberg MD    Click to Print Result   View SmartLink Info    Blood Gas, Arterial (Order #[id number]) on 12/23/20   Order Report    Order Details  15 lpm hfnc and 15 lpm nrb

## 2020-12-24 NOTE — PROGRESS NOTES
PTT at 2100 62.8 no bolus no change, hep gtt going at 18u/kg/hr.      Electronically signed by Lesley Christiansen RN on 12/23/2020 at 9:38 PM

## 2020-12-24 NOTE — PROGRESS NOTES
Left chest port accessed, blood return, flushed, pt tolerated no complaints.     Electronically signed by Terry Haque RN on 12/23/2020 at 8:25 PM

## 2020-12-24 NOTE — PROGRESS NOTES
Progress Note  Date:2020       Room:0704/704-01  Patient John Hardin     YOB: 1945     Age:75 y.o. Subjective    Subjective:  77 y. o. male with a past medical history of ESRD on peritoneal dialysis, hypertension, hyperlipidemia, CAD who presented to an outside facility complaining of worsening dyspnea, sore throat and dysphagia since being diagnosed with COVID-19 found to have acute respiratory failure with hypoxia requiring 4L to maintain saturations at OSH. Patient transferred to our facility for further management. Found to have elevated procalcitonin and D-dimer started on empiric antibiotics, CT angio was negative for PE study, hence heparin was discontinued and initiated on Lovenox  Patient with worsening saturations/respiratory status transferred to CCU  evening, and now on 7L of HF oxygen. Review of Systems: As stated above    Objective         Vitals Last 24 Hours:  TEMPERATURE:  Temp  Av.5 °F (36.4 °C)  Min: 96.6 °F (35.9 °C)  Max: 98 °F (36.7 °C)  RESPIRATIONS RANGE: Resp  Av.3  Min: 13  Max: 30  PULSE OXIMETRY RANGE: SpO2  Av.6 %  Min: 84 %  Max: 100 %  PULSE RANGE: Pulse  Av  Min: 72  Max: 102  BLOOD PRESSURE RANGE: Systolic (85IXA), IUR:713 , Min:93 , PIW:960   ; Diastolic (79GEC), USZ:43, Min:49, Max:93    I/O (24Hr): Intake/Output Summary (Last 24 hours) at 2020 1536  Last data filed at 2020 0800  Gross per 24 hour   Intake 100 ml   Output 300 ml   Net -200 ml       Physical examination:  Patient was monitored today, through [facility] glass door, appeared to be in no acute distress, discussed with nurse, patient answers questions appropriately, did not sound to be in any respiratory distress. Respiration seems unlabored, no evidence of dyspnea on conversation. Patient is alert.       Labs/Imaging/Diagnostics    Labs:  CBC:  Recent Labs     20  0559 20  1244 20  0306   WBC 7.6 8.9 8.5 RBC 2.97* 3.23* 2.88*   HGB 9.6* 10.4* 9.2*   HCT 29.4* 32.0* 28.3*   MCV 99.0* 99.1* 98.3*   RDW 15.1* 15.0* 14.6*    221 235     CHEMISTRIES:  Recent Labs     12/22/20 2300 12/23/20  0559 12/23/20  1056 12/24/20  0306   * 131*  --  130*   K 4.7 5.1* 4.5 4.6   CL 95* 95*  --  92*   CO2 19* 20*  --  22   BUN 80* 83*  --  81*   CREATININE 8.0* 7.7*  --  7.0*   GLUCOSE 121* 123*  --  161*     PT/INR:  Recent Labs     12/22/20 2300 12/23/20  0559 12/24/20  0306   PROTIME 14.0 14.0 14.2   INR 1.09 1.09 1.10     APTT:  Recent Labs     12/23/20 2050 12/24/20  0306 12/24/20  0840   APTT 62.8* 67.9* 60.6*     LIVER PROFILE:  Recent Labs     12/22/20 2300 12/23/20  0559 12/24/20  0306   AST 35 37 55*   ALT 28 30 48*   BILITOT 0.3 0.3 0.3   ALKPHOS 63 61 59       Imaging Last 24 Hours:  Xr Chest Portable    Result Date: 12/22/2020  XR CHEST PORTABLE 12/22/2020 7:03 PM History: Short of breath. Covid positive. Portable chest x-ray compared with 4 March 2019. Chronic interstitial lung disease. Patchy pneumonia within the right upper lobe, left lower lobe, and within the base of the left upper lobe. Less prominent disease within the right lower lobe. Relative sparing of the left apex. Stable heart and mediastinum. Aortic arch calcification. Unchanged left subclavian port. 1. Bilateral pneumonia compatible with the history of Covid positive.  Signed by Dr Darwin Zavaleta on 12/22/2020 7:04 PM    Cta Pulmonary W Contrast    Result Date: 12/24/2020 EXAMINATION:  CTA PULMONARY W CONTRAST  12/24/2020 2:04 PM HISTORY: Hypoxia. Elevated d-dimer. Covid 19 infection. COMPARISON: No comparison study. DLP: 315 mGy-cm. Automated exposure control was utilized. TECHNIQUE: Spiral CT angiography was performed of the chest with contrast. Sagittal, coronal and 3-D images were reconstructed. MEDIASTINUM/VASCULAR: There is atheromatous disease of the thoracic aorta and coronary arteries. There is cardiomegaly. There is no CT evidence of pulmonary embolus. The pulmonary arteries are dilated with main pulmonary artery segment measuring 3.5 cm. There are small mediastinal lymph nodes. LUNGS: There are moderate groundglass appearing infiltrates in both lungs. Bronchiectasis is noted. There is no significant pleural effusion. There is mild dependent atelectasis in the lung bases posteriorly. BONES/SOFT TISSUES/: There are degenerative changes of the spine and shoulders. UPPER ABDOMEN: There is ascites in the upper abdomen. There is a 1.4 cm probable cyst in the upper pole left kidney. There is another 7 mm probable cyst in the upper pole left kidney. These areas are not fully evaluated. 1. No CT evidence of pulmonary embolus. Dilated pulmonary arteries. 2. Atheromatous disease of the thoracic aorta and coronary arteries. Cardiomegaly. 3. Moderate groundglass infiltrates bilaterally consistent with the patient's history of Covid 19 infection. Other etiologies less likely. There is also bronchiectasis. There is dependent atelectasis. 4. There is ascites in the abdomen. 5. Probable renal cysts on the left, not fully evaluated.  Signed by Dr Raquel Olvera on 12/24/2020 2:11 PM    Assessment//Plan           Hospital Problems           Last Modified POA    Renovascular hypertension (Chronic) 12/22/2020 Yes    Secondary hyperparathyroidism of renal origin [facility]) (Chronic) 12/22/2020 Yes    Palliative care patient 12/22/2020 Yes ESRD (end stage renal disease) on dialysis ([facility] [address] 12/22/2020 Yes    Anemia of chronic disease 12/22/2020 Yes    Overview Addendum 12/4/2018  3:27 PM by Rajinder Kirk MD     iInflammatory/anemia of chronic disease, secondary to ESRD  - hemoglobin dropped to 6.0  - Status post PRBC transfusion  - Monitor CBC         Coronary artery disease involving native coronary artery of native heart without angina pectoris 12/22/2020 Yes    Basal cell carcinoma (BCC) of face 12/22/2020 Yes    Ischemic cardiomyopathy 12/22/2020 Yes    COVID-19 12/22/2020 Yes        Assessment & Plan      Acute hypoxic respiratory failure  COVID-19 positive  -CT chest with bilateral interstitial infiltrates  -Noted to have elevated CRP, LDH, ferritin and D-dimer  -Procalcitonin levels also elevated  -Continue to monitor LFTs  -Lymphopenia noted continue to monitor daily CBC  -Respiratory therapy, maintain O2 sats greater than 92%  -Inhaler therapy  -Continue empiric antibiotic therapy.   -Continue dexamethasone 6 mg day  -Initiated on remdesivir 12/24/2020-Anticoagulation measures, Lovenox 30 mg twice daily  -Avoid BiPAP/nebulized therapies to avoid aerosolization     ESRD: Peritoneal dialysis patient.  Nephrology following.     Hypertension: Monitor BP and adjust medications as needed.     Hypothyroidism: Continue Synthroid    History of anxiety: Continue benzo at nighttime as needed    History of coronary artery disease  Continue Plavix, beta-blocker and lisinopril.             Advance Directive: Full Code  DVT prophylaxis: lovenox   Discharge planning: TBD       Electronically signed by   [facility]   Internal Medicine Hospitalist  On 12/24/2020  At 3:48 PM    EMR Dragon/Transcription disclaimer: Much of this encounter note is an electronic transcription/translation of spoken language to printed text.  The electronic translation of spoken language may permit erroneous, or at times, nonsensical words or phrases to be inadvertently transcribed; although attempts have made to review the note for such errors, some may still exist.

## 2020-12-24 NOTE — PROGRESS NOTES
Pt has been sat 100% for the past h while pt on non rebreather and high flow 15L each. Respiratory consulted, will try to take off non rebreather and monitor sats.      Electronically signed by Ronald Littlejohn RN on 12/23/2020 at 9:40 PM

## 2020-12-24 NOTE — PROGRESS NOTES
RT decreased high flow to 7L, ot sats 93% at the moment, will continue to monitor. No showing signs of resp distress.     Electronically signed by Drew Fiore RN on 12/24/2020 at 12:31 AM

## 2020-12-24 NOTE — PROGRESS NOTES
 glucose (GLUTOSE) 40 % oral gel 15 g  15 g Oral PRN Sharon Kaiser MD        dextrose 50 % IV solution  12.5 g Intravenous PRN Sharon Kaiser MD        glucagon (rDNA) injection 1 mg  1 mg Intramuscular PRN Sharon Kaiser MD        dextrose 5 % solution  100 mL/hr Intravenous PRN Sharon Kaiser MD        Benzocaine-Menthol (CEPACOL) 1 lozenge  1 lozenge Oral Q2H PRN Sharon Kaiser MD        phenol 1.4 % mouth spray 1 spray  1 spray Mouth/Throat Q2H PRN Sharon Kaiser MD   1 spray at 12/23/20 0711    budesonide-formoterol (SYMBICORT) 160-4.5 MCG/ACT inhaler 2 puff  2 puff Inhalation BID Sharon Kaiser MD   2 puff at 12/24/20 0915       Past Medical History:  Past Medical History:   Diagnosis Date    Anxiety     Asthma     Benign essential HTN     Benign hypertensive kidney disease     CAD (coronary artery disease)     sees dr. Akua Bennett Chronic kidney disease, stage IV (severe) ([facility] [address] 7/18/2016    ESRD (end stage renal disease) ([facility] [address]     no dialysis at present.  Hemod[facility] patient [facility])     [facility]     Hyperlipidemia     Palliative care patient 11/29/2018    Prolonged emergence from general anesthesia     c/o dizzy and \"over sedated\" with prostate \"scope\".     Renal osteodystrophy     Skin cancer 2018    facial    Throat cancer ([facility] [address] 2012    Thyroid disease        Past Surgical History:  Past Surgical History:   Procedure Laterality Date    CYSTOSCOPY      HERNIA REPAIR      umbilical    LAPAROSCOPY INSERTION PERITONEAL CATHETER N/A 7/18/2016    CATHETER INSERTION PERITONEAL DIALYSIS LAPAROSCOPIC performed by Ross Dalton MD at [address] N/A 1/28/2019    LAPAROSCOPIC REVISION OF PD CATHETER performed by Sydney John MD at [address] [facility] CATH DIAL OPEN N/A 11/28/2018    PLACEMENT OF TUNNELED HEMODIALYSIS CATHETER performed by Lyudmila Villar MD at [address]  CT LAP INSERTION TUNNELED INTRAPERITONEAL CATHETER N/A 10/22/2018    PERITONEAL DIALYSIS CATHETER EXTERIORIZATION performed by Tameka Tijerina MD at [address]    TUNNELED VENOUS PORT PLACEMENT      VASCULAR SURGERY  07/10/2019    SJS. Removal of tunneled dialysis catheter right internal jugular vein.        Family History  Family History   Problem Relation Age of Onset    Heart Disease Mother     Cancer Father         lung       Social History  Social History     Socioeconomic History    Marital status:      Spouse name: Not on file    Number of children: Not on file    Years of education: Not on file    Highest education level: Not on file   Occupational History    Not on file   Social Needs    Financial resource strain: Not on file    Food insecurity     Worry: Not on file     Inability: Not on file    Transportation needs     Medical: Not on file     Non-medical: Not on file   Tobacco Use    Smoking status: Never Smoker    Smokeless tobacco: Never Used   Substance and Sexual Activity    Alcohol use: No    Drug use: No    Sexual activity: Not on file   Lifestyle    Physical activity     Days per week: Not on file     Minutes per session: Not on file    Stress: Not on file   Relationships    Social connections     Talks on phone: Not on file     Gets together: Not on file     Attends Judaism service: Not on file     Active member of club or organization: Not on file     Attends meetings of clubs or organizations: Not on file     Relationship status: Not on file    Intimate partner violence     Fear of current or ex partner: Not on file     Emotionally abused: Not on file     Physically abused: Not on file     Forced sexual activity: Not on file   Other Topics Concern    Not on file   Social History Narrative    Not on file         Review of Systems:  History obtained from chart review and the patient General ROS: No fever or chills  Respiratory ROS: No cough, +shortness of breath, -wheezing  Cardiovascular ROS: no chest pain but dyspnea on exertion  Gastrointestinal ROS: No abdominal pain or melena  Genito-Urinary ROS: No dysuria or hematuria  Musculoskeletal ROS: No joint pain or swelling         Objective:  Blood pressure 126/77, pulse 75, temperature 97.6 °F (36.4 °C), temperature source Axillary, resp. rate 18, height 5' 11\" (1.803 m), weight 135 lb 2 oz (61.3 kg), SpO2 96 %. Intake/Output Summary (Last 24 hours) at 12/24/2020 1206  Last data filed at 12/24/2020 0800  Gross per 24 hour   Intake 100 ml   Output 300 ml   Net -200 ml   Exam is limited, done with nurse's assistance due to [address] General: awake, alert  Chest: Diminished breath sounds at the bases  CVS: regular rate and rhythm  Abdominal: soft, nontender, normal bowel sounds  Extremities: no cyanosis or edema  Skin: warm and dry without rash    Labs:  BMP:   Recent Labs     12/22/20 2300 12/23/20  0559 12/23/20  1056 12/24/20  0306   * 131*  --  130*   K 4.7 5.1* 4.5 4.6   CL 95* 95*  --  92*   CO2 19* 20*  --  22   BUN 80* 83*  --  81*   CREATININE 8.0* 7.7*  --  7.0*   CALCIUM 8.1* 8.3*  --  7.9*     CBC:   Recent Labs     12/23/20  0559 12/23/20  1244 12/24/20  0306   WBC 7.6 8.9 8.5   HGB 9.6* 10.4* 9.2*   HCT 29.4* 32.0* 28.3*   MCV 99.0* 99.1* 98.3*    221 235     LIVER PROFILE:   Recent Labs     12/22/20 2300 12/23/20  0559 12/24/20  0306   AST 35 37 55*   ALT 28 30 48*   BILITOT 0.3 0.3 0.3   ALKPHOS 63 61 59     PT/INR:   Recent Labs     12/22/20 2300 12/23/20  0559 12/24/20  0306   PROTIME 14.0 14.0 14.2   INR 1.09 1.09 1.10     APTT:   Recent Labs     12/23/20 2050 12/24/20  0306 12/24/20  0840   APTT 62.8* 67.9* 60.6*     BNP:  No results for input(s): BNP in the last 72 hours. Ionized Calcium:No results for input(s): IONCA in the last 72 hours.

## 2020-12-24 NOTE — PROGRESS NOTES
Infection Prevention Note:  COVID lab validation. 12/13/2020 SARS-CoV-2: Positive.  Testing by [facility]

## 2020-12-26 NOTE — PROGRESS NOTES
Nephrology ([facility]) Progress Note    Patient:  Chiqui Varner  YOB: 1945  Date of Service: 12/25/2020  MRN: [id number]   Acct: [de-identified]   Primary Care Physician: No primary care provider on file. Advance Directive: Full Code  Admit Date: 12/22/2020       Hospital Day: 3  Referring Provider: Diana Zuniga MD    Patient Seen, Chart, Consults notes, Labs, Radiology studies reviewed. Subjective:  Patient is a 68 y. o. man with a past medical history of ESRD on peritoneal dialysis, hypertension, hyperlipidemia, CAD who presented to an outside facility complaining of worsening dyspnea and dysphagia since being diagnosed with COVID-19. Estrellita You went into acute respiratory failure and is now requiring 4 L oxygen. [facility]. Patient offered no other complaints. Renal service was consulted to manage his ESRD. Overnight, he went on Non-rebreather O2 and he was transferred to critical care unit for close monitoring. Patient then improved some. He received PD last night. He remained on 4 Liters/min.      Allergies:  Diphenhydramine and Sulfa antibiotics    Medicines:  Current Facility-Administered Medications   Medication Dose Route Frequency Provider Last Rate Last Admin    heparin (porcine) injection 5,000 Units  5,000 Units Subcutaneous 3 times per day Ghulam Bianchi MD        remdesivir 100 mg in sodium chloride 0.9 % 250 mL IVPB  100 mg Intravenous Q24H Diana Zuniga MD        0.9 % sodium chloride bolus  30 mL Intravenous PRN Diana Zuniga MD        vancomycin Runell Acworth) intermittent dosing (placeholder)   Other RX Placeholder Clare Borrero MD        lisinopril (PRINIVIL;ZESTRIL) tablet 2.5 mg  2.5 mg Oral Daily Rachael Stuart MD        darbepoetin jerald-polysorbate SEVEN [facility]) injection 60 mcg  60 mcg Subcutaneous Weekly Rachael Stuart MD   60 mcg at 12/23/20 153  ALPRAZolam Bobbetta Keenan) tablet 0.25 mg  0.25 mg Oral Nightly PRN Annie Bergman MD        aspirin chewable tablet 81 mg  81 mg Oral Daily Annie Bergman MD   81 mg at 12/25/20 0837    atorvastatin (LIPITOR) tablet 40 mg  40 mg Oral Nightly Annie Bergman MD   40 mg at 12/24/20 1929    carvedilol (COREG) tablet 3.125 mg  3.125 mg Oral BID  Annie Bergman MD   3.125 mg at 12/25/20 4868    clopidogrel (PLAVIX) tablet 75 mg  75 mg Oral Daily Annie Bergman MD   75 mg at 12/25/20 9382    levothyroxine (SYNTHROID) tablet 50 mcg  50 mcg Oral Daily Annie Bergman MD   50 mcg at 12/25/20 3801    sevelamer (RENVELA) tablet 800 mg  800 mg Oral TID Annie Bergman MD   800 mg at 12/25/20 9356    vitamin D (ERGOCALCIFEROL) capsule 50,000 Units  50,000 Units Oral Q7 Days Annie Bergman MD        acetaminophen (TYLENOL) tablet 650 mg  650 mg Oral Q6H PRN Annie Bergman MD        Or    acetaminophen (TYLENOL) suppository 650 mg  650 mg Rectal Q6H PRN Annie Bergman MD        guaiFENesin-dextromethorphan (ROBITUSSIN DM) 100-10 MG/5ML syrup 5 mL  5 mL Oral Q4H PRN Annie Bergman MD        dexamethasone (DECADRON) tablet 6 mg  6 mg Oral Daily Annie Bergman MD   6 mg at 12/25/20 5521    Vitamin D (CHOLECALCIFEROL) tablet 6,000 Units  6,000 Units Oral Daily Annie Bergman MD   6,000 Units at 12/25/20 0837    albuterol sulfate  (90 Base) MCG/ACT inhaler 2 puff  2 puff Inhalation Q6H PRN Annie Bergman MD   2 puff at 12/23/20 0544    insulin lispro (HUMALOG) injection vial 0-6 Units  0-6 Units Subcutaneous TID  Annie Bergman MD   2 Units at 12/24/20 1628    insulin lispro (HUMALOG) injection vial 0-3 Units  0-3 Units Subcutaneous Nightly Annie Bergman MD   1 Units at 12/24/20 2136    glucose (GLUTOSE) 40 % oral gel 15 g  15 g Oral PRN Annie Bergman MD        dextrose 50 % IV solution  12.5 g Intravenous PRN Annie Bergman MD        glucagon (rDNA) injection 1 mg  1 mg Intramuscular PRN Annie Bergman MD  dextrose 5 % solution  100 mL/hr Intravenous PRN Garrick Aragon MD        Benzocaine-Menthol (CEPACOL) 1 lozenge  1 lozenge Oral Q2H PRN Garrick Aragon MD        phenol 1.4 % mouth spray 1 spray  1 spray Mouth/Throat Q2H PRN Garrick Aragon MD   1 spray at 12/23/20 0711    budesonide-formoterol (SYMBICORT) 160-4.5 MCG/ACT inhaler 2 puff  2 puff Inhalation BID Garrick Aragon MD   2 puff at 12/25/20 0800       Past Medical History:  Past Medical History:   Diagnosis Date    Anxiety     Asthma     Benign essential HTN     Benign hypertensive kidney disease     CAD (coronary artery disease)     sees dr. Viviana Armenta Chronic kidney disease, stage IV (severe) ([facility] [address] 7/18/2016    ESRD (end stage renal disease) ([facility] [address]     no dialysis at present.  Hemodialysis patient [facility])     mon wed fri [facility] of blood transfusion     Hyperlipidemia     Palliative care patient 11/29/2018    Prolonged emergence from general anesthesia     c/o dizzy and \"over sedated\" with prostate \"scope\".     Renal osteodystrophy     Skin cancer 2018    facial    Throat cancer ([facility] [address] 2012    Thyroid disease        Past Surgical History:  Past Surgical History:   Procedure Laterality Date    CYSTOSCOPY      HERNIA REPAIR      umbilical    LAPAROSCOPY INSERTION PERITONEAL CATHETER N/A 7/18/2016    CATHETER INSERTION PERITONEAL DIALYSIS LAPAROSCOPIC performed by La Suh MD at [facility] N/A 1/28/2019    LAPAROSCOPIC REVISION OF PD CATHETER performed by Carlos De Paz MD at [address] [facility] CATH DIAL OPEN N/A 11/28/2018    PLACEMENT OF TUNNELED HEMODIALYSIS CATHETER performed by Romulo York MD at [facility] TUNNELED INTRAPERITONEAL CATHETER N/A 10/22/2018    PERITONEAL DIALYSIS CATHETER EXTERIORIZATION performed by Carlos De Paz MD at [place] [address]  TUNNELED VENOUS PORT PLACEMENT      VASCULAR SURGERY  07/10/2019    SJS. Removal of tunneled dialysis catheter right internal jugular vein.        Family History  Family History   Problem Relation Age of Onset    Heart Disease Mother     Cancer Father         lung       Social History  Social History     Socioeconomic History    Marital status:      Spouse name: Not on file    Number of children: Not on file    Years of education: Not on file    Highest education level: Not on file   Occupational History    Not on file   Social Needs    Financial resource strain: Not on file    Food insecurity     Worry: Not on file     Inability: Not on file    Transportation needs     Medical: Not on file     Non-medical: Not on file   Tobacco Use    Smoking status: Never Smoker    Smokeless tobacco: Never Used   Substance and Sexual Activity    Alcohol use: No    Drug use: No    Sexual activity: Not on file   Lifestyle    Physical activity     Days per week: Not on file     Minutes per session: Not on file    Stress: Not on file   Relationships    Social connections     Talks on phone: Not on file     Gets together: Not on file     Attends [employer] service: Not on file     Active member of club or organization: Not on file     Attends meetings of clubs or organizations: Not on file     Relationship status: Not on file    Intimate partner violence     Fear of current or ex partner: Not on file     Emotionally abused: Not on file     Physically abused: Not on file     Forced sexual activity: Not on file   Other Topics Concern    Not on file   Social History Narrative    Not on file         Review of Systems:  History obtained from chart review and the patient  General ROS: No fever or chills  Respiratory ROS: No cough, +shortness of breath, -wheezing  Cardiovascular ROS: no chest pain but dyspnea on exertion  Gastrointestinal ROS: No abdominal pain or melena 0223   ALKPHOS 61 59 62   ALT 30 48* 80*   AST 37 55* 90*   PROT 5.9* 5.7* 5.3*   BILITOT 0.3 0.3 <0.2   LABALBU 2.0* 2.3* 2.0*     Lactic Acid: No results for input(s): LACTA in the last 72 hours. Troponin: No results for input(s): CKTOTAL, CKMB, TROPONINT in the last 72 hours. ABGs: No results for input(s): PH, PCO2, PO2, HCO3, O2SAT in the last 72 hours. CRP:    Recent Labs     12/22/20  2300   CRP 32.68*     Sed Rate:  No results for input(s): SEDRATE in the last 72 hours. Cultures:   No results for input(s): CULTURE in the last 72 hours. Radiology reports as per the Radiologist  Radiology: Xr Chest Portable    Result Date: 12/22/2020  XR CHEST PORTABLE 12/22/2020 7:03 PM History: Short of breath. Covid positive. Portable chest x-ray compared with 4 March 2019. Chronic interstitial lung disease. Patchy pneumonia within the right upper lobe, left lower lobe, and within the base of the left upper lobe. Less prominent disease within the right lower lobe. Relative sparing of the left apex. Stable heart and mediastinum. Aortic arch calcification. Unchanged left subclavian port. 1. Bilateral pneumonia compatible with the history of Covid positive. Signed by Dr Kimberlee Romero on 12/22/2020 7:04 PM       Assessment     1. ESRD  2. Hypertensive nephropathy  3. COVID pneumonia  4. Acute resp failure  5. Hyponatremia  6. Hyperkalemia  7. Anemia of CKD      Plan: Will provide more PD tonight. Continue current management. Follow up labs. Continue Aranesp for anemia management. Add fluids restrictions.

## 2020-12-26 NOTE — PROGRESS NOTES
Progress Note  Date:2020       Room:0704/704-01  Patient Joanna Li     YOB: 1945     Age:75 y.o. Subjective    Subjective:  77 y. o. male with a past medical history of ESRD on peritoneal dialysis, hypertension, hyperlipidemia, CAD who presented to an outside facility complaining of worsening dyspnea, sore throat and dysphagia since being diagnosed with COVID-19 found to have acute respiratory failure with hypoxia requiring 4L to maintain saturations at OSH. Patient transferred to our facility for further management. Found to have elevated procalcitonin and D-dimer started on empiric antibiotics, CT angio was negative for PE study, hence heparin was discontinued and initiated on Lovenox  Patient with worsening saturations/respiratory status transferred to CCU  evening, and now on 7L of HF oxygen. Blood culture from  growing S.aureus. Initiated on Vanc and discontinued cefepime. Repeat BC ordered . Decompensates with decrease oxygen with slight movement but recovers quickly. Review of Systems: As stated above    Objective         Vitals Last 24 Hours:  TEMPERATURE:  Temp  Av.6 °F (37 °C)  Min: 98.4 °F (36.9 °C)  Max: 98.8 °F (37.1 °C)  RESPIRATIONS RANGE: Resp  Av.4  Min: 11  Max: 33  PULSE OXIMETRY RANGE: SpO2  Av.8 %  Min: 86 %  Max: 98 %  PULSE RANGE: Pulse  Av.6  Min: 70  Max: 94  BLOOD PRESSURE RANGE: Systolic (11MHJ), FPE:542 , Min:105 , NVA:619   ; Diastolic (37WEL), MG, Min:63, Max:87    I/O (24Hr):     Intake/Output Summary (Last 24 hours) at 2020 8157  Last data filed at 2020 0800  Gross per 24 hour   Intake    Output 600 ml   Net -600 ml       Physical examination: Patient was monitored today, through CCU glass door, appeared to be in no acute distress, discussed with nurse, patient answers questions appropriately, did not sound to be in any respiratory distress. Respiration seems unlabored, no evidence of dyspnea on conversation. Patient is alert. Labs/Imaging/Diagnostics    Labs:  CBC:  Recent Labs     12/23/20  1244 12/24/20  0306 12/25/20  0223   WBC 8.9 8.5 5.9   RBC 3.23* 2.88* 2.42*   HGB 10.4* 9.2* 7.7*   HCT 32.0* 28.3* 23.9*   MCV 99.1* 98.3* 98.8*   RDW 15.0* 14.6* 14.6*    235 209     CHEMISTRIES:  Recent Labs     12/23/20  0559 12/23/20  1056 12/24/20  0306 12/25/20  0223   *  --  130* 125*   K 5.1* 4.5 4.6 4.5   CL 95*  --  92* 89*   CO2 20*  --  22 21*   BUN 83*  --  81* 76*   CREATININE 7.7*  --  7.0* 6.4*   GLUCOSE 123*  --  161* 102     PT/INR:  Recent Labs     12/23/20  0559 12/24/20  0306 12/25/20  0223   PROTIME 14.0 14.2 15.3*   INR 1.09 1.10 1.21*     APTT:  Recent Labs     12/24/20  0306 12/24/20  0840 12/25/20  0223   APTT 67.9* 60.6* 144.6*     LIVER PROFILE:  Recent Labs     12/23/20  0559 12/24/20  0306 12/25/20  0223   AST 37 55* 90*   ALT 30 48* 80*   BILITOT 0.3 0.3 <0.2   ALKPHOS 61 59 62       Imaging Last 24 Hours:  Xr Chest Portable    Result Date: 12/22/2020  XR CHEST PORTABLE 12/22/2020 7:03 PM History: Short of breath. Covid positive. Portable chest x-ray compared with 4 March 2019. Chronic interstitial lung disease. Patchy pneumonia within the right upper lobe, left lower lobe, and within the base of the left upper lobe. Less prominent disease within the right lower lobe. Relative sparing of the left apex. Stable heart and mediastinum. Aortic arch calcification. Unchanged left subclavian port. 1. Bilateral pneumonia compatible with the history of Covid positive.  Signed by Dr Rosa Isela Elena on 12/22/2020 7:04 PM    Cta Pulmonary W Contrast    Result Date: 12/24/2020 EXAMINATION:  CTA PULMONARY W CONTRAST  12/24/2020 2:04 PM HISTORY: Hypoxia. Elevated d-dimer. Covid 19 infection. COMPARISON: No comparison study. DLP: 315 mGy-cm. Automated exposure control was utilized. TECHNIQUE: Spiral CT angiography was performed of the chest with contrast. Sagittal, coronal and 3-D images were reconstructed. MEDIASTINUM/VASCULAR: There is atheromatous disease of the thoracic aorta and coronary arteries. There is cardiomegaly. There is no CT evidence of pulmonary embolus. The pulmonary arteries are dilated with main pulmonary artery segment measuring 3.5 cm. There are small mediastinal lymph nodes. LUNGS: There are moderate groundglass appearing infiltrates in both lungs. Bronchiectasis is noted. There is no significant pleural effusion. There is mild dependent atelectasis in the lung bases posteriorly. BONES/SOFT TISSUES/: There are degenerative changes of the spine and shoulders. UPPER ABDOMEN: There is ascites in the upper abdomen. There is a 1.4 cm probable cyst in the upper pole left kidney. There is another 7 mm probable cyst in the upper pole left kidney. These areas are not fully evaluated. 1. No CT evidence of pulmonary embolus. Dilated pulmonary arteries. 2. Atheromatous disease of the thoracic aorta and coronary arteries. Cardiomegaly. 3. Moderate groundglass infiltrates bilaterally consistent with the patient's history of Covid 19 infection. Other etiologies less likely. There is also bronchiectasis. There is dependent atelectasis. 4. There is ascites in the abdomen. 5. Probable renal cysts on the left, not fully evaluated.  Signed by Dr Ruby Selby on 12/24/2020 2:11 PM    Assessment//Plan           Hospital Problems           Last Modified POA    Renovascular hypertension (Chronic) 12/22/2020 Yes    Secondary hyperparathyroidism of renal origin [facility]) (Chronic) 12/22/2020 Yes    Palliative care patient 12/22/2020 Yes ESRD (end stage renal disease) on dialysis ([facility] [address] 12/22/2020 Yes    Anemia of chronic disease 12/22/2020 Yes    Overview Addendum 12/4/2018  3:27 PM by Jack Claudio MD     iInflammatory/anemia of chronic disease, secondary to ESRD  - hemoglobin dropped to 6.0  - Status post PRBC transfusion  - Monitor CBC         Coronary artery disease involving native coronary artery of native heart without angina pectoris 12/22/2020 Yes    Basal cell carcinoma (BCC) of face 12/22/2020 Yes    Ischemic cardiomyopathy 12/22/2020 Yes    COVID-19 12/22/2020 Yes        Assessment & Plan      Acute hypoxic respiratory failure  COVID-19 positive  -CT chest with bilateral interstitial infiltrates  -Noted to have elevated CRP, LDH, ferritin and D-dimer  -Procalcitonin levels also elevated  -Continue to monitor LFTs  -Lymphopenia noted continue to monitor daily CBC  -Respiratory therapy, maintain O2 sats greater than 92%  -Inhaler therapy  -Continue empiric antibiotic therapy.   -Continue dexamethasone 6 mg day  -Initiated on remdesivir 12/24/2020-Anticoagulation measures, Lovenox 30 mg twice daily  -Avoid BiPAP/nebulized therapies to avoid aerosolization     ESRD: Peritoneal dialysis patient.  Nephrology following.     Hypertension: Monitor BP and adjust medications as needed.     Hypothyroidism: Continue Synthroid    History of anxiety: Continue benzo at nighttime as needed    History of coronary artery disease  Continue Plavix, beta-blocker and lisinopril.             Advance Directive: Full Code  DVT prophylaxis: lovenox   Discharge planning: TBD       Electronically signed by   [facility]   Internal Medicine Hospitalist  On 12/25/2020  At 6:58 PM    EMR Dragon/Transcription disclaimer: Much of this encounter note is an electronic transcription/translation of spoken language to printed text.  The electronic translation of spoken language may permit erroneous, or at times, nonsensical words or phrases to be inadvertently transcribed; although attempts have made to review the note for such errors, some may still exist.

## 2020-12-26 NOTE — PROGRESS NOTES
Progress Note  Date:12/26/2020       Room:0704/704-01  Patient John Hardin     YOB: 1945     Age:75 y.o. Subjective    Subjective:  77 y. o. male with a past medical history of ESRD on peritoneal dialysis, hypertension, hyperlipidemia, CAD who presented to an outside facility complaining of worsening dyspnea, sore throat and dysphagia since being diagnosed with COVID-19 found to have acute respiratory failure with hypoxia requiring 4L to maintain saturations at OSH. Patient transferred to our facility for further management. Found to have elevated procalcitonin and D-dimer started on empiric antibiotics, CT angio was negative for PE study, hence heparin was discontinued and initiated on Lovenox  Patient with worsening saturations/respiratory status transferred to CCU 12/23 evening, and now on 7L of HF oxygen. Blood culture from 12/23 growing S.aureus. Initiated on Vanc and discontinued cefepime. Repeat BC ordered 12/25. Decompensates with decrease oxygen with slight movement but recovers quickly. Review of Systems: As stated above    Objective         Vitals Last 24 Hours:  TEMPERATURE:  No data recorded  RESPIRATIONS RANGE: No data recorded  PULSE OXIMETRY RANGE: No data recorded  PULSE RANGE: No data recorded  BLOOD PRESSURE RANGE: No data recorded.  ; No data recorded. I/O (24Hr): No intake or output data in the 24 hours ending 12/26/20 1626    Physical examination:  Patient was monitored today, through CCU glass door, appeared to be in no acute distress, discussed with nurse, patient answers questions appropriately, did not sound to be in any respiratory distress. Respiration seems unlabored, no evidence of dyspnea on conversation. Patient is alert.       Labs/Imaging/Diagnostics    Labs:  CBC:  Recent Labs     12/24/20  0306 12/25/20  0223   WBC 8.5 5.9   RBC 2.88* 2.42*   HGB 9.2* 7.7*   HCT 28.3* 23.9*   MCV 98.3* 98.8*   RDW 14.6* 14.6*    209 EXAMINATION:  CTA PULMONARY W CONTRAST  12/24/2020 2:04 PM HISTORY: Hypoxia. Elevated d-dimer. Covid 19 infection. COMPARISON: No comparison study. DLP: 315 mGy-cm. Automated exposure control was utilized. TECHNIQUE: Spiral CT angiography was performed of the chest with contrast. Sagittal, coronal and 3-D images were reconstructed. MEDIASTINUM/VASCULAR: There is atheromatous disease of the thoracic aorta and coronary arteries. There is cardiomegaly. There is no CT evidence of pulmonary embolus. The pulmonary arteries are dilated with main pulmonary artery segment measuring 3.5 cm. There are small mediastinal lymph nodes. LUNGS: There are moderate groundglass appearing infiltrates in both lungs. Bronchiectasis is noted. There is no significant pleural effusion. There is mild dependent atelectasis in the lung bases posteriorly. BONES/SOFT TISSUES/: There are degenerative changes of the spine and shoulders. UPPER ABDOMEN: There is ascites in the upper abdomen. There is a 1.4 cm probable cyst in the upper pole left kidney. There is another 7 mm probable cyst in the upper pole left kidney. These areas are not fully evaluated. 1. No CT evidence of pulmonary embolus. Dilated pulmonary arteries. 2. Atheromatous disease of the thoracic aorta and coronary arteries. Cardiomegaly. 3. Moderate groundglass infiltrates bilaterally consistent with the patient's history of Covid 19 infection. Other etiologies less likely. There is also bronchiectasis. There is dependent atelectasis. 4. There is ascites in the abdomen. 5. Probable renal cysts on the left, not fully evaluated.  Signed by Dr Hans Garnica on 12/24/2020 2:11 PM    Assessment//Plan           Hospital Problems           Last Modified POA    Renovascular hypertension (Chronic) 12/22/2020 Yes    Secondary hyperparathyroidism of renal origin [facility]) (Chronic) 12/22/2020 Yes    Palliative care patient 12/22/2020 Yes ESRD (end stage renal disease) on dialysis ([facility] [address] 12/22/2020 Yes    Anemia of chronic disease 12/22/2020 Yes    Overview Addendum 12/4/2018  3:27 PM by Fernando Shaffer MD     iInflammatory/anemia of chronic disease, secondary to ESRD  - hemoglobin dropped to 6.0  - Status post PRBC transfusion  - Monitor CBC         Coronary artery disease involving native coronary artery of native heart without angina pectoris 12/22/2020 Yes    Basal cell carcinoma (BCC) of face 12/22/2020 Yes    Ischemic cardiomyopathy 12/22/2020 Yes    COVID-19 12/22/2020 Yes        Assessment & Plan      Acute hypoxic respiratory failure  COVID-19 positive  -CT chest with bilateral interstitial infiltrates  -Noted to have elevated CRP, LDH, ferritin and D-dimer  -Procalcitonin levels also elevated  -Continue to monitor LFTs  -Lymphopenia noted continue to monitor daily CBC  -Respiratory therapy, maintain O2 sats greater than 92%  -Inhaler therapy  -Continue empiric antibiotic therapy.   -Continue dexamethasone 6 mg day  -Initiated on remdesivir 12/24/2020-Anticoagulation measures, Lovenox 30 mg twice daily  -Avoid BiPAP/nebulized therapies to avoid aerosolization     ESRD: Peritoneal dialysis patient.  Nephrology following.     Hypertension: Monitor BP and adjust medications as needed.     Hypothyroidism: Continue Synthroid    History of anxiety: Continue benzo at nighttime as needed    History of coronary artery disease  Continue Plavix, beta-blocker and lisinopril.             Advance Directive: Full Code  DVT prophylaxis: lovenox   Discharge planning: TBD       Electronically signed by   Anna Valera   Internal Medicine Hospitalist  On 12/26/2020  At 4:26 PM    EMR Dragon/Transcription disclaimer: Much of this encounter note is an electronic transcription/translation of spoken language to printed text.  The electronic translation of spoken language may permit erroneous, or at times, nonsensical words or phrases to be inadvertently transcribed; although attempts have made to review the note for such errors, some may still exist.

## 2020-12-26 NOTE — PROGRESS NOTES
Nephrology ([facility]) Progress Note    Patient:  Elena Urena  YOB: 1945  Date of Service: 12/26/2020  MRN: [id number]   Acct: [de-identified]   Primary Care Physician: No primary care provider on file. Advance Directive: Full Code  Admit Date: 12/22/2020       Hospital Day: 4  Referring Provider: Sara Montague MD    Patient Seen, Chart, Consults notes, Labs, Radiology studies reviewed. Subjective:  Patient is a 68 y. o. man with a past medical history of ESRD on peritoneal dialysis, hypertension, hyperlipidemia, CAD who presented to an outside facility complaining of worsening dyspnea and dysphagia since being diagnosed with COVID-19. Michelle Font went into acute respiratory failure and is now requiring 4 L oxygen. [facility] floor. Patient offered no other complaints. Renal service was consulted to manage his ESRD. A couple of nights ago, he went on non-rebreather O2 and he was transferred to critical care unit for close monitoring. Patient then improved some. He received PD last night. He remained on 6 Liters/min.      Allergies:  Diphenhydramine and Sulfa antibiotics    Medicines:  Current Facility-Administered Medications   Medication Dose Route Frequency Provider Last Rate Last Admin    heparin (porcine) injection 5,000 Units  5,000 Units Subcutaneous 3 times per day Reece Iglesias MD        remdesivir 100 mg in sodium chloride 0.9 % 250 mL IVPB  100 mg Intravenous Q24H Sara Montague MD        0.9 % sodium chloride bolus  30 mL Intravenous PRN Sara Montague MD        vancomycin Lorenza Acuña) intermittent dosing (placeholder)   Other RX Placeholder Rosa Maria Solitario MD        lisinopril (PRINIVIL;ZESTRIL) tablet 2.5 mg  2.5 mg Oral Daily Reji Rascon MD        darbepoetin jerald-polysorbate [facility]) injection 60 mcg  60 mcg Subcutaneous Weekly Reji Rascon MD   60 mcg at 12/23/20 1535  ALPRAZolam Deven Nam) tablet 0.25 mg  0.25 mg Oral Nightly PRN Daniel Essex, MD        aspirin chewable tablet 81 mg  81 mg Oral Daily Daniel Essex, MD   81 mg at 12/25/20 0837    atorvastatin (LIPITOR) tablet 40 mg  40 mg Oral Nightly Daniel Essex, MD   40 mg at 12/24/20 1929    carvedilol (COREG) tablet 3.125 mg  3.125 mg Oral BID  Daniel Essex, MD   3.125 mg at 12/25/20 8747    clopidogrel (PLAVIX) tablet 75 mg  75 mg Oral Daily Daniel Essex, MD   75 mg at 12/25/20 5097    levothyroxine (SYNTHROID) tablet 50 mcg  50 mcg Oral Daily Daniel Essex, MD   50 mcg at 12/25/20 4946    sevelamer (RENVELA) tablet 800 mg  800 mg Oral TID Daniel Essex, MD   800 mg at 12/25/20 0336    vitamin D (ERGOCALCIFEROL) capsule 50,000 Units  50,000 Units Oral Q7 Days Daniel Essex, MD        acetaminophen (TYLENOL) tablet 650 mg  650 mg Oral Q6H PRN Daniel Essex, MD        Or    acetaminophen (TYLENOL) suppository 650 mg  650 mg Rectal Q6H PRN Daniel Essex, MD        guaiFENesin-dextromethorphan (ROBITUSSIN DM) 100-10 MG/5ML syrup 5 mL  5 mL Oral Q4H PRN Daniel Essex, MD        dexamethasone (DECADRON) tablet 6 mg  6 mg Oral Daily Daniel Essex, MD   6 mg at 12/25/20 4388    Vitamin D (CHOLECALCIFEROL) tablet 6,000 Units  6,000 Units Oral Daily Daniel Essex, MD   6,000 Units at 12/25/20 0837    albuterol sulfate  (90 Base) MCG/ACT inhaler 2 puff  2 puff Inhalation Q6H PRN Daniel Essex, MD   2 puff at 12/23/20 0544    insulin lispro (HUMALOG) injection vial 0-6 Units  0-6 Units Subcutaneous TID  Daniel Essex, MD   2 Units at 12/24/20 1628    insulin lispro (HUMALOG) injection vial 0-3 Units  0-3 Units Subcutaneous Nightly Daniel Essex, MD   1 Units at 12/24/20 2130    glucose (GLUTOSE) 40 % oral gel 15 g  15 g Oral PRN Daniel Essex, MD        dextrose 50 % IV solution  12.5 g Intravenous PRN Daniel Essex, MD        glucagon (rDNA) injection 1 mg  1 mg Intramuscular PRN Daniel Essex, MD  dextrose 5 % solution  100 mL/hr Intravenous PRN Venecia Swan MD        Benzocaine-Menthol (CEPACOL) 1 lozenge  1 lozenge Oral Q2H PRN Venecia Swan MD        phenol 1.4 % mouth spray 1 spray  1 spray Mouth/Throat Q2H PRN Venecia Swan MD   1 spray at 12/23/20 0711    budesonide-formoterol (SYMBICORT) 160-4.5 MCG/ACT inhaler 2 puff  2 puff Inhalation BID Venecia Swan MD   2 puff at 12/25/20 0800       Past Medical History:  Past Medical History:   Diagnosis Date    Anxiety     Asthma     Benign essential HTN     Benign hypertensive kidney disease     CAD (coronary artery disease)     sees dr. Dhaval Valencia Chronic kidney disease, stage IV (severe) ([facility] 7/18/2016    ESRD (end stage renal disease) ([facility] [facility]     no dialysis at present.  Hemodialysis patient [facility])     [facility]     Hyperlipidemia     Palliative care patient 11/29/2018    Prolonged emergence from general anesthesia     c/o dizzy and \"over sedated\" with prostate \"scope\".     Renal osteodystrophy     Skin cancer 2018    facial    Throat cancer ([facility] 2012    Thyroid disease        Past Surgical History:  Past Surgical History:   Procedure Laterality Date    CYSTOSCOPY      HERNIA REPAIR      umbilical    LAPAROSCOPY INSERTION PERITONEAL CATHETER N/A 7/18/2016    CATHETER INSERTION PERITONEAL DIALYSIS LAPAROSCOPIC performed by Fanny Witt MD at [facility] N/A 1/28/2019    LAPAROSCOPIC REVISION OF PD CATHETER performed by Colt Johnson MD at [address] [place] [address] CATH DIAL OPEN N/A 11/28/2018    PLACEMENT OF TUNNELED HEMODIALYSIS CATHETER performed by Lee Almanzar MD at [facility] TUNNELED INTRAPERITONEAL CATHETER N/A 10/22/2018    PERITONEAL DIALYSIS CATHETER EXTERIORIZATION performed by Colt Johnson MD at [place] [id number]  TUNNELED VENOUS PORT PLACEMENT      VASCULAR SURGERY  07/10/2019    SJS. Removal of tunneled dialysis catheter right internal jugular vein.        Family History  Family History   Problem Relation Age of Onset    Heart Disease Mother     Cancer Father         lung       Social History  Social History     Socioeconomic History    Marital status:      Spouse name: Not on file    Number of children: Not on file    Years of education: Not on file    Highest education level: Not on file   Occupational History    Not on file   Social Needs    Financial resource strain: Not on file    Food insecurity     Worry: Not on file     Inability: Not on file    Transportation needs     Medical: Not on file     Non-medical: Not on file   Tobacco Use    Smoking status: Never Smoker    Smokeless tobacco: Never Used   Substance and Sexual Activity    Alcohol use: No    Drug use: No    Sexual activity: Not on file   Lifestyle    Physical activity     Days per week: Not on file     Minutes per session: Not on file    Stress: Not on file   Relationships    Social connections     Talks on phone: Not on file     Gets together: Not on file     Attends [employer] service: Not on file     Active member of club or organization: Not on file     Attends meetings of clubs or organizations: Not on file     Relationship status: Not on file    Intimate partner violence     Fear of current or ex partner: Not on file     Emotionally abused: Not on file     Physically abused: Not on file     Forced sexual activity: Not on file   Other Topics Concern    Not on file   Social History Narrative    Not on file         Review of Systems:  History obtained from chart review and the patient  General ROS: No fever or chills  Respiratory ROS: No cough, +shortness of breath, -wheezing  Cardiovascular ROS: no chest pain but dyspnea on exertion  Gastrointestinal ROS: No abdominal pain or melena

## 2020-12-26 NOTE — PROGRESS NOTES
Pharmacy Vancomycin Consult     Vancomycin Day: 4  Current Dosing: pulse dosing for PD    Temp max:  98.8    Recent Labs     12/24/20  0306 12/25/20  0223   BUN 81* 76*       Recent Labs     12/24/20  0306 12/25/20  0223   CREATININE 7.0* 6.4*       Recent Labs     12/24/20  0306 12/25/20  0223   WBC 8.5 5.9       No intake or output data in the 24 hours ending 12/26/20 1400    Culture Date Source Results   12/22/20 Respiratory Staph aureus; yeast   12/23/20 Blood Staphylococcus aureus   12/24/20 Blood Sent       Ht Readings from Last 1 Encounters:   12/23/20 5' 11\" (1.803 m)        Wt Readings from Last 1 Encounters:   12/23/20 135 lb 2 oz (61.3 kg)         Body mass index is 18.85 kg/m². Estimated Creatinine Clearance: 9 mL/min (A) (based on SCr of 6.4 mg/dL (H)). Random level: 14.6    Assessment/Plan: Will give vancomycin 1000 mg IV once. Random level ordered.      Electronically signed by Fredrick Landeros [facility] on 12/26/2020 at 2:00 PM

## 2020-12-27 NOTE — PROGRESS NOTES
Progress Note  Date:2020       Room:0704/704-01  Patient Kim Kaye     YOB: 1945     Age:75 y.o. Subjective    Subjective:  77 y. o. male with a past medical history of ESRD on peritoneal dialysis, hypertension, hyperlipidemia, CAD who presented to an outside facility complaining of worsening dyspnea, sore throat and dysphagia since being diagnosed with COVID-19 found to have acute respiratory failure with hypoxia requiring 4L to maintain saturations at OSH. Patient transferred to our facility for further management. Found to have elevated procalcitonin and D-dimer started on empiric antibiotics, CT angio was negative for PE study, hence heparin was discontinued and initiated on Lovenox  Patient with worsening saturations/respiratory status transferred to CCU  evening, initiated on Remdesivir , now improved and weaned down to 3L. Blood culture from  growing S.aureus. Initiated on Vanc and discontinued cefepime. Repeat BC ordered  also positive, ID consulted. Patient     Transfer to 4th floor today     Review of Systems: As stated above    Objective         Vitals Last 24 Hours:  TEMPERATURE:  Temp  Av.4 °F (36.3 °C)  Min: 97.2 °F (36.2 °C)  Max: 97.8 °F (36.6 °C)  RESPIRATIONS RANGE: Resp  Av  Min: 12  Max: 38  PULSE OXIMETRY RANGE: SpO2  Av.4 %  Min: 81 %  Max: 98 %  PULSE RANGE: Pulse  Av.3  Min: 73  Max: 89  BLOOD PRESSURE RANGE: Systolic (87ERW), XEX:489 , Min:66 , CTC:072   ; Diastolic (61UQX), QJZ:85, Min:47, Max:93    I/O (24Hr): No intake or output data in the 24 hours ending 20 1647    Physical examination:  Patient was monitored today, through CCU glass door, appeared to be in no acute distress, discussed with nurse, patient answers questions appropriately, did not sound to be in any respiratory distress. Respiration seems unlabored, no evidence of dyspnea on conversation. Patient is alert. Labs/Imaging/Diagnostics    Labs:  CBC:  Recent Labs     12/25/20 0223 12/27/20 0308   WBC 5.9 4.3*   RBC 2.42* 2.72*   HGB 7.7* 8.7*   HCT 23.9* 26.2*   MCV 98.8* 96.3*   RDW 14.6* 14.2    261     CHEMISTRIES:  Recent Labs     12/25/20 0223 12/27/20 0308   * 127*   K 4.5 4.3   CL 89* 90*   CO2 21* 21*   BUN 76* 76*   CREATININE 6.4* 6.2*   GLUCOSE 102 174*     PT/INR:  Recent Labs     12/25/20 0223 12/27/20 0308   PROTIME 15.3* 15.4*   INR 1.21* 1.22*     APTT:  Recent Labs     12/25/20 0223   APTT 144.6*     LIVER PROFILE:  Recent Labs     12/25/20 0223 12/27/20 0308   AST 90* 34   ALT 80* 68*   BILITOT <0.2 <0.2   ALKPHOS 62 61       Imaging Last 24 Hours:  Xr Chest Portable    Result Date: 12/22/2020  XR CHEST PORTABLE 12/22/2020 7:03 PM History: Short of breath. Covid positive. Portable chest x-ray compared with 4 March 2019. Chronic interstitial lung disease. Patchy pneumonia within the right upper lobe, left lower lobe, and within the base of the left upper lobe. Less prominent disease within the right lower lobe. Relative sparing of the left apex. Stable heart and mediastinum. Aortic arch calcification. Unchanged left subclavian port. 1. Bilateral pneumonia compatible with the history of Covid positive.  Signed by Dr Susan Whipple on 12/22/2020 7:04 PM    Cta Pulmonary W Contrast    Result Date: 12/24/2020 ESRD (end stage renal disease) on dialysis ([facility] [address] 12/22/2020 Yes    Anemia of chronic disease 12/22/2020 Yes    Overview Addendum 12/4/2018  3:27 PM by Vadim Rowley MD     iInflammatory/anemia of chronic disease, secondary to ESRD  - hemoglobin dropped to 6.0  - Status post PRBC transfusion  - Monitor CBC         Coronary artery disease involving native coronary artery of native heart without angina pectoris 12/22/2020 Yes    Basal cell carcinoma (BCC) of face 12/22/2020 Yes    Ischemic cardiomyopathy 12/22/2020 Yes    COVID-19 12/22/2020 Yes        Assessment & Plan      Acute hypoxic respiratory failure  COVID-19 positive  -CT chest with bilateral interstitial infiltrates  -Noted to have elevated CRP, LDH, ferritin and D-dimer  -Procalcitonin levels also elevated  -Continue to monitor LFTs  -Lymphopenia noted continue to monitor daily CBC  -Respiratory therapy, maintain O2 sats greater than 92%  -Inhaler therapy  -Continue dexamethasone 6 mg day #6/10  -Initiated on remdesivir 12/24/2020 - 12/28  -Anticoagulation measures, Lovenox 30 mg twice daily  -Avoid BiPAP/nebulized therapies to avoid aerosolization    Bacteremia (S.Aureus)  Blood culture with positive results x2; repeat ordered today  Continue with vancomycin 12/23 - present  ID consulted     ESRD: Peritoneal dialysis patient.  Nephrology following.     Hypertension: Monitor BP and adjust medications as needed.     Hypothyroidism: Continue Synthroid    History of anxiety: Continue benzo at nighttime as needed    History of coronary artery disease  Continue Plavix, beta-blocker and lisinopril.             Advance Directive: Full Code  DVT prophylaxis: lovenox   Discharge planning: TBD      Electronically signed by   Pamela Buckley   Internal Medicine Hospitalist  On 12/27/2020  At 4:47 PM    EMR Dragon/Transcription disclaimer:

## 2020-12-27 NOTE — PROGRESS NOTES
Pharmacy Vancomycin Consult     Vancomycin Day: 5  Current Dosing: pulse dosing for PD    Temp max:  97.5    Recent Labs     12/25/20  0223 12/27/20  0308   BUN 76* 76*       Recent Labs     12/25/20  0223 12/27/20  0308   CREATININE 6.4* 6.2*       Recent Labs     12/25/20  0223 12/27/20  0308   WBC 5.9 4.3*       No intake or output data in the 24 hours ending 12/27/20 1348    Culture Date Source Results   12/22/20 Respiratory Staph aureus; yeast   12/23/20 Blood Staphylococcus aureus   12/25/20 Blood Gram + cocci resembling Staph       Ht Readings from Last 1 Encounters:   12/23/20 5' 11\" (1.803 m)        Wt Readings from Last 1 Encounters:   12/23/20 135 lb 2 oz (61.3 kg)         Body mass index is 18.85 kg/m². Estimated Creatinine Clearance: 9 mL/min (A) (based on SCr of 6.2 mg/dL (H)). Random 12/27 Post PD: 21.9  (last dose was given 12/26 at 1500)    Assessment/Plan: No Vancomycin needed today. Will give 1 gram on 12/28 and recheck level after PD on 12/29. Typically once steady state is reached, Redosing is only required every 48-72 hours in PD patients.   Electronically signed by Marlene Lazar, [address] [place] [address] on 12/27/2020 at 1:48 PM

## 2020-12-27 NOTE — PROGRESS NOTES
Nephrology ([facility]) Progress Note    Patient:  Anthony Christiansen  YOB: 1945  Date of Service: 12/27/2020  MRN: [id number]   Acct: [de-identified]   Primary Care Physician: No primary care provider on file. Advance Directive: Full Code  Admit Date: 12/22/2020       Hospital Day: 5  Referring Provider: Laisha Macias MD    Patient Seen, Chart, Consults notes, Labs, Radiology studies reviewed. Subjective:  Patient is a 68 y. o. man with a past medical history of ESRD on peritoneal dialysis, hypertension, hyperlipidemia, CAD who presented to an outside facility complaining of worsening dyspnea and dysphagia since being diagnosed with COVID-19. Rahel Cherry went into acute respiratory failure and is now requiring 4 L oxygen. [facility]. Patient offered no other complaints. Renal service was consulted to manage his ESRD. A couple of nights ago, he went on non-rebreather O2 and he was transferred to critical care unit for close monitoring. Patient then improved some. He received PD last night. Today, he was doing better and is now down to 2 Liters O2 /min. He has been less dyspneic.      Allergies:  Diphenhydramine and Sulfa antibiotics    Medicines:  Current Facility-Administered Medications   Medication Dose Route Frequency Provider Last Rate Last Admin    vancomycin (VANCOCIN) 1 g in dextrose 5% 250 mL IVPB  1,000 mg Intravenous Once Roque Glover MD        heparin (porcine) injection 5,000 Units  5,000 Units Subcutaneous 3 times per day Luma Hardin MD   5,000 Units at 12/27/20 0641    remdesivir 100 mg in sodium chloride 0.9 % 250 mL IVPB  100 mg Intravenous Q24H Laisha Macias MD        0.9 % sodium chloride bolus  30 mL Intravenous PRN Laisha Macias MD        vancomycin [facility]) intermittent dosing (placeholder)   Other Darius Chapin MD  [Held by provider] lisinopril (PRINIVIL;ZESTRIL) tablet 2.5 mg  2.5 mg Oral Daily Jose Raul Harris MD        darbepoetin jerald-polysorbate SEVEN [facility]) injection 60 mcg  60 mcg Subcutaneous Weekly Jose Raul Harris MD   60 mcg at 12/23/20 1534    ALPRAZolam Alexander Pakistani) tablet 0.25 mg  0.25 mg Oral Nightly PRN Deana Xavier MD        aspirin chewable tablet 81 mg  81 mg Oral Daily Deana Xavier MD   81 mg at 12/27/20 0800    atorvastatin (LIPITOR) tablet 40 mg  40 mg Oral Nightly Deana Xavier MD   40 mg at 12/26/20 2144    carvedilol (COREG) tablet 3.125 mg  3.125 mg Oral BID WC Deana Xavier MD   3.125 mg at 12/27/20 0759    clopidogrel (PLAVIX) tablet 75 mg  75 mg Oral Daily Deana Xavier MD   75 mg at 12/27/20 0800    levothyroxine (SYNTHROID) tablet 50 mcg  50 mcg Oral Daily Deana Xavier MD   50 mcg at 12/27/20 0641    sevelamer (RENVELA) tablet 800 mg  800 mg Oral TID Deana Xavier MD   800 mg at 12/27/20 0800    vitamin D (ERGOCALCIFEROL) capsule 50,000 Units  50,000 Units Oral Q7 Days Deana Xavier MD        acetaminophen (TYLENOL) tablet 650 mg  650 mg Oral Q6H PRN Deana Xavier MD        Or    acetaminophen (TYLENOL) suppository 650 mg  650 mg Rectal Q6H PRN Deana Xavier MD        guaiFENesin-dextromethorphan (ROBITUSSIN DM) 100-10 MG/5ML syrup 5 mL  5 mL Oral Q4H PRN Deana Xavier MD        dexamethasone (DECADRON) tablet 6 mg  6 mg Oral Daily Deana Xavier MD   6 mg at 12/27/20 0800    Vitamin D (CHOLECALCIFEROL) tablet 6,000 Units  6,000 Units Oral Daily Deana Xavier MD   6,000 Units at 12/27/20 0800    albuterol sulfate  (90 Base) MCG/ACT inhaler 2 puff  2 puff Inhalation Q6H PRN Deana Xavier MD   2 puff at 12/23/20 0544    insulin lispro (HUMALOG) injection vial 0-6 Units  0-6 Units Subcutaneous TID  Deana Xavier MD   2 Units at 12/24/20 1628    insulin lispro (HUMALOG) injection vial 0-3 Units  0-3 Units Subcutaneous Nightly Deana Xavier MD   1 Units at 12/24/20 6913  glucose (GLUTOSE) 40 % oral gel 15 g  15 g Oral PRN Marion Villalobos MD        dextrose 50 % IV solution  12.5 g Intravenous PRN Marion Villalobos MD        glucagon (rDNA) injection 1 mg  1 mg Intramuscular PRN Marion Villalobos MD        dextrose 5 % solution  100 mL/hr Intravenous PRN Marion Villalobos MD        Benzocaine-Menthol (CEPACOL) 1 lozenge  1 lozenge Oral Q2H PRN Marion Villalobos MD        phenol 1.4 % mouth spray 1 spray  1 spray Mouth/Throat Q2H PRN Marion Villalobos MD   1 spray at 12/23/20 0711    budesonide-formoterol (SYMBICORT) 160-4.5 MCG/ACT inhaler 2 puff  2 puff Inhalation BID Marion Villalobos MD   2 puff at 12/27/20 0801       Past Medical History:  Past Medical History:   Diagnosis Date    Anxiety     Asthma     Benign essential HTN     Benign hypertensive kidney disease     CAD (coronary artery disease)     sees dr. Felice Castañeda Chronic kidney disease, stage IV (severe) ([facility] 7/18/2016    ESRD (end stage renal disease) ([facility]     no dialysis at present.  Hemodialysis patient [facility])     [facility]     Hyperlipidemia     Palliative care patient 11/29/2018    Prolonged emergence from general anesthesia     c/o dizzy and \"over sedated\" with prostate \"scope\".     Renal osteodystrophy     Skin cancer 2018    facial    Throat cancer ([facility] [address] 2012    Thyroid disease        Past Surgical History:  Past Surgical History:   Procedure Laterality Date    CYSTOSCOPY      HERNIA REPAIR      umbilical    LAPAROSCOPY INSERTION PERITONEAL CATHETER N/A 7/18/2016    CATHETER INSERTION PERITONEAL DIALYSIS LAPAROSCOPIC performed by Lilibeth Sheppard MD at [address] [facility] N/A 1/28/2019    LAPAROSCOPIC REVISION OF PD CATHETER performed by Tr Santana MD at [address] [facility] CATH DIAL OPEN N/A 11/28/2018    PLACEMENT OF TUNNELED HEMODIALYSIS CATHETER performed by Brie Gomez MD at [address] [facility]  [place] LAP INSERTION TUNNELED INTRAPERITONEAL CATHETER N/A 10/22/2018    PERITONEAL DIALYSIS CATHETER EXTERIORIZATION performed by Cornel Villeda MD at [address]    TUNNELED VENOUS PORT PLACEMENT      VASCULAR SURGERY  07/10/2019    SJS. Removal of tunneled dialysis catheter right internal jugular vein.        Family History  Family History   Problem Relation Age of Onset    Heart Disease Mother     Cancer Father         lung       Social History  Social History     Socioeconomic History    Marital status:      Spouse name: Not on file    Number of children: Not on file    Years of education: Not on file    Highest education level: Not on file   Occupational History    Not on file   Social Needs    Financial resource strain: Not on file    Food insecurity     Worry: Not on file     Inability: Not on file    Transportation needs     Medical: Not on file     Non-medical: Not on file   Tobacco Use    Smoking status: Never Smoker    Smokeless tobacco: Never Used   Substance and Sexual Activity    Alcohol use: No    Drug use: No    Sexual activity: Not on file   Lifestyle    Physical activity     Days per week: Not on file     Minutes per session: Not on file    Stress: Not on file   Relationships    Social connections     Talks on phone: Not on file     Gets together: Not on file     Attends Anabaptist service: Not on file     Active member of club or organization: Not on file     Attends meetings of clubs or organizations: Not on file     Relationship status: Not on file    Intimate partner violence     Fear of current or ex partner: Not on file     Emotionally abused: Not on file     Physically abused: Not on file     Forced sexual activity: Not on file   Other Topics Concern    Not on file   Social History Narrative    Not on file         Review of Systems:  History obtained from chart review and the patient General ROS: No fever or chills  Respiratory ROS: No cough, +shortness of breath, -wheezing  Cardiovascular ROS: no chest pain but dyspnea on exertion  Gastrointestinal ROS: No abdominal pain or melena  Genito-Urinary ROS: No dysuria or hematuria  Musculoskeletal ROS: No joint pain or swelling         Objective:  Blood pressure 118/69, pulse 82, temperature 97.5 °F (36.4 °C), temperature source Temporal, resp. rate 25, height 5' 11\" (1.803 m), weight 135 lb 2 oz (61.3 kg), SpO2 97 %. No intake or output data in the 24 hours ending 12/27/20 1235Exam is limited, done with nurse's assistance due to [address] General: awake, alert  Chest: clear bilaterally  CVS: regular rate and rhythm  Abdominal: soft, nontender, normal bowel sounds  Extremities: no cyanosis or edema  Skin: warm and dry without rash    Labs:  BMP:   Recent Labs     12/25/20 0223 12/27/20  0308   * 127*   K 4.5 4.3   CL 89* 90*   CO2 21* 21*   BUN 76* 76*   CREATININE 6.4* 6.2*   CALCIUM 7.4* 7.5*     CBC:   Recent Labs     12/25/20 0223 12/27/20  0308   WBC 5.9 4.3*   HGB 7.7* 8.7*   HCT 23.9* 26.2*   MCV 98.8* 96.3*    261     LIVER PROFILE:   Recent Labs     12/25/20 0223 12/27/20  0308   AST 90* 34   ALT 80* 68*   BILITOT <0.2 <0.2   ALKPHOS 62 61     PT/INR:   Recent Labs     12/25/20 0223 12/27/20  0308   PROTIME 15.3* 15.4*   INR 1.21* 1.22*     APTT:   Recent Labs     12/25/20 0223   APTT 144.6*     BNP:  No results for input(s): BNP in the last 72 hours. Ionized Calcium:No results for input(s): IONCA in the last 72 hours. Magnesium:No results for input(s): MG in the last 72 hours. Phosphorus:No results for input(s): PHOS in the last 72 hours. HgbA1C: No results for input(s): LABA1C in the last 72 hours.   Hepatic:   Recent Labs     12/25/20 0223 12/27/20  0308   ALKPHOS 62 61   ALT 80* 68*   AST 90* 34   PROT 5.3* 5.3*   BILITOT <0.2 <0.2   LABALBU 2.0* 1.9* Lactic Acid: No results for input(s): LACTA in the last 72 hours. Troponin: No results for input(s): CKTOTAL, CKMB, TROPONINT in the last 72 hours. ABGs: No results for input(s): PH, PCO2, PO2, HCO3, O2SAT in the last 72 hours. CRP:    No results for input(s): CRP in the last 72 hours. Sed Rate:  No results for input(s): SEDRATE in the last 72 hours. Cultures:   No results for input(s): CULTURE in the last 72 hours. Radiology reports as per the Radiologist  Radiology: Xr Chest Portable    Result Date: 12/22/2020  XR CHEST PORTABLE 12/22/2020 7:03 PM History: Short of breath. Covid positive. Portable chest x-ray compared with 4 March 2019. Chronic interstitial lung disease. Patchy pneumonia within the right upper lobe, left lower lobe, and within the base of the left upper lobe. Less prominent disease within the right lower lobe. Relative sparing of the left apex. Stable heart and mediastinum. Aortic arch calcification. Unchanged left subclavian port. 1. Bilateral pneumonia compatible with the history of Covid positive. Signed by Dr Deborah Hardin on 12/22/2020 7:04 PM       Assessment     1. ESRD  2. Hypertensive nephropathy  3. COVID pneumonia  4. Acute resp failure  5. Hyponatremia  6. Hyperkalemia  7. Anemia of CKD      Plan: Will provide more PD tonight. Continue current management. Follow up labs. Continue Aranesp for anemia management.

## 2020-12-28 NOTE — CONSULTS
Current PRN Medications:  sodium chloride, ALPRAZolam, acetaminophen **OR** acetaminophen, guaiFENesin-dextromethorphan, albuterol sulfate HFA, glucose, dextrose, glucagon (rDNA), dextrose, Benzocaine-Menthol, phenol    Allergies: Allergies   Allergen Reactions    Diphenhydramine      Other reaction(s): Blacked out    Sulfa Antibiotics      made me feel like I had the flu       Past Medical History: End-stage renal disease. He is on peritoneal dialysis. Hypertension. Asthma. Coronary artery disease. Hyperlipidemia. Throat cancer. Thyroid dysfunction. Past Surgical History: Cystoscopy. Hernia repair. Prostate surgery. Throat cancer surgery. Vascular surgery. Oenvci-t-Uzdy placement. Social History: No alcohol use. No illicit drug use. Family History: Heart disease. Lung cancer. Exposure History: Uncertain    Review of Systems: See HPI    Vital Signs:  /72   Pulse 73   Temp 98.6 °F (37 °C) (Temporal)   Resp 22   Ht 5' 11\" (1.803 m)   Wt 135 lb 2 oz (61.3 kg)   SpO2 99%   BMI 18.85 kg/m²  Temp (24hrs), Av.6 °F (36.4 °C), Min:97.2 °F (36.2 °C), Max:98.6 °F (37 °C)    Physical Exam:   Vital signs reviewed. I was able to observe him through the ICU glass doors. He looks comfortable at present. He is not coughing. He did not appear dyspneic. Was able to see his Xpghre-p-Drhh site. No erythema or swelling  Discussed bedside exam with nursing  He has some coarse breath sounds. No wheezing. Abdomen nondistended.   No significant lower extremity edema    Lab Results:  CBC:   Recent Labs     20  0308   WBC 5.9 4.3*   HGB 7.7* 8.7*   HCT 23.9* 26.2*    261   LYMPHOPCT 4.0* 6.0*   MONOPCT 1.0 5.0     CMP:   Recent Labs     20  0308   * 127*   K 4.5 4.3   CL 89* 90*   CO2 21* 21*   BUN 76* 76*   CREATININE 6.4* 6.2*   CALCIUM 7.4* 7.5*   BILITOT <0.2 <0.2   ALKPHOS 62 61   ALT 80* 68*   AST 90* 34   GLUCOSE 102 174* Culture:   Sputum culture December 22, 2020-MSSA  Blood cultures December 22, 2020-MSSA  Blood cultures December 25, 2020-gram-positive cocci in clusters resembling Staphylococcus  Blood cultures today pending    Radiology:   CT angiogram of the chest December 24, 2020: Impression   1. No CT evidence of pulmonary embolus. Dilated pulmonary arteries. 2. Atheromatous disease of the thoracic aorta and coronary arteries. Cardiomegaly. 3. Moderate groundglass infiltrates bilaterally consistent with the   patient's history of Covid 19 infection. Other etiologies less likely. There is also bronchiectasis. There is dependent atelectasis. 4. There is ascites in the abdomen. 5. Probable renal cysts on the left, not fully evaluated. Signed by Dr Archie Javed on 12/24/2020 2:11 PM     Additional Studies Reviewed:     Impression:   [id number] infection/pneumonia-seems to be improving  MSSA bloodstream infection-possible pulmonary source. Possible Ssmqai-f-Eayk source.   End-stage renal disease on hemodialysis  Hypertension  Coronary artery disease  History head neck cancer    Recommendations:    Continue antibiotic treatment for Staph aureus  His organism is MSSA-we will discontinue vancomycin and begin cefazolin  Repeat blood cultures to make sure bacteremia is clearing  Would recommend Bmyaeq-q-Mftm removal as his blood cultures from the 22nd and 25th appear to be positive suggesting an endovascular focus of infection  Would suggest we wean dexamethasone over the next 5 days  Continue to follow    Nadira Moreno MD  12/27/20  11:17 PM

## 2020-12-28 NOTE — PROGRESS NOTES
  Vitamin D (CHOLECALCIFEROL) tablet 6,000 Units, Daily      albuterol sulfate  (90 Base) MCG/ACT inhaler 2 puff, Q6H PRN      insulin lispro (HUMALOG) injection vial 0-6 Units, TID WC      insulin lispro (HUMALOG) injection vial 0-3 Units, Nightly      glucose (GLUTOSE) 40 % oral gel 15 g, PRN      dextrose 50 % IV solution, PRN      glucagon (rDNA) injection 1 mg, PRN      dextrose 5 % solution, PRN      Benzocaine-Menthol (CEPACOL) 1 lozenge, Q2H PRN      phenol 1.4 % mouth spray 1 spray, Q2H PRN      budesonide-formoterol (SYMBICORT) 160-4.5 MCG/ACT inhaler 2 puff, BID      Review of Systems see HPI. No headache or other neurological complaints    VitalSigns:  /67   Pulse 85   Temp 97.6 °F (36.4 °C) (Temporal)   Resp 20   Ht 5' 11\" (1.803 m)   Wt 135 lb 2 oz (61.3 kg)   SpO2 90%   BMI 18.85 kg/m²      Physical Exam  Utishf-o-Dymi left upper chest without erythema or surrounding induration  Lungs with reasonable air movement. No crackles or wheezes. Abdomen soft and nontender  Extremities without edema  Abdomen soft and nontender.  Peritoneal dialysis catheter site without erythema or induration  Heart distant heart sounds without apparent murmur    Lab Results:  CBC:   Recent Labs     12/27/20  0308 12/28/20  0550   WBC 4.3* 4.8   HGB 8.7* 8.3*    292     BMP:  Recent Labs     12/27/20  0308 12/28/20  0550   * 128*   K 4.3 4.0   CL 90* 90*   CO2 21* 23   BUN 76* 75*   CREATININE 6.2* 6.2*   GLUCOSE 174* 104     CultureResults:  Blood cultures December 23, 2020-methicillin susceptible Staphylococcus aureus  Blood cultures December 25, 2020-Staph aureus  Blood cultures December 27, 2020-gram-positive cocci in clusters  Respiratory culture December 22, 2020-methicillin susceptible staph aureus    Radiology: None    Additional Studies Reviewed:  None    Impression:  COVID-19 infection/COVID-19 pneumonia MSSA bloodstream infection-possible port source. Possible pulmonary source.   End-stage renal disease on peritoneal dialysis  Hypertension  Coronary artery disease  History head neck cancer    Recommendations:  Continue treatment for methicillin susceptible staph aureus with cefazolin 2 g IV daily  Confirm cefazolin dosing and peritoneal dialysis with pharmacy/renal and adjust if necessary  Discontinue remdesivir-he has had a few doses and with end-stage renal disease/dialysis will avoid buildup of metabolites  Continue dexamethasone at present-would likely truncate course since he seems to be very stable from a pulmonary standpoint and we are treating a staph aureus bloodstream infection  Follow-up blood cultures to make sure bacteremia clearing  Would suggest Yvyeng-j-Ovpb removal    Felix Holden MD

## 2020-12-28 NOTE — PROGRESS NOTES
[facility]        Hospitalist Progress Note  12/28/2020 12:39 PM  Subjective:   Admit Date: 12/22/2020  PCP: No primary care provider on file. Chief Complaint: Dyspnea    Subjective: Patient seen at bedside. Improving. Comfortable. Cumulative Hospital History: The patient is a 77 y.o. male with a past medical history of ESRD on peritoneal dialysis, hypertension, hyperlipidemia, CAD who presented to an outside facility complaining of worsening dyspnea, sore throat and dysphagia since being diagnosed with COVID-19 found to have acute respiratory failure with hypoxia requiring 4L to maintain saturations at OSH. Patient transferred to our facility for further management. Found to have elevated procalcitonin and D-dimer started on empiric antibiotics and heparin drip pending cultures and CT angio PE study. Patient with worsening saturations/respiratory status transferred to CCU. Patient found to have blood cultures growing MSSA with infectious diseases consulted. CT angio pulmonary embolus study showing no evidence of pulmonary embolism and heparin drip discontinued and patient placed on prophylactic lovenox. Patient's respiratory status improving and he was transferred out of CCU. ID recommending [id number] removal.  General surgery consulted for port removal.    ROS: 14 point review of systems is negative except as specifically addressed above. DIET CARDIAC;  Dysphagia Soft and Bite-Sized    Intake/Output Summary (Last 24 hours) at 12/28/2020 1239  Last data filed at 12/28/2020 0800  Gross per 24 hour   Intake 200 ml   Output    Net 200 ml     Medications:   dextrose       Current Facility-Administered Medications   Medication Dose Route Frequency Provider Last Rate Last Admin    [START ON 12/29/2020] ceFAZolin (ANCEF) 1 g in sterile water 10 mL IV syringe  1 g Intravenous Q24H Monse Chambers MD  heparin (porcine) injection 5,000 Units  5,000 Units Subcutaneous 3 times per day Darlene Burgess MD   5,000 Units at 12/28/20 0540    0.9 % sodium chloride bolus  30 mL Intravenous PRN Darlene Burgess MD        [Held by provider] lisinopril (PRINIVIL;ZESTRIL) tablet 2.5 mg  2.5 mg Oral Daily Darlene Burgess MD        darbepoetin jerald-polysorbate SEVEN [facility]) injection 60 mcg  60 mcg Subcutaneous Weekly Darlene Burgess MD   60 mcg at 12/23/20 1534    ALPRAZolam [place] [facility]) tablet 0.25 mg  0.25 mg Oral Nightly PRN Darlene Burgess MD        aspirin chewable tablet 81 mg  81 mg Oral Daily Darlene Burgess MD   81 mg at 12/28/20 0802    atorvastatin (LIPITOR) tablet 40 mg  40 mg Oral Nightly Darlene Burgess MD   40 mg at 12/27/20 1921    carvedilol (COREG) tablet 3.125 mg  3.125 mg Oral BID WC Darlene Burgess MD   3.125 mg at 12/28/20 0802    clopidogrel (PLAVIX) tablet 75 mg  75 mg Oral Daily Darlene Burgess MD   75 mg at 12/28/20 0803    levothyroxine (SYNTHROID) tablet 50 mcg  50 mcg Oral Daily Darlene Burgess MD   50 mcg at 12/28/20 0803    sevelamer (RENVELA) tablet 800 mg  800 mg Oral TID Darlene Burgess MD   800 mg at 12/28/20 8210    vitamin D (ERGOCALCIFEROL) capsule 50,000 Units  50,000 Units Oral Q7 Days Darlene Burgess MD        acetaminophen (TYLENOL) tablet 650 mg  650 mg Oral Q6H PRN Darlene Burgess MD        Or    acetaminophen (TYLENOL) suppository 650 mg  650 mg Rectal Q6H PRN Darlene Burgess MD        guaiFENesin-dextromethorphan (ROBITUSSIN DM) 100-10 MG/5ML syrup 5 mL  5 mL Oral Q4H PRN Darlene Burgess MD        dexamethasone (DECADRON) tablet 6 mg  6 mg Oral Daily Darlene Burgess MD   6 mg at 12/28/20 8857    Vitamin D (CHOLECALCIFEROL) tablet 6,000 Units  6,000 Units Oral Daily Darlene Burgess MD   6,000 Units at 12/28/20 0803    albuterol sulfate  (90 Base) MCG/ACT inhaler 2 puff  2 puff Inhalation Q6H PRN Darlene Burgess MD   2 puff at 12/23/20 6064  insulin lispro (HUMALOG) injection vial 0-6 Units  0-6 Units Subcutaneous TID WC Lizzie Glaser MD   Stopped at 12/28/20 0901    insulin lispro (HUMALOG) injection vial 0-3 Units  0-3 Units Subcutaneous Nightly Lizzie Glaser MD   1 Units at 12/27/20 2049    glucose (GLUTOSE) 40 % oral gel 15 g  15 g Oral PRN Lizzie Glaser MD        dextrose 50 % IV solution  12.5 g Intravenous PRN Lizzie Glaser MD        glucagon (rDNA) injection 1 mg  1 mg Intramuscular PRN Lizzie Glaser MD        dextrose 5 % solution  100 mL/hr Intravenous PRN Lizzie Glaser MD        Benzocaine-Menthol (CEPACOL) 1 lozenge  1 lozenge Oral Q2H PRN Lizzie Glaser MD        phenol 1.4 % mouth spray 1 spray  1 spray Mouth/Throat Q2H PRN Lizzie Glaser MD   1 spray at 12/23/20 3297    budesonide-formoterol (SYMBICORT) 160-4.5 MCG/ACT inhaler 2 puff  2 puff Inhalation BID Lizzie Glaser MD   2 puff at 12/28/20 0804        Labs:     Recent Labs     12/27/20  0308 12/28/20  0550   WBC 4.3* 4.8   RBC 2.72* 2.58*   HGB 8.7* 8.3*   HCT 26.2* 24.9*   MCV 96.3* 96.5*   MCH 32.0* 32.2*   MCHC 33.2 33.3    292     Recent Labs     12/27/20  0308 12/28/20  0550   * 128*   K 4.3 4.0   ANIONGAP 16 15   CL 90* 90*   CO2 21* 23   BUN 76* 75*   CREATININE 6.2* 6.2*   GLUCOSE 174* 104   CALCIUM 7.5* 7.6*     No results for input(s): MG, PHOS in the last 72 hours. Recent Labs     12/27/20  0308 12/28/20  0550   AST 34 21   ALT 68* 50*   BILITOT <0.2 <0.2   ALKPHOS 61 61     ABGs:No results for input(s): PH, PO2, PCO2, HCO3, BE, O2SAT in the last 72 hours. Troponin T: No results for input(s): TROPONINI in the last 72 hours. INR:   Recent Labs     12/27/20  0308 12/28/20  0550   INR 1.22* 1.23*     Lactic Acid: No results for input(s): LACTA in the last 72 hours.     Objective: Vitals: /67   Pulse 85   Temp 97.6 °F (36.4 °C) (Temporal)   Resp 20   Ht 5' 11\" (1.803 m)   Wt 135 lb 2 oz (61.3 kg)   SpO2 90%   BMI 18.85 kg/m²   24HR INTAKE/OUTPUT:      Intake/Output Summary (Last 24 hours) at 12/28/2020 1239  Last data filed at 12/28/2020 0800  Gross per 24 hour   Intake 200 ml   Output    Net 200 ml     General appearance: Alert  Head: NC/AT  Eyes: conjunctivae/corneas clear   Neck: Supple  Lungs: BLAE   Heart: RRR  Abdomen: BS+  Extremities: +pulses  Skin: warm  Neurologic: Alert, gross motor function intact  Psychiatric:  Mood appropriate    Assessment and Plan: Active Problems:    Renovascular hypertension    Secondary hyperparathyroidism of renal origin [facility])    Palliative care patient    ESRD (end stage renal disease) on dialysis ([facility] [address]    Anemia of chronic disease    Coronary artery disease involving native coronary artery of native heart without angina pectoris    Basal cell carcinoma (BCC) of face    Ischemic cardiomyopathy    COVID-19  Resolved Problems:    * No resolved hospital problems. *    COVID: Decadron - taper. Supportive management. Bronchodilators. O2 as needed. Worsening respiratory status. MSSA bacteremia: Ancef. Follow-up repeat cultures. [id number] removal-General surgery consulted.     ESRD: Peritoneal dialysis patient. Nephrology on board.     Hypertension: Monitor BP and adjust medications as needed.     Supportive management.     Advance Directive: Full Code    DVT prophylaxis: Heparin gtt    Discharge planning: TBD      Signed:  Luke Mcneil MD 12/28/2020 12:39 PM  Rounding Hospitalist

## 2020-12-28 NOTE — PROGRESS NOTES
Spouse Ross Luciano notified of pt transfer to room 428 and given update on condition. She verbalizes gratitude and understanding.     Electronically signed by Terrance Marmolejo RN on 12/28/2020 at 7:56 AM

## 2020-12-28 NOTE — CARE COORDINATION
Called patient to assess discharge needs. Patient states he cannot talk until he has his throat spray and he cannot find it. Called patient's nurse, Justa Handley, and asked her to provide assistance. Will attempt to call patient again at a later time.   Electronically signed by Tanna Ventura RN on 12/28/2020 at 3:30 PM

## 2020-12-28 NOTE — PROGRESS NOTES
Pharmacy Renal Adjustment    Rita Jewell is a 77 y.o. male. Pharmacy has renally adjusted medications per protocol. Recent Labs     12/27/20  0308 12/28/20  0550   BUN 76* 75*       Recent Labs     12/27/20  0308 12/28/20  0550   CREATININE 6.2* 6.2*       Estimated Creatinine Clearance: 9 mL/min (A) (based on SCr of 6.2 mg/dL (H)).     Height:   Ht Readings from Last 1 Encounters:   12/23/20 5' 11\" (1.803 m)     Weight:  Wt Readings from Last 1 Encounters:   12/23/20 135 lb 2 oz (61.3 kg)       CKD stage: ESRD on PD             Plan: Adjust the following medications based on renal function:           Cefazolin 2 g IV every 24 hours to 1 g IV every 24 hours    Electronically signed by Dakotah Grossman [facility] on 12/28/2020 at 7:30 AM

## 2020-12-29 NOTE — PROGRESS NOTES
INFECTIOUS DISEASES PROGRESS NOTE    Patient:  Shyla Ramos  YOB: 1945  MRN: [id number]   Admit date: 12/22/2020   Admitting Physician: Dano Simmons MD  Primary Care Physician: No primary care provider on file. CHIEF COMPLAINT: \"Need to get this oxygen taken care of\"      Interval History: Patient reports he does not know what his oxygen is on currently. Per review of the records it appears it is up to 5 L and was 4 L previously. Patient asked if he is getting the port removed. I explained to him that I was told is going to be removed tomorrow per Dr. Freddie Rainey. Port in place for 6-7 years for throat cancer    No oxygen at home per patient. He states he is sitting up currently and moving side to side is much as possible. Allergies:    Allergies   Allergen Reactions    Diphenhydramine      Other reaction(s): Blacked out    Sulfa Antibiotics      made me feel like I had the flu       Current Meds:     ceFAZolin (ANCEF) 1 g in sterile water 10 mL IV syringe, Q24H      heparin (porcine) injection 5,000 Units, 3 times per day      0.9 % sodium chloride bolus, PRN      [Held by provider] lisinopril (PRINIVIL;ZESTRIL) tablet 2.5 mg, Daily      darbepoetin jerald-polysorbate (ARANESP) injection 60 mcg, Weekly      ALPRAZolam (XANAX) tablet 0.25 mg, Nightly PRN      [Held by provider] aspirin chewable tablet 81 mg, Daily      atorvastatin (LIPITOR) tablet 40 mg, Nightly      carvedilol (COREG) tablet 3.125 mg, BID WC      [Held by provider] clopidogrel (PLAVIX) tablet 75 mg, Daily      levothyroxine (SYNTHROID) tablet 50 mcg, Daily      sevelamer (RENVELA) tablet 800 mg, TID      vitamin D (ERGOCALCIFEROL) capsule 50,000 Units, Q7 Days      acetaminophen (TYLENOL) tablet 650 mg, Q6H PRN    Or      acetaminophen (TYLENOL) suppository 650 mg, Q6H PRN      guaiFENesin-dextromethorphan (ROBITUSSIN DM) 100-10 MG/5ML syrup 5 mL, Q4H PRN   dexamethasone (DECADRON) tablet 6 mg, Daily      Vitamin D (CHOLECALCIFEROL) tablet 6,000 Units, Daily      albuterol sulfate  (90 Base) MCG/ACT inhaler 2 puff, Q6H PRN      insulin lispro (HUMALOG) injection vial 0-6 Units, TID WC      insulin lispro (HUMALOG) injection vial 0-3 Units, Nightly      glucose (GLUTOSE) 40 % oral gel 15 g, PRN      dextrose 50 % IV solution, PRN      glucagon (rDNA) injection 1 mg, PRN      dextrose 5 % solution, PRN      Benzocaine-Menthol (CEPACOL) 1 lozenge, Q2H PRN      phenol 1.4 % mouth spray 1 spray, Q2H PRN      budesonide-formoterol (SYMBICORT) 160-4.5 MCG/ACT inhaler 2 puff, BID        Review of Systems   Constitutional: Negative for fever. Respiratory: Positive for shortness of breath. Vital Signs:  /82   Pulse 81   Temp 97.7 °F (36.5 °C) (Temporal)   Resp 20   Ht 5' 11\" (1.803 m)   Wt 145 lb 1.6 oz (65.8 kg)   SpO2 91%   BMI 20.24 kg/m²   Temp (24hrs), Av.8 °F (36.6 °C), Min:97.3 °F (36.3 °C), Max:98.4 °F (36.9 °C)      Physical Exam   Called patient in his room and he answered without much difficulty. He seemed to be in no acute distress  Respiratory: Effort was even and minimally labored.   He did have a hoarse voice but said that was chronic  Neuro: Alert, oriented able to give history without much difficulty  Psych: Pleasant cooperative      LAB RESULTS:    CBC with DIFF:  Recent Labs     20  0308 20  0550 20  0130   WBC 4.3* 4.8 6.3   RBC 2.72* 2.58* 2.62*   HGB 8.7* 8.3* 8.3*   HCT 26.2* 24.9* 25.5*   MCV 96.3* 96.5* 97.3*   MCH 32.0* 32.2* 31.7*   MCHC 33.2 33.3 32.5*   RDW 14.2 14.3 14.3    292 338   MPV 9.6 9.9 10.1   NEUTOPHILPCT 88.0* 95.0* 95.0*   LYMPHOPCT 6.0* 1.0* 5.0*   MONOPCT 5.0 1.0 0.0   EOSRELPCT 0.0 0.0 0.0   BASOPCT 0.0 0.0 0.0   NEUTROABS 3.8 4.7 6.0   LYMPHSABS 0.3* 0.0* 0.3*   MONOSABS 0.20 0.00 0.00   EOSABS 0.00 0.00 0.00   BASOSABS 0.00 0.00 0.00       CMP/BMP: Recent Labs     12/27/20  0308 12/28/20  0550 12/29/20  0130   * 128* 129*   K 4.3 4.0 3.7   CL 90* 90* 89*   CO2 21* 23 24   ANIONGAP 16 15 16   GLUCOSE 174* 104 151*   BUN 76* 75* 75*   CREATININE 6.2* 6.2* 6.0*   LABGLOM 9* 9* 9*   CALCIUM 7.5* 7.6* 7.5*   PROT 5.3* 5.2* 5.2*   LABALBU 1.9* 1.9* 1.9*   BILITOT <0.2 <0.2 <0.2   ALKPHOS 61 61 67   ALT 68* 50* 31   AST 34 21 20         Culture Results:  Blood cultures positive for methicillin susceptible Staphylococcus aureus on December 23, December 25, and December 27. Blood cultures ordered yesterday December 28 do not appear to have been collected  Blood cultures ordered today December 29 do not appear to have been collected      Respiratory culture on December 22 + for heavy growth of Staph aureus, heavy growth of yeast and some normal respiratory areli. Urine Legionella and urine strep pneumo antigens negative        RADIOLOGY      Xr Chest Portable    Result Date: 12/22/2020  XR CHEST PORTABLE 12/22/2020 7:03 PM History: Short of breath. Covid positive. Portable chest x-ray compared with 4 March 2019. Chronic interstitial lung disease. Patchy pneumonia within the right upper lobe, left lower lobe, and within the base of the left upper lobe. Less prominent disease within the right lower lobe. Relative sparing of the left apex. Stable heart and mediastinum. Aortic arch calcification. Unchanged left subclavian port. 1. Bilateral pneumonia compatible with the history of Covid positive.  Signed by Dr Bryant Pena on 12/22/2020 7:04 PM            Patient Active Problem List   Diagnosis    NSTEMI (non-ST elevated myocardial infarction) ([address]    Renovascular hypertension    Secondary hyperparathyroidism of renal origin ([address]    Anemia due to stage 4 chronic kidney disease ([address]    Palliative care patient    ESRD (end stage renal disease) on dialysis ([address]    Anemia of chronic disease    Peritoneal dialysis catheter dysfunction ([address]  Skin lesion of face    Coronary artery disease involving native coronary artery of native heart without angina pectoris    Basal cell carcinoma (BCC) of face    Chest pain    Chest discomfort    Ischemic cardiomyopathy    COVID-19       IMPRESSION:    1. Methicillin susceptible Staphylococcus aureus bacteremia from blood cultures December 23, December 25 and December 27. Presumed port infection. Possible pulmonary source. 2. Acute respiratory failure with hypoxemia and patient with COVID-19 infection with possible bacterial pneumonia as well based on sputum cultures. (CT angiogram did not reveal any definite consolidation)appears patient did receive some remdesivir  3. End-stage renal disease on peritoneal dialysis  4. History of throat cancer per patient in remission. RECOMMENDATIONS :  · Encouraged prone postitioning is much as possible with PD catheter. Patient acknowledged this has been discussed with him  · Continue O2 supplementation and wean as tolerated  · dexamethasone day #8.   As per Dr. Courtney Peralta previous recommendation, would truncate as soon as possible given staph aureus bacteremia  · Continue cefazolin  · Agree with removal of port        Shemar Pedraza MD

## 2020-12-29 NOTE — CONSULTS
 0.9 % sodium chloride bolus  30 mL Intravenous PRN Richard Hebert MD        [Held by provider] lisinopril (PRINIVIL;ZESTRIL) tablet 2.5 mg  2.5 mg Oral Daily Richard Hebert MD        darbepoetin jerald-polysorbate SEVEN [facility]) injection 60 mcg  60 mcg Subcutaneous Weekly Richard Hebert MD   60 mcg at 12/23/20 1534    ALPRAZolam [place]) tablet 0.25 mg  0.25 mg Oral Nightly PRN Richard Hebert MD        aspirin chewable tablet 81 mg  81 mg Oral Daily Richard Hebert MD   81 mg at 12/28/20 0802    atorvastatin (LIPITOR) tablet 40 mg  40 mg Oral Nightly Richard Hebert MD   40 mg at 12/27/20 1921    carvedilol (COREG) tablet 3.125 mg  3.125 mg Oral BID WC Richard Hebert MD   3.125 mg at 12/28/20 0802    clopidogrel (PLAVIX) tablet 75 mg  75 mg Oral Daily Richard Hebert MD   75 mg at 12/28/20 0803    levothyroxine (SYNTHROID) tablet 50 mcg  50 mcg Oral Daily Richard Hebert MD   50 mcg at 12/28/20 0803    sevelamer (RENVELA) tablet 800 mg  800 mg Oral TID Richard Hebert MD   800 mg at 12/28/20 1411    vitamin D (ERGOCALCIFEROL) capsule 50,000 Units  50,000 Units Oral Q7 Days Richard Hebert MD        acetaminophen (TYLENOL) tablet 650 mg  650 mg Oral Q6H PRN Richard Hebert MD        Or    acetaminophen (TYLENOL) suppository 650 mg  650 mg Rectal Q6H PRN Richard Hebert MD        guaiFENesin-dextromethorphan (ROBITUSSIN DM) 100-10 MG/5ML syrup 5 mL  5 mL Oral Q4H PRN Richard Hebert MD        dexamethasone (DECADRON) tablet 6 mg  6 mg Oral Daily Richard Hebert MD   6 mg at 12/28/20 5069    Vitamin D (CHOLECALCIFEROL) tablet 6,000 Units  6,000 Units Oral Daily Richard Hebert MD   6,000 Units at 12/28/20 0803    albuterol sulfate  (90 Base) MCG/ACT inhaler 2 puff  2 puff Inhalation Q6H PRN Richard Hebert MD   2 puff at 12/23/20 0544    insulin lispro (HUMALOG) injection vial 0-6 Units  0-6 Units Subcutaneous TID AMINAH Hebert MD   2 Units at 12/28/20 2608  insulin lispro (HUMALOG) injection vial 0-3 Units  0-3 Units Subcutaneous Nightly Pamela Mayer MD   1 Units at 12/27/20 2049    glucose (GLUTOSE) 40 % oral gel 15 g  15 g Oral PRN Pamela Mayer MD        dextrose 50 % IV solution  12.5 g Intravenous PRN Pamela Mayer MD        glucagon (rDNA) injection 1 mg  1 mg Intramuscular PRN Pamela Mayer MD        dextrose 5 % solution  100 mL/hr Intravenous PRN Pamela Mayer MD        Benzocaine-Menthol (CEPACOL) 1 lozenge  1 lozenge Oral Q2H PRN Pamela Mayer MD        phenol 1.4 % mouth spray 1 spray  1 spray Mouth/Throat Q2H PRN Pamela Mayer MD   1 spray at 12/23/20 2680    budesonide-formoterol (SYMBICORT) 160-4.5 MCG/ACT inhaler 2 puff  2 puff Inhalation BID Pamela Mayer MD   2 puff at 12/28/20 0804     Allergies: Diphenhydramine and Sulfa antibiotics  Past Medical History:   Diagnosis Date    Anxiety     Asthma     Benign essential HTN     Benign hypertensive kidney disease     CAD (coronary artery disease)     sees dr. Hayden Saucedo Chronic kidney disease, stage IV (severe) ([address] 7/18/2016    ESRD (end stage renal disease) ([facility] [address]     no dialysis at present.  [facility])     [facility]     Hyperlipidemia     Palliative care patient 11/29/2018    Prolonged emergence from general anesthesia     c/o dizzy and \"over sedated\" with prostate \"scope\".     Renal osteodystrophy     Skin cancer 2018    facial    Throat cancer ([facility] [address] 2012    Thyroid disease      Past Surgical History:   Procedure Laterality Date    CYSTOSCOPY      HERNIA REPAIR      umbilical    LAPAROSCOPY INSERTION PERITONEAL CATHETER N/A 7/18/2016    CATHETER INSERTION PERITONEAL DIALYSIS LAPAROSCOPIC performed by Luz Elena Fletcher MD at [facility] N/A 1/28/2019    LAPAROSCOPIC REVISION OF PD CATHETER performed by Rachelle Franco MD at [email or web address]  NH INSERTION TUNNEL INTRAPERITONEAL CATH DIAL OPEN N/A 11/28/2018    PLACEMENT OF TUNNELED HEMODIALYSIS CATHETER performed by Ally Allen MD at [address] LAP INSERTION TUNNELED INTRAPERITONEAL CATHETER N/A 10/22/2018    PERITONEAL DIALYSIS CATHETER EXTERIORIZATION performed by Bryson Garcia MD at [facility]    TUNNELED VENOUS PORT PLACEMENT      VASCULAR SURGERY  07/10/2019    [facility]. Removal of tunneled dialysis catheter right internal jugular vein. Family History   Problem Relation Age of Onset    Heart Disease Mother     Cancer Father         lung     Social History     Tobacco Use    Smoking status: Never Smoker    Smokeless tobacco: Never Used   Substance Use Topics    Alcohol use: No       ROS:  14 point review of systems is negative except for the above. PHYSICAL EXAM:    The patient is a 77 y.o. male  in no acute distress. He is alert oriented and cooperative. Mood and affect are appropriate. Skin is warm and dry without rashes. /67   Pulse 85   Temp 97.6 °F (36.4 °C) (Temporal)   Resp 20   Ht 5' 11\" (1.803 m)   Wt 135 lb 2 oz (61.3 kg)   SpO2 90%   BMI 18.85 kg/m²       HEENT: Normocephalic and atraumatic. EOMs intact. Pupils equal and round and reactive to light and accommodation. External ears and nose are normal.  Sclera nonicteric. Conjunctiva normal  Oropharynx without masses or lesions. Neck: Neck is supple without masses or thyromegaly    Chest: Lungs are clear to auscultation. Respiratory effort normal. Port site is fine, with no erythema or fluid. Cardiac: Regular rate and rhythm without rubs, murmurs, or gallops    Abdomen: The abdomen is soft and nontender with no hepatosplenomegaly. There are no abdominal hernias noted. PD catheter in place    Extremities: The extremities are normal. There are no signs of clubbing, cyanosis, or edema.     IMPRESSION: Ongoing positive blood cultures On Plavix, aspirin, and heparin    PLAN: Hold Plavix and aspirin. Hold heparin Tuesday night              Will remove port when space available Wednesday.

## 2020-12-29 NOTE — PROGRESS NOTES
 [Held by provider] lisinopril (PRINIVIL;ZESTRIL) tablet 2.5 mg  2.5 mg Oral Daily Mary Holt MD        darbepoetin jerald-polysorbate SEVEN [facility]) injection 60 mcg  60 mcg Subcutaneous Weekly Mary Holt MD   60 mcg at 12/23/20 1534    ALPRAZolam Gely ) tablet 0.25 mg  0.25 mg Oral Nightly PRN Mary Holt MD        aspirin chewable tablet 81 mg  81 mg Oral Daily Mray Holt MD   81 mg at 12/28/20 0802    atorvastatin (LIPITOR) tablet 40 mg  40 mg Oral Nightly Mary Holt MD   40 mg at 12/27/20 1921    carvedilol (COREG) tablet 3.125 mg  3.125 mg Oral BID WC Mary Holt MD   3.125 mg at 12/28/20 1855    clopidogrel (PLAVIX) tablet 75 mg  75 mg Oral Daily Mary Holt MD   75 mg at 12/28/20 0803    levothyroxine (SYNTHROID) tablet 50 mcg  50 mcg Oral Daily Mary Holt MD   50 mcg at 12/28/20 0803    sevelamer (RENVELA) tablet 800 mg  800 mg Oral TID Mary Holt MD   800 mg at 12/28/20 2341    vitamin D (ERGOCALCIFEROL) capsule 50,000 Units  50,000 Units Oral Q7 Days Mary Holt MD        acetaminophen (TYLENOL) tablet 650 mg  650 mg Oral Q6H PRN Mary Holt MD        Or    acetaminophen (TYLENOL) suppository 650 mg  650 mg Rectal Q6H PRN Mary Holt MD        guaiFENesin-dextromethorphan (ROBITUSSIN DM) 100-10 MG/5ML syrup 5 mL  5 mL Oral Q4H PRN Mary Holt MD        dexamethasone (DECADRON) tablet 6 mg  6 mg Oral Daily Mary Holt MD   6 mg at 12/28/20 9411    Vitamin D (CHOLECALCIFEROL) tablet 6,000 Units  6,000 Units Oral Daily Mary Holt MD   6,000 Units at 12/28/20 0803    albuterol sulfate  (90 Base) MCG/ACT inhaler 2 puff  2 puff Inhalation Q6H PRN Mary Holt MD   2 puff at 12/23/20 0567    insulin lispro (HUMALOG) injection vial 0-6 Units  0-6 Units Subcutaneous TID  Mary Holt MD   2 Units at 12/28/20 6969  TX INSERTION TUNNEL INTRAPERITONEAL CATH DIAL OPEN N/A 11/28/2018    PLACEMENT OF TUNNELED HEMODIALYSIS CATHETER performed by Luci Huber MD at [address] LAP INSERTION TUNNELED INTRAPERITONEAL CATHETER N/A 10/22/2018    PERITONEAL DIALYSIS CATHETER EXTERIORIZATION performed by Courtney De La Torre MD at [facility]    TUNNELED VENOUS PORT PLACEMENT      VASCULAR SURGERY  07/10/2019    SJS. Removal of tunneled dialysis catheter right internal jugular vein.        Family History  Family History   Problem Relation Age of Onset    Heart Disease Mother     Cancer Father         lung       Social History  Social History     Socioeconomic History    Marital status:      Spouse name: Not on file    Number of children: Not on file    Years of education: Not on file    Highest education level: Not on file   Occupational History    Not on file   Social Needs    Financial resource strain: Not on file    Food insecurity     Worry: Not on file     Inability: Not on file    Transportation needs     Medical: Not on file     Non-medical: Not on file   Tobacco Use    Smoking status: Never Smoker    Smokeless tobacco: Never Used   Substance and Sexual Activity    Alcohol use: No    Drug use: No    Sexual activity: Not on file   Lifestyle    Physical activity     Days per week: Not on file     Minutes per session: Not on file    Stress: Not on file   Relationships    Social connections     Talks on phone: Not on file     Gets together: Not on file     Attends [employer] service: Not on file     Active member of club or organization: Not on file     Attends meetings of clubs or organizations: Not on file     Relationship status: Not on file    Intimate partner violence     Fear of current or ex partner: Not on file     Emotionally abused: Not on file     Physically abused: Not on file     Forced sexual activity: Not on file   Other Topics Concern  Not on file   Social History Narrative    Not on file         Review of Systems:  History obtained from chart review and the patient  General ROS: No fever or chills  Respiratory ROS: No cough,+shortness of breath  Cardiovascular ROS: No chest pain or palpitations  Gastrointestinal ROS: No abdominal pain or melena  Genito-Urinary ROS: No dysuria or hematuria  Musculoskeletal ROS: No joint pain or swelling         Objective:  Patient Vitals for the past 24 hrs:   BP Temp Temp src Pulse Resp SpO2   12/28/20 1853 (!) 143/82 97.3 °F (36.3 °C) Temporal 87 16 94 %   12/28/20 1002 109/67 97.6 °F (36.4 °C) Temporal 85 20 90 %   12/28/20 0900 132/72   97 19 90 %   12/28/20 0800 130/78 98.3 °F (36.8 °C) Temporal 78 17 100 %   12/28/20 0700 123/73   82 18 94 %   12/28/20 0654     (!) 7 93 %   12/28/20 0600 126/84   85 16 95 %   12/28/20 0530  96.6 °F (35.9 °C) Axillary      12/28/20 0500 127/77   78 14 99 %   12/28/20 0420    78     12/28/20 0400 (!) 97/59   80 14 99 %   12/28/20 0300 106/69   81 19 96 %   12/28/20 0200 131/77   85 18 95 %   12/28/20 0100 111/60   80 17 99 %       Intake/Output Summary (Last 24 hours) at 12/29/2020 0007  Last data filed at 12/28/2020 1235  Gross per 24 hour   Intake 440 ml   Output    Net 440 ml     Exam limited in an effort to preserve PPE and performed with nursing assistance  General: awake/alert  Chest:  unlabored  CVS: regular rate and rhythm  Abdominal: nondistended  Extremities: no cyanosis or edema  Skin: no rash noted  Psych: alert/appropriate  Neuro: grossly nonfocal      Labs:  BMP:   Recent Labs     12/27/20  0308 12/28/20  0550   * 128*   K 4.3 4.0   CL 90* 90*   CO2 21* 23   BUN 76* 75*   CREATININE 6.2* 6.2*   CALCIUM 7.5* 7.6*     CBC:   Recent Labs     12/27/20  0308 12/28/20  0550   WBC 4.3* 4.8   HGB 8.7* 8.3*   HCT 26.2* 24.9*   MCV 96.3* 96.5*    292     LIVER PROFILE:   Recent Labs     12/27/20  0308 12/28/20  0550 AST 34 21   ALT 68* 50*   BILITOT <0.2 <0.2   ALKPHOS 61 61     PT/INR:   Recent Labs     12/27/20  0308 12/28/20  0550   PROTIME 15.4* 15.5*   INR 1.22* 1.23*     APTT: No results for input(s): APTT in the last 72 hours. BNP:  No results for input(s): BNP in the last 72 hours. Ionized Calcium:No results for input(s): IONCA in the last 72 hours. Magnesium:No results for input(s): MG in the last 72 hours. Phosphorus:No results for input(s): PHOS in the last 72 hours. HgbA1C: No results for input(s): LABA1C in the last 72 hours. Hepatic:   Recent Labs     12/27/20  0308 12/28/20  0550   ALKPHOS 61 61   ALT 68* 50*   AST 34 21   PROT 5.3* 5.2*   BILITOT <0.2 <0.2   LABALBU 1.9* 1.9*     Lactic Acid: No results for input(s): LACTA in the last 72 hours. Troponin: No results for input(s): CKTOTAL, CKMB, TROPONINT in the last 72 hours. ABGs:   Lab Results   Component Value Date    PHART 7.480 12/23/2020    PO2ART 114.0 12/23/2020    FXF0VCP 25.0 12/23/2020     CRP:  No results for input(s): CRP in the last 72 hours. Sed Rate:  No results for input(s): SEDRATE in the last 72 hours. Culture Results:   Blood Culture Recent:   Recent Labs     12/27/20  1010   BC Gram stain Aerobic and Anaerobic bottle:  Gram positive cocci in clusters  resembling Staphylococcus  Culture in progress  Please notify Physician  *       Urine Culture Recent : No results for input(s): Fide Session in the last 720 hours.     Radiology reports as per the Radiologist  Radiology: Xr Chest Portable    Result Date: 12/22/2020 XR CHEST PORTABLE 12/22/2020 7:03 PM History: Short of breath. Covid positive. Portable chest x-ray compared with 4 March 2019. Chronic interstitial lung disease. Patchy pneumonia within the right upper lobe, left lower lobe, and within the base of the left upper lobe. Less prominent disease within the right lower lobe. Relative sparing of the left apex. Stable heart and mediastinum. Aortic arch calcification. Unchanged left subclavian port. 1. Bilateral pneumonia compatible with the history of Covid positive. Signed by Dr Dami Colbert on 12/22/2020 7:04 PM    Cta Pulmonary W Contrast    Result Date: 12/24/2020  EXAMINATION:  CTA PULMONARY W CONTRAST  12/24/2020 2:04 PM HISTORY: Hypoxia. Elevated d-dimer. Covid 19 infection. COMPARISON: No comparison study. DLP: 315 mGy-cm. Automated exposure control was utilized. TECHNIQUE: Spiral CT angiography was performed of the chest with contrast. Sagittal, coronal and 3-D images were reconstructed. MEDIASTINUM/VASCULAR: There is atheromatous disease of the thoracic aorta and coronary arteries. There is cardiomegaly. There is no CT evidence of pulmonary embolus. The pulmonary arteries are dilated with main pulmonary artery segment measuring 3.5 cm. There are small mediastinal lymph nodes. LUNGS: There are moderate groundglass appearing infiltrates in both lungs. Bronchiectasis is noted. There is no significant pleural effusion. There is mild dependent atelectasis in the lung bases posteriorly. BONES/SOFT TISSUES/: There are degenerative changes of the spine and shoulders. UPPER ABDOMEN: There is ascites in the upper abdomen. There is a 1.4 cm probable cyst in the upper pole left kidney. There is another 7 mm probable cyst in the upper pole left kidney. These areas are not fully evaluated. 1. No CT evidence of pulmonary embolus. Dilated pulmonary arteries. 2. Atheromatous disease of the thoracic aorta and coronary arteries. Cardiomegaly. 3. Moderate groundglass infiltrates bilaterally consistent with the patient's history of Covid 19 infection. Other etiologies less likely. There is also bronchiectasis. There is dependent atelectasis. 4. There is ascites in the abdomen. 5. Probable renal cysts on the left, not fully evaluated.  Signed by Dr Anshul Hickman on 12/24/2020 2:11 PM       Assessment   ESRD  HTN nephropathy  COVID 19  Acute hypoxic respiratory failure  Hyponatremia  Hyperkalemia  Anemia CKD      Plan:  PD per orders  abx per ID  planning port removal 12/30    Magno Harper MD  12/29/20  12:07 AM

## 2020-12-29 NOTE — PROGRESS NOTES
Nephrology ([facility]) Progress Note    Patient:  Val Henry  YOB: 1945  Date of Service: 12/29/2020  MRN: [id number]   Acct: [de-identified]   Primary Care Physician: No primary care provider on file. Advance Directive: Full Code  Admit Date: 12/22/2020       Hospital Day: 7  Referring Provider: Julia Jimenez MD    Patient independently seen and examined, Chart, Consults, Notes, Operative notes, Labs, Cardiology, and Radiology studies reviewed as available. Subjective:  Val Henry is a 77 y.o. male  whom we were consulted for ESRD on peritoneal dialysis. He also has hypertension, hyperlipidemia, CAD. He presented to an outside facility complaining of worsening dyspnea and dysphagia since being diagnosed with COVID-19.  He went into acute respiratory failure and is now requiring 4 L oxygen. He was admitted to [facility] and is on the [facility] floor. Patient offered no other complaints. Renal service was consulted to manage his ESRD. A couple of nights ago, he went on non-rebreather O2 and he was transferred to critical care unit for close monitoring. Patient then improved some and was transferred back to floor 12/28. Today, doing well. Remains in COVID isolation. No events per nursing. soa better. Planning port removal tomorrow.      Allergies:  Diphenhydramine and Sulfa antibiotics    Medicines:  Current Facility-Administered Medications   Medication Dose Route Frequency Provider Last Rate Last Admin    docusate sodium (COLACE) capsule 100 mg  100 mg Oral Daily Julia Jimenez MD   100 mg at 12/29/20 1351    senna (SENOKOT) tablet 8.6 mg  1 tablet Oral Nightly Julia Jimenez MD        lactulose [facility]) 10 GM/15ML solution 20 g  20 g Oral TID Julia Jimenez MD   20 g at 12/29/20 1350    bisacodyl (DULCOLAX) suppository 10 mg  10 mg Rectal Daily PRN Julia Jimenez MD  ceFAZolin (ANCEF) 1 g in sterile water 10 mL IV syringe  1 g Intravenous Q24H Andrea Mohr MD   1 g at 12/28/20 2341    heparin (porcine) injection 5,000 Units  5,000 Units Subcutaneous 3 times per day Belvie Goltz, MD   5,000 Units at 12/29/20 1556    0.9 % sodium chloride bolus  30 mL Intravenous PRN Belvie Goltz, MD        [Held by provider] lisinopril (PRINIVIL;ZESTRIL) tablet 2.5 mg  2.5 mg Oral Daily Belvie Goltz, MD        darbepoetin jerald-polysorbate SEVEN [facility]) injection 60 mcg  60 mcg Subcutaneous Weekly Belvie Goltz, MD   60 mcg at 12/23/20 1534    ALPRAZolam Milbert Brigidaer) tablet 0.25 mg  0.25 mg Oral Nightly PRN Belvie Goltz, MD   0.25 mg at 12/29/20 0116    [Held by provider] aspirin chewable tablet 81 mg  81 mg Oral Daily Belvie Goltz, MD   81 mg at 12/28/20 0802    atorvastatin (LIPITOR) tablet 40 mg  40 mg Oral Nightly Belvie Goltz, MD   40 mg at 12/27/20 1921    carvedilol (COREG) tablet 3.125 mg  3.125 mg Oral BID WC Belvie Goltz, MD   3.125 mg at 12/29/20 2887    [Held by provider] clopidogrel (PLAVIX) tablet 75 mg  75 mg Oral Daily Belvie Goltz, MD   75 mg at 12/28/20 0803    levothyroxine (SYNTHROID) tablet 50 mcg  50 mcg Oral Daily Belvie Goltz, MD   50 mcg at 12/29/20 0915    sevelamer (RENVELA) tablet 800 mg  800 mg Oral TID Belvie Goltz, MD   800 mg at 12/29/20 1351    vitamin D (ERGOCALCIFEROL) capsule 50,000 Units  50,000 Units Oral Q7 Days Belvie Goltz, MD        acetaminophen (TYLENOL) tablet 650 mg  650 mg Oral Q6H PRN Belvie Goltz, MD        Or    acetaminophen (TYLENOL) suppository 650 mg  650 mg Rectal Q6H PRN Belvie Goltz, MD        guaiFENesin-dextromethorphan (ROBITUSSIN DM) 100-10 MG/5ML syrup 5 mL  5 mL Oral Q4H PRN Belvie Goltz, MD        Vitamin D (CHOLECALCIFEROL) tablet 6,000 Units  6,000 Units Oral Daily Belvie Goltz, MD   6,000 Units at 12/29/20 5993  albuterol sulfate  (90 Base) MCG/ACT inhaler 2 puff  2 puff Inhalation Q6H PRN Pamela Mayer MD   2 puff at 12/23/20 0544    insulin lispro (HUMALOG) injection vial 0-6 Units  0-6 Units Subcutaneous TID WC Pamela Mayer MD   2 Units at 12/28/20 1411    insulin lispro (HUMALOG) injection vial 0-3 Units  0-3 Units Subcutaneous Nightly Pamela Mayer MD   1 Units at 12/27/20 2049    glucose (GLUTOSE) 40 % oral gel 15 g  15 g Oral PRN Pamela Mayer MD        dextrose 50 % IV solution  12.5 g Intravenous PRN Pamela Mayer MD        glucagon (rDNA) injection 1 mg  1 mg Intramuscular PRN Pamela Mayer MD        dextrose 5 % solution  100 mL/hr Intravenous PRN Pamela Mayer MD        Benzocaine-Menthol (CEPACOL) 1 lozenge  1 lozenge Oral Q2H PRN Pamela Mayer MD        phenol 1.4 % mouth spray 1 spray  1 spray Mouth/Throat Q2H PRN Pamela Mayer MD   1 spray at 12/23/20 4856    budesonide-formoterol (SYMBICORT) 160-4.5 MCG/ACT inhaler 2 puff  2 puff Inhalation BID Pamela Mayer MD   2 puff at 12/29/20 4633       Past Medical History:  Past Medical History:   Diagnosis Date    Anxiety     Asthma     Benign essential HTN     Benign hypertensive kidney disease     CAD (coronary artery disease)     sees dr. Hayden Saucedo Chronic kidney disease, stage IV (severe) ([address] 7/18/2016    ESRD (end stage renal disease) ([facility] [address]     no dialysis at present.  Hemodialysis patient [facility])     mon wed fri at [address] of blood transfusion     Hyperlipidemia     Palliative care patient 11/29/2018    Prolonged emergence from general anesthesia     c/o dizzy and \"over sedated\" with prostate \"scope\".     Renal osteodystrophy     Skin cancer 2018    facial    Throat cancer [facility]) 2012    Thyroid disease        Past Surgical History:  Past Surgical History:   Procedure Laterality Date    CYSTOSCOPY      HERNIA REPAIR      umbilical  LAPAROSCOPY INSERTION PERITONEAL CATHETER N/A 7/18/2016    CATHETER INSERTION PERITONEAL DIALYSIS LAPAROSCOPIC performed by Ike Ruth MD at [facility] N/A 1/28/2019    LAPAROSCOPIC REVISION OF PD CATHETER performed by Gumaro Grimm MD at [facility] CATH DIAL OPEN N/A 11/28/2018    PLACEMENT OF TUNNELED HEMODIALYSIS CATHETER performed by Julien Lira MD at [facility] TUNNELED INTRAPERITONEAL CATHETER N/A 10/22/2018    PERITONEAL DIALYSIS CATHETER EXTERIORIZATION performed by Gumaro Grimm MD at [place] [address]    TUNNELED VENOUS PORT PLACEMENT      VASCULAR SURGERY  07/10/2019    SJS. Removal of tunneled dialysis catheter right internal jugular vein.        Family History  Family History   Problem Relation Age of Onset    Heart Disease Mother     Cancer Father         lung       Social History  Social History     Socioeconomic History    Marital status:      Spouse name: Not on file    Number of children: Not on file    Years of education: Not on file    Highest education level: Not on file   Occupational History    Not on file   Social Needs    Financial resource strain: Not on file    Food insecurity     Worry: Not on file     Inability: Not on file    Transportation needs     Medical: Not on file     Non-medical: Not on file   Tobacco Use    Smoking status: Never Smoker    Smokeless tobacco: Never Used   Substance and Sexual Activity    Alcohol use: No    Drug use: No    Sexual activity: Not on file   Lifestyle    Physical activity     Days per week: Not on file     Minutes per session: Not on file    Stress: Not on file   Relationships    Social connections     Talks on phone: Not on file     Gets together: Not on file     Attends [employer] service: Not on file     Active member of club or organization: Not on file Attends meetings of clubs or organizations: Not on file     Relationship status: Not on file    Intimate partner violence     Fear of current or ex partner: Not on file     Emotionally abused: Not on file     Physically abused: Not on file     Forced sexual activity: Not on file   Other Topics Concern    Not on file   Social History Narrative    Not on file         Review of Systems:  History obtained from chart review and the patient  General ROS: No fever or chills  Respiratory ROS: No cough,+shortness of breath  Cardiovascular ROS: No chest pain or palpitations  Gastrointestinal ROS: No abdominal pain or melena  Genito-Urinary ROS: No dysuria or hematuria  Musculoskeletal ROS: No joint pain or swelling         Objective:  Patient Vitals for the past 24 hrs:   BP Temp Temp src Pulse Resp SpO2 Height Weight   12/29/20 1530 126/72 97.6 °F (36.4 °C) Oral 78 20   140 lb 3.4 oz (63.6 kg)   12/29/20 1138 123/71 97.4 °F (36.3 °C) Temporal 81 20 93 %     12/29/20 0931       5' 11\" (1.803 m)    12/29/20 0659 127/82 97.7 °F (36.5 °C) Temporal 81 20 91 %     12/29/20 0056 (!) 142/91 98.4 °F (36.9 °C) Temporal 98 16 95 %  145 lb 1.6 oz (65.8 kg)   12/28/20 1853 (!) 143/82 97.3 °F (36.3 °C) Temporal 87 16 94 %         Intake/Output Summary (Last 24 hours) at 12/29/2020 1607  Last data filed at 12/29/2020 1236  Gross per 24 hour   Intake 480 ml   Output 1100 ml   Net -620 ml     Exam limited in an effort to preserve PPE and performed with nursing assistance  General: awake/alert  Chest:  unlabored  CVS: regular rate and rhythm  Abdominal: nondistended  Extremities: no cyanosis or edema  Skin: no rash noted  Psych: alert/appropriate  Neuro: grossly nonfocal      Labs:  BMP:   Recent Labs     12/27/20  0308 12/28/20  0550 12/29/20  0130   * 128* 129*   K 4.3 4.0 3.7   CL 90* 90* 89*   CO2 21* 23 24   BUN 76* 75* 75*   CREATININE 6.2* 6.2* 6.0*   CALCIUM 7.5* 7.6* 7.5*     CBC:   Recent Labs XR CHEST PORTABLE 12/22/2020 7:03 PM History: Short of breath. Covid positive. Portable chest x-ray compared with 4 March 2019. Chronic interstitial lung disease. Patchy pneumonia within the right upper lobe, left lower lobe, and within the base of the left upper lobe. Less prominent disease within the right lower lobe. Relative sparing of the left apex. Stable heart and mediastinum. Aortic arch calcification. Unchanged left subclavian port. 1. Bilateral pneumonia compatible with the history of Covid positive. Signed by Dr Carrillo Parra on 12/22/2020 7:04 PM    Cta Pulmonary W Contrast    Result Date: 12/24/2020  EXAMINATION:  CTA PULMONARY W CONTRAST  12/24/2020 2:04 PM HISTORY: Hypoxia. Elevated d-dimer. Covid 19 infection. COMPARISON: No comparison study. DLP: 315 mGy-cm. Automated exposure control was utilized. TECHNIQUE: Spiral CT angiography was performed of the chest with contrast. Sagittal, coronal and 3-D images were reconstructed. MEDIASTINUM/VASCULAR: There is atheromatous disease of the thoracic aorta and coronary arteries. There is cardiomegaly. There is no CT evidence of pulmonary embolus. The pulmonary arteries are dilated with main pulmonary artery segment measuring 3.5 cm. There are small mediastinal lymph nodes. LUNGS: There are moderate groundglass appearing infiltrates in both lungs. Bronchiectasis is noted. There is no significant pleural effusion. There is mild dependent atelectasis in the lung bases posteriorly. BONES/SOFT TISSUES/: There are degenerative changes of the spine and shoulders. UPPER ABDOMEN: There is ascites in the upper abdomen. There is a 1.4 cm probable cyst in the upper pole left kidney. There is another 7 mm probable cyst in the upper pole left kidney. These areas are not fully evaluated.

## 2020-12-29 NOTE — PLAN OF CARE
Problem: Nutrition  Goal: Optimal nutrition therapy  Outcome: Ongoing   Nutrition Problem #1: Underweight  Intervention: Food and/or Nutrient Delivery: Modify Current Diet  Nutritional Goals: PO intake >50%, wt stable

## 2020-12-29 NOTE — PROGRESS NOTES
Comprehensive Nutrition Assessment    Type and Reason for Visit:  Initial, RD Nutrition Re-Screen/LOS    Nutrition Recommendations/Plan: Modify diet to Renal    Nutrition Assessment:  LOS assessment. Pt presents at risk for nutritional decline r/t PD and impaired respiratory function. PO intake avg >50% of documented meals. Elevated glucose levels noted, pt receiving steroids. Also may be related to dialyzate. Gradual wt loss documented over the past year per wt records. Modifying diet to Renal; soft and bite-sized. Will continue to monitor intake and implement intervention if needed.     Malnutrition Assessment:  Malnutrition Status:  No malnutrition    Context:  Acute Illness       Estimated Daily Nutrient Needs:  Energy (kcal):  4984-0008; Weight Used for Energy Requirements:  Current     Protein (g):  ; Weight Used for Protein Requirements:  Current(1.5-2.0)        Fluid (ml/day):  2600-4888; Method Used for Fluid Requirements:  1 ml/kcal      Wounds:  None       Current Nutrition Therapies:    DIET RENAL; Dysphagia Soft and Bite-Sized    Anthropometric Measures:  · Height: 5' 11\" (180.3 cm)  · Current Body Weight: 145 lb 1.6 oz (65.8 kg)    · Usual Body Weight: 153 lb (69.4 kg)(1/2020)     · Ideal Body Weight: 172 lbs; % Ideal Body Weight 84.4 %   · BMI: 20.2  · BMI Categories: Underweight (BMI less than 22) age over 72       Nutrition Diagnosis:   · Underweight related to increase demand for energy/nutrients, inadequate protein-energy intake as evidenced by weight loss, BMI      Nutrition Interventions:   Food and/or Nutrient Delivery:  Modify Current Diet  Nutrition Education/Counseling:  No recommendation at this time   Coordination of Nutrition Care:  Continue to monitor while inpatient    Goals:  PO intake >50%, wt stable       Nutrition Monitoring and Evaluation:   Behavioral-Environmental Outcomes:  None Identified   Food/Nutrient Intake Outcomes:  Food and Nutrient Intake Physical Signs/Symptoms Outcomes:  Biochemical Data, Skin, Weight     Discharge Planning:    Continue current diet     Electronically signed by Ventura Ambrose RD, LD on 12/29/20 at 9:43 AM CST    Contact: [phone]

## 2020-12-29 NOTE — PROGRESS NOTES
Spoke with pt to provide spiritual care. Pt says he is \"trudging along. \" Pt was admitted on 12/22/20 and has been hospitalized at least a week. Provided spiritual care with support and prayer. Pt expressed gratitude for spiritual care.     Electronically signed by Armond Sacks on 12/29/2020 at 1:31 PM

## 2020-12-29 NOTE — CARE COORDINATION
Called patient to assess discharge needs. Current Readmission score is 22%. Patient lives at home with his spouse. Patient states he does not need any support, Tamera Chand has been able to take care of himself\". His spouse has been weakened by Covid-19 and will be unable to care for patient. Patient states he does not have any durable medical equipment and does not expect to need any at discharge. If patient qualifies for oxygen, he is interested in home oxygen at discharge. He is agreeable to ordering oxygen from [facility]. Liset Gupta [phone]  F [phone]    Patient plans to purchase a Pulse Ox meter. Patient is interested in [facility] services at discharge. He wants to use Meds to Bed program.  Pharmacy notified. Patient reports that he is able to afford his medications at this time. At discharge, his grandson will provide transportation. CM will continue to follow.   Electronically signed by Reinier Mariano RN on 12/29/2020 at 10:55 AM

## 2020-12-29 NOTE — PROGRESS NOTES
[facility]        Hospitalist Progress Note  12/29/2020 11:53 AM  Subjective:   Admit Date: 12/22/2020  PCP: No primary care provider on file. Chief Complaint: Dyspnea    Subjective: Patient seen at bedside. Improving. Comfortable. Constipated. Cumulative Hospital History: The patient is a 77 y.o. male with a past medical history of ESRD on peritoneal dialysis, hypertension, hyperlipidemia, CAD who presented to an outside facility complaining of worsening dyspnea, sore throat and dysphagia since being diagnosed with COVID-19 found to have acute respiratory failure with hypoxia requiring 4L to maintain saturations at OSH. Patient transferred to our facility for further management. Found to have elevated procalcitonin and D-dimer started on empiric antibiotics and heparin drip pending cultures and CT angio PE study. Patient with worsening saturations/respiratory status transferred to CCU. Patient found to have blood cultures growing MSSA with infectious diseases consulted. CT angio pulmonary embolus study showing no evidence of pulmonary embolism and heparin drip discontinued and patient placed on prophylactic lovenox. Patient's respiratory status improving and he was transferred out of CCU. ID recommending Tgleod-v-Syyg removal.  General surgery consulted for port removal.    ROS: 14 point review of systems is negative except as specifically addressed above.     DIET RENAL; Dysphagia Soft and Bite-Sized    Intake/Output Summary (Last 24 hours) at 12/29/2020 1153  Last data filed at 12/29/2020 1025  Gross per 24 hour   Intake 480 ml   Output 800 ml   Net -320 ml     Medications:   dextrose       Current Facility-Administered Medications   Medication Dose Route Frequency Provider Last Rate Last Admin    docusate sodium (COLACE) capsule 100 mg  100 mg Oral Daily Baljinder Verde MD        [facility]) tablet 8.6 mg  1 tablet Oral Nightly Baljinder Verde MD  Vitamin D (CHOLECALCIFEROL) tablet 6,000 Units  6,000 Units Oral Daily Edilberto Pringle MD   6,000 Units at 12/29/20 0916    albuterol sulfate  (90 Base) MCG/ACT inhaler 2 puff  2 puff Inhalation Q6H PRN Edilberto Pringle MD   2 puff at 12/23/20 0544    insulin lispro (HUMALOG) injection vial 0-6 Units  0-6 Units Subcutaneous TID WC Edilberto Pringle MD   2 Units at 12/28/20 1411    insulin lispro (HUMALOG) injection vial 0-3 Units  0-3 Units Subcutaneous Nightly Edilberto Pringle MD   1 Units at 12/27/20 2049    glucose (GLUTOSE) 40 % oral gel 15 g  15 g Oral PRN Edilberto Pringle MD        dextrose 50 % IV solution  12.5 g Intravenous PRN Edilberto Pringle MD        glucagon (rDNA) injection 1 mg  1 mg Intramuscular PRN Edilberto Pringle MD        dextrose 5 % solution  100 mL/hr Intravenous PRN Edilberto Pringle MD        Benzocaine-Menthol (CEPACOL) 1 lozenge  1 lozenge Oral Q2H PRN Edilberto Pringle MD        phenol 1.4 % mouth spray 1 spray  1 spray Mouth/Throat Q2H PRN Edilberto Pringle MD   1 spray at 12/23/20 0711    budesonide-formoterol (SYMBICORT) 160-4.5 MCG/ACT inhaler 2 puff  2 puff Inhalation BID Edilberto Pringle MD   2 puff at 12/29/20 0916        Labs:     Recent Labs     12/27/20  0308 12/28/20  0550 12/29/20  0130   WBC 4.3* 4.8 6.3   RBC 2.72* 2.58* 2.62*   HGB 8.7* 8.3* 8.3*   HCT 26.2* 24.9* 25.5*   MCV 96.3* 96.5* 97.3*   MCH 32.0* 32.2* 31.7*   MCHC 33.2 33.3 32.5*    292 338     Recent Labs     12/27/20  0308 12/28/20  0550 12/29/20  0130   * 128* 129*   K 4.3 4.0 3.7   ANIONGAP 16 15 16   CL 90* 90* 89*   CO2 21* 23 24   BUN 76* 75* 75*   CREATININE 6.2* 6.2* 6.0*   GLUCOSE 174* 104 151*   CALCIUM 7.5* 7.6* 7.5*     No results for input(s): MG, PHOS in the last 72 hours.   Recent Labs     12/27/20  0308 12/28/20  0550 12/29/20  0130   AST 34 21 20   ALT 68* 50* 31   BILITOT <0.2 <0.2 <0.2   ALKPHOS 61 61 67 ABGs:No results for input(s): PH, PO2, PCO2, HCO3, BE, O2SAT in the last 72 hours. Troponin T: No results for input(s): TROPONINI in the last 72 hours. INR:   Recent Labs     12/27/20  0308 12/28/20  0550 12/29/20  0130   INR 1.22* 1.23* 1.20*     Lactic Acid: No results for input(s): LACTA in the last 72 hours. Objective:   Vitals: /71   Pulse 81   Temp 97.4 °F (36.3 °C) (Temporal)   Resp 20   Ht 5' 11\" (1.803 m)   Wt 145 lb 1.6 oz (65.8 kg)   SpO2 93%   BMI 20.24 kg/m²   24HR INTAKE/OUTPUT:      Intake/Output Summary (Last 24 hours) at 12/29/2020 1153  Last data filed at 12/29/2020 1025  Gross per 24 hour   Intake 480 ml   Output 800 ml   Net -320 ml     General appearance: Alert  Head: NC/AT  Eyes: conjunctivae/corneas clear   Neck: Supple  Lungs: BLAE   Heart: RRR  Abdomen: BS+  Extremities: +pulses  Skin: warm  Neurologic: Alert, gross motor function intact  Psychiatric:  Mood appropriate    Assessment and Plan: Active Problems:    Renovascular hypertension    Secondary hyperparathyroidism of renal origin [facility])    Palliative care patient    ESRD (end stage renal disease) on dialysis ([facility] [address]    Anemia of chronic disease    Coronary artery disease involving native coronary artery of native heart without angina pectoris    Basal cell carcinoma (BCC) of face    Ischemic cardiomyopathy    COVID-19  Resolved Problems:    * No resolved hospital problems. *    COVID: Decadron discontinued in the setting of bacteremia. Supportive management. Bronchodilators. O2 as needed. MSSA bacteremia: Ancef. Follow-up repeat cultures. Batyiu-d-Vxqe removal as general surgery. DAPT held.     ESRD: Peritoneal dialysis patient. Nephrology on board.     Hypertension: Monitor BP and adjust medications as needed.     Supportive management.     Advance Directive: Full Code    DVT prophylaxis: Heparin    Discharge planning: TBD      Signed:  Caitlin Guillaume MD 12/29/2020 11:53 AM  Rounding Hospitalist

## 2020-12-30 PROBLEM — T80.212A PORT OR RESERVOIR INFECTION: Status: ACTIVE | Noted: 2020-01-01

## 2020-12-30 NOTE — PROGRESS NOTES
Occupational Therapy  Awaiting procedure. Will continue to follow for evaluation.  Electronically signed by Jose Maria Rincon OT on 12/30/2020 at 2:06 PM

## 2020-12-30 NOTE — BRIEF OP NOTE
Brief Postoperative Note      DATE OF PROCEDURE: 12/30/2020     SURGEON: Isidra Pacheco MD    PREOPERATIVE DIAGNOSIS: PORT REMOVAL    POSTOPERATIVE DIAGNOSIS: Same fibrosed catheter    OPERATION: Procedure(s):  PORT REMOVAL    ANESTHESIA: Monitor Anesthesia Care    ESTIMATED BLOOD LOSS: Minimal    COMPLICATIONS: None. SPECIMENS: * No specimens in log *    DRAINS: None    The patient tolerated the procedure well.     Electronically signed by Isidra Pacheco MD  on 12/30/2020 at 4:34 PM

## 2020-12-30 NOTE — ANESTHESIA PRE PROCEDURE
Department of Anesthesiology  Preprocedure Note       Name:  La Le   Age:  77 y.o.  :  1945                                          MRN:  [id number]         Date:  2020      Surgeon: Brigid Wasserman):  Roxane Barrera MD    Procedure: Procedure(s):  Port removal    Medications prior to admission:   Prior to Admission medications    Medication Sig Start Date End Date Taking? Authorizing Provider   carvedilol (COREG) 3.125 MG tablet TAKE 1 TABLET BY MOUTH TWICE A DAY WITH MEALS 20   KEN Bailey   clopidogrel (PLAVIX) 75 MG tablet TAKE 1 TABLET BY MOUTH EVERY DAY 20   KEN Bailey   atorvastatin (LIPITOR) 40 MG tablet Take 1 tablet by mouth nightly 3/6/20   KEN Mendez   sevelamer (RENVELA) 800 MG tablet Take 800 mg by mouth 3 times daily Two pills with every meal. 19   Historical Provider, MD   vitamin D (ERGOCALCIFEROL) 1.25 MG (51514 UT) CAPS capsule Take 50,000 Units by mouth every 7 days 1/15/20   Historical Provider, MD   lisinopril (PRINIVIL;ZESTRIL) 40 MG tablet Take 40 mg by mouth daily 20   Historical Provider, MD   amLODIPine (NORVASC) 10 MG tablet Take 10 mg by mouth daily 19   Historical Provider, MD   aspirin 81 MG chewable tablet Take 1 tablet by mouth daily 18   Corby Sarabia, DO   levothyroxine (SYNTHROID) 50 MCG tablet Take 1 tablet by mouth Daily 18   Corby Sarabia, DO   Aspirin-Acetaminophen-Caffeine (EXCEDRIN PO) Take 1 tablet by mouth daily as needed     Historical Provider, MD   ALPRAZolam (XANAX) 0.25 MG tablet Take 0.25 mg by mouth nightly as needed for Sleep. Bob Leung Historical Provider, MD       Current medications:    No current facility-administered medications for this visit. No current outpatient medications on file.      Facility-Administered Medications Ordered in Other Visits   Medication Dose Route Frequency Provider Last Rate Last Admin  acetaminophen (TYLENOL) suppository 650 mg  650 mg Rectal Q6H PRN Rock Johnson MD        guaiFENesin-dextromethorphan (ROBITUSSIN DM) 100-10 MG/5ML syrup 5 mL  5 mL Oral Q4H PRN Rock Johnson MD        Vitamin D (CHOLECALCIFEROL) tablet 6,000 Units  6,000 Units Oral Daily Rock Johnson MD   6,000 Units at 12/29/20 0916    albuterol sulfate  (90 Base) MCG/ACT inhaler 2 puff  2 puff Inhalation Q6H PRN Rock Johnson MD   2 puff at 12/23/20 0544    insulin lispro (HUMALOG) injection vial 0-6 Units  0-6 Units Subcutaneous TID WC Rock Johnson MD   2 Units at 12/29/20 1733    insulin lispro (HUMALOG) injection vial 0-3 Units  0-3 Units Subcutaneous Nightly Rock Johnson MD   1 Units at 12/29/20 2129    glucose (GLUTOSE) 40 % oral gel 15 g  15 g Oral PRN Rock Johnson MD        dextrose 50 % IV solution  12.5 g Intravenous PRN Rock Johnson MD        glucagon (rDNA) injection 1 mg  1 mg Intramuscular PRN Rock Johnson MD        dextrose 5 % solution  100 mL/hr Intravenous PRN Rock Johnson MD        Benzocaine-Menthol (CEPACOL) 1 lozenge  1 lozenge Oral Q2H PRN Rock Johnson MD        phenol 1.4 % mouth spray 1 spray  1 spray Mouth/Throat Q2H PRN Rock Johnson MD   1 spray at 12/23/20 4095    budesonide-formoterol (SYMBICORT) 160-4.5 MCG/ACT inhaler 2 puff  2 puff Inhalation BID Rock Johnson MD   2 puff at 12/29/20 2116       Allergies:     Allergies   Allergen Reactions    Diphenhydramine      Other reaction(s): Blacked out    Sulfa Antibiotics      made me feel like I had the flu       Problem List:    Patient Active Problem List   Diagnosis Code    NSTEMI (non-ST elevated myocardial infarction) ([facility] [address] I21.4    Renovascular hypertension I15.0    Secondary hyperparathyroidism of renal origin ([facility] [address] N25.81    Anemia due to stage 4 chronic kidney disease ([address] N18.4, D63.1    Palliative care patient Z51.5  ESRD (end stage renal disease) on dialysis (HCC) N18.6, Z99.2    Anemia of chronic disease D63.8    Peritoneal dialysis catheter dysfunction (HCC) T85.611A    Skin lesion of face L98.9    Coronary artery disease involving native coronary artery of native heart without angina pectoris I25.10    Basal cell carcinoma (BCC) of face C44.310    Chest pain R07.9    Chest discomfort R07.89    Ischemic cardiomyopathy I25.5    COVID-19 U07.1       Past Medical History:        Diagnosis Date    Anxiety     Asthma     Benign essential HTN     Benign hypertensive kidney disease     CAD (coronary artery disease)     sees dr. Suresh Patel Chronic kidney disease, stage IV (severe) ([facility] [address] 7/18/2016    ESRD (end stage renal disease) ([facility] [address]     no dialysis at present.  [facility]od[facility]     [facility]     Hyperlipidemia     Palliative care patient 11/29/2018    Prolonged emergence from general anesthesia     c/o dizzy and \"over sedated\" with prostate \"scope\".     Renal osteodystrophy     Skin cancer 2018    facial    Throat cancer ([facility] [address] 2012    Thyroid disease        Past Surgical History:        Procedure Laterality Date    CYSTOSCOPY      HERNIA REPAIR      umbilical    LAPAROSCOPY INSERTION PERITONEAL CATHETER N/A 7/18/2016    CATHETER INSERTION PERITONEAL DIALYSIS LAPAROSCOPIC performed by Isaias Swann MD at [address] [facility] N/A 1/28/2019    LAPAROSCOPIC REVISION OF PD CATHETER performed by Karis Dotson MD at [address] [place] [address] CATH DIAL OPEN N/A 11/28/2018    PLACEMENT OF TUNNELED HEMODIALYSIS CATHETER performed by Golden Srinivasan MD at [facility] TUNNELED INTRAPERITONEAL CATHETER N/A 10/22/2018    PERITONEAL DIALYSIS CATHETER EXTERIORIZATION performed by Karis Dotson MD at [place] [address]  TUNNELED VENOUS PORT PLACEMENT      VASCULAR SURGERY  07/10/2019    [facility]. Removal of tunneled dialysis catheter right internal jugular vein. Social History:    Social History     Tobacco Use    Smoking status: Never Smoker    Smokeless tobacco: Never Used   Substance Use Topics    Alcohol use: No                                Counseling given: Not Answered      Vital Signs (Current): There were no vitals filed for this visit.                                            BP Readings from Last 3 Encounters:   12/30/20 123/76   09/29/20 (!) 162/88   01/23/20 116/68       NPO Status:                                                                                 BMI:   Wt Readings from Last 3 Encounters:   12/30/20 143 lb (64.9 kg)   09/29/20 150 lb (68 kg)   01/23/20 153 lb (69.4 kg)     There is no height or weight on file to calculate BMI.    CBC:   Lab Results   Component Value Date    WBC 8.6 12/30/2020    RBC 2.43 12/30/2020    HGB 7.8 12/30/2020    HCT 23.7 12/30/2020    HCT 30.8 09/28/2011    MCV 97.5 12/30/2020    RDW 14.1 12/30/2020     12/30/2020     09/28/2011       CMP:   Lab Results   Component Value Date     12/30/2020     09/28/2011    K 4.2 12/30/2020    K 4.6 09/28/2011    CL 91 12/30/2020     09/28/2011    CO2 25 12/30/2020    BUN 74 12/30/2020    CREATININE 6.3 12/30/2020    CREATININE 3.5 09/28/2011    GFRAA 11 12/30/2020    LABGLOM 9 12/30/2020    GLUCOSE 99 12/30/2020    PROT 4.7 12/30/2020    PROT 6.4 09/28/2011    CALCIUM 7.6 12/30/2020    BILITOT <0.2 12/30/2020    ALKPHOS 62 12/30/2020    ALKPHOS 81 09/28/2011    AST 17 12/30/2020    ALT 8 12/30/2020       POC Tests:   Recent Labs     12/30/20  0741   POCGLU 102*       Coags:   Lab Results   Component Value Date    PROTIME 15.0 12/30/2020    INR 1.18 12/30/2020    APTT 144.6 12/25/2020       HCG (If Applicable): No results found for: PREGTESTUR, PREGSERUM, HCG, HCGQUANT     ABGs:   Lab Results Component Value Date    PHART 7.480 12/23/2020    PO2ART 114.0 12/23/2020    FHA7CUG 25.0 12/23/2020    GIW3YVY 18.6 12/23/2020    BEART -3.4 12/23/2020    B5KVOGTQ 95.7 12/23/2020        Type & Screen (If Applicable):  No results found for: LABABO, [address]    Anesthesia Evaluation  Patient summary reviewed and Nursing notes reviewed no history of anesthetic complications:   Airway: Mallampati: I  TM distance: >3 FB   Neck ROM: full   Dental:    (+) partials      Pulmonary:   (+) recent URI:      (-) COPD, asthma and sleep apnea                          ROS comment: No tob   Cardiovascular:  Exercise tolerance: good (>4 METS),   (+) hypertension:, angina: no interval change, past MI:, CAD:,     (-) pacemaker, CABG/stent and dysrhythmias    ECG reviewed          Cleared by cardiology     Beta Blocker:  Dose within 24 Hrs      ROS comment: Recent MI in November of 2018. Evaluated by cardiology. Current plan is medical management. Pt denies chest pain and shortness of breath. Heart cath:  Summary:       1. Successful femoral artery ultrasound  2. Successful femoral artery arteriogram  3. Supervision of the administration of moderate conscious sedation  4.  100% stenosis of the osteal / proximal LAD and 90 - 95% stenosis of the 1st diagonal.    5.  Luminal irregularities of the co - dominant circumflex marginal system  6. Moderate diffuse disease, between 50 and 70%, throughout the entire co - dominant right coronary artery. 7.  Left ventricular function is dramatically impaired in the anterior - apical - and inferior apical segments with a visually estimated ejection fraction of approximately 20%. Only the bases of the heart move well. 8.  Patent left [place], un grafted  9. Patent right [place], un grafted        RECOMMENDATIONS:     1.  Reassurance  2. Risk factor modification  3.   Evaluation of etiologies of non cardiac chest discomfort should symptoms persist or progress

## 2020-12-30 NOTE — PROGRESS NOTES
[facility]        Hospitalist Progress Note  12/30/2020 10:12 AM  Subjective:   Admit Date: 12/22/2020  PCP: No primary care provider on file. Chief Complaint: Dyspnea    Subjective: Patient seen at bedside. Comfortable. NPO for surgery -  [facility]. Cumulative Hospital History: The patient is a 77 y.o. male with a past medical history of ESRD on peritoneal dialysis, hypertension, hyperlipidemia, CAD who presented to an outside facility complaining of worsening dyspnea, sore throat and dysphagia since being diagnosed with COVID-19 found to have acute respiratory failure with hypoxia requiring 4L to maintain saturations at OSH. Patient transferred to our facility for further management. Found to have elevated procalcitonin and D-dimer started on empiric antibiotics and heparin drip pending cultures and CT angio PE study. Patient with worsening saturations/respiratory status transferred to CCU. Patient found to have blood cultures growing MSSA with infectious diseases consulted. CT angio pulmonary embolus study showing no evidence of pulmonary embolism and heparin drip discontinued and patient placed on prophylactic lovenox. Patient's respiratory status improving and he was transferred out of CCU. ID recommending Axrzbp-x-Rvdo removal.  General surgery consulted for port removal.    ROS: 14 point review of systems is negative except as specifically addressed above.     Diet NPO, After Midnight    Intake/Output Summary (Last 24 hours) at 12/30/2020 1012  Last data filed at 12/30/2020 1787  Gross per 24 hour   Intake 240 ml   Output 1740 ml   Net -1500 ml     Medications:   dextrose       Current Facility-Administered Medications   Medication Dose Route Frequency Provider Last Rate Last Admin    docusate sodium (COLACE) capsule 100 mg  100 mg Oral Daily Clare Borrero MD   100 mg at 12/29/20 1351    senna (SENOKOT) tablet 8.6 mg  1 tablet Oral Nightly Clare Borrero MD   8.6 mg at 12/29/20 2237  guaiFENesin-dextromethorphan (ROBITUSSIN DM) 100-10 MG/5ML syrup 5 mL  5 mL Oral Q4H PRN Karuna Grigsby MD        Vitamin D (CHOLECALCIFEROL) tablet 6,000 Units  6,000 Units Oral Daily Karuna Grigsby MD   6,000 Units at 12/29/20 0916    albuterol sulfate  (90 Base) MCG/ACT inhaler 2 puff  2 puff Inhalation Q6H PRN Karuna Grigsby MD   2 puff at 12/23/20 0544    insulin lispro (HUMALOG) injection vial 0-6 Units  0-6 Units Subcutaneous TID WC Karuna Grigsby MD   2 Units at 12/29/20 1733    insulin lispro (HUMALOG) injection vial 0-3 Units  0-3 Units Subcutaneous Nightly Karuna Grigsby MD   1 Units at 12/29/20 2129    glucose (GLUTOSE) 40 % oral gel 15 g  15 g Oral PRN Karuna Grigsby MD        dextrose 50 % IV solution  12.5 g Intravenous PRN Karuna Grigsby MD        glucagon (rDNA) injection 1 mg  1 mg Intramuscular PRN Karuna Grigsby MD        dextrose 5 % solution  100 mL/hr Intravenous PRN Karuna Grigsby MD        Benzocaine-Menthol (CEPACOL) 1 lozenge  1 lozenge Oral Q2H PRN Karuna Grigsby MD        phenol 1.4 % mouth spray 1 spray  1 spray Mouth/Throat Q2H PRN Karuna Grigsby MD   1 spray at 12/23/20 7226    budesonide-formoterol (SYMBICORT) 160-4.5 MCG/ACT inhaler 2 puff  2 puff Inhalation BID Karuna Grigsby MD   2 puff at 12/29/20 2116        Labs:     Recent Labs     12/28/20  0550 12/29/20  0130 12/30/20  0320   WBC 4.8 6.3 8.6   RBC 2.58* 2.62* 2.43*   HGB 8.3* 8.3* 7.8*   HCT 24.9* 25.5* 23.7*   MCV 96.5* 97.3* 97.5*   MCH 32.2* 31.7* 32.1*   MCHC 33.3 32.5* 32.9*    338 324     Recent Labs     12/28/20  0550 12/29/20  0130 12/30/20  0320   * 129* 127*   K 4.0 3.7 4.2   ANIONGAP 15 16 11   CL 90* 89* 91*   CO2 23 24 25   BUN 75* 75* 74*   CREATININE 6.2* 6.0* 6.3*   GLUCOSE 104 151* 99   CALCIUM 7.6* 7.5* 7.6*     No results for input(s): MG, PHOS in the last 72 hours.   Recent Labs     12/28/20  0550 12/29/20  0130 12/30/20  0320   AST 21 20 17 ALT 50* 31 8   BILITOT <0.2 <0.2 <0.2   ALKPHOS 61 67 62     ABGs:No results for input(s): PH, PO2, PCO2, HCO3, BE, O2SAT in the last 72 hours. Troponin T: No results for input(s): TROPONINI in the last 72 hours. INR:   Recent Labs     12/28/20  0550 12/29/20  0130 12/30/20  0320   INR 1.23* 1.20* 1.18     Lactic Acid: No results for input(s): LACTA in the last 72 hours. Objective:   Vitals: /76   Pulse 80   Temp 97.4 °F (36.3 °C) (Temporal)   Resp 16   Ht 5' 11\" (1.803 m)   Wt 143 lb (64.9 kg)   SpO2 96%   BMI 19.94 kg/m²   24HR INTAKE/OUTPUT:      Intake/Output Summary (Last 24 hours) at 12/30/2020 1012  Last data filed at 12/30/2020 3794  Gross per 24 hour   Intake 240 ml   Output 1740 ml   Net -1500 ml     General appearance: Alert  Head: NC/AT  Eyes: conjunctivae/corneas clear   Neck: Supple  Lungs: BLAE   Heart: RRR  Abdomen: BS+  Extremities: +pulses  Skin: warm  Neurologic: Alert, gross motor function intact  Psychiatric:  Mood appropriate    Assessment and Plan: Active Problems:    Renovascular hypertension    Secondary hyperparathyroidism of renal origin [facility])    Palliative care patient    ESRD (end stage renal disease) on dialysis ([facility] [address]    Anemia of chronic disease    Coronary artery disease involving native coronary artery of native heart without angina pectoris    Basal cell carcinoma (BCC) of face    Ischemic cardiomyopathy    COVID-19  Resolved Problems:    * No resolved hospital problems. *    COVID: Supportive management. Bronchodilators. O2 as needed. MSSA bacteremia: Ancef. Follow-up repeat cultures. Jmvkrb-j-Ijiv removal as general surgery - planned for today. DAPT held.     ESRD: Peritoneal dialysis patient. Nephrology on board.     Hypertension: Monitor BP and adjust medications as needed.     Supportive management.     Advance Directive: Full Code    DVT prophylaxis: Heparin    Discharge planning: TBD      Signed:  Jeffrey Ro MD 12/30/2020 10:12 AM [facility] Hospitalist

## 2020-12-30 NOTE — PROGRESS NOTES
SLP spoke with RN, Abimbola Means. She discussed he has not been taken for his procedure. He will remain NPO until the procedure is complete. RN stated pt is mad at this time and be cautious if entering room. SLP will return to assess pt if needed to assess diet texture and liquid consistency.     Electronically signed by Roderick Rivero on 12/30/20 at 1:37 PM CST

## 2020-12-30 NOTE — ANESTHESIA POSTPROCEDURE EVALUATION
Department of Anesthesiology  Postprocedure Note    Patient: Jose Roberto Ramos  MRN: [id number]  YOB: 1945  Date of evaluation: 12/30/2020  Time:  5:04 PM     Procedure Summary     Date: 12/30/20 Room / Location: [address]    Anesthesia Start: 9932 Anesthesia Stop: 1703    Procedure: PORT REMOVAL (N/A ) Diagnosis: (PORT REMOVAL)    Surgeons: Anthony Barrios MD Responsible Provider: Manjula Vergara MD    Anesthesia Type: MAC ASA Status: 4          Anesthesia Type: MAC    Macey Phase I:      Macey Phase II:      Last vitals: Reviewed and per EMR flowsheets.        Anesthesia Post Evaluation    Patient location during evaluation: PACU  Patient participation: complete - patient participated  Level of consciousness: awake and alert  Pain score: 0  Airway patency: patent  Nausea & Vomiting: no nausea and no vomiting  Complications: no  Cardiovascular status: blood pressure returned to baseline  Respiratory status: acceptable  Hydration status: stable

## 2020-12-30 NOTE — PROGRESS NOTES
INFECTIOUS DISEASES PROGRESS NOTE    Patient:  Leigh Elizabeth  YOB: 1945  MRN: [id number]   Admit date: 12/22/2020   Admitting Physician: Angelia Winslow MD  Primary Care Physician: No primary care provider on file. CHIEF COMPLAINT: \"Waiting for surgery\"    Interval History: Patient unhappy that he has been n.p.o. and waiting for surgery. Port in place for 6-7 years for throat cancer    No oxygen at home per patient. Allergies:    Allergies   Allergen Reactions    Diphenhydramine      Other reaction(s): Blacked out    Sulfa Antibiotics      made me feel like I had the flu       Current Meds:     docusate sodium (COLACE) capsule 100 mg, Daily      senna (SENOKOT) tablet 8.6 mg, Nightly      lactulose (CHRONULAC) 10 GM/15ML solution 20 g, TID      bisacodyl (DULCOLAX) suppository 10 mg, Daily PRN      ceFAZolin (ANCEF) 1 g in sterile water 10 mL IV syringe, Q24H      heparin (porcine) injection 5,000 Units, 3 times per day      0.9 % sodium chloride bolus, PRN      [Held by provider] lisinopril (PRINIVIL;ZESTRIL) tablet 2.5 mg, Daily      darbepoetin jerald-polysorbate (ARANESP) injection 60 mcg, Weekly      ALPRAZolam (XANAX) tablet 0.25 mg, Nightly PRN      [Held by provider] aspirin chewable tablet 81 mg, Daily      atorvastatin (LIPITOR) tablet 40 mg, Nightly      carvedilol (COREG) tablet 3.125 mg, BID WC      [Held by provider] clopidogrel (PLAVIX) tablet 75 mg, Daily      levothyroxine (SYNTHROID) tablet 50 mcg, Daily      sevelamer (RENVELA) tablet 800 mg, TID      vitamin D (ERGOCALCIFEROL) capsule 50,000 Units, Q7 Days      acetaminophen (TYLENOL) tablet 650 mg, Q6H PRN    Or      acetaminophen (TYLENOL) suppository 650 mg, Q6H PRN      guaiFENesin-dextromethorphan (ROBITUSSIN DM) 100-10 MG/5ML syrup 5 mL, Q4H PRN      Vitamin D (CHOLECALCIFEROL) tablet 6,000 Units, Daily      albuterol sulfate  (90 Base) MCG/ACT inhaler 2 puff, Q6H PRN   insulin lispro (HUMALOG) injection vial 0-6 Units, TID WC      insulin lispro (HUMALOG) injection vial 0-3 Units, Nightly      glucose (GLUTOSE) 40 % oral gel 15 g, PRN      dextrose 50 % IV solution, PRN      glucagon (rDNA) injection 1 mg, PRN      dextrose 5 % solution, PRN      Benzocaine-Menthol (CEPACOL) 1 lozenge, Q2H PRN      phenol 1.4 % mouth spray 1 spray, Q2H PRN      budesonide-formoterol (SYMBICORT) 160-4.5 MCG/ACT inhaler 2 puff, BID        Review of Systems   Constitutional: Negative for fever. Respiratory: Positive for shortness of breath. Vital Signs:  BP (!) 140/74   Pulse 79   Temp 97.4 °F (36.3 °C) (Temporal)   Resp 16   Ht 5' 11\" (1.803 m)   Wt 143 lb (64.9 kg)   SpO2 95%   BMI 19.94 kg/m²   Temp (24hrs), Av.6 °F (36.4 °C), Min:97.4 °F (36.3 °C), Max:98.1 °F (36.7 °C)      Physical Exam   Surgical staff up on the floor to get patient to take for surgery currently. Respiratory: Effort was even and minimally labored.   He was down to 4.5 L (6 L yesterday when I talk to him)  Neuro: Alert, oriented able to give history without much difficulty  Psych: Pleasant cooperativelikely because he was going for surgery as had been agitated earlier      LAB RESULTS:    CBC with DIFF:  Recent Labs     20  0550 20  0130 20  0320   WBC 4.8 6.3 8.6   RBC 2.58* 2.62* 2.43*   HGB 8.3* 8.3* 7.8*   HCT 24.9* 25.5* 23.7*   MCV 96.5* 97.3* 97.5*   MCH 32.2* 31.7* 32.1*   MCHC 33.3 32.5* 32.9*   RDW 14.3 14.3 14.1    338 324   MPV 9.9 10.1 10.1   NEUTOPHILPCT 95.0* 95.0* 92.7*   LYMPHOPCT 1.0* 5.0* 3.1*   MONOPCT 1.0 0.0 3.2   EOSRELPCT 0.0 0.0 0.1   BASOPCT 0.0 0.0 0.1   NEUTROABS 4.7 6.0 8.0*   LYMPHSABS 0.0* 0.3* 0.3*   MONOSABS 0.00 0.00 0.30   EOSABS 0.00 0.00 0.00   BASOSABS 0.00 0.00 0.00       CMP/BMP:  Recent Labs     20  0550 20  0130 20  0320   * 129* 127*   K 4.0 3.7 4.2   CL 90* 89* 91*   CO2 23 24 25   ANIONGAP 15 16 11 GLUCOSE 104 151* 99   BUN 75* 75* 74*   CREATININE 6.2* 6.0* 6.3*   LABGLOM 9* 9* 9*   CALCIUM 7.6* 7.5* 7.6*   PROT 5.2* 5.2* 4.7*   LABALBU 1.9* 1.9* 1.7*   BILITOT <0.2 <0.2 <0.2   ALKPHOS 61 67 62   ALT 50* 31 8   AST 21 20 17         Culture Results:  Blood cultures positive for methicillin susceptible Staphylococcus aureus on December 23, December 25, and December 27. Blood cultures ordered yesterday December 28 do not appear to have been collected  Blood cultures ordered today December 29 no growth to date  Blood cultures December 30 \"sent\"      Respiratory culture on December 22 + for heavy growth of Staph aureus, heavy growth of yeast and some normal respiratory areli. Urine Legionella and urine strep pneumo antigens negative        RADIOLOGY      Xr Chest Portable    Result Date: 12/22/2020  XR CHEST PORTABLE 12/22/2020 7:03 PM History: Short of breath. Covid positive. Portable chest x-ray compared with 4 March 2019. Chronic interstitial lung disease. Patchy pneumonia within the right upper lobe, left lower lobe, and within the base of the left upper lobe. Less prominent disease within the right lower lobe. Relative sparing of the left apex. Stable heart and mediastinum. Aortic arch calcification. Unchanged left subclavian port. 1. Bilateral pneumonia compatible with the history of Covid positive.  Signed by Dr Jose Carlos Gonzalez on 12/22/2020 7:04 PM            Patient Active Problem List   Diagnosis    NSTEMI (non-ST elevated myocardial infarction) ([address]    Renovascular hypertension    Secondary hyperparathyroidism of renal origin ([address]    Anemia due to stage 4 chronic kidney disease ([address]    Palliative care patient    ESRD (end stage renal disease) on dialysis ([address]    Anemia of chronic disease    Peritoneal dialysis catheter dysfunction ([address]    Skin lesion of face    Coronary artery disease involving native coronary artery of native heart without angina pectoris  Basal cell carcinoma (BCC) of face    Chest pain    Chest discomfort    Ischemic cardiomyopathy    COVID-19       IMPRESSION:    1. Methicillin susceptible Staphylococcus aureus bacteremia from blood cultures December 23, December 25 and December 27. Presumed port infection. Possible pulmonary source. 2. Acute respiratory failure with hypoxemia and patient with COVID-19 infection with possible bacterial pneumonia as well based on sputum cultures. (CT angiogram did not reveal any definite consolidation)appears patient did receive some remdesivir  3. End-stage renal disease on peritoneal dialysis  4. History of throat cancer per patient in remission. RECOMMENDATIONS :  · Continue to encouraged prone postitioning is much as possible with PD catheter.   Patient acknowledged this has been discussed with him  · Continue O2 supplementation and wean as tolerated  · Continue cefazolin  · Agree with removal of port        Marck Mratins MD

## 2020-12-31 NOTE — ADT AUTH CERT
Pneumonia - Care Day 9 (12/30/2020) by Amisha Vasquez RN       Review Status Review Entered   Completed 12/30/2020 15:16      Criteria Review      Care Day: 9 Care Date: 12/30/2020 Level of Care:    Guideline Day 3    Clinical Status    ( ) * Hemodynamic stability    ( ) * Afebrile or temperature acceptable for next level of care    ( ) * Tachypnea absent    ( ) * Hypoxemia absent    ( ) * Mental status at baseline    ( ) * Antibiotic regimen acceptable for next level of care    ( ) * Discharge plans and education understood    Activity    ( ) * Ambulatory or acceptable for next level of care    Routes    ( ) * Oral hydration, medications, and diet    Interventions    ( ) * Oxygen absent or at baseline need    ( ) * Isolation not indicated, or is performable at next level of care    * Milestone   Additional Notes   CARE DAY 9       12/30/2020      REMAINS IN COVID UNIT REQUIRING O2 @3LPM   HAVING PORT REMOVED TODAY      Chief Complaint: Dyspnea       Subjective: Patient seen at bedside.  Comfortable.  NPO for surgery -  hungry.       Coronary artery disease involving native coronary artery of native heart without angina pectoris     Basal cell carcinoma (BCC) of face     Ischemic cardiomyopathy     COVID-19   Resolved Problems:     * No resolved hospital problems. *       COVID: Supportive management.  Bronchodilators.  O2 as needed.        MSSA bacteremia: Ancef.  Follow-up repeat cultures.  Openht-f-Scmd removal as general surgery - planned for today. DAPT held.       ESRD: Peritoneal dialysis patient.  Nephrology on board.       Hypertension: Monitor BP and adjust medications as needed.       Supportive management.       Advance Directive: Full Code       DVT prophylaxis: Heparin       Discharge planning: TBD      IMPRESSION:       1. Methicillin susceptible Staphylococcus aureus bacteremia from blood cultures December 23, December 25 and December 27.  Presumed port infection.  Possible pulmonary source. 2. Acute respiratory failure with hypoxemia and patient with COVID-19 infection with possible bacterial pneumonia as well based on sputum cultures.  (CT angiogram did not reveal any definite consolidation)-appears patient did receive some remdesivir   3. End-stage renal disease on peritoneal dialysis   4.  History of throat cancer per patient in remission.       RECOMMENDATIONS :   · Encouraged prone postitioning is much as possible with PD catheter.  Patient acknowledged this has been discussed with him   · Continue O2 supplementation and wean as tolerated   · dexamethasone day #8.  As per Dr. Kevin Calvillo previous recommendation, would truncate as soon as possible given staph aureus bacteremia   · Continue cefazolin   · Agree with removal of port                Pneumonia - Care Day 6 (12/27/2020) by Inge Mcduffie RN       Review Status Review Entered   Completed 12/28/2020 14:00      Criteria Review      Care Day: 6 Care Date: 12/27/2020 Level of Care:    Guideline Day 3    Clinical Status    ( ) * Hemodynamic stability ( ) * Afebrile or temperature acceptable for next level of care    ( ) * Tachypnea absent    ( ) * Hypoxemia absent    ( ) * Mental status at baseline    ( ) * Antibiotic regimen acceptable for next level of care    ( ) * Discharge plans and education understood    Activity    ( ) * Ambulatory or acceptable for next level of care    Routes    ( ) * Oral hydration, medications, and diet    Interventions    ( ) * Oxygen absent or at baseline need    ( ) * Isolation not indicated, or is performable at next level of care    * Milestone   Additional Notes   CARE DAY 6       2020      TRANSFERRING TO COVID UNIT ON THE FLOOR TODAY REQUIRING O2 @3LPM      Subjective:  69 y.o. male with a past medical history of ESRD on peritoneal dialysis, hypertension, hyperlipidemia, CAD who presented to an outside facility complaining of worsening dyspnea, sore throat and dysphagia since being diagnosed with COVID-19 found to have acute respiratory failure with hypoxia requiring 4L to maintain saturations at OSH. Patient transferred to our facility for further management. Found to have elevated procalcitonin and D-dimer started on empiric antibiotics, CT angio was negative for PE study, hence heparin was discontinued and initiated on Lovenox  Patient with worsening saturations/respiratory status transferred to CCU  evening, initiated on Remdesivir , now improved and weaned down to 3L. Blood culture from  growing S.aureus. Initiated on Vanc and discontinued cefepime. Repeat BC ordered  also positive, ID consulted.  Patient Transfer to 4th floor today       Review of Systems: As stated above       Objective         Vitals Last 24 Hours:   TEMPERATURE:  Temp  Av.4 °F (36.3 °C)  Min: 97.2 °F (36.2 °C)  Max: 97.8 °F (36.6 °C)   RESPIRATIONS RANGE: Resp  Av  Min: 12  Max: 38   PULSE OXIMETRY RANGE: SpO2  Av.4 %  Min: 81 %  Max: 98 %   PULSE RANGE: Pulse  Av.3  Min: 73  Max: 89 BLOOD PRESSURE RANGE: Systolic (53PYM), DOC:534 , Min:66 , KCT:153    ; Diastolic (29GEY), XHP:79, Min:47, Max:93         WBC 4.3   HGB 8.7      CL 90   BUN 76   CREAT 6.2      Acute hypoxic respiratory failure   COVID-19 positive   -CT chest with bilateral interstitial infiltrates   -Noted to have elevated CRP, LDH, ferritin and D-dimer   -Procalcitonin levels also elevated   -Continue to monitor LFTs   -Lymphopenia noted continue to monitor daily CBC   -Respiratory therapy, maintain O2 sats greater than 92%   -Inhaler therapy   -Continue dexamethasone 6 mg day #6/10   -Initiated on remdesivir 12/24/2020 - 12/28   -Anticoagulation measures, Lovenox 30 mg twice daily   -Avoid BiPAP/nebulized therapies to avoid aerosolization       Bacteremia (S.Aureus)   Blood culture with positive results x2; repeat ordered today   Continue with vancomycin 12/23 - present   ID consulted       ESRD: Peritoneal dialysis patient.  Nephrology following.       Hypertension: Monitor BP and adjust medications as needed.       Hypothyroidism: Continue Synthroid       History of anxiety: Continue benzo at nighttime as needed       History of coronary artery disease   Continue Plavix, beta-blocker and lisinopril. Advance Directive: Full Code   DVT prophylaxis: lovenox    Discharge planning: TBD          Assessment       1. ESRD   2. Hypertensive nephropathy   3. COVID pneumonia   4. Acute resp failure   5. Hyponatremia   6. Hyperkalemia   7. Anemia of CKD           Plan: Will provide more PD tonight. Continue current management. Follow up labs.  Continue Aranesp for anemia management.          INFECTIOUS DISEASES CONSULT NOTE   Reason for Consultation: Bacteremia     History of Present Illness/Chief Complaint: 29-year-old man. Laura Hernandez was admitted on December 22, 2020. Laura Hernandez has end-stage renal disease.  He is on peritoneal dialysis. Laura Hernandez has a prior history of head neck cancer.  He has Kvqqyj-[id number]-[id number] in place. Laura Hernandez had presented to an outside facility. Laura Hernandez had had dyspnea, sore throat and dysphagia.  He was diagnosed with COVID-19.  He had hypoxemia requiring oxygen supplementation.  He was transferred to [facility] for further management. Laura Hernandez has had blood cultures growing Staph aureus. Laura Hernandez has been on vancomycin treatment. Laura Hernandez has been here since the 22nd.  I was asked to see him to offer additional recommendations. Scheduled Medications   · [START ON 12/28/2020] vancomycin 1,000 mg Intravenous Once   · vancomycin 1,000 mg Intravenous Once   · heparin (porcine) 5,000 Units Subcutaneous 3 times per day   · remdesivir IVPB 100 mg Intravenous Q24H   · vancomycin (VANCOCIN) intermittent dosing (placeholder) Other RX Placeholder   · [Held by provider] lisinopril 2.5 mg Oral Daily   · darbepoetin jerald-polysorbate 60 mcg Subcutaneous Weekly   · aspirin 81 mg Oral Daily   · atorvastatin 40 mg Oral Nightly   · carvedilol 3.125 mg Oral BID WC   · clopidogrel 75 mg Oral Daily   · levothyroxine 50 mcg Oral Daily   · sevelamer 800 mg Oral TID   · vitamin D 50,000 Units Oral Q7 Days   · dexamethasone 6 mg Oral Daily   · Vitamin D 6,000 Units Oral Daily   · insulin lispro 0-6 Units Subcutaneous TID WC   · insulin lispro 0-3 Units Subcutaneous Nightly   · budesonide-formoterol 2 puff Inhalation BID         Impression:    COVID-19 infection/pneumonia-seems to be improving   MSSA bloodstream infection-possible pulmonary source.  Possible [id number] source.    End-stage renal disease on hemodialysis   Hypertension   Coronary artery disease   History head neck cancer       Recommendations:     Continue antibiotic treatment for Staph aureus

## 2020-12-31 NOTE — PROGRESS NOTES
Speech Language Pathology  Facility/Department: [facility] 4 ONCOLOGY UNIT  SWALLOW THERAPY     NAME: Yassine Lara  : 1945  MRN: [id number]    ADMISSION DATE: 2020  ADMITTING DIAGNOSIS: has NSTEMI (non-ST elevated myocardial infarction) ([facility] Renovascular hypertension; Secondary hyperparathyroidism of renal origin ([facility] Anemia due to stage 4 chronic kidney disease ([facility] Palliative care patient; ESRD (end stage renal disease) on dialysis ([facility] Anemia of chronic disease; Peritoneal dialysis catheter dysfunction ([facility] Skin lesion of face; Coronary artery disease involving native coronary artery of native heart without angina pectoris; Basal cell carcinoma (BCC) of face; Chest pain; Chest discomfort; Ischemic cardiomyopathy; COVID-19; and Port or reservoir infection on their problem list.    Date of Treat: 2020  Evaluating Therapist: Stormy Penn    Current Diet level:  Soft and bite sized consistency with thin liquids    Primary Complaint  Patient requesting regular solid consistency. Reason for Referral  Yassine Lara was referred for a bedside swallow evaluation to assess the efficiency of his swallow function, identify signs and symptoms of aspiration and make recommendations regarding safe dietary consistencies, effective compensatory strategies, and safe eating environment. Impression  Re-assessed patient's swallowing function. Patient exhibits sluggish, mild-moderately decreased laryngeal elevation for swallow airway protection. It is noted that patient exhibited inconsistent delayed coughs with every consistency administered during treatment session this date (puree; regular solid;thin H2O).  However, do not feel all coughs were related to penetration/aspiration secondary to timing of swallows and presence of throat movement and patient exhibited inconsistent coughs at rest.    At this time, per patient request, would trial regular solid consistency. Continue thin liquids. Self-feed. Will continue to follow.     Treatment Plan  Requires SLP Intervention: Yes     Recommended Diet and Intervention  Solid Consistency Recommendation: Regular solid    Liquid Consistency Recommendation: Thin  Recommended Form of Meds: PO  Therapeutic Interventions: Patient/Family education;Diet tolerance monitoring     Compensatory Swallowing Strategies  Compensatory Swallowing Strategies: Upright as possible for all oral intake;Small bites/sips;Eat/Feed slowly; Alternate solids and liquids; Remain upright for 30-45 minutes after meals     Treatment/Goals  Timeframe for Short-term Goals: 1x/day for 3 days   Goal 1: Patient will tolerate regular solid consistency with thin liquids with min S/S penetration/aspiration during PO intake. Goal 2: Patient staff will follow swallow safety recommendations to decrease risk of penetration/aspiration during PO intake.      General  Chart Reviewed: Yes  Behavior/Cognition: Alert; Cooperative  O2 Device: Nasal Cannula  Communication Observation: (SLP ranked functional intelligibility of speech for unfamiliar listeners at 100% in utterances with background noise present. Hoarse vocal quality was noted during verbalizations.)  Follows Directions: Simple   Patient Positioning: Upright in bed  Consistencies Administered: Dysphagia Pureed (Dysphagia I); Regular solid; Thin - cup      Re-assessed patient's swallowing function with the following observations noted:     Oral Phase Oral Phase - Comment: Oral transit of puree consistency presentations, administered independently, primarily measured 1-2 seconds in length and no oral cavity residue was noted post swallows. Patient exhibited adequate oral prep of regular solid consistency trials presented independently. Oral transit of regular solid consistency primarily measured 1-2 seconds in length and min oral cavity residue was observed post swallows; residue cleared from the mouth with additional dry swallows. Oral transit of thin H2O presentations, administered independently via cup, primarily measured 1-2 seconds in length.      Pharyngeal Phase  Laryngeal Elevation: (Patient exhibited sluggish, mild-moderately decreased laryngeal elevation for swallow airway protection.)  Delayed Cough: All  Pharyngeal Phase - Comment: It is noted that patient exhibited inconsistent delayed coughs with every consistency administered during treatment session this date (puree; regular solid;thin H2O). However, do not feel all coughs were related to penetration/aspiration secondary to timing of swallows and presence of throat movement and patient exhibited inconsistent coughs at rest.     At this time, per patient request, would trial regular solid consistency. Continue thin liquids. Self-feed.  Will continue to follow.     Electronically signed by ARCHANA Pace on 12/31/2020 at 12:28 PM

## 2020-12-31 NOTE — OP NOTE
HUMAIRA CHHAYA [facility] [address], [address] [facility]                                OPERATIVE REPORT    PATIENT NAME: Li Hale                    :        1945  MED REC NO:   [id number]                              ROOM:       [facility]  ACCOUNT NO:   [de-identified]                           ADMIT DATE: 2020  PROVIDER:     Lyudmila Brush MD    DATE OF PROCEDURE:  2020    Intraoperative procedural note as an adjunct to Dr. Yareli Santiago note. PREOPERATIVE DIAGNOSES:  Infected port. POSTOPERATIVE DIAGNOSIS: Infected port. PROCEDURE PERFORMED:  Endovascular removal of subclavian/surperior vena cava catheter. SURGEON:  For this portion of the surgery is Alex Walker.    This portion of the surgery is removal of intravenous catheter, endovascular approach, right  femoral venous approach, 8-Pashto catheter. HISTORY:  The patient is a 80-year-old male, who was thought to be  septic from an infected port. Dr. Que Mcdonald had not placed this port. The port   had been apparently in the patient for years. Dr Que Mcdonald had bbrought him to the  operating room under the COVID protocol for removal of the port. He had  actually removed the port as well as 2 cuffs, which were on the portion  of the catheter near the actual port itself, and however, even though  the catheter seemingly was free,  the catheter seemed to be \"stuck\" and would not slide out through the subclavicular incision. I was asked to come in to help remove the catheter portion of  the port, the port having already been removed, see Dr. Yareli Santiago report. Upon entry to the room, I did examine the subclavicular wound. I did  not find any other cuffs keeping the catheter in place; however, when I  tried to remove the catheter, it simply stretched and it would not  budge.   I even did this under fluoro to see if the tip was slowly retracting, which it was not. I also manipulated the arm, thinking that  perhaps this was pinched between the clavicle and the first rib and in  no position of the arm could I get the catheter removed. At this point, I made the decision to attempt to remove this catheter  with a snare from the distal portion of the catheter. Using a micropuncture access needle, I accessed the right femoral vein,  and over a wire, placed an 8-Romanian sheath. I had placed an angled  Glidewire through the catheter from the proximal portion to make sure  that the end was free and not adherent by a fibrin sheath to the wall of  the vena cava. I then passed a snare up through my femoral sheath and  snared the end of the catheter. Applying gentle-to-moderate retraction  on the snared catheter, the catheter finally began to move and I  captured it with the snare. We watched all this under fluoroscopic  guidance and we were able to remove the entire length of the catheter  including the portion, which was still in the subclavicular wound. I  then pulled this out through the femoral vein removing the catheter, the  wire, the snare as well as the femoral sheath. Pressure was held at the  femoral puncture site for approximately 20 minutes. Fluoroscopy  confirmed that we had no portion of the catheter remaining in the  patient. The supraclavicular wound was packed by Dr. Teresita Gonzales and as such  did stop bleeding. That portion of the procedure would be dictated by  Dr. Teresita Gonzaels. The estimated blood loss for the catheter removal portion of the  procedure was less than 50 mL. See Dr. Arcelia Lind note for the total  blood loss. The entire catheter was removed as well as the port.         Keith Aguayo MD    D: 12/30/2020 18:26:08      T: 12/30/2020 18:32:50     [email or web address]  Job#: [id number]     Doc#: [id number]    CC:

## 2020-12-31 NOTE — PROGRESS NOTES
Physical Therapy    Facility/Department: [facility] ONCOLOGY UNIT  Initial Assessment    NAME: Chiqui Varner  : 1945  MRN: [id number]    Date of Service: 2020    Discharge Recommendations:  Continue to assess pending progress, Patient would benefit from continued therapy after discharge        Assessment   Body structures, Functions, Activity limitations: Decreased functional mobility ; Decreased strength;Decreased safe awareness;Decreased cognition;Decreased posture; Increased pain;Decreased balance;Decreased endurance  Assessment: Pt. will benefit from cont PT to decrease impairments. Pt.'s BP needs to be closely monitored due to pt near passing out. Pt. in a very weakened state and is unable to mobilize on his own and finds difficulty with A. Pt. light headed and had decreased responsiveness with standing. Pt. returned to supine. Treatment Diagnosis: deconditioning  Prognosis: Fair  Decision Making: Medium Complexity  PT Education: Goals;PT Role;Plan of Care;General Safety;Transfer Training;Functional Mobility Training;Gait Training  Barriers to Learning: lethargic  REQUIRES PT FOLLOW UP: Yes  Activity Tolerance  Activity Tolerance: Patient limited by endurance; Patient limited by cognitive status; Other  Activity Tolerance: drowsy       Patient Diagnosis(es): There were no encounter diagnoses. has a past medical history of Anxiety, Asthma, Benign essential HTN, Benign hypertensive kidney disease, CAD (coronary artery disease), Chronic kidney disease, stage IV (severe) ([address] ESRD (end stage renal disease) ([address] Hemodialysis patient ([address] History of blood transfusion, Hyperlipidemia, Palliative care patient, Prolonged emergence from general anesthesia, Renal osteodystrophy, Skin cancer, Throat cancer ([address] and Thyroid disease. has a past surgical history that includes Throat surgery; Cystoscopy; Tunneled venous port placement; LAPAROSCOPY INSERTION PERITONEAL CATHETER (N/A, 7/18/2016); hernia repair; Prostate surgery; pr lap insertion tunneled intraperitoneal catheter (N/A, 10/22/2018); pr insertion tunnel intraperitoneal cath dial open (N/A, 11/28/2018); LAPAROSCOPY INSERTION PERITONEAL CATHETER (N/A, 1/28/2019); vascular surgery (07/10/2019); and Port Surgery (N/A, 12/30/2020). Restrictions  Restrictions/Precautions  Restrictions/Precautions: Fall Risk, Isolation, Contact Precautions  Required Braces or Orthoses?: No  Position Activity Restriction  Other position/activity restrictions: MSSA, droplet prec  Vision/Hearing  Hearing: Within functional limits     Subjective  General  Chart Reviewed: Yes  Patient assessed for rehabilitation services?: Yes  Response To Previous Treatment: Not applicable  Family / Caregiver Present: No  Referring Practitioner: Ahmet Brothers MD  Referral Date : 12/29/20  Diagnosis: renovascular hypertension, covid 23, acute hypoxemic respiratory failure, infected port  Follows Commands: Impaired  Other (Comment): needs extra time and repetition to follow commands. General Comment  Comments: RN, Richelle Elizondo and states pt is very weak after port removal last night and not eating much. 12/30 port removal  Subjective  Subjective: Pt. willing to try some mobility but states he was really just exhaused.   Pain Screening  Patient Currently in Pain: Denies  Pain Assessment  Pain Assessment: 0-10  Pain Level: 0  Functional Pain Assessment: Prevents or interferes some active activities and ADLs  Response to Pain Intervention: Patient Satisfied  Vital Signs  Patient Currently in Pain: Denies  Pre Treatment Pain Screening  Intervention List: Patient able to continue with treatment    Orientation  Orientation  Overall Orientation Status: Within Functional Limits  Social/Functional History Social/Functional History  Lives With: Spouse  Type of Home: House  Home Layout: One level  Home Access: Stairs to enter without rails  Entrance Stairs - Number of Steps: 1  Home Equipment: Rolling walker, Oxygen  ADL Assistance: Independent  Ambulation Assistance: Independent  Transfer Assistance: Independent  Additional Comments: Per pt., was independent previously  Cognition   Cognition  Overall Cognitive Status: Exceptions  Following Commands: Follows one step commands with increased time  Attention Span: Difficulty dividing attention  Memory: Appears intact  Safety Judgement: Decreased awareness of need for assistance  Problem Solving: Assistance required to generate solutions;Assistance required to implement solutions  Insights: Decreased awareness of deficits  Initiation: Requires cues for all  Sequencing: Requires cues for all  Cognition Comment: pt does not initiate and follow through with mobility    Objective     Observation/Palpation  Posture: Good  Observation: IV L foot, peritoneal dialysis (not hooked up), O2    AROM RLE (degrees)  RLE AROM: WFL  AROM LLE (degrees)  LLE AROM : WFL  Strength RLE  Strength RLE: Exception  Comment: grossly 3+/5  Strength LLE  Strength LLE: Exception  Comment: grossly 3+/5     Sensation  Overall Sensation Status: WFL  Bed mobility  Rolling to Left: Contact guard assistance  Rolling to Right: Contact guard assistance  Supine to Sit: Minimal assistance  Sit to Supine: Moderate assistance;2 Person assistance  Scootin Person assistance;Maximal assistance  Comment: pt sat on EOB x 3 mins, very dizzy  Transfers  Sit to Stand: Minimal Assistance  Stand to sit: Minimal Assistance  Comment: Min A x 2 with RW, once pt standing, pt unable to take more than 1 step to [facility]. Pt. unable to initiate sitting when PT told him to. Pt. needed Mod A x 2 to sit.   Ambulation  Ambulation?: No  Ambulation 1  Comments: pt took 1 step to [facility] and was unable to do more     Balance  Posture: Good Sitting - Static: Fair;-  Sitting - Dynamic: -;Fair  Standing - Static: +;Poor  Standing - Dynamic: Poor        Plan   Plan  Times per week: 3-7  Times per day: Daily  Plan weeks: 2  Current Treatment Recommendations: Strengthening, Balance Training, Functional Mobility Training, Transfer Training, Gait Training, Endurance Training, Pain Management, Safety Education & Training, Positioning, Equipment Evaluation, Education, & procurement, Patient/Caregiver Education & Training  Plan Comment: cont. PT per POC.   Safety Devices  Type of devices: Gait belt, Patient at risk for falls, Nurse notified, Left in bed, Call light within reach, Bed alarm in place    G-Code       OutComes Score                                                  AM-PAC Score             Goals  Short term goals  Time Frame for Short term goals: 2 wks  Short term goal 1: supine to sit indep  Short term goal 2: sit to stand indep  Short term goal 3: amb. 100' with RW  Patient Goals   Patient goals : go home       Therapy Time   Individual Concurrent Group Co-treatment   Time In           Time Out           Minutes                   Sivakumar Zamarripa PT    Electronically signed by Sivakumar Zamarripa PT on 12/31/2020 at 11:34 AM

## 2020-12-31 NOTE — PROGRESS NOTES
[facility]        Hospitalist Progress Note  12/31/2020 11:15 AM  Subjective:   Admit Date: 12/22/2020  PCP: No primary care provider on file. Chief Complaint: Dyspnea    Subjective: Patient seen at bedside. Lethargic. No complaints. Cumulative Hospital History: The patient is a 77 y.o. male with a past medical history of ESRD on peritoneal dialysis, hypertension, hyperlipidemia, CAD who presented to an outside facility complaining of worsening dyspnea, sore throat and dysphagia since being diagnosed with COVID-19 found to have acute respiratory failure with hypoxia requiring 4L to maintain saturations at OSH. Patient transferred to our facility for further management. Found to have elevated procalcitonin and D-dimer started on empiric antibiotics and heparin drip pending cultures and CT angio PE study. Patient with worsening saturations/respiratory status transferred to CCU. Patient found to have blood cultures growing MSSA with infectious diseases consulted. CT angio pulmonary embolus study showing no evidence of pulmonary embolism and heparin drip discontinued and patient placed on prophylactic lovenox. Patient's respiratory status improving and he was transferred out of CCU. ID recommending [id number] removal.  General surgery consulted for port removal.    ROS: 14 point review of systems is negative except as specifically addressed above. DIET GENERAL;     Intake/Output Summary (Last 24 hours) at 12/31/2020 1115  Last data filed at 12/31/2020 0931  Gross per 24 hour   Intake 50 ml   Output 350 ml   Net -300 ml     Medications:   dextrose       Current Facility-Administered Medications   Medication Dose Route Frequency Provider Last Rate Last Admin    aspirin chewable tablet 81 mg  81 mg Oral Daily Rocio Mi MD   81 mg at 12/31/20 0932    clopidogrel (PLAVIX) tablet 75 mg  75 mg Oral Daily Rocio Mi MD   75 mg at 12/31/20 5074  lisinopril (PRINIVIL;ZESTRIL) tablet 2.5 mg  2.5 mg Oral Daily Chapis Chavarria MD        docusate sodium (COLACE) capsule 100 mg  100 mg Oral Daily Chapis Chavarria MD   100 mg at 12/31/20 2044    senna (SENOKOT) tablet 8.6 mg  1 tablet Oral Nightly Chapis Chavarria MD   8.6 mg at 12/30/20 2128    lactulose ([facility]) 10 GM/15ML solution 20 g  20 g Oral TID Chapis Chavarria MD   20 g at 12/31/20 0932    bisacodyl (DULCOLAX) suppository 10 mg  10 mg Rectal Daily PRN Chapis Chavarria MD        ceFAZolin (ANCEF) 1 g in sterile water 10 mL IV syringe  1 g Intravenous Q24H Chapis Chavarria MD   1 g at 12/30/20 2319    heparin (porcine) injection 5,000 Units  5,000 Units Subcutaneous 3 times per day Chapis Chavarria MD   5,000 Units at 12/31/20 0453    0.9 % sodium chloride bolus  30 mL Intravenous PRN Chapis Chavarria MD        darbepoetin jerald-polysorbate SEVEN [facility]) injection 60 mcg  60 mcg Subcutaneous Weekly Chapis Chavarria MD   60 mcg at 12/23/20 1534    ALPRAZolam [facility]) tablet 0.25 mg  0.25 mg Oral Nightly PRN Chapis Chavarria MD   0.25 mg at 12/30/20 0005    atorvastatin (LIPITOR) tablet 40 mg  40 mg Oral Nightly Chapis Chavarria MD   40 mg at 12/30/20 2119    carvedilol (COREG) tablet 3.125 mg  3.125 mg Oral BID WC Chapis Chavarria MD   3.125 mg at 12/29/20 1732    levothyroxine (SYNTHROID) tablet 50 mcg  50 mcg Oral Daily Chapis Chavarria MD   50 mcg at 12/31/20 0453    sevelamer (RENVELA) tablet 800 mg  800 mg Oral TID Chapis Chavarria MD   800 mg at 12/31/20 6057    vitamin D (ERGOCALCIFEROL) capsule 50,000 Units  50,000 Units Oral Q7 Days Chapis Chavarria MD        acetaminophen (TYLENOL) tablet 650 mg  650 mg Oral Q6H PRN Chapis Chavarria MD        Or    acetaminophen (TYLENOL) suppository 650 mg  650 mg Rectal Q6H PRN Chapis Chavarria MD        guaiFENesin-dextromethorphan (ROBITUSSIN DM) 100-10 MG/5ML syrup 5 mL  5 mL Oral Q4H PRN Chapis Chavarria MD  Vitamin D (CHOLECALCIFEROL) tablet 6,000 Units  6,000 Units Oral Daily Mark Sy MD   6,000 Units at 12/31/20 0932    albuterol sulfate  (90 Base) MCG/ACT inhaler 2 puff  2 puff Inhalation Q6H PRN Mark Sy MD   2 puff at 12/23/20 0544    insulin lispro (HUMALOG) injection vial 0-6 Units  0-6 Units Subcutaneous TID WC Mark Sy MD   2 Units at 12/29/20 1733    insulin lispro (HUMALOG) injection vial 0-3 Units  0-3 Units Subcutaneous Nightly Mark Sy MD   1 Units at 12/29/20 2129    glucose (GLUTOSE) 40 % oral gel 15 g  15 g Oral PRN Mark Sy MD        dextrose 50 % IV solution  12.5 g Intravenous PRN Mark Sy MD        glucagon (rDNA) injection 1 mg  1 mg Intramuscular PRN Mark Sy MD        dextrose 5 % solution  100 mL/hr Intravenous PRN Mark Sy MD        Benzocaine-Menthol (CEPACOL) 1 lozenge  1 lozenge Oral Q2H PRN Mark Sy MD        phenol 1.4 % mouth spray 1 spray  1 spray Mouth/Throat Q2H PRN Mark Sy MD   1 spray at 12/23/20 2608    budesonide-formoterol (SYMBICORT) 160-4.5 MCG/ACT inhaler 2 puff  2 puff Inhalation BID Mark Sy MD   2 puff at 12/31/20 0935        Labs:     Recent Labs     12/29/20  0130 12/30/20  0320 12/31/20  0500   WBC 6.3 8.6 8.6   RBC 2.62* 2.43* 2.59*   HGB 8.3* 7.8* 8.3*   HCT 25.5* 23.7* 25.6*   MCV 97.3* 97.5* 98.8*   MCH 31.7* 32.1* 32.0*   MCHC 32.5* 32.9* 32.4*    324 363     Recent Labs     12/29/20  0130 12/30/20  0320 12/31/20  0500   * 127* 129*   K 3.7 4.2 4.3   ANIONGAP 16 11 17   CL 89* 91* 90*   CO2 24 25 22   BUN 75* 74* 74*   CREATININE 6.0* 6.3* 6.5*   GLUCOSE 151* 99 148*   CALCIUM 7.5* 7.6* 7.9*     No results for input(s): MG, PHOS in the last 72 hours.   Recent Labs     12/29/20  0130 12/30/20  0320 12/31/20  0500   AST 20 17 15   ALT 31 8 <5*   BILITOT <0.2 <0.2 <0.2   ALKPHOS 67 62 65 ABGs:No results for input(s): PH, PO2, PCO2, HCO3, BE, O2SAT in the last 72 hours. Troponin T: No results for input(s): TROPONINI in the last 72 hours. INR:   Recent Labs     12/29/20  0130 12/30/20  0320 12/31/20  0500   INR 1.20* 1.18 1.20*     Lactic Acid: No results for input(s): LACTA in the last 72 hours. Objective:   Vitals: BP (!) 93/55   Pulse 88   Temp 97.3 °F (36.3 °C) (Temporal)   Resp 20   Ht 5' 11\" (1.803 m)   Wt 143 lb (64.9 kg)   SpO2 90%   BMI 19.94 kg/m²   24HR INTAKE/OUTPUT:      Intake/Output Summary (Last 24 hours) at 12/31/2020 1115  Last data filed at 12/31/2020 0931  Gross per 24 hour   Intake 50 ml   Output 350 ml   Net -300 ml     General appearance: Alert  Head: NC/AT  Eyes: conjunctivae/corneas clear   Neck: Supple  Lungs: BLAE   Heart: RRR  Abdomen: BS+  Extremities: +pulses  Skin: warm  Neurologic: Alert, gross motor function intact  Psychiatric:  Mood appropriate    Assessment and Plan: Active Problems:    Renovascular hypertension    Secondary hyperparathyroidism of renal origin [facility])    Palliative care patient    ESRD (end stage renal disease) on dialysis ([facility]    Anemia of chronic disease    Coronary artery disease involving native coronary artery of native heart without angina pectoris    Basal cell carcinoma (BCC) of face    Ischemic cardiomyopathy    COVID-19    Port or reservoir infection  Resolved Problems:    * No resolved hospital problems. *    COVID: Supportive management. Bronchodilators. O2 as needed. MSSA bacteremia: Ancef. Blood cultures have cleared. Port has been removed with surgery 12/30/2020. Attempting antibiotics through PD catheter. Once in place possible discharge home.     ESRD: Peritoneal dialysis patient. Nephrology on board.     Hypertension: Monitor BP and adjust medications as needed.     Supportive management.     Advance Directive: Full Code    DVT prophylaxis: Heparin Discharge planning: TBD - soon pending antibiotic availability through PD catheter.       Signed:  Mary Jo Ritter MD 12/31/2020 11:15 AM  Rounding Hospitalist

## 2020-12-31 NOTE — PROGRESS NOTES
Anmol Scott M.D. [facility]  Daily Progress Note    Pt Name: Jeffry Record Number: [id number]  Date of Birth 1945   Today's Date: 12/31/2020    Chief Complaint:  No chief complaint on file. SUBJECTIVE:     Lillie Huerta is doing well. Pain is well controlled. He survived a terribly difficult port removal. Hopefully this will help his ongoing infection problems.  He is doing well we will just see him again as needed    MEDS:     Scheduled Meds:   aspirin  81 mg Oral Daily    clopidogrel  75 mg Oral Daily    lisinopril  2.5 mg Oral Daily    docusate sodium  100 mg Oral Daily    senna  1 tablet Oral Nightly    lactulose  20 g Oral TID    ceFAZolin  1 g Intravenous Q24H    heparin (porcine)  5,000 Units Subcutaneous 3 times per day    darbepoetin jerald-polysorbate  60 mcg Subcutaneous Weekly    atorvastatin  40 mg Oral Nightly    carvedilol  3.125 mg Oral BID WC    levothyroxine  50 mcg Oral Daily    sevelamer  800 mg Oral TID    vitamin D  50,000 Units Oral Q7 Days    Vitamin D  6,000 Units Oral Daily    insulin lispro  0-6 Units Subcutaneous TID WC    insulin lispro  0-3 Units Subcutaneous Nightly    budesonide-formoterol  2 puff Inhalation BID     Continuous Infusions:   dextrose       PRN Meds:    bisacodyl, 10 mg, Daily PRN      sodium chloride, 30 mL, PRN      ALPRAZolam, 0.25 mg, Nightly PRN      acetaminophen, 650 mg, Q6H PRN    Or      acetaminophen, 650 mg, Q6H PRN      guaiFENesin-dextromethorphan, 5 mL, Q4H PRN      albuterol sulfate HFA, 2 puff, Q6H PRN      glucose, 15 g, PRN      dextrose, 12.5 g, PRN      glucagon (rDNA), 1 mg, PRN      dextrose, 100 mL/hr, PRN      Benzocaine-Menthol, 1 lozenge, Q2H PRN      phenol, 1 spray, Q2H PRN        OBJECTIVE:     Patient Vitals for the past 24 hrs:   BP Temp Temp src Pulse Resp SpO2   12/31/20 0711 (!) 93/55 97.3 °F (36.3 °C) Temporal 88 20 90 % 12/31/20 0138 105/74 98 °F (36.7 °C) Temporal 95 22 90 %   12/30/20 2328 (!) 92/52 98.3 °F (36.8 °C) Temporal 89 16 90 %   12/30/20 2055 116/69 97.8 °F (36.6 °C) Temporal 85 16 92 %   12/30/20 1933 114/65 97.1 °F (36.2 °C) Temporal 86 20 91 %   12/30/20 1848 (!) 100/56 97.3 °F (36.3 °C) Temporal 85 18 93 %   12/30/20 1805 133/82 96.8 °F (36 °C) Temporal 79 16 94 %   12/30/20 1749 125/68 96.2 °F (35.7 °C) Temporal 86 16 97 %   12/30/20 1730 130/70   87 14 93 %   12/30/20 1725 137/88 97.7 °F (36.5 °C) Temporal 82 13 95 %   12/30/20 1720 (!) 137/92   91 13 95 %   12/30/20 1715 133/67   85 15 94 %   12/30/20 1710    87 13 94 %   12/30/20 1705 121/82   87 13 93 %   12/30/20 1700 122/67   88 15 (!) 81 %   12/30/20 1655 117/75   86 14 (!) 78 %   12/30/20 1654 117/75 97.1 °F (36.2 °C) Temporal 88 15 (!) 80 %         Intake/Output Summary (Last 24 hours) at 12/31/2020 1413  Last data filed at 12/31/2020 0931  Gross per 24 hour   Intake 50 ml   Output 350 ml   Net -300 ml       In: 50 [I.V.:50]  Out: 350 [Urine:350]    I/O last 3 completed shifts: In: 50 [I.V.:50]  Out: -      Date 12/31/20 0000 - 12/31/20 2359   Shift 1913-1942 3529-0413 0909-3096 24 Hour Total   INTAKE   Shift Total(mL/kg)       OUTPUT   Urine(mL/kg/hr)  350  350   Shift Total(mL/kg)  350(5.4)  350(5.4)   Weight (kg) 64.9 64.9 64.9 64.9       Wt Readings from Last 3 Encounters:   12/30/20 143 lb (64.9 kg)   09/29/20 150 lb (68 kg)   01/23/20 153 lb (69.4 kg)        Body mass index is 19.94 kg/m².      Diet: DIET GENERAL;    LABS:     CBC:   Recent Labs     12/29/20 0130 12/30/20  0320 12/31/20  0500   WBC 6.3 8.6 8.6   RBC 2.62* 2.43* 2.59*   HGB 8.3* 7.8* 8.3*   HCT 25.5* 23.7* 25.6*   MCV 97.3* 97.5* 98.8*   MCH 31.7* 32.1* 32.0*   MCHC 32.5* 32.9* 32.4*   RDW 14.3 14.1 14.6*    324 363   MPV 10.1 10.1 10.2      Last 3 CMP:   Recent Labs     12/29/20  0130 12/30/20  0320 12/31/20  0500   * 127* 129*   K 3.7 4.2 4.3 CL 89* 91* 90*   CO2 24 25 22   BUN 75* 74* 74*   CREATININE 6.0* 6.3* 6.5*   GLUCOSE 151* 99 148*   CALCIUM 7.5* 7.6* 7.9*   PROT 5.2* 4.7* 5.0*   LABALBU 1.9* 1.7* 1.8*   BILITOT <0.2 <0.2 <0.2   ALKPHOS 67 62 65   AST 20 17 15   ALT 31 8 <5*          PHYSICAL EXAM:     CONSTITUTIONAL: Alert, appropriate, no acute distress  SKIN: warm, dry with no rashes or lesions  EYES: Non icteric  CHEST/LUNGS: CTA bilaterally  CARDIOVASCULAR: RRR    ABDOMEN: soft, ND, appropriately TTP, +BS  Incisions: Clean, dry, and intact with no erythema or induration  NEUROLOGIC: CN II-XI grossly intact, no motor or sensory deficits   EXTREMITIES: warm, well perfused, no edema     ASSESSMENT:       1. HD # 9  Patient Active Problem List   Diagnosis    NSTEMI (non-ST elevated myocardial infarction) ([facility])    Renovascular hypertension    Secondary hyperparathyroidism of renal origin ([address]    Anemia due to stage 4 chronic kidney disease ([address]    Palliative care patient    ESRD (end stage renal disease) on dialysis (HCC)    Anemia of chronic disease    Peritoneal dialysis catheter dysfunction (HCC)    Skin lesion of face    Coronary artery disease involving native coronary artery of native heart without angina pectoris    Basal cell carcinoma (BCC) of face    Chest pain    Chest discomfort    Ischemic cardiomyopathy    COVID-19    Port or reservoir infection   2. PLAN:     1. We'll see again if needed. 2.     Felicita Solorio M.D.  FACS  Electronically signed 12/31/2020 at 2:13 PM

## 2020-12-31 NOTE — CONSULTS
Consults   Intraoperative consult to remove central venous catheter. I was called during procedure to assist with removal of central venous catheter. See op report for details.

## 2020-12-31 NOTE — PROGRESS NOTES
INFECTIOUS DISEASES PROGRESS NOTE    Patient:  Gianluca Burgess  YOB: 1945  MRN: [id number]   Admit date: 12/22/2020   Admitting Physician: Rosa Maria Solitario MD  Primary Care Physician: No primary care provider on file. CHIEF COMPLAINT: hoarse voice - \"since this covid\"    Interval History: Patient is status post port removal December 30 per Dr. Pascale Gannon. He states his chest is \"not really sore\"       Follow-up blood cultures negative thus far    Allergies:    Allergies   Allergen Reactions    Diphenhydramine      Other reaction(s): Blacked out    Sulfa Antibiotics      made me feel like I had the flu       Current Meds:     aspirin chewable tablet 81 mg, Daily      clopidogrel (PLAVIX) tablet 75 mg, Daily      lisinopril (PRINIVIL;ZESTRIL) tablet 2.5 mg, Daily      docusate sodium (COLACE) capsule 100 mg, Daily      senna (SENOKOT) tablet 8.6 mg, Nightly      lactulose (CHRONULAC) 10 GM/15ML solution 20 g, TID      bisacodyl (DULCOLAX) suppository 10 mg, Daily PRN      ceFAZolin (ANCEF) 1 g in sterile water 10 mL IV syringe, Q24H      heparin (porcine) injection 5,000 Units, 3 times per day      0.9 % sodium chloride bolus, PRN      darbepoetin jerald-polysorbate (ARANESP) injection 60 mcg, Weekly      ALPRAZolam (XANAX) tablet 0.25 mg, Nightly PRN      atorvastatin (LIPITOR) tablet 40 mg, Nightly      carvedilol (COREG) tablet 3.125 mg, BID WC      levothyroxine (SYNTHROID) tablet 50 mcg, Daily      sevelamer (RENVELA) tablet 800 mg, TID      vitamin D (ERGOCALCIFEROL) capsule 50,000 Units, Q7 Days      acetaminophen (TYLENOL) tablet 650 mg, Q6H PRN    Or      acetaminophen (TYLENOL) suppository 650 mg, Q6H PRN      guaiFENesin-dextromethorphan (ROBITUSSIN DM) 100-10 MG/5ML syrup 5 mL, Q4H PRN      Vitamin D (CHOLECALCIFEROL) tablet 6,000 Units, Daily      albuterol sulfate  (90 Base) MCG/ACT inhaler 2 puff, Q6H PRN   insulin lispro (HUMALOG) injection vial 0-6 Units, TID WC      insulin lispro (HUMALOG) injection vial 0-3 Units, Nightly      glucose (GLUTOSE) 40 % oral gel 15 g, PRN      dextrose 50 % IV solution, PRN      glucagon (rDNA) injection 1 mg, PRN      dextrose 5 % solution, PRN      Benzocaine-Menthol (CEPACOL) 1 lozenge, Q2H PRN      phenol 1.4 % mouth spray 1 spray, Q2H PRN      budesonide-formoterol (SYMBICORT) 160-4.5 MCG/ACT inhaler 2 puff, BID        Review of Systems   Constitutional: Negative for fever. Respiratory: Positive for cough. Vital Signs:  BP (!) 93/55   Pulse 88   Temp 97.3 °F (36.3 °C) (Temporal)   Resp 20   Ht 5' 11\" (1.803 m)   Wt 143 lb (64.9 kg)   SpO2 90%   BMI 19.94 kg/m²   Temp (24hrs), Av.4 °F (36.3 °C), Min:96.2 °F (35.7 °C), Max:98.3 °F (36.8 °C)      Physical Exam     General: Patient was called in his room. He answered after several rings. Respiratory: Effort was even and not labored. He was noted to cough a few times while we spoke. His voice was quite hoarse.   Neuro: Alert, oriented able to give history without much difficulty  Psych: Pleasant cooperative      LAB RESULTS:    CBC with DIFF:  Recent Labs     20  0130 20  0320 20  0500   WBC 6.3 8.6 8.6   RBC 2.62* 2.43* 2.59*   HGB 8.3* 7.8* 8.3*   HCT 25.5* 23.7* 25.6*   MCV 97.3* 97.5* 98.8*   MCH 31.7* 32.1* 32.0*   MCHC 32.5* 32.9* 32.4*   RDW 14.3 14.1 14.6*    324 363   MPV 10.1 10.1 10.2   NEUTOPHILPCT 95.0* 92.7* 88.0*   LYMPHOPCT 5.0* 3.1* 2.0*   MONOPCT 0.0 3.2 0.0   EOSRELPCT 0.0 0.1 0.0   BASOPCT 0.0 0.1 0.0   NEUTROABS 6.0 8.0* 8.4*   LYMPHSABS 0.3* 0.3* 0.2*   MONOSABS 0.00 0.30 0.00   EOSABS 0.00 0.00 0.00   BASOSABS 0.00 0.00 0.00       CMP/BMP:  Recent Labs     20  0130 20  0320 20  0500   * 127* 129*   K 3.7 4.2 4.3   CL 89* 91* 90*   CO2 24 25 22   ANIONGAP 16 11 17   GLUCOSE 151* 99 148*   BUN 75* 74* 74* CREATININE 6.0* 6.3* 6.5*   LABGLOM 9* 9* 8*   CALCIUM 7.5* 7.6* 7.9*   PROT 5.2* 4.7* 5.0*   LABALBU 1.9* 1.7* 1.8*   BILITOT <0.2 <0.2 <0.2   ALKPHOS 67 62 65   ALT 31 8 <5*   AST 20 17 15         Culture Results:  Blood cultures positive for methicillin susceptible Staphylococcus aureus on December 23, December 25, and December 27. Blood cultures ordered yesterday December 28 do not appear to have been collected  Blood cultures ordered today December 29 no growth to date  Blood cultures December 30 no growth to date      Respiratory culture on December 22 + for heavy growth of Staph aureus, heavy growth of yeast and some normal respiratory areli. Urine Legionella and urine strep pneumo antigens negative        RADIOLOGY      Xr Chest Portable    Result Date: 12/22/2020  XR CHEST PORTABLE 12/22/2020 7:03 PM History: Short of breath. Covid positive. Portable chest x-ray compared with 4 March 2019. Chronic interstitial lung disease. Patchy pneumonia within the right upper lobe, left lower lobe, and within the base of the left upper lobe. Less prominent disease within the right lower lobe. Relative sparing of the left apex. Stable heart and mediastinum. Aortic arch calcification. Unchanged left subclavian port. 1. Bilateral pneumonia compatible with the history of Covid positive.  Signed by Dr Rosa Isela Elena on 12/22/2020 7:04 PM            Patient Active Problem List   Diagnosis    NSTEMI (non-ST elevated myocardial infarction) ([address]    Renovascular hypertension    Secondary hyperparathyroidism of renal origin ([address]    Anemia due to stage 4 chronic kidney disease ([address]    Palliative care patient    ESRD (end stage renal disease) on dialysis ([address]    Anemia of chronic disease    Peritoneal dialysis catheter dysfunction ([address]    Skin lesion of face    Coronary artery disease involving native coronary artery of native heart without angina pectoris    Basal cell carcinoma (BCC) of face  Chest pain    Chest discomfort    Ischemic cardiomyopathy    COVID-19    Port or reservoir infection       IMPRESSION:    1. Methicillin susceptible Staphylococcus aureus bacteremia from blood cultures December 23, December 25 and December 27. Presumed port infection. Possible pulmonary source. 2. Acute respiratory failure with hypoxemia and patient with COVID-19 infection with possible bacterial pneumonia as well based on sputum cultures. (CT angiogram did not reveal any definite consolidation)appears patient did receive some remdesivir  3. End-stage renal disease on peritoneal dialysis  4. History of throat cancer per patient in remission. RECOMMENDATIONS :  · Continue O2 supplementation and wean as tolerated  · Continue cefazolin    Head placed ordered for pharmacy to assist with options for medications/antibiotics that can be put in his PD fluid, preferably cefazolin as methicillin susceptible Staphylococcus aureusi. e. would like to avoid using vancomycin.   Would plan on a minimum of 14 days from his first negative blood culture      Jen Oden MD

## 2020-12-31 NOTE — PROGRESS NOTES
Occupational Therapy   Occupational Therapy Initial Assessment  Date: 2020   Patient Name: Niraj Robert  MRN: [id number]     : 1945    Date of Service: 2020    Discharge Recommendations:          Assessment   Performance deficits / Impairments: Decreased functional mobility ; Decreased endurance;Decreased ADL status; Decreased cognition;Decreased strength  Assessment: Will progress as tolerated  Treatment Diagnosis: Renal insufficiency, COVID  Prognosis: Good  Decision Making: Low Complexity  REQUIRES OT FOLLOW UP: Yes  Activity Tolerance  Activity Tolerance: Treatment limited secondary to medical complications (free text)           Patient Diagnosis(es): There were no encounter diagnoses. has a past medical history of Anxiety, Asthma, Benign essential HTN, Benign hypertensive kidney disease, CAD (coronary artery disease), Chronic kidney disease, stage IV (severe) ([facility] ESRD (end stage renal disease) ([facility] [facility] Hemodialysis patient ([facility] [address] [facility] History of blood transfusion, Hyperlipidemia, Palliative care patient, Prolonged emergence from general anesthesia, Renal osteodystrophy, Skin cancer, Throat cancer ([facility] [address] and Thyroid disease. has a past surgical history that includes Throat surgery; Cystoscopy; Tunneled venous port placement; LAPAROSCOPY INSERTION PERITONEAL CATHETER (N/A, 2016); hernia repair; Prostate surgery; pr lap insertion tunneled intraperitoneal catheter (N/A, 10/22/2018); pr insertion tunnel intraperitoneal cath dial open (N/A, 2018); LAPAROSCOPY INSERTION PERITONEAL CATHETER (N/A, 2019); vascular surgery (07/10/2019); and Port Surgery (N/A, 2020).     Treatment Diagnosis: Renal insufficiency, COVID      Restrictions       Subjective   General  Chart Reviewed: Yes  Patient assessed for rehabilitation services?: Yes  Family / Caregiver Present: No  Diagnosis: Renal insufficiency, COVID  Patient Currently in Pain: Denies  Vital Signs Patient Currently in Pain: Denies  Social/Functional History  Social/Functional History  Lives With: Spouse  Home Equipment: Rolling walker, Oxygen  ADL Assistance: Independent  Ambulation Assistance: Independent  Additional Comments: Per pt., was independent previously       Objective   Hearing: Within functional limits             ADL  Feeding: Setup  Grooming: Supervision  UE Bathing: Supervision  LE Bathing: Moderate assistance  UE Dressing: Supervision  LE Dressing: Maximum assistance  Toileting: Maximum assistance           Transfers  Stand Step Transfers: Minimal assistance  Sit to stand: Contact guard assistance  Transfer Comments: When pt. stood up CGA, appeared to lose consciousness, returned to the bed. Cognition  Overall Cognitive Status: Exceptions  Following Commands: Follows one step commands with increased time  Attention Span: Difficulty dividing attention  Safety Judgement: Decreased awareness of need for assistance                 LUE AROM (degrees)  LUE AROM : WFL  RUE AROM (degrees)  RUE AROM : WFL  LUE Strength  Gross LUE Strength: WFL  RUE Strength  Gross RUE Strength: WFL                   Plan   Plan  Times per week: 3-5x/week  Times per day: Daily    G-Code     OutComes Score                                                  AM-PAC Score             Goals  Short term goals  Time Frame for Short term goals: 1 week  Short term goal 1: Supervision with seated LB dsg  Short term goal 2: Supervision with clothing mgmt  Short term goal 3: Supervision with light ambulatory ADLs.   Long term goals  Long term goal 1: Return to PLOF       Therapy Time   Individual Concurrent Group Co-treatment   Time In           Time Out           Minutes                   Raisa Ordaz OTR/L

## 2020-12-31 NOTE — OP NOTE
HUMAIRA TWINLINX [facility] [address] [facility]                                OPERATIVE REPORT    PATIENT NAME: Debra Biggs                    :        1945  MED REC NO:   [id number]                              ROOM:       [facility]  ACCOUNT NO:   [de-identified]                           ADMIT DATE: 2020  PROVIDER:     Jose De La O MD    DATE OF PROCEDURE:  2020    PREOPERATIVE DIAGNOSIS:  Possible chronically infected port. POSTOPERATIVE DIAGNOSIS:  Possible chronically infected port with marked  scarring and fibrosis. PROCEDURE PERFORMED:  Port removal, intraoperative consultation with Dr. Bridget Beyer from Vascular Surgery, who performed the lateral half of  the procedure. SURGEON:  Jose De La O MD    ASSISTANT:  All Gibbons PA-C    ANESTHESIA:  Started as a MAC and proceeded to general.    INDICATIONS:  The patient is a 80-year-old gentleman who is in the  hospital with COVID and is having multiple positive blood cultures for  methicillin-sensitive Staph. He has had a port for several years. We  were asked to remove the port. We discussed the risks and benefits. He  understood and was agreeable. In addition, he has been on aspirin and  Plavix which were stopped 2 days ago to hopefully lessen the bleeding. OPERATIVE PROCEDURE:  Today, he was brought to the operating room,  sedated and was prepped and draped in a sterile fashion. The area  around the port was anesthetized with 1/16% Xylocaine. We reopened the  old incision, dissected down to the catheter. We then dissected the  catheter free and started withdrawing the catheter, and it was quite  stuck and appeared to have a Dacron cuff around it. We dissected down  to the catheter and pulled, and the catheter fractured right at that  point.   We then dissected further down the catheter and there appeared to be another cuff, once again the catheter fractured as we did this. We continued dissecting down around the subclavian vein almost to the  midline and the catheter was still tightly stuck. At this point, I  clamped the catheter, took out the main portion of the port and asked  Vascular Surgery for assistance. We passed a wire into the catheter and  they were able to snare it from below, and this was done and dictated  separately by Dr. Willye Sandifer and got the rest of the catheter out. We then  irrigated copiously. We did hold pressure for several minutes and then  closed with several layers of 3-0 Vicryl and 4-0 Monocryl for the skin. This was a terrific procedure. Estimated blood loss, 500 mL. Complications, none.         Elida Cruz MD    D: 12/30/2020 18:00:23      T: 12/30/2020 19:11:41     [email or web address]  Job#: [id number]     Doc#: [id number]    CC:

## 2020-12-31 NOTE — PROGRESS NOTES
Pharmacy Consult      Usama Cosby is a 77 y.o. male for whom pharmacy has been consulted by Dr Richardson Walker to:  Please evaluate for any possible antibiotics intraperitoneal to treat              MSSA bacteremia i.e. other any dosing recommendations for cefazolin? Pharmacy to Dose -> Other - See Comments   . Patient Active Problem List   Diagnosis    NSTEMI (non-ST elevated myocardial infarction) ([facility] [facility]    Renovascular hypertension    Secondary hyperparathyroidism of renal origin ([facility] [facility]    Anemia due to stage 4 chronic kidney disease ([facility] [facility]    Palliative care patient    ESRD (end stage renal disease) on dialysis ([facility] [facility]    Anemia of chronic disease    Peritoneal dialysis catheter dysfunction ([facility] [facility]    Skin lesion of face    Coronary artery disease involving native coronary artery of native heart without angina pectoris    Basal cell carcinoma (BCC) of face    Chest pain    Chest discomfort    Ischemic cardiomyopathy    COVID-19    Port or reservoir infection       Allergies:  Diphenhydramine and Sulfa antibiotics     Recent Labs     12/30/20  0320 12/31/20  0500   CREATININE 6.3* 6.5*       Ht/Wt:   Ht Readings from Last 1 Encounters:   12/29/20 5' 11\" (1.803 m)        Wt Readings from Last 1 Encounters:   12/30/20 143 lb (64.9 kg)         Estimated Creatinine Clearance: 9 mL/min (A) (based on SCr of 6.5 mg/dL (H)). Assessment:    Dr. Richardson Walker PD antibiotic recommendations for MSSA. Gentamicin 0.6 mg/kg daily exchange (40 mg) for Synergy; Cefazolin 15-20 mg/kg (1 gram ) daily exchange. Vancomycin 15-20 mg/kg (2 grams) every 5-7 days or (250 mg ) daily. My understanding is that a nephrologist would need to order antibiotics for PD. Thank you for the consult.  .    Electronically signed by Lata Vázquez [facility] on 12/31/2020 at 12:37 PM

## 2020-12-31 NOTE — PROGRESS NOTES
Nephrology ([facility]) Progress Note    Patient:  Alvin Patterson  YOB: 1945  Date of Service: 12/30/2020  MRN: [id number]   Acct: [de-identified]   Primary Care Physician: No primary care provider on file. Advance Directive: Full Code  Admit Date: 12/22/2020       Hospital Day: 8  Referring Provider: Giancarlo Cosby MD    Patient independently seen and examined, Chart, Consults, Notes, Operative notes, Labs, Cardiology, and Radiology studies reviewed as available. Subjective:  Alvin Patterson is a 77 y.o. male  whom we were consulted for ESRD on peritoneal dialysis. He also has hypertension, hyperlipidemia, CAD. He presented to an outside facility complaining of worsening dyspnea and dysphagia since being diagnosed with COVID-19.  He went into acute respiratory failure and is now requiring 4 L oxygen. He was admitted to [facility] and is on the [facility] floor. Patient offered no other complaints. Renal service was consulted to manage his ESRD. A couple of nights ago, he went on non-rebreather O2 and he was transferred to critical care unit for close monitoring. Patient then improved some and was transferred back to floor 12/28. Today, doing well. Remains in COVID isolation. No events per nursing. SOA improving. Planning port removal today.      Allergies:  Diphenhydramine and Sulfa antibiotics    Medicines:  Current Facility-Administered Medications   Medication Dose Route Frequency Provider Last Rate Last Admin    [START ON 12/31/2020] aspirin chewable tablet 81 mg  81 mg Oral Daily Ahmet Brothers MD        [START ON 12/31/2020] clopidogrel (PLAVIX) tablet 75 mg  75 mg Oral Daily MD Steven Casey Ko ON 12/31/2020] lisinopril (PRINIVIL;ZESTRIL) tablet 2.5 mg  2.5 mg Oral Daily Ahmet Brothers MD        docusate sodium (COLACE) capsule 100 mg  100 mg Oral Daily Ahmet Brothers MD   100 mg at 12/29/20 1351  senna (SENOKOT) tablet 8.6 mg  1 tablet Oral Nightly Fortino Vitale MD   8.6 mg at 12/30/20 2128    lactulose ([facility]) 10 GM/15ML solution 20 g  20 g Oral TID Fortino Vitale MD   20 g at 12/30/20 2119    bisacodyl (DULCOLAX) suppository 10 mg  10 mg Rectal Daily PRN Fortino Vitale MD        ceFAZolin (ANCEF) 1 g in sterile water 10 mL IV syringe  1 g Intravenous Q24H Fortino Vitale MD   1 g at 12/30/20 0005    heparin (porcine) injection 5,000 Units  5,000 Units Subcutaneous 3 times per day Fortino Vitale MD   Stopped at 12/29/20 2122    0.9 % sodium chloride bolus  30 mL Intravenous PRN Fortino Vitale MD        darbepoetin jerald-polysorbate SEVEN [facility]) injection 60 mcg  60 mcg Subcutaneous Weekly Fortino Vitale MD   60 mcg at 12/23/20 1534    ALPRAZolam [place] [place]) tablet 0.25 mg  0.25 mg Oral Nightly PRN Fortino Vitale MD   0.25 mg at 12/30/20 0005    atorvastatin (LIPITOR) tablet 40 mg  40 mg Oral Nightly Fortino Vitale MD   40 mg at 12/30/20 2119    carvedilol (COREG) tablet 3.125 mg  3.125 mg Oral BID WC Fortino Viatle MD   3.125 mg at 12/29/20 1732    levothyroxine (SYNTHROID) tablet 50 mcg  50 mcg Oral Daily Fortino Vitale MD   50 mcg at 12/29/20 0915    sevelamer (RENVELA) tablet 800 mg  800 mg Oral TID Fortino Vitale MD   800 mg at 12/30/20 2119    vitamin D (ERGOCALCIFEROL) capsule 50,000 Units  50,000 Units Oral Q7 Days Fortino Vitale MD        acetaminophen (TYLENOL) tablet 650 mg  650 mg Oral Q6H PRN Fortino Vitale MD        Or    acetaminophen (TYLENOL) suppository 650 mg  650 mg Rectal Q6H PRN Fortino Vitale MD        guaiFENesin-dextromethorphan (ROBITUSSIN DM) 100-10 MG/5ML syrup 5 mL  5 mL Oral Q4H PRN Fortino Vitale MD        Vitamin D (CHOLECALCIFEROL) tablet 6,000 Units  6,000 Units Oral Daily Fortino Vitale MD   6,000 Units at 12/29/20 0546  albuterol sulfate  (90 Base) MCG/ACT inhaler 2 puff  2 puff Inhalation Q6H PRN Guillermina Bermeo MD   2 puff at 12/23/20 0544    insulin lispro (HUMALOG) injection vial 0-6 Units  0-6 Units Subcutaneous TID WC Guillermina Bermeo MD   2 Units at 12/29/20 1733    insulin lispro (HUMALOG) injection vial 0-3 Units  0-3 Units Subcutaneous Nightly Guillermina Bermeo MD   1 Units at 12/29/20 2129    glucose (GLUTOSE) 40 % oral gel 15 g  15 g Oral PRN Guillermina Bermeo MD        dextrose 50 % IV solution  12.5 g Intravenous PRN Guillermina Bermeo MD        glucagon (rDNA) injection 1 mg  1 mg Intramuscular PRN Guillermina Bermeo MD        dextrose 5 % solution  100 mL/hr Intravenous PRN Guillermina Bermeo MD        Benzocaine-Menthol (CEPACOL) 1 lozenge  1 lozenge Oral Q2H PRN Guillermina Bermeo MD        phenol 1.4 % mouth spray 1 spray  1 spray Mouth/Throat Q2H PRN Guillermina Bermeo MD   1 spray at 12/23/20 2709    budesonide-formoterol (SYMBICORT) 160-4.5 MCG/ACT inhaler 2 puff  2 puff Inhalation BID Guillermina Bermeo MD   2 puff at 12/29/20 2116       Past Medical History:  Past Medical History:   Diagnosis Date    Anxiety     Asthma     Benign essential HTN     Benign hypertensive kidney disease     CAD (coronary artery disease)     sees dr. Ann-Marie Moreno Chronic kidney disease, stage IV (severe) ([facility] [address] 7/18/2016    ESRD (end stage renal disease) ([facility] [address]     no dialysis at present.  Hemodialysis patient [facility])     mon wed fri at [address] of blood transfusion     Hyperlipidemia     Palliative care patient 11/29/2018    Prolonged emergence from general anesthesia     c/o dizzy and \"over sedated\" with prostate \"scope\".     Renal osteodystrophy     Skin cancer 2018    facial    Throat cancer [facility]) 2012    Thyroid disease        Past Surgical History:  Past Surgical History:   Procedure Laterality Date    CYSTOSCOPY      HERNIA REPAIR      umbilical Attends meetings of clubs or organizations: Not on file     Relationship status: Not on file    Intimate partner violence     Fear of current or ex partner: Not on file     Emotionally abused: Not on file     Physically abused: Not on file     Forced sexual activity: Not on file   Other Topics Concern    Not on file   Social History Narrative    Not on file         Review of Systems:  History obtained from chart review and the patient  General ROS: No fever or chills  Respiratory ROS: No cough,+shortness of breath  Cardiovascular ROS: No chest pain or palpitations  Gastrointestinal ROS: No abdominal pain or melena  Genito-Urinary ROS: No dysuria or hematuria  Musculoskeletal ROS: No joint pain or swelling         Objective:  Patient Vitals for the past 24 hrs:   BP Temp Temp src Pulse Resp SpO2 Weight   12/30/20 2055 116/69 97.8 °F (36.6 °C) Temporal 85 16 92 %    12/30/20 1933 114/65 97.1 °F (36.2 °C) Temporal 86 20 91 %    12/30/20 1848 (!) 100/56 97.3 °F (36.3 °C) Temporal 85 18 93 %    12/30/20 1805 133/82 96.8 °F (36 °C) Temporal 79 16 94 %    12/30/20 1749 125/68 96.2 °F (35.7 °C) Temporal 86 16 97 %    12/30/20 1730 130/70   87 14 93 %    12/30/20 1725 137/88 97.7 °F (36.5 °C) Temporal 82 13 95 %    12/30/20 1720 (!) 137/92   91 13 95 %    12/30/20 1715 133/67   85 15 94 %    12/30/20 1710    87 13 94 %    12/30/20 1705 121/82   87 13 93 %    12/30/20 1700 122/67   88 15 (!) 81 %    12/30/20 1655 117/75   86 14 (!) 78 %    12/30/20 1654 117/75 97.1 °F (36.2 °C) Temporal 88 15 (!) 80 %    12/30/20 1217 (!) 140/74 97.4 °F (36.3 °C) Temporal 79 16 95 %    12/30/20 0643 123/76 97.4 °F (36.3 °C) Temporal 80 16 96 %    12/30/20 0025 (!) 140/80 98.1 °F (36.7 °C) Temporal 79 18 96 % 143 lb (64.9 kg)       Intake/Output Summary (Last 24 hours) at 12/30/2020 2208  Last data filed at 12/30/2020 1724  Gross per 24 hour   Intake 50 ml   Output 940 ml   Net -890 ml Exam limited in an effort to preserve PPE and performed with nursing assistance  General: awake/alert  Chest:  unlabored  CVS: regular rate and rhythm  Abdominal: nondistended  Extremities: no cyanosis or edema  Skin: no rash noted  Psych: alert/appropriate  Neuro: grossly nonfocal      Labs:  BMP:   Recent Labs     12/28/20  0550 12/29/20  0130 12/30/20  0320   * 129* 127*   K 4.0 3.7 4.2   CL 90* 89* 91*   CO2 23 24 25   BUN 75* 75* 74*   CREATININE 6.2* 6.0* 6.3*   CALCIUM 7.6* 7.5* 7.6*     CBC:   Recent Labs     12/28/20  0550 12/29/20 0130 12/30/20  0320   WBC 4.8 6.3 8.6   HGB 8.3* 8.3* 7.8*   HCT 24.9* 25.5* 23.7*   MCV 96.5* 97.3* 97.5*    338 324     LIVER PROFILE:   Recent Labs     12/28/20  0550 12/29/20 0130 12/30/20  0320   AST 21 20 17   ALT 50* 31 8   BILITOT <0.2 <0.2 <0.2   ALKPHOS 61 67 62     PT/INR:   Recent Labs     12/28/20  0550 12/29/20 0130 12/30/20  0320   PROTIME 15.5* 15.2* 15.0*   INR 1.23* 1.20* 1.18     APTT: No results for input(s): APTT in the last 72 hours. BNP:  No results for input(s): BNP in the last 72 hours. Ionized Calcium:No results for input(s): IONCA in the last 72 hours. Magnesium:No results for input(s): MG in the last 72 hours. Phosphorus:No results for input(s): PHOS in the last 72 hours. HgbA1C: No results for input(s): LABA1C in the last 72 hours. Hepatic:   Recent Labs     12/28/20  0550 12/29/20  0130 12/30/20  0320   ALKPHOS 61 67 62   ALT 50* 31 8   AST 21 20 17   PROT 5.2* 5.2* 4.7*   BILITOT <0.2 <0.2 <0.2   LABALBU 1.9* 1.9* 1.7*     Lactic Acid: No results for input(s): LACTA in the last 72 hours. Troponin: No results for input(s): CKTOTAL, CKMB, TROPONINT in the last 72 hours. ABGs:   Lab Results   Component Value Date    PHART 7.480 12/23/2020    PO2ART 114.0 12/23/2020    [id number] 25.0 12/23/2020     CRP:  No results for input(s): CRP in the last 72 hours. Sed Rate:  No results for input(s): SEDRATE in the last 72 hours. Culture Results:   Blood Culture Recent:   Recent Labs     12/29/20  1045   BC No Growth to date. Any change in status will be called. Urine Culture Recent : No results for input(s): LABURIN in the last 720 hours. Radiology reports as per the Radiologist  Radiology: Xr Chest Portable    Result Date: 12/22/2020  XR CHEST PORTABLE 12/22/2020 7:03 PM History: Short of breath. Covid positive. Portable chest x-ray compared with 4 March 2019. Chronic interstitial lung disease. Patchy pneumonia within the right upper lobe, left lower lobe, and within the base of the left upper lobe. Less prominent disease within the right lower lobe. Relative sparing of the left apex. Stable heart and mediastinum. Aortic arch calcification. Unchanged left subclavian port. 1. Bilateral pneumonia compatible with the history of Covid positive.  Signed by Dr Bienvenido Araujo on 12/22/2020 7:04 PM    Cta Pulmonary W Contrast    Result Date: 12/24/2020 EXAMINATION:  CTA PULMONARY W CONTRAST  12/24/2020 2:04 PM HISTORY: Hypoxia. Elevated d-dimer. Covid 19 infection. COMPARISON: No comparison study. DLP: 315 mGy-cm. Automated exposure control was utilized. TECHNIQUE: Spiral CT angiography was performed of the chest with contrast. Sagittal, coronal and 3-D images were reconstructed. MEDIASTINUM/VASCULAR: There is atheromatous disease of the thoracic aorta and coronary arteries. There is cardiomegaly. There is no CT evidence of pulmonary embolus. The pulmonary arteries are dilated with main pulmonary artery segment measuring 3.5 cm. There are small mediastinal lymph nodes. LUNGS: There are moderate groundglass appearing infiltrates in both lungs. Bronchiectasis is noted. There is no significant pleural effusion. There is mild dependent atelectasis in the lung bases posteriorly. BONES/SOFT TISSUES/: There are degenerative changes of the spine and shoulders. UPPER ABDOMEN: There is ascites in the upper abdomen. There is a 1.4 cm probable cyst in the upper pole left kidney. There is another 7 mm probable cyst in the upper pole left kidney. These areas are not fully evaluated. 1. No CT evidence of pulmonary embolus. Dilated pulmonary arteries. 2. Atheromatous disease of the thoracic aorta and coronary arteries. Cardiomegaly. 3. Moderate groundglass infiltrates bilaterally consistent with the patient's history of Covid 19 infection. Other etiologies less likely. There is also bronchiectasis. There is dependent atelectasis. 4. There is ascites in the abdomen. 5. Probable renal cysts on the left, not fully evaluated.  Signed by Dr Melissa Barbour on 12/24/2020 2:11 PM       Assessment   ESRD  HTN nephropathy  COVID 19  Acute hypoxic respiratory failure  Hyponatremia  Hyperkalemia  Anemia CKD      Plan:  PD per orders  abx per ID  planning port removal 12/30  D/w nursing    Danitza Huber MD  12/30/20  10:08 PM

## 2021-01-01 ENCOUNTER — APPOINTMENT (OUTPATIENT)
Dept: GENERAL RADIOLOGY | Age: 76
DRG: 208 | End: 2021-01-01
Attending: INTERNAL MEDICINE
Payer: MEDICARE

## 2021-01-01 VITALS
HEIGHT: 71 IN | HEART RATE: 74 BPM | WEIGHT: 137.2 LBS | TEMPERATURE: 96.2 F | RESPIRATION RATE: 14 BRPM | OXYGEN SATURATION: 91 % | BODY MASS INDEX: 19.21 KG/M2 | SYSTOLIC BLOOD PRESSURE: 143 MMHG | DIASTOLIC BLOOD PRESSURE: 53 MMHG

## 2021-01-01 LAB
ABO/RH: NORMAL
ALBUMIN SERPL-MCNC: 0.3 G/DL (ref 3.5–5.2)
ALBUMIN SERPL-MCNC: 1.1 G/DL (ref 3.5–5.2)
ALBUMIN SERPL-MCNC: 1.5 G/DL (ref 3.5–5.2)
ALBUMIN SERPL-MCNC: 1.6 G/DL (ref 3.5–5.2)
ALBUMIN SERPL-MCNC: 1.8 G/DL (ref 3.5–5.2)
ALBUMIN SERPL-MCNC: 1.9 G/DL (ref 3.5–5.2)
ALP BLD-CCNC: 54 U/L (ref 40–130)
ALP BLD-CCNC: 63 U/L (ref 40–130)
ALP BLD-CCNC: 63 U/L (ref 40–130)
ALP BLD-CCNC: 64 U/L (ref 40–130)
ALP BLD-CCNC: 66 U/L (ref 40–130)
ALP BLD-CCNC: 67 U/L (ref 40–130)
ALP BLD-CCNC: 69 U/L (ref 40–130)
ALP BLD-CCNC: 73 U/L (ref 40–130)
ALP BLD-CCNC: 84 U/L (ref 40–130)
ALP BLD-CCNC: 92 U/L (ref 40–130)
ALT SERPL-CCNC: 5 U/L (ref 5–41)
ALT SERPL-CCNC: 89 U/L (ref 5–41)
ALT SERPL-CCNC: <5 U/L (ref 5–41)
ANION GAP SERPL CALCULATED.3IONS-SCNC: 13 MMOL/L (ref 7–19)
ANION GAP SERPL CALCULATED.3IONS-SCNC: 14 MMOL/L (ref 7–19)
ANION GAP SERPL CALCULATED.3IONS-SCNC: 14 MMOL/L (ref 7–19)
ANION GAP SERPL CALCULATED.3IONS-SCNC: 15 MMOL/L (ref 7–19)
ANION GAP SERPL CALCULATED.3IONS-SCNC: 17 MMOL/L (ref 7–19)
ANION GAP SERPL CALCULATED.3IONS-SCNC: 18 MMOL/L (ref 7–19)
ANION GAP SERPL CALCULATED.3IONS-SCNC: 18 MMOL/L (ref 7–19)
ANION GAP SERPL CALCULATED.3IONS-SCNC: 19 MMOL/L (ref 7–19)
ANION GAP SERPL CALCULATED.3IONS-SCNC: 19 MMOL/L (ref 7–19)
ANION GAP SERPL CALCULATED.3IONS-SCNC: 21 MMOL/L (ref 7–19)
ANISOCYTOSIS: ABNORMAL
ANTIBODY SCREEN: NORMAL
ANTIBODY SCREEN: NORMAL
AST SERPL-CCNC: 17 U/L (ref 5–40)
AST SERPL-CCNC: 21 U/L (ref 5–40)
AST SERPL-CCNC: 23 U/L (ref 5–40)
AST SERPL-CCNC: 24 U/L (ref 5–40)
AST SERPL-CCNC: 44 U/L (ref 5–40)
AST SERPL-CCNC: 5 U/L (ref 5–40)
AST SERPL-CCNC: 51 U/L (ref 5–40)
AST SERPL-CCNC: 60 U/L (ref 5–40)
AST SERPL-CCNC: 68 U/L (ref 5–40)
AST SERPL-CCNC: 71 U/L (ref 5–40)
ATYPICAL LYMPHOCYTE RELATIVE PERCENT: 1 % (ref 0–8)
ATYPICAL LYMPHOCYTE RELATIVE PERCENT: 1 % (ref 0–8)
ATYPICAL LYMPHOCYTE RELATIVE PERCENT: 2 % (ref 0–8)
ATYPICAL LYMPHOCYTE RELATIVE PERCENT: 2 % (ref 0–8)
BANDED NEUTROPHILS RELATIVE PERCENT: 11 % (ref 0–5)
BANDED NEUTROPHILS RELATIVE PERCENT: 2 % (ref 0–5)
BANDED NEUTROPHILS RELATIVE PERCENT: 5 % (ref 0–5)
BANDED NEUTROPHILS RELATIVE PERCENT: 59 % (ref 0–5)
BANDED NEUTROPHILS RELATIVE PERCENT: 6 % (ref 0–5)
BANDED NEUTROPHILS RELATIVE PERCENT: 6 % (ref 0–5)
BANDED NEUTROPHILS RELATIVE PERCENT: 8 % (ref 0–5)
BASE EXCESS ARTERIAL: -1.3 MMOL/L (ref -2–2)
BASE EXCESS ARTERIAL: -1.6 MMOL/L (ref -2–2)
BASE EXCESS ARTERIAL: 1.1 MMOL/L (ref -2–2)
BASOPHILS ABSOLUTE: 0 K/UL (ref 0–0.2)
BASOPHILS RELATIVE PERCENT: 0 % (ref 0–1)
BILIRUB SERPL-MCNC: 0.3 MG/DL (ref 0.2–1.2)
BILIRUB SERPL-MCNC: <0.2 MG/DL (ref 0.2–1.2)
BLOOD BANK DISPENSE STATUS: NORMAL
BLOOD BANK PRODUCT CODE: NORMAL
BLOOD CULTURE, ROUTINE: NORMAL
BPU ID: NORMAL
BUN BLDV-MCNC: 66 MG/DL (ref 8–23)
BUN BLDV-MCNC: 68 MG/DL (ref 8–23)
BUN BLDV-MCNC: 71 MG/DL (ref 8–23)
BUN BLDV-MCNC: 73 MG/DL (ref 8–23)
BUN BLDV-MCNC: 82 MG/DL (ref 8–23)
BUN BLDV-MCNC: 82 MG/DL (ref 8–23)
BUN BLDV-MCNC: 87 MG/DL (ref 8–23)
BUN BLDV-MCNC: 91 MG/DL (ref 8–23)
BUN BLDV-MCNC: 93 MG/DL (ref 8–23)
BUN BLDV-MCNC: 98 MG/DL (ref 8–23)
CALCIUM SERPL-MCNC: 5.8 MG/DL (ref 8.8–10.2)
CALCIUM SERPL-MCNC: 6.4 MG/DL (ref 8.8–10.2)
CALCIUM SERPL-MCNC: 7.5 MG/DL (ref 8.8–10.2)
CALCIUM SERPL-MCNC: 7.6 MG/DL (ref 8.8–10.2)
CALCIUM SERPL-MCNC: 7.9 MG/DL (ref 8.8–10.2)
CALCIUM SERPL-MCNC: 8 MG/DL (ref 8.8–10.2)
CALCIUM SERPL-MCNC: 8 MG/DL (ref 8.8–10.2)
CALCIUM SERPL-MCNC: 8.1 MG/DL (ref 8.8–10.2)
CALCIUM SERPL-MCNC: 8.3 MG/DL (ref 8.8–10.2)
CALCIUM SERPL-MCNC: 8.4 MG/DL (ref 8.8–10.2)
CARBOXYHEMOGLOBIN ARTERIAL: 0.9 % (ref 0–5)
CARBOXYHEMOGLOBIN ARTERIAL: 1 % (ref 0–5)
CARBOXYHEMOGLOBIN ARTERIAL: 1 % (ref 0–5)
CHLORIDE BLD-SCNC: 85 MMOL/L (ref 98–111)
CHLORIDE BLD-SCNC: 87 MMOL/L (ref 98–111)
CHLORIDE BLD-SCNC: 87 MMOL/L (ref 98–111)
CHLORIDE BLD-SCNC: 88 MMOL/L (ref 98–111)
CHLORIDE BLD-SCNC: 89 MMOL/L (ref 98–111)
CHLORIDE BLD-SCNC: 89 MMOL/L (ref 98–111)
CHLORIDE BLD-SCNC: 90 MMOL/L (ref 98–111)
CHLORIDE BLD-SCNC: 91 MMOL/L (ref 98–111)
CO2: 19 MMOL/L (ref 22–29)
CO2: 21 MMOL/L (ref 22–29)
CO2: 22 MMOL/L (ref 22–29)
CO2: 24 MMOL/L (ref 22–29)
CO2: 25 MMOL/L (ref 22–29)
CO2: 26 MMOL/L (ref 22–29)
CREAT SERPL-MCNC: 6.3 MG/DL (ref 0.5–1.2)
CREAT SERPL-MCNC: 6.4 MG/DL (ref 0.5–1.2)
CREAT SERPL-MCNC: 6.5 MG/DL (ref 0.5–1.2)
CREAT SERPL-MCNC: 6.7 MG/DL (ref 0.5–1.2)
CREAT SERPL-MCNC: 6.8 MG/DL (ref 0.5–1.2)
CREAT SERPL-MCNC: 7 MG/DL (ref 0.5–1.2)
CREAT SERPL-MCNC: 7.3 MG/DL (ref 0.5–1.2)
CREAT SERPL-MCNC: 7.4 MG/DL (ref 0.5–1.2)
CREAT SERPL-MCNC: 7.6 MG/DL (ref 0.5–1.2)
CREAT SERPL-MCNC: 7.6 MG/DL (ref 0.5–1.2)
D DIMER: 2.43 UG/ML FEU (ref 0–0.48)
D DIMER: 3.81 UG/ML FEU (ref 0–0.48)
D DIMER: 3.9 UG/ML FEU (ref 0–0.48)
D DIMER: 3.95 UG/ML FEU (ref 0–0.48)
D DIMER: 4.05 UG/ML FEU (ref 0–0.48)
D DIMER: 4.22 UG/ML FEU (ref 0–0.48)
D DIMER: 4.3 UG/ML FEU (ref 0–0.48)
D DIMER: 4.87 UG/ML FEU (ref 0–0.48)
D DIMER: 5.54 UG/ML FEU (ref 0–0.48)
DESCRIPTION BLOOD BANK: NORMAL
DOHLE BODIES: ABNORMAL
EOSINOPHILS ABSOLUTE: 0 K/UL (ref 0–0.6)
EOSINOPHILS ABSOLUTE: 0.04 K/UL (ref 0–0.6)
EOSINOPHILS ABSOLUTE: 0.04 K/UL (ref 0–0.6)
EOSINOPHILS ABSOLUTE: 0.05 K/UL (ref 0–0.6)
EOSINOPHILS RELATIVE PERCENT: 0 % (ref 0–5)
EOSINOPHILS RELATIVE PERCENT: 1 % (ref 0–5)
FIBRINOGEN: 411 MG/DL (ref 238–505)
FIBRINOGEN: 509 MG/DL (ref 238–505)
FIBRINOGEN: 526 MG/DL (ref 238–505)
FIBRINOGEN: 692 MG/DL (ref 238–505)
FIBRINOGEN: 694 MG/DL (ref 238–505)
FIBRINOGEN: 707 MG/DL (ref 238–505)
FIBRINOGEN: 717 MG/DL (ref 238–505)
FIBRINOGEN: 750 MG/DL (ref 238–505)
FIBRINOGEN: 866 MG/DL (ref 238–505)
GFR AFRICAN AMERICAN: 10
GFR AFRICAN AMERICAN: 11
GFR AFRICAN AMERICAN: 9
GFR NON-AFRICAN AMERICAN: 7
GFR NON-AFRICAN AMERICAN: 8
GFR NON-AFRICAN AMERICAN: 9
GFR NON-AFRICAN AMERICAN: 9
GLUCOSE BLD-MCNC: 103 MG/DL (ref 70–99)
GLUCOSE BLD-MCNC: 103 MG/DL (ref 70–99)
GLUCOSE BLD-MCNC: 103 MG/DL (ref 74–109)
GLUCOSE BLD-MCNC: 104 MG/DL (ref 70–99)
GLUCOSE BLD-MCNC: 104 MG/DL (ref 70–99)
GLUCOSE BLD-MCNC: 105 MG/DL (ref 70–99)
GLUCOSE BLD-MCNC: 107 MG/DL (ref 70–99)
GLUCOSE BLD-MCNC: 109 MG/DL (ref 70–99)
GLUCOSE BLD-MCNC: 109 MG/DL (ref 70–99)
GLUCOSE BLD-MCNC: 110 MG/DL (ref 70–99)
GLUCOSE BLD-MCNC: 110 MG/DL (ref 74–109)
GLUCOSE BLD-MCNC: 111 MG/DL (ref 70–99)
GLUCOSE BLD-MCNC: 112 MG/DL (ref 70–99)
GLUCOSE BLD-MCNC: 113 MG/DL (ref 70–99)
GLUCOSE BLD-MCNC: 114 MG/DL (ref 70–99)
GLUCOSE BLD-MCNC: 117 MG/DL (ref 70–99)
GLUCOSE BLD-MCNC: 119 MG/DL (ref 70–99)
GLUCOSE BLD-MCNC: 119 MG/DL (ref 70–99)
GLUCOSE BLD-MCNC: 120 MG/DL (ref 70–99)
GLUCOSE BLD-MCNC: 127 MG/DL (ref 70–99)
GLUCOSE BLD-MCNC: 132 MG/DL (ref 70–99)
GLUCOSE BLD-MCNC: 133 MG/DL (ref 70–99)
GLUCOSE BLD-MCNC: 135 MG/DL (ref 74–109)
GLUCOSE BLD-MCNC: 138 MG/DL (ref 70–99)
GLUCOSE BLD-MCNC: 147 MG/DL (ref 74–109)
GLUCOSE BLD-MCNC: 148 MG/DL (ref 74–109)
GLUCOSE BLD-MCNC: 153 MG/DL (ref 70–99)
GLUCOSE BLD-MCNC: 170 MG/DL (ref 74–109)
GLUCOSE BLD-MCNC: 181 MG/DL (ref 70–99)
GLUCOSE BLD-MCNC: 207 MG/DL (ref 74–109)
GLUCOSE BLD-MCNC: 53 MG/DL (ref 70–99)
GLUCOSE BLD-MCNC: 56 MG/DL (ref 70–99)
GLUCOSE BLD-MCNC: 64 MG/DL (ref 70–99)
GLUCOSE BLD-MCNC: 69 MG/DL (ref 70–99)
GLUCOSE BLD-MCNC: 76 MG/DL (ref 70–99)
GLUCOSE BLD-MCNC: 81 MG/DL (ref 70–99)
GLUCOSE BLD-MCNC: 81 MG/DL (ref 70–99)
GLUCOSE BLD-MCNC: 82 MG/DL (ref 70–99)
GLUCOSE BLD-MCNC: 87 MG/DL (ref 74–109)
GLUCOSE BLD-MCNC: 88 MG/DL (ref 70–99)
GLUCOSE BLD-MCNC: 93 MG/DL (ref 70–99)
GLUCOSE BLD-MCNC: 96 MG/DL (ref 74–109)
GLUCOSE BLD-MCNC: 97 MG/DL (ref 70–99)
GLUCOSE BLD-MCNC: 97 MG/DL (ref 70–99)
GLUCOSE BLD-MCNC: 97 MG/DL (ref 74–109)
GLUCOSE BLD-MCNC: 99 MG/DL (ref 70–99)
HCO3 ARTERIAL: 25 MMOL/L (ref 22–26)
HCO3 ARTERIAL: 26.4 MMOL/L (ref 22–26)
HCO3 ARTERIAL: 28.2 MMOL/L (ref 22–26)
HCT VFR BLD CALC: 21 % (ref 42–52)
HCT VFR BLD CALC: 21.6 % (ref 42–52)
HCT VFR BLD CALC: 24.1 % (ref 42–52)
HCT VFR BLD CALC: 24.3 % (ref 42–52)
HCT VFR BLD CALC: 25.1 % (ref 42–52)
HCT VFR BLD CALC: 25.6 % (ref 42–52)
HCT VFR BLD CALC: 27.3 % (ref 42–52)
HCT VFR BLD CALC: 27.7 % (ref 42–52)
HCT VFR BLD CALC: 27.8 % (ref 42–52)
HCT VFR BLD CALC: 27.8 % (ref 42–52)
HCT VFR BLD CALC: 30.2 % (ref 42–52)
HCT VFR BLD CALC: 33.6 % (ref 42–52)
HEMOGLOBIN, ART, EXTENDED: 8.7 G/DL (ref 14–18)
HEMOGLOBIN, ART, EXTENDED: 8.9 G/DL (ref 14–18)
HEMOGLOBIN, ART, EXTENDED: 8.9 G/DL (ref 14–18)
HEMOGLOBIN: 11 G/DL (ref 14–18)
HEMOGLOBIN: 6.7 G/DL (ref 14–18)
HEMOGLOBIN: 6.9 G/DL (ref 14–18)
HEMOGLOBIN: 7.4 G/DL (ref 14–18)
HEMOGLOBIN: 8.2 G/DL (ref 14–18)
HEMOGLOBIN: 8.4 G/DL (ref 14–18)
HEMOGLOBIN: 8.6 G/DL (ref 14–18)
HEMOGLOBIN: 9.1 G/DL (ref 14–18)
HEMOGLOBIN: 9.1 G/DL (ref 14–18)
HEMOGLOBIN: 9.2 G/DL (ref 14–18)
HEMOGLOBIN: 9.3 G/DL (ref 14–18)
HEMOGLOBIN: 9.9 G/DL (ref 14–18)
HYPOCHROMIA: ABNORMAL
HYPOCHROMIA: ABNORMAL
IMMATURE GRANULOCYTES #: 0 K/UL
INR BLD: 1.11 (ref 0.88–1.18)
INR BLD: 1.16 (ref 0.88–1.18)
INR BLD: 1.22 (ref 0.88–1.18)
INR BLD: 1.26 (ref 0.88–1.18)
INR BLD: 1.27 (ref 0.88–1.18)
INR BLD: 1.31 (ref 0.88–1.18)
INR BLD: 1.81 (ref 0.88–1.18)
INR BLD: 2.27 (ref 0.88–1.18)
INR BLD: 2.9 (ref 0.88–1.18)
LYMPHOCYTES ABSOLUTE: 0 K/UL (ref 1.1–4.5)
LYMPHOCYTES ABSOLUTE: 0.1 K/UL (ref 1.1–4.5)
LYMPHOCYTES ABSOLUTE: 0.2 K/UL (ref 1.1–4.5)
LYMPHOCYTES RELATIVE PERCENT: 0 % (ref 20–40)
LYMPHOCYTES RELATIVE PERCENT: 1 % (ref 20–40)
LYMPHOCYTES RELATIVE PERCENT: 2 % (ref 20–40)
LYMPHOCYTES RELATIVE PERCENT: 2 % (ref 20–40)
LYMPHOCYTES RELATIVE PERCENT: 3 % (ref 20–40)
LYMPHOCYTES RELATIVE PERCENT: 3 % (ref 20–40)
LYMPHOCYTES RELATIVE PERCENT: 4 % (ref 20–40)
LYMPHOCYTES RELATIVE PERCENT: 9 % (ref 20–40)
MACROCYTES: ABNORMAL
MAGNESIUM: 2 MG/DL (ref 1.6–2.4)
MCH RBC QN AUTO: 31.3 PG (ref 27–31)
MCH RBC QN AUTO: 31.3 PG (ref 27–31)
MCH RBC QN AUTO: 31.4 PG (ref 27–31)
MCH RBC QN AUTO: 31.5 PG (ref 27–31)
MCH RBC QN AUTO: 31.7 PG (ref 27–31)
MCH RBC QN AUTO: 31.8 PG (ref 27–31)
MCH RBC QN AUTO: 32 PG (ref 27–31)
MCH RBC QN AUTO: 32.2 PG (ref 27–31)
MCH RBC QN AUTO: 34 PG (ref 27–31)
MCHC RBC AUTO-ENTMCNC: 30.5 G/DL (ref 33–37)
MCHC RBC AUTO-ENTMCNC: 31.9 G/DL (ref 33–37)
MCHC RBC AUTO-ENTMCNC: 31.9 G/DL (ref 33–37)
MCHC RBC AUTO-ENTMCNC: 32.7 G/DL (ref 33–37)
MCHC RBC AUTO-ENTMCNC: 32.7 G/DL (ref 33–37)
MCHC RBC AUTO-ENTMCNC: 32.8 G/DL (ref 33–37)
MCHC RBC AUTO-ENTMCNC: 33.1 G/DL (ref 33–37)
MCHC RBC AUTO-ENTMCNC: 33.3 G/DL (ref 33–37)
MCHC RBC AUTO-ENTMCNC: 33.5 G/DL (ref 33–37)
MCHC RBC AUTO-ENTMCNC: 33.6 G/DL (ref 33–37)
MCHC RBC AUTO-ENTMCNC: 34 G/DL (ref 33–37)
MCV RBC AUTO: 100 FL (ref 80–94)
MCV RBC AUTO: 105.7 FL (ref 80–94)
MCV RBC AUTO: 93.7 FL (ref 80–94)
MCV RBC AUTO: 93.8 FL (ref 80–94)
MCV RBC AUTO: 94.1 FL (ref 80–94)
MCV RBC AUTO: 94.9 FL (ref 80–94)
MCV RBC AUTO: 95.5 FL (ref 80–94)
MCV RBC AUTO: 95.9 FL (ref 80–94)
MCV RBC AUTO: 97.7 FL (ref 80–94)
MCV RBC AUTO: 99.1 FL (ref 80–94)
MCV RBC AUTO: 99.5 FL (ref 80–94)
METAMYELOCYTES RELATIVE PERCENT: 1 %
METHEMOGLOBIN ARTERIAL: 0.8 %
METHEMOGLOBIN ARTERIAL: 0.9 %
METHEMOGLOBIN ARTERIAL: 1 %
MONOCYTES ABSOLUTE: 0 K/UL (ref 0–0.9)
MONOCYTES ABSOLUTE: 0.1 K/UL (ref 0–0.9)
MONOCYTES ABSOLUTE: 0.2 K/UL (ref 0–0.9)
MONOCYTES RELATIVE PERCENT: 0 % (ref 0–10)
MONOCYTES RELATIVE PERCENT: 1 % (ref 0–10)
MONOCYTES RELATIVE PERCENT: 1 % (ref 0–10)
MONOCYTES RELATIVE PERCENT: 2 % (ref 0–10)
MONOCYTES RELATIVE PERCENT: 3 % (ref 0–10)
MONOCYTES RELATIVE PERCENT: 8 % (ref 0–10)
NEUTROPHILS ABSOLUTE: 0 K/UL (ref 1.5–7.5)
NEUTROPHILS ABSOLUTE: 1.9 K/UL (ref 1.5–7.5)
NEUTROPHILS ABSOLUTE: 2 K/UL (ref 1.5–7.5)
NEUTROPHILS ABSOLUTE: 2.3 K/UL (ref 1.5–7.5)
NEUTROPHILS ABSOLUTE: 2.6 K/UL (ref 1.5–7.5)
NEUTROPHILS ABSOLUTE: 2.8 K/UL (ref 1.5–7.5)
NEUTROPHILS ABSOLUTE: 3.6 K/UL (ref 1.5–7.5)
NEUTROPHILS ABSOLUTE: 4 K/UL (ref 1.5–7.5)
NEUTROPHILS ABSOLUTE: 4.3 K/UL (ref 1.5–7.5)
NEUTROPHILS ABSOLUTE: 4.6 K/UL (ref 1.5–7.5)
NEUTROPHILS RELATIVE PERCENT: 1 % (ref 50–65)
NEUTROPHILS RELATIVE PERCENT: 35 % (ref 50–65)
NEUTROPHILS RELATIVE PERCENT: 83 % (ref 50–65)
NEUTROPHILS RELATIVE PERCENT: 84 % (ref 50–65)
NEUTROPHILS RELATIVE PERCENT: 84 % (ref 50–65)
NEUTROPHILS RELATIVE PERCENT: 86 % (ref 50–65)
NEUTROPHILS RELATIVE PERCENT: 89 % (ref 50–65)
NEUTROPHILS RELATIVE PERCENT: 92 % (ref 50–65)
NEUTROPHILS RELATIVE PERCENT: 94 % (ref 50–65)
NEUTROPHILS RELATIVE PERCENT: 95 % (ref 50–65)
O2 CONTENT ARTERIAL: 10.5 ML/DL
O2 CONTENT ARTERIAL: 11 ML/DL
O2 CONTENT ARTERIAL: 9.3 ML/DL
O2 SAT, ARTERIAL: 74 %
O2 SAT, ARTERIAL: 85.1 %
O2 SAT, ARTERIAL: 87.6 %
O2 THERAPY: ABNORMAL
OVALOCYTES: ABNORMAL
PCO2 ARTERIAL: 36 MMHG (ref 35–45)
PCO2 ARTERIAL: 63 MMHG (ref 35–45)
PCO2 ARTERIAL: 79 MMHG (ref 35–45)
PDW BLD-RTO: 14.4 % (ref 11.5–14.5)
PDW BLD-RTO: 14.5 % (ref 11.5–14.5)
PDW BLD-RTO: 14.5 % (ref 11.5–14.5)
PDW BLD-RTO: 14.6 % (ref 11.5–14.5)
PDW BLD-RTO: 14.6 % (ref 11.5–14.5)
PDW BLD-RTO: 14.7 % (ref 11.5–14.5)
PDW BLD-RTO: 14.8 % (ref 11.5–14.5)
PDW BLD-RTO: 15 % (ref 11.5–14.5)
PDW BLD-RTO: 15.4 % (ref 11.5–14.5)
PDW BLD-RTO: 15.6 % (ref 11.5–14.5)
PDW BLD-RTO: 16.1 % (ref 11.5–14.5)
PERFORMED ON: ABNORMAL
PERFORMED ON: NORMAL
PH ARTERIAL: 7.16 (ref 7.35–7.45)
PH ARTERIAL: 7.23 (ref 7.35–7.45)
PH ARTERIAL: 7.45 (ref 7.35–7.45)
PLATELET # BLD: 184 K/UL (ref 130–400)
PLATELET # BLD: 214 K/UL (ref 130–400)
PLATELET # BLD: 22 K/UL (ref 130–400)
PLATELET # BLD: 222 K/UL (ref 130–400)
PLATELET # BLD: 236 K/UL (ref 130–400)
PLATELET # BLD: 280 K/UL (ref 130–400)
PLATELET # BLD: 289 K/UL (ref 130–400)
PLATELET # BLD: 36 K/UL (ref 130–400)
PLATELET # BLD: 64 K/UL (ref 130–400)
PLATELET # BLD: 81 K/UL (ref 130–400)
PLATELET # BLD: 9 K/UL (ref 130–400)
PLATELET SLIDE REVIEW: ABNORMAL
PLATELET SLIDE REVIEW: ADEQUATE
PMV BLD AUTO: 10 FL (ref 9.4–12.4)
PMV BLD AUTO: 10.2 FL (ref 9.4–12.4)
PMV BLD AUTO: 10.2 FL (ref 9.4–12.4)
PMV BLD AUTO: 10.3 FL (ref 9.4–12.4)
PMV BLD AUTO: 10.3 FL (ref 9.4–12.4)
PMV BLD AUTO: 10.5 FL (ref 9.4–12.4)
PMV BLD AUTO: 11 FL (ref 9.4–12.4)
PMV BLD AUTO: 11.3 FL (ref 9.4–12.4)
PMV BLD AUTO: 11.9 FL (ref 9.4–12.4)
PMV BLD AUTO: 13.2 FL (ref 9.4–12.4)
PO2 ARTERIAL: 39 MMHG (ref 80–100)
PO2 ARTERIAL: 55 MMHG (ref 80–100)
PO2 ARTERIAL: 67 MMHG (ref 80–100)
POLYCHROMASIA: ABNORMAL
POTASSIUM REFLEX MAGNESIUM: 4.1 MMOL/L (ref 3.5–5)
POTASSIUM REFLEX MAGNESIUM: 4.5 MMOL/L (ref 3.5–5)
POTASSIUM REFLEX MAGNESIUM: 4.6 MMOL/L (ref 3.5–5)
POTASSIUM REFLEX MAGNESIUM: 4.7 MMOL/L (ref 3.5–5)
POTASSIUM REFLEX MAGNESIUM: 4.8 MMOL/L (ref 3.5–5)
POTASSIUM REFLEX MAGNESIUM: 5 MMOL/L (ref 3.5–5)
POTASSIUM REFLEX MAGNESIUM: 5.3 MMOL/L (ref 3.5–5)
POTASSIUM REFLEX MAGNESIUM: 5.4 MMOL/L (ref 3.5–5)
POTASSIUM REFLEX MAGNESIUM: 5.9 MMOL/L (ref 3.5–5)
POTASSIUM SERPL-SCNC: 5.4 MMOL/L (ref 3.5–5)
POTASSIUM SERPL-SCNC: 5.8 MMOL/L (ref 3.5–5)
POTASSIUM, WHOLE BLOOD: 5.2
POTASSIUM, WHOLE BLOOD: 5.6
POTASSIUM, WHOLE BLOOD: 5.9
PROTHROMBIN TIME: 14.2 SEC (ref 12–14.6)
PROTHROMBIN TIME: 14.8 SEC (ref 12–14.6)
PROTHROMBIN TIME: 15.4 SEC (ref 12–14.6)
PROTHROMBIN TIME: 15.8 SEC (ref 12–14.6)
PROTHROMBIN TIME: 15.9 SEC (ref 12–14.6)
PROTHROMBIN TIME: 16.3 SEC (ref 12–14.6)
PROTHROMBIN TIME: 21.2 SEC (ref 12–14.6)
PROTHROMBIN TIME: 25.5 SEC (ref 12–14.6)
PROTHROMBIN TIME: 31.1 SEC (ref 12–14.6)
RBC # BLD: 2.11 M/UL (ref 4.7–6.1)
RBC # BLD: 2.18 M/UL (ref 4.7–6.1)
RBC # BLD: 2.3 M/UL (ref 4.7–6.1)
RBC # BLD: 2.41 M/UL (ref 4.7–6.1)
RBC # BLD: 2.68 M/UL (ref 4.7–6.1)
RBC # BLD: 2.73 M/UL (ref 4.7–6.1)
RBC # BLD: 2.9 M/UL (ref 4.7–6.1)
RBC # BLD: 2.91 M/UL (ref 4.7–6.1)
RBC # BLD: 2.93 M/UL (ref 4.7–6.1)
RBC # BLD: 3.15 M/UL (ref 4.7–6.1)
RBC # BLD: 3.44 M/UL (ref 4.7–6.1)
SMUDGE CELLS: ABNORMAL
SODIUM BLD-SCNC: 123 MMOL/L (ref 136–145)
SODIUM BLD-SCNC: 125 MMOL/L (ref 136–145)
SODIUM BLD-SCNC: 126 MMOL/L (ref 136–145)
SODIUM BLD-SCNC: 126 MMOL/L (ref 136–145)
SODIUM BLD-SCNC: 127 MMOL/L (ref 136–145)
SODIUM BLD-SCNC: 127 MMOL/L (ref 136–145)
SODIUM BLD-SCNC: 128 MMOL/L (ref 136–145)
SODIUM BLD-SCNC: 129 MMOL/L (ref 136–145)
SODIUM BLD-SCNC: 129 MMOL/L (ref 136–145)
SODIUM BLD-SCNC: 133 MMOL/L (ref 136–145)
TARGET CELLS: ABNORMAL
TEAR DROP CELLS: ABNORMAL
TOTAL PROTEIN: 4.4 G/DL (ref 6.6–8.7)
TOTAL PROTEIN: 4.4 G/DL (ref 6.6–8.7)
TOTAL PROTEIN: 4.5 G/DL (ref 6.6–8.7)
TOTAL PROTEIN: 4.9 G/DL (ref 6.6–8.7)
TOTAL PROTEIN: 4.9 G/DL (ref 6.6–8.7)
TOTAL PROTEIN: 5.1 G/DL (ref 6.6–8.7)
TOTAL PROTEIN: 5.3 G/DL (ref 6.6–8.7)
TOTAL PROTEIN: 5.3 G/DL (ref 6.6–8.7)
TOTAL PROTEIN: 5.4 G/DL (ref 6.6–8.7)
TOTAL PROTEIN: 5.5 G/DL (ref 6.6–8.7)
TOXIC GRANULATION: ABNORMAL
VACUOLATED NEUTROPHILS: ABNORMAL
VANCOMYCIN TROUGH: 14.6 UG/ML (ref 10–20)
WBC # BLD: 2.1 K/UL (ref 4.8–10.8)
WBC # BLD: 2.3 K/UL (ref 4.8–10.8)
WBC # BLD: 2.4 K/UL (ref 4.8–10.8)
WBC # BLD: 2.7 K/UL (ref 4.8–10.8)
WBC # BLD: 2.8 K/UL (ref 4.8–10.8)
WBC # BLD: 2.9 K/UL (ref 4.8–10.8)
WBC # BLD: 3.7 K/UL (ref 4.8–10.8)
WBC # BLD: 4.2 K/UL (ref 4.8–10.8)
WBC # BLD: 4.3 K/UL (ref 4.8–10.8)
WBC # BLD: 4.7 K/UL (ref 4.8–10.8)
WBC # BLD: 4.7 K/UL (ref 4.8–10.8)

## 2021-01-01 PROCEDURE — 92526 ORAL FUNCTION THERAPY: CPT

## 2021-01-01 PROCEDURE — 86901 BLOOD TYPING SEROLOGIC RH(D): CPT

## 2021-01-01 PROCEDURE — 2500000003 HC RX 250 WO HCPCS: Performed by: INTERNAL MEDICINE

## 2021-01-01 PROCEDURE — 80053 COMPREHEN METABOLIC PANEL: CPT

## 2021-01-01 PROCEDURE — 85014 HEMATOCRIT: CPT

## 2021-01-01 PROCEDURE — 94002 VENT MGMT INPAT INIT DAY: CPT

## 2021-01-01 PROCEDURE — 6360000002 HC RX W HCPCS: Performed by: INTERNAL MEDICINE

## 2021-01-01 PROCEDURE — 85379 FIBRIN DEGRADATION QUANT: CPT

## 2021-01-01 PROCEDURE — P9016 RBC LEUKOCYTES REDUCED: HCPCS

## 2021-01-01 PROCEDURE — 6360000002 HC RX W HCPCS: Performed by: SURGERY

## 2021-01-01 PROCEDURE — 85384 FIBRINOGEN ACTIVITY: CPT

## 2021-01-01 PROCEDURE — 6370000000 HC RX 637 (ALT 250 FOR IP): Performed by: HOSPITALIST

## 2021-01-01 PROCEDURE — 97530 THERAPEUTIC ACTIVITIES: CPT

## 2021-01-01 PROCEDURE — 97110 THERAPEUTIC EXERCISES: CPT

## 2021-01-01 PROCEDURE — 2580000003 HC RX 258: Performed by: HOSPITALIST

## 2021-01-01 PROCEDURE — 82947 ASSAY GLUCOSE BLOOD QUANT: CPT

## 2021-01-01 PROCEDURE — 2060000000 HC ICU INTERMEDIATE R&B

## 2021-01-01 PROCEDURE — 2580000003 HC RX 258: Performed by: INTERNAL MEDICINE

## 2021-01-01 PROCEDURE — 5A1945Z RESPIRATORY VENTILATION, 24-96 CONSECUTIVE HOURS: ICD-10-PCS | Performed by: INTERNAL MEDICINE

## 2021-01-01 PROCEDURE — 6370000000 HC RX 637 (ALT 250 FOR IP): Performed by: SURGERY

## 2021-01-01 PROCEDURE — 85025 COMPLETE CBC W/AUTO DIFF WBC: CPT

## 2021-01-01 PROCEDURE — 94761 N-INVAS EAR/PLS OXIMETRY MLT: CPT

## 2021-01-01 PROCEDURE — 85610 PROTHROMBIN TIME: CPT

## 2021-01-01 PROCEDURE — 86850 RBC ANTIBODY SCREEN: CPT

## 2021-01-01 PROCEDURE — 1210000000 HC MED SURG R&B

## 2021-01-01 PROCEDURE — 8040000000 HC CCPD INPATIENT

## 2021-01-01 PROCEDURE — 36600 WITHDRAWAL OF ARTERIAL BLOOD: CPT

## 2021-01-01 PROCEDURE — 83735 ASSAY OF MAGNESIUM: CPT

## 2021-01-01 PROCEDURE — 86923 COMPATIBILITY TEST ELECTRIC: CPT

## 2021-01-01 PROCEDURE — 2580000003 HC RX 258: Performed by: SURGERY

## 2021-01-01 PROCEDURE — 86900 BLOOD TYPING SEROLOGIC ABO: CPT

## 2021-01-01 PROCEDURE — 2700000000 HC OXYGEN THERAPY PER DAY

## 2021-01-01 PROCEDURE — 71045 X-RAY EXAM CHEST 1 VIEW: CPT

## 2021-01-01 PROCEDURE — 94003 VENT MGMT INPAT SUBQ DAY: CPT

## 2021-01-01 PROCEDURE — 84132 ASSAY OF SERUM POTASSIUM: CPT

## 2021-01-01 PROCEDURE — 31500 INSERT EMERGENCY AIRWAY: CPT

## 2021-01-01 PROCEDURE — 6360000002 HC RX W HCPCS: Performed by: HOSPITALIST

## 2021-01-01 PROCEDURE — 51702 INSERT TEMP BLADDER CATH: CPT

## 2021-01-01 PROCEDURE — 85018 HEMOGLOBIN: CPT

## 2021-01-01 PROCEDURE — 85027 COMPLETE CBC AUTOMATED: CPT

## 2021-01-01 PROCEDURE — 6360000002 HC RX W HCPCS

## 2021-01-01 PROCEDURE — 0BH18EZ INSERTION OF ENDOTRACHEAL AIRWAY INTO TRACHEA, VIA NATURAL OR ARTIFICIAL OPENING ENDOSCOPIC: ICD-10-PCS | Performed by: INTERNAL MEDICINE

## 2021-01-01 PROCEDURE — 36430 TRANSFUSION BLD/BLD COMPNT: CPT

## 2021-01-01 PROCEDURE — 82803 BLOOD GASES ANY COMBINATION: CPT

## 2021-01-01 PROCEDURE — 2100000000 HC CCU R&B

## 2021-01-01 PROCEDURE — P9073 PLATELETS PHERESIS PATH REDU: HCPCS

## 2021-01-01 RX ORDER — 0.9 % SODIUM CHLORIDE 0.9 %
20 INTRAVENOUS SOLUTION INTRAVENOUS ONCE
Status: COMPLETED | OUTPATIENT
Start: 2021-01-01 | End: 2021-01-01

## 2021-01-01 RX ORDER — PROPOFOL 10 MG/ML
INJECTION, EMULSION INTRAVENOUS
Status: COMPLETED
Start: 2021-01-01 | End: 2021-01-01

## 2021-01-01 RX ORDER — SODIUM CHLORIDE 9 MG/ML
INJECTION, SOLUTION INTRAVENOUS PRN
Status: DISCONTINUED | OUTPATIENT
Start: 2021-01-01 | End: 2021-01-01 | Stop reason: HOSPADM

## 2021-01-01 RX ORDER — DEXAMETHASONE SODIUM PHOSPHATE 10 MG/ML
6 INJECTION, SOLUTION INTRAMUSCULAR; INTRAVENOUS EVERY 12 HOURS
Status: DISCONTINUED | OUTPATIENT
Start: 2021-01-01 | End: 2021-01-01 | Stop reason: HOSPADM

## 2021-01-01 RX ORDER — LORAZEPAM 2 MG/ML
1 INJECTION INTRAMUSCULAR EVERY 4 HOURS PRN
Status: DISCONTINUED | OUTPATIENT
Start: 2021-01-01 | End: 2021-01-01 | Stop reason: HOSPADM

## 2021-01-01 RX ORDER — DEXMEDETOMIDINE HYDROCHLORIDE 4 UG/ML
0.2 INJECTION, SOLUTION INTRAVENOUS CONTINUOUS
Status: DISCONTINUED | OUTPATIENT
Start: 2021-01-01 | End: 2021-01-01 | Stop reason: HOSPADM

## 2021-01-01 RX ORDER — ZINC SULFATE 50(220)MG
50 CAPSULE ORAL DAILY
Status: DISCONTINUED | OUTPATIENT
Start: 2021-01-01 | End: 2021-01-01

## 2021-01-01 RX ORDER — PROPOFOL 10 MG/ML
10 INJECTION, EMULSION INTRAVENOUS
Status: DISCONTINUED | OUTPATIENT
Start: 2021-01-01 | End: 2021-01-01 | Stop reason: HOSPADM

## 2021-01-01 RX ORDER — NOREPINEPHRINE BIT/0.9 % NACL 16MG/250ML
2 INFUSION BOTTLE (ML) INTRAVENOUS CONTINUOUS
Status: DISCONTINUED | OUTPATIENT
Start: 2021-01-01 | End: 2021-01-01

## 2021-01-01 RX ORDER — MECOBALAMIN 5000 MCG
5 TABLET,DISINTEGRATING ORAL NIGHTLY
Status: DISCONTINUED | OUTPATIENT
Start: 2021-01-01 | End: 2021-01-01

## 2021-01-01 RX ORDER — SCOLOPAMINE TRANSDERMAL SYSTEM 1 MG/1
1 PATCH, EXTENDED RELEASE TRANSDERMAL
Status: DISCONTINUED | OUTPATIENT
Start: 2021-01-01 | End: 2021-01-01 | Stop reason: HOSPADM

## 2021-01-01 RX ADMIN — CLOPIDOGREL BISULFATE 75 MG: 75 TABLET ORAL at 08:47

## 2021-01-01 RX ADMIN — BUDESONIDE AND FORMOTEROL FUMARATE DIHYDRATE 2 PUFF: 160; 4.5 AEROSOL RESPIRATORY (INHALATION) at 20:54

## 2021-01-01 RX ADMIN — PHENOL 1 SPRAY: 1.5 LIQUID ORAL at 18:30

## 2021-01-01 RX ADMIN — BUDESONIDE AND FORMOTEROL FUMARATE DIHYDRATE 2 PUFF: 160; 4.5 AEROSOL RESPIRATORY (INHALATION) at 20:47

## 2021-01-01 RX ADMIN — FENTANYL CITRATE 5 MCG/HR: 50 INJECTION INTRAVENOUS at 19:03

## 2021-01-01 RX ADMIN — HEPARIN SODIUM 5000 UNITS: 5000 INJECTION INTRAVENOUS; SUBCUTANEOUS at 06:10

## 2021-01-01 RX ADMIN — CARVEDILOL 3.12 MG: 6.25 TABLET, FILM COATED ORAL at 09:28

## 2021-01-01 RX ADMIN — STANDARDIZED SENNA CONCENTRATE 8.6 MG: 8.6 TABLET ORAL at 20:20

## 2021-01-01 RX ADMIN — ASPIRIN 81 MG: 81 TABLET, CHEWABLE ORAL at 09:34

## 2021-01-01 RX ADMIN — HEPARIN SODIUM 5000 UNITS: 5000 INJECTION INTRAVENOUS; SUBCUTANEOUS at 14:45

## 2021-01-01 RX ADMIN — SEVELAMER CARBONATE 800 MG: 800 TABLET, FILM COATED ORAL at 09:38

## 2021-01-01 RX ADMIN — ATORVASTATIN CALCIUM 40 MG: 40 TABLET, FILM COATED ORAL at 21:58

## 2021-01-01 RX ADMIN — HEPARIN SODIUM 5000 UNITS: 5000 INJECTION INTRAVENOUS; SUBCUTANEOUS at 15:00

## 2021-01-01 RX ADMIN — LEVOTHYROXINE SODIUM 50 MCG: 50 TABLET ORAL at 06:12

## 2021-01-01 RX ADMIN — SEVELAMER CARBONATE 800 MG: 800 TABLET, FILM COATED ORAL at 20:02

## 2021-01-01 RX ADMIN — BUDESONIDE AND FORMOTEROL FUMARATE DIHYDRATE 2 PUFF: 160; 4.5 AEROSOL RESPIRATORY (INHALATION) at 09:32

## 2021-01-01 RX ADMIN — ATORVASTATIN CALCIUM 40 MG: 40 TABLET, FILM COATED ORAL at 21:21

## 2021-01-01 RX ADMIN — SEVELAMER CARBONATE 800 MG: 800 TABLET, FILM COATED ORAL at 14:34

## 2021-01-01 RX ADMIN — SEVELAMER CARBONATE 800 MG: 800 TABLET, FILM COATED ORAL at 22:00

## 2021-01-01 RX ADMIN — Medication 50 MG: at 10:54

## 2021-01-01 RX ADMIN — CARVEDILOL 3.12 MG: 6.25 TABLET, FILM COATED ORAL at 18:00

## 2021-01-01 RX ADMIN — PROPOFOL 10 MCG/KG/MIN: 10 INJECTION, EMULSION INTRAVENOUS at 16:20

## 2021-01-01 RX ADMIN — CLOPIDOGREL BISULFATE 75 MG: 75 TABLET ORAL at 09:29

## 2021-01-01 RX ADMIN — LEVOTHYROXINE SODIUM 50 MCG: 50 TABLET ORAL at 10:55

## 2021-01-01 RX ADMIN — Medication 42 MCG/MIN: at 17:14

## 2021-01-01 RX ADMIN — APIXABAN 2.5 MG: 2.5 TABLET, FILM COATED ORAL at 20:25

## 2021-01-01 RX ADMIN — ASPIRIN 81 MG: 81 TABLET, CHEWABLE ORAL at 08:47

## 2021-01-01 RX ADMIN — SEVELAMER CARBONATE 800 MG: 800 TABLET, FILM COATED ORAL at 09:15

## 2021-01-01 RX ADMIN — SEVELAMER CARBONATE 800 MG: 800 TABLET, FILM COATED ORAL at 10:55

## 2021-01-01 RX ADMIN — CEFAZOLIN SODIUM 1 G: 1 INJECTION, POWDER, FOR SOLUTION INTRAMUSCULAR; INTRAVENOUS at 00:04

## 2021-01-01 RX ADMIN — SODIUM CHLORIDE 20 ML: 9 INJECTION, SOLUTION INTRAVENOUS at 09:40

## 2021-01-01 RX ADMIN — GUAIFENESIN AND DEXTROMETHORPHAN 5 ML: 100; 10 SYRUP ORAL at 16:50

## 2021-01-01 RX ADMIN — SEVELAMER CARBONATE 800 MG: 800 TABLET, FILM COATED ORAL at 14:40

## 2021-01-01 RX ADMIN — GUAIFENESIN AND DEXTROMETHORPHAN 5 ML: 100; 10 SYRUP ORAL at 10:55

## 2021-01-01 RX ADMIN — CEFAZOLIN SODIUM 1 G: 1 INJECTION, POWDER, FOR SOLUTION INTRAMUSCULAR; INTRAVENOUS at 00:25

## 2021-01-01 RX ADMIN — BUDESONIDE AND FORMOTEROL FUMARATE DIHYDRATE 2 PUFF: 160; 4.5 AEROSOL RESPIRATORY (INHALATION) at 09:01

## 2021-01-01 RX ADMIN — Medication 6000 UNITS: at 08:47

## 2021-01-01 RX ADMIN — HEPARIN SODIUM 5000 UNITS: 5000 INJECTION INTRAVENOUS; SUBCUTANEOUS at 15:13

## 2021-01-01 RX ADMIN — SEVELAMER CARBONATE 800 MG: 800 TABLET, FILM COATED ORAL at 21:21

## 2021-01-01 RX ADMIN — CARVEDILOL 3.12 MG: 6.25 TABLET, FILM COATED ORAL at 11:21

## 2021-01-01 RX ADMIN — BUDESONIDE AND FORMOTEROL FUMARATE DIHYDRATE 2 PUFF: 160; 4.5 AEROSOL RESPIRATORY (INHALATION) at 09:53

## 2021-01-01 RX ADMIN — Medication 6000 UNITS: at 09:15

## 2021-01-01 RX ADMIN — SEVELAMER CARBONATE 800 MG: 800 TABLET, FILM COATED ORAL at 14:55

## 2021-01-01 RX ADMIN — CARVEDILOL 3.12 MG: 6.25 TABLET, FILM COATED ORAL at 17:45

## 2021-01-01 RX ADMIN — SEVELAMER CARBONATE 800 MG: 800 TABLET, FILM COATED ORAL at 08:47

## 2021-01-01 RX ADMIN — Medication 40 MCG/MIN: at 10:33

## 2021-01-01 RX ADMIN — HEPARIN SODIUM 5000 UNITS: 5000 INJECTION INTRAVENOUS; SUBCUTANEOUS at 21:57

## 2021-01-01 RX ADMIN — ATORVASTATIN CALCIUM 40 MG: 40 TABLET, FILM COATED ORAL at 20:49

## 2021-01-01 RX ADMIN — HEPARIN SODIUM 5000 UNITS: 5000 INJECTION INTRAVENOUS; SUBCUTANEOUS at 05:45

## 2021-01-01 RX ADMIN — Medication 6000 UNITS: at 09:38

## 2021-01-01 RX ADMIN — Medication 6000 UNITS: at 10:54

## 2021-01-01 RX ADMIN — BUDESONIDE AND FORMOTEROL FUMARATE DIHYDRATE 2 PUFF: 160; 4.5 AEROSOL RESPIRATORY (INHALATION) at 21:24

## 2021-01-01 RX ADMIN — Medication 6000 UNITS: at 09:29

## 2021-01-01 RX ADMIN — CEFAZOLIN SODIUM 1 G: 1 INJECTION, POWDER, FOR SOLUTION INTRAMUSCULAR; INTRAVENOUS at 00:42

## 2021-01-01 RX ADMIN — CEFAZOLIN SODIUM 1 G: 1 INJECTION, POWDER, FOR SOLUTION INTRAMUSCULAR; INTRAVENOUS at 10:05

## 2021-01-01 RX ADMIN — Medication 6000 UNITS: at 08:25

## 2021-01-01 RX ADMIN — CLOPIDOGREL BISULFATE 75 MG: 75 TABLET ORAL at 09:10

## 2021-01-01 RX ADMIN — FENTANYL CITRATE 75 MCG/HR: 50 INJECTION INTRAVENOUS at 23:02

## 2021-01-01 RX ADMIN — CEFAZOLIN SODIUM 1 G: 1 INJECTION, POWDER, FOR SOLUTION INTRAMUSCULAR; INTRAVENOUS at 12:38

## 2021-01-01 RX ADMIN — SEVELAMER CARBONATE 800 MG: 800 TABLET, FILM COATED ORAL at 09:10

## 2021-01-01 RX ADMIN — ASPIRIN 81 MG: 81 TABLET, CHEWABLE ORAL at 09:15

## 2021-01-01 RX ADMIN — CARVEDILOL 3.12 MG: 6.25 TABLET, FILM COATED ORAL at 18:33

## 2021-01-01 RX ADMIN — LEVOTHYROXINE SODIUM 50 MCG: 50 TABLET ORAL at 08:25

## 2021-01-01 RX ADMIN — STANDARDIZED SENNA CONCENTRATE 8.6 MG: 8.6 TABLET ORAL at 22:00

## 2021-01-01 RX ADMIN — CARVEDILOL 3.12 MG: 6.25 TABLET, FILM COATED ORAL at 09:14

## 2021-01-01 RX ADMIN — LISINOPRIL 2.5 MG: 2.5 TABLET ORAL at 09:28

## 2021-01-01 RX ADMIN — DEXTROSE MONOHYDRATE 25 ML/HR: 50 INJECTION, SOLUTION INTRAVENOUS at 10:20

## 2021-01-01 RX ADMIN — DOCUSATE SODIUM 100 MG: 100 CAPSULE ORAL at 09:33

## 2021-01-01 RX ADMIN — DOCUSATE SODIUM 100 MG: 100 CAPSULE ORAL at 09:15

## 2021-01-01 RX ADMIN — APIXABAN 2.5 MG: 2.5 TABLET, FILM COATED ORAL at 20:19

## 2021-01-01 RX ADMIN — ASPIRIN 81 MG: 81 TABLET, CHEWABLE ORAL at 09:38

## 2021-01-01 RX ADMIN — GUAIFENESIN AND DEXTROMETHORPHAN 5 ML: 100; 10 SYRUP ORAL at 08:25

## 2021-01-01 RX ADMIN — STANDARDIZED SENNA CONCENTRATE 8.6 MG: 8.6 TABLET ORAL at 20:22

## 2021-01-01 RX ADMIN — ATORVASTATIN CALCIUM 40 MG: 40 TABLET, FILM COATED ORAL at 20:20

## 2021-01-01 RX ADMIN — BUDESONIDE AND FORMOTEROL FUMARATE DIHYDRATE 2 PUFF: 160; 4.5 AEROSOL RESPIRATORY (INHALATION) at 20:21

## 2021-01-01 RX ADMIN — LEVOTHYROXINE SODIUM 50 MCG: 50 TABLET ORAL at 05:45

## 2021-01-01 RX ADMIN — SEVELAMER CARBONATE 800 MG: 800 TABLET, FILM COATED ORAL at 21:59

## 2021-01-01 RX ADMIN — BUDESONIDE AND FORMOTEROL FUMARATE DIHYDRATE 2 PUFF: 160; 4.5 AEROSOL RESPIRATORY (INHALATION) at 09:38

## 2021-01-01 RX ADMIN — HEPARIN SODIUM 5000 UNITS: 5000 INJECTION INTRAVENOUS; SUBCUTANEOUS at 05:38

## 2021-01-01 RX ADMIN — SEVELAMER CARBONATE 800 MG: 800 TABLET, FILM COATED ORAL at 21:42

## 2021-01-01 RX ADMIN — LEVOTHYROXINE SODIUM 50 MCG: 50 TABLET ORAL at 05:09

## 2021-01-01 RX ADMIN — Medication 5 MG: at 21:59

## 2021-01-01 RX ADMIN — ACETAMINOPHEN 650 MG: 325 TABLET ORAL at 20:49

## 2021-01-01 RX ADMIN — APIXABAN 2.5 MG: 2.5 TABLET, FILM COATED ORAL at 09:29

## 2021-01-01 RX ADMIN — HEPARIN SODIUM 5000 UNITS: 5000 INJECTION INTRAVENOUS; SUBCUTANEOUS at 22:15

## 2021-01-01 RX ADMIN — Medication 5 MG: at 20:22

## 2021-01-01 RX ADMIN — ATORVASTATIN CALCIUM 40 MG: 40 TABLET, FILM COATED ORAL at 20:02

## 2021-01-01 RX ADMIN — APIXABAN 2.5 MG: 2.5 TABLET, FILM COATED ORAL at 21:58

## 2021-01-01 RX ADMIN — HEPARIN SODIUM 5000 UNITS: 5000 INJECTION INTRAVENOUS; SUBCUTANEOUS at 05:42

## 2021-01-01 RX ADMIN — ALPRAZOLAM 0.25 MG: 0.25 TABLET ORAL at 00:14

## 2021-01-01 RX ADMIN — BUDESONIDE AND FORMOTEROL FUMARATE DIHYDRATE 2 PUFF: 160; 4.5 AEROSOL RESPIRATORY (INHALATION) at 20:23

## 2021-01-01 RX ADMIN — ASPIRIN 81 MG: 81 TABLET, CHEWABLE ORAL at 09:35

## 2021-01-01 RX ADMIN — BUDESONIDE AND FORMOTEROL FUMARATE DIHYDRATE 2 PUFF: 160; 4.5 AEROSOL RESPIRATORY (INHALATION) at 21:41

## 2021-01-01 RX ADMIN — PHENYLEPHRINE HYDROCHLORIDE 100 MCG/MIN: 10 INJECTION INTRAVENOUS at 15:20

## 2021-01-01 RX ADMIN — STANDARDIZED SENNA CONCENTRATE 8.6 MG: 8.6 TABLET ORAL at 20:47

## 2021-01-01 RX ADMIN — HEPARIN SODIUM 5000 UNITS: 5000 INJECTION INTRAVENOUS; SUBCUTANEOUS at 14:30

## 2021-01-01 RX ADMIN — CARVEDILOL 3.12 MG: 6.25 TABLET, FILM COATED ORAL at 21:56

## 2021-01-01 RX ADMIN — Medication 42 MCG/MIN: at 22:59

## 2021-01-01 RX ADMIN — ATORVASTATIN CALCIUM 40 MG: 40 TABLET, FILM COATED ORAL at 22:00

## 2021-01-01 RX ADMIN — CLOPIDOGREL BISULFATE 75 MG: 75 TABLET ORAL at 09:15

## 2021-01-01 RX ADMIN — LEVOTHYROXINE SODIUM 50 MCG: 50 TABLET ORAL at 06:10

## 2021-01-01 RX ADMIN — DEXAMETHASONE 6 MG: 4 TABLET ORAL at 10:54

## 2021-01-01 RX ADMIN — Medication 6000 UNITS: at 09:10

## 2021-01-01 RX ADMIN — CLOPIDOGREL BISULFATE 75 MG: 75 TABLET ORAL at 09:35

## 2021-01-01 RX ADMIN — BUDESONIDE AND FORMOTEROL FUMARATE DIHYDRATE 2 PUFF: 160; 4.5 AEROSOL RESPIRATORY (INHALATION) at 09:30

## 2021-01-01 RX ADMIN — SEVELAMER CARBONATE 800 MG: 800 TABLET, FILM COATED ORAL at 09:34

## 2021-01-01 RX ADMIN — DEXTROSE MONOHYDRATE 12.5 G: 25 INJECTION, SOLUTION INTRAVENOUS at 12:11

## 2021-01-01 RX ADMIN — CLOPIDOGREL BISULFATE 75 MG: 75 TABLET ORAL at 09:34

## 2021-01-01 RX ADMIN — LEVOTHYROXINE SODIUM 50 MCG: 50 TABLET ORAL at 05:35

## 2021-01-01 RX ADMIN — LISINOPRIL 2.5 MG: 2.5 TABLET ORAL at 09:39

## 2021-01-01 RX ADMIN — SEVELAMER CARBONATE 800 MG: 800 TABLET, FILM COATED ORAL at 08:25

## 2021-01-01 RX ADMIN — DEXTROSE MONOHYDRATE 12.5 G: 25 INJECTION, SOLUTION INTRAVENOUS at 22:11

## 2021-01-01 RX ADMIN — PHENYLEPHRINE HYDROCHLORIDE 150 MCG/MIN: 10 INJECTION INTRAVENOUS at 21:57

## 2021-01-01 RX ADMIN — Medication 50 MG: at 08:25

## 2021-01-01 RX ADMIN — BUDESONIDE AND FORMOTEROL FUMARATE DIHYDRATE 2 PUFF: 160; 4.5 AEROSOL RESPIRATORY (INHALATION) at 10:56

## 2021-01-01 RX ADMIN — DEXAMETHASONE SODIUM PHOSPHATE 6 MG: 10 INJECTION, SOLUTION INTRAMUSCULAR; INTRAVENOUS at 20:47

## 2021-01-01 RX ADMIN — DOCUSATE SODIUM 100 MG: 100 CAPSULE ORAL at 09:28

## 2021-01-01 RX ADMIN — CLOPIDOGREL BISULFATE 75 MG: 75 TABLET ORAL at 09:39

## 2021-01-01 RX ADMIN — Medication 6000 UNITS: at 09:35

## 2021-01-01 RX ADMIN — STANDARDIZED SENNA CONCENTRATE 8.6 MG: 8.6 TABLET ORAL at 21:21

## 2021-01-01 RX ADMIN — HEPARIN SODIUM 5000 UNITS: 5000 INJECTION INTRAVENOUS; SUBCUTANEOUS at 22:01

## 2021-01-01 RX ADMIN — CARVEDILOL 3.12 MG: 6.25 TABLET, FILM COATED ORAL at 08:47

## 2021-01-01 RX ADMIN — CEFAZOLIN SODIUM 1 G: 1 INJECTION, POWDER, FOR SOLUTION INTRAMUSCULAR; INTRAVENOUS at 01:27

## 2021-01-01 RX ADMIN — ATORVASTATIN CALCIUM 40 MG: 40 TABLET, FILM COATED ORAL at 20:25

## 2021-01-01 RX ADMIN — STANDARDIZED SENNA CONCENTRATE 8.6 MG: 8.6 TABLET ORAL at 21:42

## 2021-01-01 RX ADMIN — PROPOFOL 40 MCG/KG/MIN: 10 INJECTION, EMULSION INTRAVENOUS at 04:22

## 2021-01-01 RX ADMIN — BUDESONIDE AND FORMOTEROL FUMARATE DIHYDRATE 2 PUFF: 160; 4.5 AEROSOL RESPIRATORY (INHALATION) at 09:26

## 2021-01-01 RX ADMIN — CARVEDILOL 3.12 MG: 6.25 TABLET, FILM COATED ORAL at 17:52

## 2021-01-01 RX ADMIN — BUDESONIDE AND FORMOTEROL FUMARATE DIHYDRATE 2 PUFF: 160; 4.5 AEROSOL RESPIRATORY (INHALATION) at 07:12

## 2021-01-01 RX ADMIN — STANDARDIZED SENNA CONCENTRATE 8.6 MG: 8.6 TABLET ORAL at 20:02

## 2021-01-01 RX ADMIN — DEXAMETHASONE SODIUM PHOSPHATE 6 MG: 10 INJECTION, SOLUTION INTRAMUSCULAR; INTRAVENOUS at 08:25

## 2021-01-01 RX ADMIN — DOCUSATE SODIUM 100 MG: 100 CAPSULE ORAL at 08:25

## 2021-01-01 RX ADMIN — LISINOPRIL 2.5 MG: 2.5 TABLET ORAL at 09:33

## 2021-01-01 RX ADMIN — PROPOFOL 15 MCG/KG/MIN: 10 INJECTION, EMULSION INTRAVENOUS at 06:04

## 2021-01-01 RX ADMIN — CARVEDILOL 3.12 MG: 6.25 TABLET, FILM COATED ORAL at 09:34

## 2021-01-01 RX ADMIN — CEFAZOLIN SODIUM 1 G: 1 INJECTION, POWDER, FOR SOLUTION INTRAMUSCULAR; INTRAVENOUS at 10:55

## 2021-01-01 RX ADMIN — Medication 5 MG: at 20:47

## 2021-01-01 RX ADMIN — SEVELAMER CARBONATE 800 MG: 800 TABLET, FILM COATED ORAL at 20:48

## 2021-01-01 RX ADMIN — Medication 38 MCG/MIN: at 02:41

## 2021-01-01 RX ADMIN — SEVELAMER CARBONATE 800 MG: 800 TABLET, FILM COATED ORAL at 15:00

## 2021-01-01 RX ADMIN — DOCUSATE SODIUM 100 MG: 100 CAPSULE ORAL at 09:38

## 2021-01-01 RX ADMIN — Medication 40 MCG/MIN: at 06:04

## 2021-01-01 RX ADMIN — HEPARIN SODIUM 5000 UNITS: 5000 INJECTION INTRAVENOUS; SUBCUTANEOUS at 05:35

## 2021-01-01 RX ADMIN — CEFAZOLIN SODIUM 1 G: 1 INJECTION, POWDER, FOR SOLUTION INTRAMUSCULAR; INTRAVENOUS at 00:14

## 2021-01-01 RX ADMIN — LEVOTHYROXINE SODIUM 50 MCG: 50 TABLET ORAL at 05:34

## 2021-01-01 RX ADMIN — ATORVASTATIN CALCIUM 40 MG: 40 TABLET, FILM COATED ORAL at 21:42

## 2021-01-01 RX ADMIN — Medication 25 MCG/KG/MIN: at 16:23

## 2021-01-01 RX ADMIN — DEXTROSE MONOHYDRATE 12.5 G: 25 INJECTION, SOLUTION INTRAVENOUS at 10:09

## 2021-01-01 RX ADMIN — LEVOTHYROXINE SODIUM 50 MCG: 50 TABLET ORAL at 05:43

## 2021-01-01 RX ADMIN — PROPOFOL 15 MCG/KG/MIN: 10 INJECTION, EMULSION INTRAVENOUS at 13:42

## 2021-01-01 RX ADMIN — HEPARIN SODIUM 5000 UNITS: 5000 INJECTION INTRAVENOUS; SUBCUTANEOUS at 15:05

## 2021-01-01 RX ADMIN — SEVELAMER CARBONATE 800 MG: 800 TABLET, FILM COATED ORAL at 09:35

## 2021-01-01 RX ADMIN — HEPARIN SODIUM 5000 UNITS: 5000 INJECTION INTRAVENOUS; SUBCUTANEOUS at 05:09

## 2021-01-01 RX ADMIN — BUDESONIDE AND FORMOTEROL FUMARATE DIHYDRATE 2 PUFF: 160; 4.5 AEROSOL RESPIRATORY (INHALATION) at 08:00

## 2021-01-01 RX ADMIN — DEXTROSE MONOHYDRATE 100 ML/HR: 50 INJECTION, SOLUTION INTRAVENOUS at 17:15

## 2021-01-01 RX ADMIN — CARVEDILOL 3.12 MG: 6.25 TABLET, FILM COATED ORAL at 09:38

## 2021-01-01 RX ADMIN — ASPIRIN 81 MG: 81 TABLET, CHEWABLE ORAL at 09:28

## 2021-01-01 RX ADMIN — HEPARIN SODIUM 5000 UNITS: 5000 INJECTION INTRAVENOUS; SUBCUTANEOUS at 14:28

## 2021-01-01 RX ADMIN — CARVEDILOL 3.12 MG: 6.25 TABLET, FILM COATED ORAL at 09:10

## 2021-01-01 RX ADMIN — Medication 6000 UNITS: at 09:33

## 2021-01-01 RX ADMIN — STANDARDIZED SENNA CONCENTRATE 8.6 MG: 8.6 TABLET ORAL at 20:49

## 2021-01-01 RX ADMIN — ASPIRIN 81 MG: 81 TABLET, CHEWABLE ORAL at 09:10

## 2021-01-01 RX ADMIN — HEPARIN SODIUM 5000 UNITS: 5000 INJECTION INTRAVENOUS; SUBCUTANEOUS at 21:31

## 2021-01-01 RX ADMIN — HEPARIN SODIUM 5000 UNITS: 5000 INJECTION INTRAVENOUS; SUBCUTANEOUS at 21:26

## 2021-01-01 RX ADMIN — BUDESONIDE AND FORMOTEROL FUMARATE DIHYDRATE 2 PUFF: 160; 4.5 AEROSOL RESPIRATORY (INHALATION) at 07:46

## 2021-01-01 RX ADMIN — ATORVASTATIN CALCIUM 40 MG: 40 TABLET, FILM COATED ORAL at 20:47

## 2021-01-01 RX ADMIN — CEFAZOLIN SODIUM 1 G: 1 INJECTION, POWDER, FOR SOLUTION INTRAMUSCULAR; INTRAVENOUS at 09:29

## 2021-01-01 RX ADMIN — BUDESONIDE AND FORMOTEROL FUMARATE DIHYDRATE 2 PUFF: 160; 4.5 AEROSOL RESPIRATORY (INHALATION) at 21:30

## 2021-01-01 RX ADMIN — PROPOFOL 40 MCG/KG/MIN: 10 INJECTION, EMULSION INTRAVENOUS at 20:13

## 2021-01-01 RX ADMIN — Medication 50 MG: at 09:31

## 2021-01-01 RX ADMIN — BUDESONIDE AND FORMOTEROL FUMARATE DIHYDRATE 2 PUFF: 160; 4.5 AEROSOL RESPIRATORY (INHALATION) at 22:11

## 2021-01-01 RX ADMIN — DEXTROSE MONOHYDRATE, SODIUM CHLORIDE, SODIUM LACTATE, CALCIUM CHLORIDE, MAGNESIUM CHLORIDE: 1.5; 538; 448; 18.4; 5.08 SOLUTION INTRAPERITONEAL at 15:30

## 2021-01-01 ASSESSMENT — PULMONARY FUNCTION TESTS
PIF_VALUE: 28
PIF_VALUE: 26
PIF_VALUE: 28
PIF_VALUE: 30
PIF_VALUE: 30
PIF_VALUE: 31
PIF_VALUE: 32
PIF_VALUE: 36
PIF_VALUE: 24
PIF_VALUE: 23
PIF_VALUE: 27
PIF_VALUE: 30
PIF_VALUE: 28
PIF_VALUE: 31
PIF_VALUE: 28
PIF_VALUE: 25
PIF_VALUE: 30
PIF_VALUE: 30
PIF_VALUE: 31
PIF_VALUE: 30
PIF_VALUE: 28
PIF_VALUE: 30
PIF_VALUE: 25
PIF_VALUE: 25

## 2021-01-01 ASSESSMENT — PAIN SCALES - GENERAL
PAINLEVEL_OUTOF10: 0
PAINLEVEL_OUTOF10: 0
PAINLEVEL_OUTOF10: 3

## 2021-01-01 NOTE — PROGRESS NOTES
 senna (SENOKOT) tablet 8.6 mg  1 tablet Oral Nightly Marlena Nur MD   8.6 mg at 12/30/20 2128    lactulose (CHRONULAC) 10 GM/15ML solution 20 g  20 g Oral TID Marlena Nur MD   20 g at 12/31/20 0932    bisacodyl (DULCOLAX) suppository 10 mg  10 mg Rectal Daily PRN Marlena Nur MD        ceFAZolin (ANCEF) 1 g in sterile water 10 mL IV syringe  1 g Intravenous Q24H Marlena Nur MD   1 g at 12/30/20 2319    heparin (porcine) injection 5,000 Units  5,000 Units Subcutaneous 3 times per day Marlena Nur MD   Stopped at 12/31/20 2058    0.9 % sodium chloride bolus  30 mL Intravenous PRN Marlena Nur MD        darbepoetin jerald-polysorbate SEVEN [facility]) injection 60 mcg  60 mcg Subcutaneous Weekly Marlena Nur MD   60 mcg at 12/23/20 1534    ALPRAZolam [facility] [place]) tablet 0.25 mg  0.25 mg Oral Nightly PRN Marlena Nur MD   0.25 mg at 12/30/20 0005    atorvastatin (LIPITOR) tablet 40 mg  40 mg Oral Nightly Marlena Nur MD   40 mg at 12/31/20 2005    carvedilol (COREG) tablet 3.125 mg  3.125 mg Oral BID WC Marlena Nur MD   3.125 mg at 12/29/20 1732    levothyroxine (SYNTHROID) tablet 50 mcg  50 mcg Oral Daily Marlena Nur MD   50 mcg at 12/31/20 0453    sevelamer (RENVELA) tablet 800 mg  800 mg Oral TID Marlena Nur MD   800 mg at 12/31/20 2004    vitamin D (ERGOCALCIFEROL) capsule 50,000 Units  50,000 Units Oral Q7 Days Marlena Nur MD        acetaminophen (TYLENOL) tablet 650 mg  650 mg Oral Q6H PRN Marlena Nur MD        Or    acetaminophen (TYLENOL) suppository 650 mg  650 mg Rectal Q6H PRN Marlena Nur MD        guaiFENesin-dextromethorphan (ROBITUSSIN DM) 100-10 MG/5ML syrup 5 mL  5 mL Oral Q4H PRN Marlena Nur MD        Vitamin D (CHOLECALCIFEROL) tablet 6,000 Units  6,000 Units Oral Daily Marlena Nur MD   6,000 Units at 12/31/20 9557  albuterol sulfate  (90 Base) MCG/ACT inhaler 2 puff  2 puff Inhalation Q6H PRN Joanna Robison MD   2 puff at 12/23/20 0544    insulin lispro (HUMALOG) injection vial 0-6 Units  0-6 Units Subcutaneous TID WC Joanna Robison MD   2 Units at 12/31/20 9730    insulin lispro (HUMALOG) injection vial 0-3 Units  0-3 Units Subcutaneous Nightly Joanna Robison MD   1 Units at 12/29/20 2129    glucose (GLUTOSE) 40 % oral gel 15 g  15 g Oral PRN Joanna Robison MD        dextrose 50 % IV solution  12.5 g Intravenous PRN Joanna Robison MD        glucagon (rDNA) injection 1 mg  1 mg Intramuscular PRN Joanna Robison MD        dextrose 5 % solution  100 mL/hr Intravenous PRN Joanna Robison MD        Benzocaine-Menthol (CEPACOL) 1 lozenge  1 lozenge Oral Q2H PRN Joanna Robison MD        phenol 1.4 % mouth spray 1 spray  1 spray Mouth/Throat Q2H PRN Joanna Robison MD   1 spray at 12/23/20 7906    budesonide-formoterol (SYMBICORT) 160-4.5 MCG/ACT inhaler 2 puff  2 puff Inhalation BID Joanna Robison MD   2 puff at 12/31/20 2000       Past Medical History:  Past Medical History:   Diagnosis Date    Anxiety     Asthma     Benign essential HTN     Benign hypertensive kidney disease     CAD (coronary artery disease)     sees dr. Liz Lyon Chronic kidney disease, stage IV (severe) ([facility] [address] 7/18/2016    ESRD (end stage renal disease) ([facility] [address]     no dialysis at present.  Hemodialysis patient [facility])     mon wed fri at [address] of blood transfusion     Hyperlipidemia     Palliative care patient 11/29/2018    Prolonged emergence from general anesthesia     c/o dizzy and \"over sedated\" with prostate \"scope\".     Renal osteodystrophy     Skin cancer 2018    facial    Throat cancer [facility]) 2012    Thyroid disease        Past Surgical History:  Past Surgical History:   Procedure Laterality Date    CYSTOSCOPY      HERNIA REPAIR      umbilical  LAPAROSCOPY INSERTION PERITONEAL CATHETER N/A 7/18/2016    CATHETER INSERTION PERITONEAL DIALYSIS LAPAROSCOPIC performed by Nory Licona MD at [facility] N/A 1/28/2019    LAPAROSCOPIC REVISION OF PD CATHETER performed by Elisha Walker MD at [address] N/A 12/30/2020    PORT REMOVAL performed by Adonis Bonilla MD at [address] [facility] CATH DIAL OPEN N/A 11/28/2018    PLACEMENT OF TUNNELED HEMODIALYSIS CATHETER performed by Yovani Wren MD at [facility] TUNNELED INTRAPERITONEAL CATHETER N/A 10/22/2018    PERITONEAL DIALYSIS CATHETER EXTERIORIZATION performed by Elisha Walker MD at [place] [id number]    TUNNELED VENOUS PORT PLACEMENT      VASCULAR SURGERY  07/10/2019    [facility]. Removal of tunneled dialysis catheter right internal jugular vein.        Family History  Family History   Problem Relation Age of Onset    Heart Disease Mother     Cancer Father         lung       Social History  Social History     Socioeconomic History    Marital status:      Spouse name: Not on file    Number of children: Not on file    Years of education: Not on file    Highest education level: Not on file   Occupational History    Not on file   Social Needs    Financial resource strain: Not on file    Food insecurity     Worry: Not on file     Inability: Not on file    Transportation needs     Medical: Not on file     Non-medical: Not on file   Tobacco Use    Smoking status: Never Smoker    Smokeless tobacco: Never Used   Substance and Sexual Activity    Alcohol use: No    Drug use: No    Sexual activity: Not on file   Lifestyle    Physical activity     Days per week: Not on file     Minutes per session: Not on file    Stress: Not on file   Relationships    Social connections     Talks on phone: Not on file     Gets together: Not on file 0320 12/31/20  0500   WBC 6.3 8.6 8.6   HGB 8.3* 7.8* 8.3*   HCT 25.5* 23.7* 25.6*   MCV 97.3* 97.5* 98.8*    324 363     LIVER PROFILE:   Recent Labs     12/29/20 0130 12/30/20 0320 12/31/20  0500   AST 20 17 15   ALT 31 8 <5*   BILITOT <0.2 <0.2 <0.2   ALKPHOS 67 62 65     PT/INR:   Recent Labs     12/29/20 0130 12/30/20 0320 12/31/20  0500   PROTIME 15.2* 15.0* 15.2*   INR 1.20* 1.18 1.20*     APTT: No results for input(s): APTT in the last 72 hours. BNP:  No results for input(s): BNP in the last 72 hours. Ionized Calcium:No results for input(s): IONCA in the last 72 hours. Magnesium:No results for input(s): MG in the last 72 hours. Phosphorus:No results for input(s): PHOS in the last 72 hours. HgbA1C: No results for input(s): LABA1C in the last 72 hours. Hepatic:   Recent Labs     12/29/20 0130 12/30/20 0320 12/31/20  0500   ALKPHOS 67 62 65   ALT 31 8 <5*   AST 20 17 15   PROT 5.2* 4.7* 5.0*   BILITOT <0.2 <0.2 <0.2   LABALBU 1.9* 1.7* 1.8*     Lactic Acid: No results for input(s): LACTA in the last 72 hours. Troponin: No results for input(s): CKTOTAL, CKMB, TROPONINT in the last 72 hours. ABGs:   Lab Results   Component Value Date    PHART 7.480 12/23/2020    PO2ART 114.0 12/23/2020    [id number] 25.0 12/23/2020     CRP:  No results for input(s): CRP in the last 72 hours. Sed Rate:  No results for input(s): SEDRATE in the last 72 hours. Culture Results:   Blood Culture Recent:   Recent Labs     12/30/20 0320   BC No Growth to date. Any change in status will be called. Urine Culture Recent : No results for input(s): LABURIN in the last 720 hours.     Radiology reports as per the Radiologist  Radiology: Xr Chest Portable    Result Date: 12/22/2020 XR CHEST PORTABLE 12/22/2020 7:03 PM History: Short of breath. Covid positive. Portable chest x-ray compared with 4 March 2019. Chronic interstitial lung disease. Patchy pneumonia within the right upper lobe, left lower lobe, and within the base of the left upper lobe. Less prominent disease within the right lower lobe. Relative sparing of the left apex. Stable heart and mediastinum. Aortic arch calcification. Unchanged left subclavian port. 1. Bilateral pneumonia compatible with the history of Covid positive. Signed by Dr Bradford Zaragoza on 12/22/2020 7:04 PM    Cta Pulmonary W Contrast    Result Date: 12/24/2020  EXAMINATION:  CTA PULMONARY W CONTRAST  12/24/2020 2:04 PM HISTORY: Hypoxia. Elevated d-dimer. Covid 19 infection. COMPARISON: No comparison study. DLP: 315 mGy-cm. Automated exposure control was utilized. TECHNIQUE: Spiral CT angiography was performed of the chest with contrast. Sagittal, coronal and 3-D images were reconstructed. MEDIASTINUM/VASCULAR: There is atheromatous disease of the thoracic aorta and coronary arteries. There is cardiomegaly. There is no CT evidence of pulmonary embolus. The pulmonary arteries are dilated with main pulmonary artery segment measuring 3.5 cm. There are small mediastinal lymph nodes. LUNGS: There are moderate groundglass appearing infiltrates in both lungs. Bronchiectasis is noted. There is no significant pleural effusion. There is mild dependent atelectasis in the lung bases posteriorly. BONES/SOFT TISSUES/: There are degenerative changes of the spine and shoulders. UPPER ABDOMEN: There is ascites in the upper abdomen. There is a 1.4 cm probable cyst in the upper pole left kidney. There is another 7 mm probable cyst in the upper pole left kidney. These areas are not fully evaluated.

## 2021-01-01 NOTE — PROGRESS NOTES
[facility]        Hospitalist Progress Note  1/1/2021 10:50 AM  Subjective:   Admit Date: 12/22/2020  PCP: No primary care provider on file. Chief Complaint: Dyspnea    Subjective: Patient seen at bedside. States \"get me out of here. \" O2 requirements increasing but noted to be saturating 99% on pulse ox. Denies pain or bleeding. Requiring 1U PRBC today. Cumulative Hospital History: The patient is a 77 y.o. male with a past medical history of ESRD on peritoneal dialysis, hypertension, hyperlipidemia, CAD who presented to an outside facility complaining of worsening dyspnea, sore throat and dysphagia since being diagnosed with COVID-19 found to have acute respiratory failure with hypoxia requiring 4L to maintain saturations at OSH. Patient transferred to our facility for further management. Found to have elevated procalcitonin and D-dimer started on empiric antibiotics and heparin drip pending cultures and CT angio PE study. Patient with worsening saturations/respiratory status transferred to CCU. Patient found to have blood cultures growing MSSA with infectious diseases consulted. CT angio pulmonary embolus study showing no evidence of pulmonary embolism and heparin drip discontinued and patient placed on prophylactic lovenox. Patient's respiratory status improving and he was transferred out of CCU. ID recommending [id number] removal.  General surgery consulted for port removal. Port removed 12/30/2020 with general and vascular surgery. ROS: 14 point review of systems is negative except as specifically addressed above. DIET GENERAL;     Intake/Output Summary (Last 24 hours) at 1/1/2021 1050  Last data filed at 12/31/2020 1539  Gross per 24 hour   Intake 240 ml   Output 500 ml   Net -260 ml     Medications:   dextrose       Current Facility-Administered Medications   Medication Dose Route Frequency Provider Last Rate Last Admin  acetaminophen (TYLENOL) tablet 650 mg  650 mg Oral Q6H PRN Sergo Clemente MD        Or    acetaminophen (TYLENOL) suppository 650 mg  650 mg Rectal Q6H PRN Sergo Clemente MD        guaiFENesin-dextromethorphan [facility] DM) 100-10 MG/5ML syrup 5 mL  5 mL Oral Q4H PRN Sergo Clemente MD        Vitamin D (CHOLECALCIFEROL) tablet 6,000 Units  6,000 Units Oral Daily Sergo Clemente MD   6,000 Units at 01/01/21 0935    albuterol sulfate  (90 Base) MCG/ACT inhaler 2 puff  2 puff Inhalation Q6H PRN Sergo Clemente MD   2 puff at 12/23/20 0544    insulin lispro (HUMALOG) injection vial 0-6 Units  0-6 Units Subcutaneous TID WC Sergo Clemente MD   2 Units at 12/31/20 8029    insulin lispro (HUMALOG) injection vial 0-3 Units  0-3 Units Subcutaneous Nightly Sergo Clemente MD   1 Units at 12/29/20 2129    glucose (GLUTOSE) 40 % oral gel 15 g  15 g Oral PRN Sergo Clemente MD        dextrose 50 % IV solution  12.5 g Intravenous PRN Sergo Clemente MD        glucagon (rDNA) injection 1 mg  1 mg Intramuscular PRN Sergo Clemente MD        dextrose 5 % solution  100 mL/hr Intravenous PRN Sergo Clemente MD        Benzocaine-Menthol (CEPACOL) 1 lozenge  1 lozenge Oral Q2H PRARNALDO Clemente MD        phenol 1.4 % mouth spray 1 spray  1 spray Mouth/Throat Q2H PRN Sergo Clemente MD   1 spray at 12/23/20 7025    budesonide-formoterol (SYMBICORT) 160-4.5 MCG/ACT inhaler 2 puff  2 puff Inhalation BID Sergo Clemente MD   2 puff at 01/01/21 1062        Labs:     Recent Labs     12/31/20  0500 01/01/21  0020 01/01/21  0430   WBC 8.6 4.7* 4.2*   RBC 2.59* 2.18* 2.11*   HGB 8.3* 6.9* 6.7*   HCT 25.6* 21.6* 21.0*   MCV 98.8* 99.1* 99.5*   MCH 32.0* 31.7* 31.8*   MCHC 32.4* 31.9* 31.9*    289 280     Recent Labs     12/30/20  0320 12/31/20  0500 01/01/21  0020   * 129* 128*   K 4.2 4.3 4.1   ANIONGAP 11 17 18   CL 91* 90* 89*   CO2 25 22 21*   BUN 74* 74* 66*   CREATININE 6.3* 6.5* 6.3* GLUCOSE 99 148* 135*   CALCIUM 7.6* 7.9* 7.5*     No results for input(s): MG, PHOS in the last 72 hours. Recent Labs     12/30/20 0320 12/31/20  0500 01/01/21  0020   AST 17 15 17   ALT 8 <5* <5*   BILITOT <0.2 <0.2 <0.2   ALKPHOS 62 65 54     ABGs:No results for input(s): PH, PO2, PCO2, HCO3, BE, O2SAT in the last 72 hours. Troponin T: No results for input(s): TROPONINI in the last 72 hours. INR:   Recent Labs     12/30/20  0320 12/31/20  0500 01/01/21  0020   INR 1.18 1.20* 1.27*     Lactic Acid: No results for input(s): LACTA in the last 72 hours. Objective:   Vitals: /61   Pulse 104   Temp 96.6 °F (35.9 °C) (Temporal)   Resp 18   Ht 5' 11\" (1.803 m)   Wt 149 lb 7 oz (67.8 kg)   SpO2 93%   BMI 20.84 kg/m²   24HR INTAKE/OUTPUT:      Intake/Output Summary (Last 24 hours) at 1/1/2021 1050  Last data filed at 12/31/2020 1539  Gross per 24 hour   Intake 240 ml   Output 500 ml   Net -260 ml     General appearance: Alert  Head: NC/AT  Eyes: conjunctivae/corneas clear   Neck: Supple  Lungs: BLAE   Heart: RRR  Abdomen: BS+  Extremities: +pulses  Skin: warm  Neurologic: Alert, gross motor function intact  Psychiatric:  Mood appropriate    Assessment and Plan: Active Problems:    Renovascular hypertension    Secondary hyperparathyroidism of renal origin [facility])    Palliative care patient    ESRD (end stage renal disease) on dialysis ([facility] [address]    Anemia of chronic disease    Coronary artery disease involving native coronary artery of native heart without angina pectoris    Basal cell carcinoma (BCC) of face    Ischemic cardiomyopathy    COVID-19    Port or reservoir infection  Resolved Problems:    * No resolved hospital problems. *    COVID: Supportive management. Bronchodilators. O2 as needed. MSSA bacteremia: Ancef. Blood cultures have cleared. Port has been removed with surgery 12/30/2020. Attempting antibiotics through PD catheter - will need to be arranged. Anemia: ?postoperative on top of anemia of kidney disease. 1U PRBC today. Monitor CBC. Denies bleeding.     ESRD: Peritoneal dialysis patient. Nephrology on board.     Hypertension: Monitor BP and adjust medications as needed.     Supportive management.     Advance Directive: Full Code    DVT prophylaxis: Heparin    Discharge planning: TBD    Signed:  Clint Cedeno MD 1/1/2021 10:50 AM  [facility]

## 2021-01-01 NOTE — PROGRESS NOTES
Speech Language Pathology  Facility/Department: [facility] 4 ONCOLOGY UNIT  Dysphagia Daily Treatment Note    NAME: Camilo Espinal  : 1945  MRN: [id number]    Patient Diagnosis(es):   Patient Active Problem List    Diagnosis Date Noted    Chest discomfort      Priority: High    Port or reservoir infection 2020    COVID-19 2020    Ischemic cardiomyopathy 2020    Chest pain 2019    Basal cell carcinoma (BCC) of face     Coronary artery disease involving native coronary artery of native heart without angina pectoris 2019    Peritoneal dialysis catheter dysfunction ([facility] [address] 2019    Skin lesion of face 2019    Anemia of chronic disease 2018    ESRD (end stage renal disease) on dialysis [facility])     Palliative care patient 2018    Renovascular hypertension 2018    Secondary hyperparathyroidism of renal origin ([facility] [address] 2018    Anemia due to stage 4 chronic kidney disease ([facility] [address] 2018    NSTEMI (non-ST elevated myocardial infarction) ([facility] [address] 2018     Allergies: Allergies   Allergen Reactions    Diphenhydramine      Other reaction(s): Blacked out    Sulfa Antibiotics      made me feel like I had the flu     Treatment Plan  Requires SLP Intervention: Yes     Recommended Diet and Intervention  Solid Consistency Recommendation: Regular solid    Liquid Consistency Recommendation: Thin  Recommended Form of Meds: PO  Therapeutic Interventions: Patient/Family education;Diet tolerance monitoring     Compensatory Swallowing Strategies  Compensatory Swallowing Strategies: Upright as possible for all oral intake;Small bites/sips;Eat/Feed slowly; Alternate solids and liquids; Remain upright for 30-45 minutes after meals     Treatment/Goals  Timeframe for Short-term Goals: 1x/day for 3 days   Goal 1: Patient will tolerate regular solid consistency with thin liquids with min S/S penetration/aspiration during PO intake.

## 2021-01-02 NOTE — PROGRESS NOTES
[facility]        Hospitalist Progress Note  1/2/2021 9:25 AM  Subjective:   Admit Date: 12/22/2020  PCP: No primary care provider on file. Chief Complaint: Dyspnea    Subjective: Patient seen at bedside. Hoarse - wants a notepad to communicate. Feels ok. Cumulative Hospital History: The patient is a 77 y.o. male with a past medical history of ESRD on peritoneal dialysis, hypertension, hyperlipidemia, CAD who presented to an outside facility complaining of worsening dyspnea, sore throat and dysphagia since being diagnosed with COVID-19 found to have acute respiratory failure with hypoxia requiring 4L to maintain saturations at OSH. Patient transferred to our facility for further management. Found to have elevated procalcitonin and D-dimer started on empiric antibiotics and heparin drip pending cultures and CT angio PE study. Patient with worsening saturations/respiratory status transferred to CCU. Patient found to have blood cultures growing MSSA with infectious diseases consulted. CT angio pulmonary embolus study showing no evidence of pulmonary embolism and heparin drip discontinued and patient placed on prophylactic lovenox. Patient's respiratory status improving and he was transferred out of CCU. ID recommending [id number] removal.  General surgery consulted for port removal. Port removed 12/30/2020 with general and vascular surgery. ROS: 14 point review of systems is negative except as specifically addressed above. DIET GENERAL;     Intake/Output Summary (Last 24 hours) at 1/2/2021 0925  Last data filed at 1/1/2021 2309  Gross per 24 hour   Intake 391.25 ml   Output    Net 391.25 ml     Medications:   dextrose       Current Facility-Administered Medications   Medication Dose Route Frequency Provider Last Rate Last Admin    aspirin chewable tablet 81 mg  81 mg Oral Daily Ahmet Brothers MD   81 mg at 01/01/21 0117  clopidogrel (PLAVIX) tablet 75 mg  75 mg Oral Daily Sergo Clemente MD   75 mg at 01/01/21 0935    lisinopril (PRINIVIL;ZESTRIL) tablet 2.5 mg  2.5 mg Oral Daily Sergo Clemente MD        docusate sodium (COLACE) capsule 100 mg  100 mg Oral Daily Sergo Clemente MD   100 mg at 12/31/20 4114    senna (SENOKOT) tablet 8.6 mg  1 tablet Oral Nightly Sergo Clemente MD   8.6 mg at 01/01/21 2142    lactulose ([facility]) 10 GM/15ML solution 20 g  20 g Oral TID Sergo Clemente MD   20 g at 12/31/20 0932    bisacodyl (DULCOLAX) suppository 10 mg  10 mg Rectal Daily PRN Sergo Clemente MD        ceFAZolin (ANCEF) 1 g in sterile water 10 mL IV syringe  1 g Intravenous Q24H Sergo Clemente MD   1 g at 01/02/21 0042    heparin (porcine) injection 5,000 Units  5,000 Units Subcutaneous 3 times per day Sergo Clemente MD   5,000 Units at 01/02/21 0538    0.9 % sodium chloride bolus  30 mL Intravenous PRN Sergo Clemente MD        darbepoetin jerald-polysorbate [facility]) injection 60 mcg  60 mcg Subcutaneous Weekly Sergo Clemente MD   60 mcg at 12/23/20 1534    ALPRAZolam Loomis Bliss) tablet 0.25 mg  0.25 mg Oral Nightly PRN Sergo Clemente MD   0.25 mg at 01/01/21 0014    atorvastatin (LIPITOR) tablet 40 mg  40 mg Oral Nightly Sergo Clemente MD   40 mg at 01/01/21 2142    carvedilol (COREG) tablet 3.125 mg  3.125 mg Oral BID WC Sergo Clemente MD   3.125 mg at 01/01/21 1833    levothyroxine (SYNTHROID) tablet 50 mcg  50 mcg Oral Daily Sergo Clemente MD   50 mcg at 01/02/21 0534    sevelamer (RENVELA) tablet 800 mg  800 mg Oral TID Sergo Clemente MD   800 mg at 01/01/21 2142    vitamin D (ERGOCALCIFEROL) capsule 50,000 Units  50,000 Units Oral Q7 Days Sergo Clemente MD        acetaminophen (TYLENOL) tablet 650 mg  650 mg Oral Q6H PRN Sergo Clemente MD        Or    acetaminophen (TYLENOL) suppository 650 mg  650 mg Rectal Q6H PRN Sergo Clemente MD  guaiFENesin-dextromethorphan (ROBITUSSIN DM) 100-10 MG/5ML syrup 5 mL  5 mL Oral Q4H PRN Naheed Ojeda MD        Vitamin D (CHOLECALCIFEROL) tablet 6,000 Units  6,000 Units Oral Daily Naheed Ojeda MD   6,000 Units at 01/01/21 0935    albuterol sulfate  (90 Base) MCG/ACT inhaler 2 puff  2 puff Inhalation Q6H PRN Naheed Ojeda MD   2 puff at 12/23/20 0544    insulin lispro (HUMALOG) injection vial 0-6 Units  0-6 Units Subcutaneous TID WC Naheed Ojeda MD   2 Units at 12/31/20 9900    insulin lispro (HUMALOG) injection vial 0-3 Units  0-3 Units Subcutaneous Nightly Naheed Ojeda MD   1 Units at 12/29/20 2129    glucose (GLUTOSE) 40 % oral gel 15 g  15 g Oral PRN Naheed Ojeda MD        dextrose 50 % IV solution  12.5 g Intravenous PRN Naheed Ojeda MD        glucagon (rDNA) injection 1 mg  1 mg Intramuscular PRN Naheed Ojeda MD        dextrose 5 % solution  100 mL/hr Intravenous PRN Naheed Ojeda MD        Benzocaine-Menthol (CEPACOL) 1 lozenge  1 lozenge Oral Q2H PRN Naheed Ojeda MD        phenol 1.4 % mouth spray 1 spray  1 spray Mouth/Throat Q2H PRN Naheed Ojeda MD   1 spray at 12/23/20 1595    budesonide-formoterol (SYMBICORT) 160-4.5 MCG/ACT inhaler 2 puff  2 puff Inhalation BID Naheed Ojeda MD   2 puff at 01/01/21 2141        Labs:     Recent Labs     01/01/21  0020 01/01/21  0430 01/01/21  1519 01/02/21  0230   WBC 4.7* 4.2*  --  4.3*   RBC 2.18* 2.11*  --  2.68*   HGB 6.9* 6.7* 9.3* 8.4*   HCT 21.6* 21.0* 27.7* 25.1*   MCV 99.1* 99.5*  --  93.7   MCH 31.7* 31.8*  --  31.3*   MCHC 31.9* 31.9*  --  33.5    280  --  236     Recent Labs     12/31/20  0500 01/01/21  0020 01/02/21  0230   * 128* 129*   K 4.3 4.1 4.5   ANIONGAP 17 18 18   CL 90* 89* 89*   CO2 22 21* 22   BUN 74* 66* 68*   CREATININE 6.5* 6.3* 6.8*   GLUCOSE 148* 135* 97   CALCIUM 7.9* 7.5* 8.1*     No results for input(s): MG, PHOS in the last 72 hours.   Recent Labs 12/31/20  0500 01/01/21  0020 01/02/21  0230   AST 15 17 21   ALT <5* <5* <5*   BILITOT <0.2 <0.2 0.3   ALKPHOS 65 54 63     ABGs:No results for input(s): PH, PO2, PCO2, HCO3, BE, O2SAT in the last 72 hours. Troponin T: No results for input(s): TROPONINI in the last 72 hours. INR:   Recent Labs     12/31/20  0500 01/01/21  0020 01/02/21  0230   INR 1.20* 1.27* 1.31*     Lactic Acid: No results for input(s): LACTA in the last 72 hours. Objective:   Vitals: /75   Pulse 98   Temp 97.2 °F (36.2 °C) (Temporal)   Resp 12   Ht 5' 11\" (1.803 m)   Wt 149 lb 6.4 oz (67.8 kg)   SpO2 99%   BMI 20.84 kg/m²   24HR INTAKE/OUTPUT:      Intake/Output Summary (Last 24 hours) at 1/2/2021 1446  Last data filed at 1/1/2021 2309  Gross per 24 hour   Intake 391.25 ml   Output    Net 391.25 ml     General appearance: Alert  Head: NC/AT  Eyes: conjunctivae/corneas clear   Neck: Supple  Lungs: BLAE   Heart: RRR  Abdomen: BS+  Extremities: +pulses  Skin: warm  Neurologic: Alert, gross motor function intact  Psychiatric:  Mood appropriate    Assessment and Plan: Active Problems:    Renovascular hypertension    Secondary hyperparathyroidism of renal origin [facility])    Palliative care patient    ESRD (end stage renal disease) on dialysis ([facility] [address]    Anemia of chronic disease    Coronary artery disease involving native coronary artery of native heart without angina pectoris    Basal cell carcinoma (BCC) of face    Ischemic cardiomyopathy    COVID-19    Port or reservoir infection  Resolved Problems:    * No resolved hospital problems. *    COVID: Supportive management. Bronchodilators. O2 requirements still high. MSSA bacteremia: Ancef. Blood cultures have cleared - first negative culture 12/29/2020. Port has been removed with surgery 12/30/2020. Attempting antibiotics through PD catheter - will need to be arranged. Anemia: s/p 1U PRBC 1/1/2020. Monitor CBC.     ESRD: Peritoneal dialysis patient. Nephrology on board.    Hypertension: Monitor BP and adjust medications as needed.     Supportive management.     Advance Directive: Full Code    DVT prophylaxis: Heparin    Discharge planning: TBD    Signed:  Daniel Essex, MD 1/2/2021 9:25 AM  Rounding Hospitalist

## 2021-01-02 NOTE — PLAN OF CARE
Problem: Falls - Risk of:  Goal: Will remain free from falls  Description: Will remain free from falls  1/1/2021 2311 by Jignesh Liang RN  Outcome: Ongoing  1/1/2021 1642 by Mirian Iverson RN  Outcome: Ongoing  Goal: Absence of physical injury  Description: Absence of physical injury  1/1/2021 2311 by Jignesh Liang RN  Outcome: Ongoing  1/1/2021 1642 by Mirian Iverson RN  Outcome: Ongoing     Problem: Skin Integrity:  Goal: Will show no infection signs and symptoms  Description: Will show no infection signs and symptoms  1/1/2021 2311 by Jignesh Liang RN  Outcome: Ongoing  1/1/2021 1642 by Mirian Iverson RN  Outcome: Ongoing  Goal: Absence of new skin breakdown  Description: Absence of new skin breakdown  1/1/2021 2311 by Jignesh Liang RN  Outcome: Ongoing  1/1/2021 1642 by Mirian Iverson RN  Outcome: Ongoing     Problem: Nutrition  Goal: Optimal nutrition therapy  1/1/2021 2311 by Jignesh Liang RN  Outcome: Ongoing  1/1/2021 1642 by Mirian Iverson RN  Outcome: Ongoing

## 2021-01-02 NOTE — PROGRESS NOTES
Nephrology ([facility]) Progress Note    Patient:  Alvin Patterson  YOB: 1945  Date of Service: 1/1/2021  MRN: [id number]   Acct: [de-identified]   Primary Care Physician: No primary care provider on file. Advance Directive: Full Code  Admit Date: 12/22/2020       Hospital Day: 10  Referring Provider: Giancarlo Cosby MD    Patient independently seen and examined, Chart, Consults, Notes, Operative notes, Labs, Cardiology, and Radiology studies reviewed as available. Subjective:  Alvin Patterson is a 77 y.o. male  whom we were consulted for ESRD on peritoneal dialysis. He also has hypertension, hyperlipidemia, CAD. He presented to an outside facility complaining of worsening dyspnea and dysphagia since being diagnosed with COVID-19.  He went into acute respiratory failure and is now requiring 4 L oxygen. He was admitted to [facility] and is on the [facility] floor. Patient offered no other complaints. Renal service was consulted to manage his ESRD. A couple of nights ago, he went on non-rebreather O2 and he was transferred to critical care unit for close monitoring. Patient then improved some and was transferred back to floor 12/28. Today, doing well. Remains in COVID isolation. No events per nursing.   SOA improving. +cough persists    Allergies:  Diphenhydramine and Sulfa antibiotics    Medicines:  Current Facility-Administered Medications   Medication Dose Route Frequency Provider Last Rate Last Admin    aspirin chewable tablet 81 mg  81 mg Oral Daily Ahemt Brothers MD   81 mg at 01/01/21 0935    clopidogrel (PLAVIX) tablet 75 mg  75 mg Oral Daily Ahmet Brothers MD   75 mg at 01/01/21 0935    lisinopril (PRINIVIL;ZESTRIL) tablet 2.5 mg  2.5 mg Oral Daily Ahmet Brothers MD        docusate sodium (COLACE) capsule 100 mg  100 mg Oral Daily Ahmet Brothers MD   100 mg at 12/31/20 2814  senna (SENOKOT) tablet 8.6 mg  1 tablet Oral Nightly Adonis Bonilla MD   8.6 mg at 01/01/21 2142    lactulose (CHRONULAC) 10 GM/15ML solution 20 g  20 g Oral TID Adonis Bonilla MD   20 g at 12/31/20 0932    bisacodyl (DULCOLAX) suppository 10 mg  10 mg Rectal Daily PRN Adonis Bonilla MD        ceFAZolin (ANCEF) 1 g in sterile water 10 mL IV syringe  1 g Intravenous Q24H Adonis Bonilla MD   1 g at 01/01/21 0014    heparin (porcine) injection 5,000 Units  5,000 Units Subcutaneous 3 times per day Adonis Bonilla MD   5,000 Units at 01/01/21 2215    0.9 % sodium chloride bolus  30 mL Intravenous PRN Adonis Bonilla MD        darbepoetin jerald-polysorbate SEVEN [facility]) injection 60 mcg  60 mcg Subcutaneous Weekly Adonis Bonilla MD   60 mcg at 12/23/20 1534    ALPRAZolam [facility]) tablet 0.25 mg  0.25 mg Oral Nightly PRN Adonis Bonilla MD   0.25 mg at 01/01/21 0014    atorvastatin (LIPITOR) tablet 40 mg  40 mg Oral Nightly Adonis Bonilla MD   40 mg at 01/01/21 2142    carvedilol (COREG) tablet 3.125 mg  3.125 mg Oral BID WC Adonis Bonilla MD   3.125 mg at 01/01/21 1833    levothyroxine (SYNTHROID) tablet 50 mcg  50 mcg Oral Daily Adonis Bonilla MD   50 mcg at 01/01/21 5319    sevelamer (RENVELA) tablet 800 mg  800 mg Oral TID Adonis Bonilla MD   800 mg at 01/01/21 2142    vitamin D (ERGOCALCIFEROL) capsule 50,000 Units  50,000 Units Oral Q7 Days Adonis Bonilla MD        acetaminophen (TYLENOL) tablet 650 mg  650 mg Oral Q6H PRN Adonis Bonilla MD        Or    acetaminophen (TYLENOL) suppository 650 mg  650 mg Rectal Q6H PRN Adonis Bonilla MD        guaiFENesin-dextromethorphan (ROBITUSSIN DM) 100-10 MG/5ML syrup 5 mL  5 mL Oral Q4H PRN Adonis Bonilla MD        Vitamin D (CHOLECALCIFEROL) tablet 6,000 Units  6,000 Units Oral Daily Adonis Bonilla MD   6,000 Units at 01/01/21 1408  albuterol sulfate  (90 Base) MCG/ACT inhaler 2 puff  2 puff Inhalation Q6H PRN Eugene Terrazas MD   2 puff at 12/23/20 0544    insulin lispro (HUMALOG) injection vial 0-6 Units  0-6 Units Subcutaneous TID WC Eugene Terrazas MD   2 Units at 12/31/20 3408    insulin lispro (HUMALOG) injection vial 0-3 Units  0-3 Units Subcutaneous Nightly Eugene Terrazas MD   1 Units at 12/29/20 2129    glucose (GLUTOSE) 40 % oral gel 15 g  15 g Oral PRN Eugene Terrazas MD        dextrose 50 % IV solution  12.5 g Intravenous PRN Eugene Terraazs MD        glucagon (rDNA) injection 1 mg  1 mg Intramuscular PRN Eugene Terrazas MD        dextrose 5 % solution  100 mL/hr Intravenous PRN Eugene Terrazas MD        Benzocaine-Menthol (CEPACOL) 1 lozenge  1 lozenge Oral Q2H PRN Eugene Terrazas MD        phenol 1.4 % mouth spray 1 spray  1 spray Mouth/Throat Q2H PRN Eugene Terrazas MD   1 spray at 12/23/20 8048    budesonide-formoterol (SYMBICORT) 160-4.5 MCG/ACT inhaler 2 puff  2 puff Inhalation BID Eugene Terrazas MD   2 puff at 01/01/21 2141       Past Medical History:  Past Medical History:   Diagnosis Date    Anxiety     Asthma     Benign essential HTN     Benign hypertensive kidney disease     CAD (coronary artery disease)     sees dr. Mindy Young Chronic kidney disease, stage IV (severe) ([facility] [address] 7/18/2016    ESRD (end stage renal disease) ([facility] [address]     no dialysis at present.  Hemodialysis patient [facility])     mon wed fri at [address] of blood transfusion     Hyperlipidemia     Palliative care patient 11/29/2018    Prolonged emergence from general anesthesia     c/o dizzy and \"over sedated\" with prostate \"scope\".     Renal osteodystrophy     Skin cancer 2018    facial    Throat cancer [facility]) 2012    Thyroid disease        Past Surgical History:  Past Surgical History:   Procedure Laterality Date    CYSTOSCOPY      HERNIA REPAIR      umbilical  LAPAROSCOPY INSERTION PERITONEAL CATHETER N/A 7/18/2016    CATHETER INSERTION PERITONEAL DIALYSIS LAPAROSCOPIC performed by Sherri Sarah MD at [address] [facility] N/A 1/28/2019    LAPAROSCOPIC REVISION OF PD CATHETER performed by Squire Schilder, MD at [address] N/A 12/30/2020    PORT REMOVAL performed by Sergo Clemente MD at [address] [facility] CATH DIAL OPEN N/A 11/28/2018    PLACEMENT OF TUNNELED HEMODIALYSIS CATHETER performed by Jimenez Venegas MD at [facility] TUNNELED INTRAPERITONEAL CATHETER N/A 10/22/2018    PERITONEAL DIALYSIS CATHETER EXTERIORIZATION performed by Squire Schilder, MD at [place] [address]    TUNNELED VENOUS PORT PLACEMENT      VASCULAR SURGERY  07/10/2019    SJS. Removal of tunneled dialysis catheter right internal jugular vein.        Family History  Family History   Problem Relation Age of Onset    Heart Disease Mother     Cancer Father         lung       Social History  Social History     Socioeconomic History    Marital status:      Spouse name: Not on file    Number of children: Not on file    Years of education: Not on file    Highest education level: Not on file   Occupational History    Not on file   Social Needs    Financial resource strain: Not on file    Food insecurity     Worry: Not on file     Inability: Not on file    Transportation needs     Medical: Not on file     Non-medical: Not on file   Tobacco Use    Smoking status: Never Smoker    Smokeless tobacco: Never Used   Substance and Sexual Activity    Alcohol use: No    Drug use: No    Sexual activity: Not on file   Lifestyle    Physical activity     Days per week: Not on file     Minutes per session: Not on file    Stress: Not on file   Relationships    Social connections     Talks on phone: Not on file     Gets together: Not on file Attends Shinto service: Not on file     Active member of club or organization: Not on file     Attends meetings of clubs or organizations: Not on file     Relationship status: Not on file    Intimate partner violence     Fear of current or ex partner: Not on file     Emotionally abused: Not on file     Physically abused: Not on file     Forced sexual activity: Not on file   Other Topics Concern    Not on file   Social History Narrative    Not on file         Review of Systems:  History obtained from chart review and the patient  General ROS: No fever or chills  Respiratory ROS: +cough,+shortness of breath  Cardiovascular ROS: No chest pain or palpitations  Gastrointestinal ROS: No abdominal pain or melena  Genito-Urinary ROS: No dysuria or hematuria  Musculoskeletal ROS: No joint pain or swelling         Objective:  Patient Vitals for the past 24 hrs:   BP Temp Temp src Pulse Resp SpO2 Weight   01/01/21 2126 135/78 98.5 °F (36.9 °C) Temporal 115 16 95 % 149 lb 6.4 oz (67.8 kg)   01/01/21 2037      93 %    01/01/21 1830 121/70   120      01/01/21 1330 107/67 96.9 °F (36.1 °C) Temporal 105 18 91 %    01/01/21 1231 113/62 97.1 °F (36.2 °C) Temporal 105 16 92 %    01/01/21 0943 103/61 96.6 °F (35.9 °C) Temporal 104 18 93 %    01/01/21 0915 (!) 92/59 97.8 °F (36.6 °C) Temporal 101 16 91 %    01/01/21 0731 100/60 98.2 °F (36.8 °C) Temporal 104  92 %    01/01/21 0059 112/66 98.4 °F (36.9 °C) Temporal 91 16 94 %        Intake/Output Summary (Last 24 hours) at 1/1/2021 2347  Last data filed at 1/1/2021 2309  Gross per 24 hour   Intake 391.25 ml   Output    Net 391.25 ml     Exam limited in an effort to preserve PPE and performed with nursing assistance  General: awake/alert  Chest:  unlabored  CVS: regular rate and rhythm  Abdominal: nondistended  Extremities: no cyanosis or edema  Skin: no rash noted  Psych: alert/appropriate  Neuro: grossly nonfocal      Labs:  BMP:   Recent Labs     12/30/20 0320 12/31/20  0500 01/01/21  0020   * 129* 128*   K 4.2 4.3 4.1   CL 91* 90* 89*   CO2 25 22 21*   BUN 74* 74* 66*   CREATININE 6.3* 6.5* 6.3*   CALCIUM 7.6* 7.9* 7.5*     CBC:   Recent Labs     12/31/20  0500 01/01/21  0020 01/01/21  0430 01/01/21  1519   WBC 8.6 4.7* 4.2*  --    HGB 8.3* 6.9* 6.7* 9.3*   HCT 25.6* 21.6* 21.0* 27.7*   MCV 98.8* 99.1* 99.5*  --     289 280  --      LIVER PROFILE:   Recent Labs     12/30/20 0320 12/31/20  0500 01/01/21  0020   AST 17 15 17   ALT 8 <5* <5*   BILITOT <0.2 <0.2 <0.2   ALKPHOS 62 65 54     PT/INR:   Recent Labs     12/30/20 0320 12/31/20  0500 01/01/21  0020   PROTIME 15.0* 15.2* 15.9*   INR 1.18 1.20* 1.27*     APTT: No results for input(s): APTT in the last 72 hours. BNP:  No results for input(s): BNP in the last 72 hours. Ionized Calcium:No results for input(s): IONCA in the last 72 hours. Magnesium:No results for input(s): MG in the last 72 hours. Phosphorus:No results for input(s): PHOS in the last 72 hours. HgbA1C: No results for input(s): LABA1C in the last 72 hours. Hepatic:   Recent Labs     12/30/20 0320 12/31/20  0500 01/01/21  0020   ALKPHOS 62 65 54   ALT 8 <5* <5*   AST 17 15 17   PROT 4.7* 5.0* 4.5*   BILITOT <0.2 <0.2 <0.2   LABALBU 1.7* 1.8* 1.6*     Lactic Acid: No results for input(s): LACTA in the last 72 hours. Troponin: No results for input(s): CKTOTAL, CKMB, TROPONINT in the last 72 hours. ABGs:   Lab Results   Component Value Date    PHART 7.480 12/23/2020    PO2ART 114.0 12/23/2020    [id number] 25.0 12/23/2020     CRP:  No results for input(s): CRP in the last 72 hours. Sed Rate:  No results for input(s): SEDRATE in the last 72 hours. Culture Results:   Blood Culture Recent:   Recent Labs     12/31/20  0500   BC No Growth to date. Any change in status will be called. Urine Culture Recent : No results for input(s): LABURIN in the last 720 hours.     Radiology reports as per the Radiologist

## 2021-01-03 NOTE — PROGRESS NOTES
Nephrology ([facility]) Progress Note    Patient:  Usama Cosby  YOB: 1945  Date of Service: 1/3/2021  MRN: [id number]   Acct: [de-identified]   Primary Care Physician: No primary care provider on file. Advance Directive: Full Code  Admit Date: 12/22/2020       Hospital Day: 12  Referring Provider: Napoleon Ramirez MD    Patient independently seen and examined, Chart, Consults, Notes, Operative notes, Labs, Cardiology, and Radiology studies reviewed as available. Subjective:  Usama Cosby is a 77 y.o. male  whom we were consulted for ESRD on peritoneal dialysis. He also has hypertension, hyperlipidemia, CAD. He presented to an outside facility complaining of worsening dyspnea and dysphagia since being diagnosed with COVID-19.  He went into acute respiratory failure and is now requiring 4 L oxygen. He was admitted to [facility] and is on the [facility] floor. Patient offered no other complaints. Renal service was consulted to manage his ESRD. A couple of nights ago, he went on non-rebreather O2 and he was transferred to critical care unit for close monitoring. Patient then improved some and was transferred back to floor 12/28. Today, doing well. Remains in COVID isolation. No events per nursing. Slowly improving and become anxious for discharge. Still has significant oxygen requirement. No issues with PD.     Allergies:  Diphenhydramine and Sulfa antibiotics    Medicines:  Current Facility-Administered Medications   Medication Dose Route Frequency Provider Last Rate Last Admin    aspirin chewable tablet 81 mg  81 mg Oral Daily Carl Carrion MD   81 mg at 01/03/21 0910    clopidogrel (PLAVIX) tablet 75 mg  75 mg Oral Daily Carl Carrion MD   75 mg at 01/03/21 0910    lisinopril (PRINIVIL;ZESTRIL) tablet 2.5 mg  2.5 mg Oral Daily Henrylata Carrion MD   2.5 mg at 01/02/21 1103  Vitamin D (CHOLECALCIFEROL) tablet 6,000 Units  6,000 Units Oral Daily Chapis Chavarria MD   6,000 Units at 01/03/21 0910    albuterol sulfate  (90 Base) MCG/ACT inhaler 2 puff  2 puff Inhalation Q6H PRN Chapis Chavarria MD   2 puff at 12/23/20 0544    insulin lispro (HUMALOG) injection vial 0-6 Units  0-6 Units Subcutaneous TID WC Chapis Chavarria MD   2 Units at 12/31/20 2476    insulin lispro (HUMALOG) injection vial 0-3 Units  0-3 Units Subcutaneous Nightly Chapis Chavarria MD   1 Units at 12/29/20 2129    glucose (GLUTOSE) 40 % oral gel 15 g  15 g Oral PRN Chapis Chavarria MD        dextrose 50 % IV solution  12.5 g Intravenous PRN Chapis Chavarria MD        glucagon (rDNA) injection 1 mg  1 mg Intramuscular PRN Chapis Chavarria MD        dextrose 5 % solution  100 mL/hr Intravenous PRN Chapis Chavarria MD        Benzocaine-Menthol (CEPACOL) 1 lozenge  1 lozenge Oral Q2H PRN Chapis Chavarria MD        phenol 1.4 % mouth spray 1 spray  1 spray Mouth/Throat Q2H PRN Chapis Chavarria MD   1 spray at 12/23/20 3552    budesonide-formoterol (SYMBICORT) 160-4.5 MCG/ACT inhaler 2 puff  2 puff Inhalation BID Chapis Chavarria MD   2 puff at 01/03/21 0746       Past Medical History:  Past Medical History:   Diagnosis Date    Anxiety     Asthma     Benign essential HTN     Benign hypertensive kidney disease     CAD (coronary artery disease)     sees dr. Orosco Slight Chronic kidney disease, stage IV (severe) ([facility] [address] 7/18/2016    ESRD (end stage renal disease) ([facility] [address]     no dialysis at present.  Hemodialysis patient [facility])     mon wed fri at [address] of blood transfusion     Hyperlipidemia     Palliative care patient 11/29/2018    Prolonged emergence from general anesthesia     c/o dizzy and \"over sedated\" with prostate \"scope\".     Renal osteodystrophy     Skin cancer 2018    facial    Throat cancer [facility]) 2012    Thyroid disease        Past Surgical History:  Past Surgical History: Procedure Laterality Date    CYSTOSCOPY      HERNIA REPAIR      umbilical    LAPAROSCOPY INSERTION PERITONEAL CATHETER N/A 7/18/2016    CATHETER INSERTION PERITONEAL DIALYSIS LAPAROSCOPIC performed by Quintin Dumont MD at [address] [facility] N/A 1/28/2019    LAPAROSCOPIC REVISION OF PD CATHETER performed by Russ Barrientos MD at [address] N/A 12/30/2020    PORT REMOVAL performed by Ezekiel Fair MD at [address] [facility] CATH DIAL OPEN N/A 11/28/2018    PLACEMENT OF TUNNELED HEMODIALYSIS CATHETER performed by Prakash Pelletier MD at [facility] TUNNELED INTRAPERITONEAL CATHETER N/A 10/22/2018    PERITONEAL DIALYSIS CATHETER EXTERIORIZATION performed by Russ Barrientos MD at [place] [address]    TUNNELED VENOUS PORT PLACEMENT      VASCULAR SURGERY  07/10/2019    SJS. Removal of tunneled dialysis catheter right internal jugular vein.        Family History  Family History   Problem Relation Age of Onset    Heart Disease Mother     Cancer Father         lung       Social History  Social History     Socioeconomic History    Marital status:      Spouse name: Not on file    Number of children: Not on file    Years of education: Not on file    Highest education level: Not on file   Occupational History    Not on file   Social Needs    Financial resource strain: Not on file    Food insecurity     Worry: Not on file     Inability: Not on file   [employer] needs     Medical: Not on file     Non-medical: Not on file   Tobacco Use    Smoking status: Never Smoker    Smokeless tobacco: Never Used   Substance and Sexual Activity    Alcohol use: No    Drug use: No    Sexual activity: Not on file   Lifestyle    Physical activity     Days per week: Not on file     Minutes per session: Not on file    Stress: Not on file   Relationships    Social connections Talks on phone: Not on file     Gets together: Not on file     Attends Druze service: Not on file     Active member of club or organization: Not on file     Attends meetings of clubs or organizations: Not on file     Relationship status: Not on file    Intimate partner violence     Fear of current or ex partner: Not on file     Emotionally abused: Not on file     Physically abused: Not on file     Forced sexual activity: Not on file   Other Topics Concern    Not on file   Social History Narrative    Not on file         Review of Systems:  History obtained from chart review and the patient  General ROS: No fever or chills  Respiratory ROS: +cough,+shortness of breath  Cardiovascular ROS: No chest pain or palpitations  Gastrointestinal ROS: No abdominal pain or melena  Genito-Urinary ROS: No dysuria or hematuria  Musculoskeletal ROS: No joint pain or swelling         Objective:  Patient Vitals for the past 24 hrs:   BP Temp Temp src Pulse Resp SpO2 Weight   01/03/21 1526      98 %    01/03/21 1239 (!) 154/84 97.1 °F (36.2 °C)  108 16 97 %    01/03/21 0851      94 %    01/03/21 0227 121/78 97.3 °F (36.3 °C) Temporal 92 16 97 % 150 lb 6.4 oz (68.2 kg)   01/02/21 2240 101/67 96.7 °F (35.9 °C) Temporal 100 18 97 %    01/02/21 1901 (!) 141/80 97.8 °F (36.6 °C) Temporal 99 16 90 %      No intake or output data in the 24 hours ending 01/03/21 1638  Exam limited in an effort to preserve PPE and performed with nursing assistance  General: awake/alert  Chest:  unlabored  CVS: regular rate and rhythm  Abdominal: nondistended  Extremities: no cyanosis or edema  Skin: no rash noted  Psych: alert/appropriate  Neuro: grossly nonfocal      Labs:  BMP:   Recent Labs     01/01/21  0020 01/02/21  0230 01/03/21  0600   * 129* 127*   K 4.1 4.5 4.6   CL 89* 89* 88*   CO2 21* 22 24   BUN 66* 68* 71*   CREATININE 6.3* 6.8* 6.5*   CALCIUM 7.5* 8.1* 8.3*     CBC:   Recent Labs     01/01/21  0430 01/01/21 1519 01/02/21  0230 01/03/21  0600   WBC 4.2*  --  4.3* 3.7*   HGB 6.7* 9.3* 8.4* 9.1*   HCT 21.0* 27.7* 25.1* 27.3*   MCV 99.5*  --  93.7 94.1*     --  236 222     LIVER PROFILE:   Recent Labs     01/01/21  0020 01/02/21 0230 01/03/21 0600   AST 17 21 23   ALT <5* <5* <5*   BILITOT <0.2 0.3 0.3   ALKPHOS 54 63 84     PT/INR:   Recent Labs     01/01/21  0020 01/02/21 0230 01/03/21  0600   PROTIME 15.9* 16.3* 14.2   INR 1.27* 1.31* 1.11     APTT: No results for input(s): APTT in the last 72 hours. BNP:  No results for input(s): BNP in the last 72 hours. Ionized Calcium:No results for input(s): IONCA in the last 72 hours. Magnesium:No results for input(s): MG in the last 72 hours. Phosphorus:No results for input(s): PHOS in the last 72 hours. HgbA1C: No results for input(s): LABA1C in the last 72 hours. Hepatic:   Recent Labs     01/01/21  0020 01/02/21 0230 01/03/21 0600   ALKPHOS 54 63 84   ALT <5* <5* <5*   AST 17 21 23   PROT 4.5* 4.9* 5.3*   BILITOT <0.2 0.3 0.3   LABALBU 1.6* 1.6* 1.6*     Lactic Acid: No results for input(s): LACTA in the last 72 hours. Troponin: No results for input(s): CKTOTAL, CKMB, TROPONINT in the last 72 hours. ABGs:   Lab Results   Component Value Date    PHART 7.480 12/23/2020    PO2ART 114.0 12/23/2020    BBD3SMG 25.0 12/23/2020     CRP:  No results for input(s): CRP in the last 72 hours. Sed Rate:  No results for input(s): SEDRATE in the last 72 hours. Culture Results:   Blood Culture Recent:   Recent Labs     12/31/20  0500   BC No Growth to date. Any change in status will be called. Urine Culture Recent : No results for input(s): LABURIN in the last 720 hours.     Radiology reports as per the Radiologist  Radiology: Xr Chest Portable    Result Date: 12/22/2020 XR CHEST PORTABLE 12/22/2020 7:03 PM History: Short of breath. Covid positive. Portable chest x-ray compared with 4 March 2019. Chronic interstitial lung disease. Patchy pneumonia within the right upper lobe, left lower lobe, and within the base of the left upper lobe. Less prominent disease within the right lower lobe. Relative sparing of the left apex. Stable heart and mediastinum. Aortic arch calcification. Unchanged left subclavian port. 1. Bilateral pneumonia compatible with the history of Covid positive. Signed by Dr Luz Viera on 12/22/2020 7:04 PM    Cta Pulmonary W Contrast    Result Date: 12/24/2020  EXAMINATION:  CTA PULMONARY W CONTRAST  12/24/2020 2:04 PM HISTORY: Hypoxia. Elevated d-dimer. Covid 19 infection. COMPARISON: No comparison study. DLP: 315 mGy-cm. Automated exposure control was utilized. TECHNIQUE: Spiral CT angiography was performed of the chest with contrast. Sagittal, coronal and 3-D images were reconstructed. MEDIASTINUM/VASCULAR: There is atheromatous disease of the thoracic aorta and coronary arteries. There is cardiomegaly. There is no CT evidence of pulmonary embolus. The pulmonary arteries are dilated with main pulmonary artery segment measuring 3.5 cm. There are small mediastinal lymph nodes. LUNGS: There are moderate groundglass appearing infiltrates in both lungs. Bronchiectasis is noted. There is no significant pleural effusion. There is mild dependent atelectasis in the lung bases posteriorly. BONES/SOFT TISSUES/: There are degenerative changes of the spine and shoulders. UPPER ABDOMEN: There is ascites in the upper abdomen. There is a 1.4 cm probable cyst in the upper pole left kidney. There is another 7 mm probable cyst in the upper pole left kidney. These areas are not fully evaluated. 1. No CT evidence of pulmonary embolus. Dilated pulmonary arteries. 2. Atheromatous disease of the thoracic aorta and coronary arteries. Cardiomegaly. 3. Moderate groundglass infiltrates bilaterally consistent with the patient's history of Covid 19 infection. Other etiologies less likely. There is also bronchiectasis. There is dependent atelectasis. 4. There is ascites in the abdomen. 5. Probable renal cysts on the left, not fully evaluated.  Signed by Dr Jake Kramer on 12/24/2020 2:11 PM       Assessment   ESRD  HTN nephropathy  COVID 19  Acute hypoxic respiratory failure  Hyponatremia  Hyperkalemia  Anemia CKD      Plan:  PD per orders with adjustments as noted  abx per ID  Underwent port removal 12/30  D/w nursing/IM  Transfuse prbc as needed  Continue try to wean oxygen supplementation as able    Edy Cronin MD  01/03/21  4:38 PM

## 2021-01-03 NOTE — PROGRESS NOTES
Nephrology ([address] [facility]) Progress Note    Patient:  Joyce Alvarado  YOB: 1945  Date of Service: 1/2/2021  MRN: [id number]   Acct: [de-identified]   Primary Care Physician: No primary care provider on file. Advance Directive: Full Code  Admit Date: 12/22/2020       Hospital Day: 11  Referring Provider: Billie Lagos MD    Patient independently seen and examined, Chart, Consults, Notes, Operative notes, Labs, Cardiology, and Radiology studies reviewed as available. Subjective:  Joyce Alvarado is a 77 y.o. male  whom we were consulted for ESRD on peritoneal dialysis. He also has hypertension, hyperlipidemia, CAD. He presented to an outside facility complaining of worsening dyspnea and dysphagia since being diagnosed with COVID-19.  He went into acute respiratory failure and is now requiring 4 L oxygen. He was admitted to [facility] and is on the [facility] floor. Patient offered no other complaints. Renal service was consulted to manage his ESRD. A couple of nights ago, he went on non-rebreather O2 and he was transferred to critical care unit for close monitoring. Patient then improved some and was transferred back to floor 12/28. Today, doing well. Remains in COVID isolation. No events per nursing. SOA improved. +cough persists. No issues with PD.     Allergies:  Diphenhydramine and Sulfa antibiotics    Medicines:  Current Facility-Administered Medications   Medication Dose Route Frequency Provider Last Rate Last Admin    aspirin chewable tablet 81 mg  81 mg Oral Daily Patricia Garrison MD   81 mg at 01/02/21 5461    clopidogrel (PLAVIX) tablet 75 mg  75 mg Oral Daily Patricia Garrison MD   75 mg at 01/02/21 3899    lisinopril (PRINIVIL;ZESTRIL) tablet 2.5 mg  2.5 mg Oral Daily Patricia Garrison MD   2.5 mg at 01/02/21 0802    docusate sodium (COLACE) capsule 100 mg  100 mg Oral Daily Patricia Garrison MD   100 mg at 01/02/21 2415  senna (SENOKOT) tablet 8.6 mg  1 tablet Oral Nightly Naheed Ojeda MD   8.6 mg at 01/02/21 2049    lactulose (CHRONULAC) 10 GM/15ML solution 20 g  20 g Oral TID Naheed Ojeda MD   20 g at 12/31/20 0932    bisacodyl (DULCOLAX) suppository 10 mg  10 mg Rectal Daily PRN Naheed Ojeda MD        ceFAZolin (ANCEF) 1 g in sterile water 10 mL IV syringe  1 g Intravenous Q24H Naheed Ojeda MD   1 g at 01/02/21 0042    heparin (porcine) injection 5,000 Units  5,000 Units Subcutaneous 3 times per day Naheed Ojeda MD   5,000 Units at 01/02/21 1500    0.9 % sodium chloride bolus  30 mL Intravenous PRN Naheed Ojeda MD        darbepoetin jerald-polysorbate SEVEN [facility]) injection 60 mcg  60 mcg Subcutaneous Weekly Naheed Ojeda MD   60 mcg at 12/23/20 1534    ALPRAZolam Milbert Rower) tablet 0.25 mg  0.25 mg Oral Nightly PRN Naheed Ojeda MD   0.25 mg at 01/01/21 0014    atorvastatin (LIPITOR) tablet 40 mg  40 mg Oral Nightly Naheed Ojeda MD   40 mg at 01/02/21 2049    carvedilol (COREG) tablet 3.125 mg  3.125 mg Oral BID WC Naheed Ojeda MD   3.125 mg at 01/02/21 6722    levothyroxine (SYNTHROID) tablet 50 mcg  50 mcg Oral Daily Naheed Ojeda MD   50 mcg at 01/02/21 0534    sevelamer (RENVELA) tablet 800 mg  800 mg Oral TID Naheed Ojeda MD   800 mg at 01/02/21 2048    vitamin D (ERGOCALCIFEROL) capsule 50,000 Units  50,000 Units Oral Q7 Days Naheed Ojeda MD        acetaminophen (TYLENOL) tablet 650 mg  650 mg Oral Q6H PRN Naheed Ojeda MD   650 mg at 01/02/21 2049    Or    acetaminophen (TYLENOL) suppository 650 mg  650 mg Rectal Q6H PRN Naheed Ojeda MD        guaiFENesin-dextromethorphan (ROBITUSSIN DM) 100-10 MG/5ML syrup 5 mL  5 mL Oral Q4H PRN Naheed Ojeda MD        Vitamin D (CHOLECALCIFEROL) tablet 6,000 Units  6,000 Units Oral Daily Naheed Ojeda MD   6,000 Units at 01/02/21 6370  albuterol sulfate  (90 Base) MCG/ACT inhaler 2 puff  2 puff Inhalation Q6H PRN Kaitlynn Castillo MD   2 puff at 12/23/20 0544    insulin lispro (HUMALOG) injection vial 0-6 Units  0-6 Units Subcutaneous TID WC Kaitlynn Castillo MD   2 Units at 12/31/20 3689    insulin lispro (HUMALOG) injection vial 0-3 Units  0-3 Units Subcutaneous Nightly Kaitlynn Castillo MD   1 Units at 12/29/20 2129    glucose (GLUTOSE) 40 % oral gel 15 g  15 g Oral PRN Kaitlynn Castillo MD        dextrose 50 % IV solution  12.5 g Intravenous PRN Kaitlynn Castillo MD        glucagon (rDNA) injection 1 mg  1 mg Intramuscular PRN Kaitlynn Castillo MD        dextrose 5 % solution  100 mL/hr Intravenous PRN Kaitlynn Castillo MD        Benzocaine-Menthol (CEPACOL) 1 lozenge  1 lozenge Oral Q2H PRN Kaitlynn Castillo MD        phenol 1.4 % mouth spray 1 spray  1 spray Mouth/Throat Q2H PRN Kaitlynn Castillo MD   1 spray at 12/23/20 2415    budesonide-formoterol (SYMBICORT) 160-4.5 MCG/ACT inhaler 2 puff  2 puff Inhalation BID Kaitlynn Castillo MD   2 puff at 01/02/21 2054       Past Medical History:  Past Medical History:   Diagnosis Date    Anxiety     Asthma     Benign essential HTN     Benign hypertensive kidney disease     CAD (coronary artery disease)     sees dr. Lexie Llamas Chronic kidney disease, stage IV (severe) ([facility] [address] 7/18/2016    ESRD (end stage renal disease) ([facility] [address]     no dialysis at present.  Hemodialysis patient [facility])     mon wed fri at [address] of blood transfusion     Hyperlipidemia     Palliative care patient 11/29/2018    Prolonged emergence from general anesthesia     c/o dizzy and \"over sedated\" with prostate \"scope\".     Renal osteodystrophy     Skin cancer 2018    facial    Throat cancer [facility]) 2012    Thyroid disease        Past Surgical History:  Past Surgical History:   Procedure Laterality Date    CYSTOSCOPY      HERNIA REPAIR      umbilical  LAPAROSCOPY INSERTION PERITONEAL CATHETER N/A 7/18/2016    CATHETER INSERTION PERITONEAL DIALYSIS LAPAROSCOPIC performed by Victorino Humphries MD at [address] N/A 1/28/2019    LAPAROSCOPIC REVISION OF PD CATHETER performed by Andria Spencer MD at [address] N/A 12/30/2020    PORT REMOVAL performed by Kala Luna MD at [facility] CATH DIAL OPEN N/A 11/28/2018    PLACEMENT OF TUNNELED HEMODIALYSIS CATHETER performed by Tomás Aguilar MD at [facility] TUNNELED INTRAPERITONEAL CATHETER N/A 10/22/2018    PERITONEAL DIALYSIS CATHETER EXTERIORIZATION performed by Andria Spencer MD at [facility]    TUNNELED VENOUS PORT PLACEMENT      VASCULAR SURGERY  07/10/2019    SJS. Removal of tunneled dialysis catheter right internal jugular vein.        Family History  Family History   Problem Relation Age of Onset    Heart Disease Mother     Cancer Father         lung       Social History  Social History     Socioeconomic History    Marital status:      Spouse name: Not on file    Number of children: Not on file    Years of education: Not on file    Highest education level: Not on file   Occupational History    Not on file   Social Needs    Financial resource strain: Not on file    Food insecurity     Worry: Not on file     Inability: Not on file    Transportation needs     Medical: Not on file     Non-medical: Not on file   Tobacco Use    Smoking status: Never Smoker    Smokeless tobacco: Never Used   Substance and Sexual Activity    Alcohol use: No    Drug use: No    Sexual activity: Not on file   Lifestyle    Physical activity     Days per week: Not on file     Minutes per session: Not on file    Stress: Not on file   Relationships    Social connections     Talks on phone: Not on file     Gets together: Not on file Attends Sikhism service: Not on file     Active member of club or organization: Not on file     Attends meetings of clubs or organizations: Not on file     Relationship status: Not on file    Intimate partner violence     Fear of current or ex partner: Not on file     Emotionally abused: Not on file     Physically abused: Not on file     Forced sexual activity: Not on file   Other Topics Concern    Not on file   Social History Narrative    Not on file         Review of Systems:  History obtained from chart review and the patient  General ROS: No fever or chills  Respiratory ROS: +cough,+shortness of breath  Cardiovascular ROS: No chest pain or palpitations  Gastrointestinal ROS: No abdominal pain or melena  Genito-Urinary ROS: No dysuria or hematuria  Musculoskeletal ROS: No joint pain or swelling         Objective:  Patient Vitals for the past 24 hrs:   BP Temp Temp src Pulse Resp SpO2   01/02/21 1901 (!) 141/80 97.8 °F (36.6 °C) Temporal 99 16 90 %   01/02/21 0934      93 %   01/02/21 0717 127/75 97.2 °F (36.2 °C) Temporal 98 12 99 %   01/02/21 0117 130/82 98.3 °F (36.8 °C) Temporal 108 20 97 %       Intake/Output Summary (Last 24 hours) at 1/2/2021 2225  Last data filed at 1/1/2021 2309  Gross per 24 hour   Intake 10 ml   Output    Net 10 ml     Exam limited in an effort to preserve PPE and performed with nursing assistance  General: awake/alert  Chest:  unlabored  CVS: regular rate and rhythm  Abdominal: nondistended  Extremities: no cyanosis or edema  Skin: no rash noted  Psych: alert/appropriate  Neuro: grossly nonfocal      Labs:  BMP:   Recent Labs     12/31/20  0500 01/01/21  0020 01/02/21  0230   * 128* 129*   K 4.3 4.1 4.5   CL 90* 89* 89*   CO2 22 21* 22   BUN 74* 66* 68*   CREATININE 6.5* 6.3* 6.8*   CALCIUM 7.9* 7.5* 8.1*     CBC:   Recent Labs     01/01/21  0020 01/01/21  0430 01/01/21  1519 01/02/21  0230   WBC 4.7* 4.2*  --  4.3*   HGB 6.9* 6.7* 9.3* 8.4* 1. No CT evidence of pulmonary embolus. Dilated pulmonary arteries. 2. Atheromatous disease of the thoracic aorta and coronary arteries. Cardiomegaly. 3. Moderate groundglass infiltrates bilaterally consistent with the patient's history of Covid 19 infection. Other etiologies less likely. There is also bronchiectasis. There is dependent atelectasis. 4. There is ascites in the abdomen. 5. Probable renal cysts on the left, not fully evaluated.  Signed by Dr Danial Hernandze on 12/24/2020 2:11 PM       Assessment   ESRD  HTN nephropathy  COVID 19  Acute hypoxic respiratory failure  Hyponatremia  Hyperkalemia  Anemia CKD      Plan:  PD per orders with adjustments as noted  abx per ID  Underwent port removal 12/30  D/w nursing/IM  Transfuse prbc as needed    Rock Wasserman MD  01/02/21  10:25 PM

## 2021-01-03 NOTE — PROGRESS NOTES
[facility]        Hospitalist Progress Note  1/3/2021 9:57 AM  Subjective:   Admit Date: 12/22/2020  PCP: No primary care provider on file. Chief Complaint: Dyspnea    Subjective: Patient seen at bedside. Still hoarse. Still wants to leave. Currently on 12L O2. Cumulative Hospital History: The patient is a 77 y.o. male with a past medical history of ESRD on peritoneal dialysis, hypertension, hyperlipidemia, CAD who presented to an outside facility complaining of worsening dyspnea, sore throat and dysphagia since being diagnosed with COVID-19 found to have acute respiratory failure with hypoxia requiring 4L to maintain saturations at OSH. Patient transferred to our facility for further management. Found to have elevated procalcitonin and D-dimer started on empiric antibiotics and heparin drip pending cultures and CT angio PE study. Patient with worsening saturations/respiratory status transferred to CCU. Patient found to have blood cultures growing MSSA with infectious diseases consulted. CT angio pulmonary embolus study showing no evidence of pulmonary embolism and heparin drip discontinued and patient placed on prophylactic lovenox. Patient's respiratory status improving and he was transferred out of CCU. ID recommending [id number] removal.  General surgery consulted for port removal. Port removed 12/30/2020 with general and vascular surgery. ROS: 14 point review of systems is negative except as specifically addressed above.     DIET GENERAL;  No intake or output data in the 24 hours ending 01/03/21 0957  Medications:   dextrose       Current Facility-Administered Medications   Medication Dose Route Frequency Provider Last Rate Last Admin    aspirin chewable tablet 81 mg  81 mg Oral Daily Nellie Latif MD   81 mg at 01/03/21 0910    clopidogrel (PLAVIX) tablet 75 mg  75 mg Oral Daily Nellie Latif MD   75 mg at 01/03/21 6146  lisinopril (PRINIVIL;ZESTRIL) tablet 2.5 mg  2.5 mg Oral Daily Guillermina Bowling MD   2.5 mg at 01/02/21 4754    docusate sodium (COLACE) capsule 100 mg  100 mg Oral Daily Guillermina Bowling MD   100 mg at 01/02/21 3790    senna (SENOKOT) tablet 8.6 mg  1 tablet Oral Nightly Guillermina Bowling MD   8.6 mg at 01/02/21 2049    lactulose ([facility]) 10 GM/15ML solution 20 g  20 g Oral TID Guillermina Bowling MD   20 g at 12/31/20 0932    bisacodyl (DULCOLAX) suppository 10 mg  10 mg Rectal Daily PRN Guillermina Bowling MD        ceFAZolin (ANCEF) 1 g in sterile water 10 mL IV syringe  1 g Intravenous Q24H Guillermina Bowling MD   1 g at 01/03/21 0004    heparin (porcine) injection 5,000 Units  5,000 Units Subcutaneous 3 times per day Guillermina Bowling MD   5,000 Units at 01/03/21 0610    0.9 % sodium chloride bolus  30 mL Intravenous PRN Guillermina Bowling MD        darbepoetin jerald-polysorbate SEVEN [facility]) injection 60 mcg  60 mcg Subcutaneous Weekly Guillermina Bowling MD   60 mcg at 12/23/20 1534    ALPRAZolam [place] ) tablet 0.25 mg  0.25 mg Oral Nightly PRN Guillermina Bowling MD   0.25 mg at 01/01/21 0014    atorvastatin (LIPITOR) tablet 40 mg  40 mg Oral Nightly Guillermina Bowling MD   40 mg at 01/02/21 2049    carvedilol (COREG) tablet 3.125 mg  3.125 mg Oral BID WC Guillermina Bowling MD   3.125 mg at 01/03/21 3484    levothyroxine (SYNTHROID) tablet 50 mcg  50 mcg Oral Daily Guillermina Bowling MD   50 mcg at 01/03/21 0610    sevelamer (RENVELA) tablet 800 mg  800 mg Oral TID Guillermina Bowling MD   800 mg at 01/03/21 5432    vitamin D (ERGOCALCIFEROL) capsule 50,000 Units  50,000 Units Oral Q7 Days Guillermina Bowling MD        acetaminophen (TYLENOL) tablet 650 mg  650 mg Oral Q6H PRN Guillermina Bowling MD   650 mg at 01/02/21 2049    Or    acetaminophen (TYLENOL) suppository 650 mg  650 mg Rectal Q6H PRN Guillermina Bowling MD  guaiFENesin-dextromethorphan (ROBITUSSIN DM) 100-10 MG/5ML syrup 5 mL  5 mL Oral Q4H PRN Kevyn Clinton MD        Vitamin D (CHOLECALCIFEROL) tablet 6,000 Units  6,000 Units Oral Daily Kevyn Clinton MD   6,000 Units at 01/03/21 0910    albuterol sulfate  (90 Base) MCG/ACT inhaler 2 puff  2 puff Inhalation Q6H PRN Kevyn Clinton MD   2 puff at 12/23/20 0544    insulin lispro (HUMALOG) injection vial 0-6 Units  0-6 Units Subcutaneous TID WC Kevyn Clinton MD   2 Units at 12/31/20 3613    insulin lispro (HUMALOG) injection vial 0-3 Units  0-3 Units Subcutaneous Nightly Kevyn Clinton MD   1 Units at 12/29/20 2129    glucose (GLUTOSE) 40 % oral gel 15 g  15 g Oral PRN Kevyn Clinton MD        dextrose 50 % IV solution  12.5 g Intravenous PRN Kevyn Clinton MD        glucagon (rDNA) injection 1 mg  1 mg Intramuscular PRN Kevyn Clinton MD        dextrose 5 % solution  100 mL/hr Intravenous PRN Kevyn Clinton MD        Benzocaine-Menthol (CEPACOL) 1 lozenge  1 lozenge Oral Q2H PRN Kevyn Clinton MD        phenol 1.4 % mouth spray 1 spray  1 spray Mouth/Throat Q2H PRN Kevyn Clinton MD   1 spray at 12/23/20 1227    budesonide-formoterol (SYMBICORT) 160-4.5 MCG/ACT inhaler 2 puff  2 puff Inhalation BID Kevyn Clinton MD   2 puff at 01/03/21 0746        Labs:     Recent Labs     01/01/21  0430 01/01/21  1519 01/02/21  0230 01/03/21  0600   WBC 4.2*  --  4.3* 3.7*   RBC 2.11*  --  2.68* 2.90*   HGB 6.7* 9.3* 8.4* 9.1*   HCT 21.0* 27.7* 25.1* 27.3*   MCV 99.5*  --  93.7 94.1*   MCH 31.8*  --  31.3* 31.4*   MCHC 31.9*  --  33.5 33.3     --  236 222     Recent Labs     01/01/21  0020 01/02/21  0230 01/03/21  0600   * 129* 127*   K 4.1 4.5 4.6   ANIONGAP 18 18 15   CL 89* 89* 88*   CO2 21* 22 24   BUN 66* 68* 71*   CREATININE 6.3* 6.8* 6.5*   GLUCOSE 135* 97 96   CALCIUM 7.5* 8.1* 8.3*     No results for input(s): MG, PHOS in the last 72 hours.   Recent Labs     01/01/21 Advance Directive: Full Code    DVT prophylaxis: Heparin    Discharge planning: TBD - needs to be weaned from O2 or go to LTAC. Needs outpatient antibiotics arranged once disposition known.     Signed:  Clare Borrero MD 1/3/2021 9:57 AM  Rounding Hospitalist

## 2021-01-04 NOTE — PROGRESS NOTES
  insulin lispro (HUMALOG) injection vial 0-6 Units, TID WC      insulin lispro (HUMALOG) injection vial 0-3 Units, Nightly      glucose (GLUTOSE) 40 % oral gel 15 g, PRN      dextrose 50 % IV solution, PRN      glucagon (rDNA) injection 1 mg, PRN      dextrose 5 % solution, PRN      Benzocaine-Menthol (CEPACOL) 1 lozenge, Q2H PRN      phenol 1.4 % mouth spray 1 spray, Q2H PRN      budesonide-formoterol (SYMBICORT) 160-4.5 MCG/ACT inhaler 2 puff, BID      Review of Systems see HPI    VitalSigns:  BP (!) 141/92   Pulse 96   Temp 97.2 °F (36.2 °C) (Temporal)   Resp 20   Ht 5' 11\" (1.803 m)   Wt 147 lb 14.4 oz (67.1 kg)   SpO2 92%   BMI 20.63 kg/m²      Physical Exam  Line/IV site: No erythema, warmth, induration, or tenderness. He does not appear dyspneic  He is not coughing  He appears comfortable    Lab Results:  CBC:   Recent Labs     01/02/21  0230 01/03/21  0600 01/04/21  0605   WBC 4.3* 3.7* 2.9*   HGB 8.4* 9.1* 8.6*    222 184     BMP:  Recent Labs     01/02/21  0230 01/03/21  0600 01/04/21  0605   * 127* 127*   K 4.5 4.6 4.8   CL 89* 88* 88*   CO2 22 24 26   BUN 68* 71* 73*   CREATININE 6.8* 6.5* 6.7*   GLUCOSE 97 96 87     CultureResults:  Blood cultures December 31, 2020-no growth  Blood cultures December 30, 2020-no growth  Blood cultures December 29, 2030-no growth  Blood cultures December 23, 2020 through December 27, 2020-MSSA    Radiology: None    Additional Studies Reviewed:  None    Impression:  Methicillin susceptible staph worse bacteremia-blood cultures now clear  Acute respiratory failure-COVID-19 infection. FiO2 requirement slightly improved. Now on 12 L instead of 15 L of oxygen supplementation. .  End-stage renal disease on hemodialysis    Recommendations:  Continue cefazolin  Continue supportive care  Wean oxygen as tolerated    Cristhian Charles MD

## 2021-01-04 NOTE — PROGRESS NOTES
Nephrology ([facility]) Progress Note    Patient:  Rita Jewell  YOB: 1945  Date of Service: 1/4/2021  MRN: [id number]   Acct: [de-identified]   Primary Care Physician: No primary care provider on file. Advance Directive: Full Code  Admit Date: 12/22/2020       Hospital Day: 13  Referring Provider: Annie Bergman MD    Patient independently seen and examined, Chart, Consults, Notes, Operative notes, Labs, Cardiology, and Radiology studies reviewed as available. Chief complaint: End-stage renal disease. Subjective:  Rita Jewell is a 77 y.o. male  whom we were consulted for end-stage renal disease. Patient is currently on CCPD and has hypertensive nephrosclerosis. Presenting to [facility] hospital with increasing shortness of breath as well as dysphagia. Patient was diagnosed with COVID-19/pneumonia. His respiratory status, slowly improving and he is back to nasal cannula from 100% nonrebreather. This morning he was seen at the door of his room, denies any shortness of breath but complaining of severe weakness.     Allergies:  Diphenhydramine and Sulfa antibiotics    Medicines:  Current Facility-Administered Medications   Medication Dose Route Frequency Provider Last Rate Last Admin    aspirin chewable tablet 81 mg  81 mg Oral Daily Kevyn Clinton MD   81 mg at 01/04/21 0915    clopidogrel (PLAVIX) tablet 75 mg  75 mg Oral Daily Kevyn Clinton MD   75 mg at 01/04/21 0915    lisinopril (PRINIVIL;ZESTRIL) tablet 2.5 mg  2.5 mg Oral Daily Kevyn Clinton MD   2.5 mg at 01/02/21 2570    docusate sodium (COLACE) capsule 100 mg  100 mg Oral Daily Kevyn Clinton MD   100 mg at 01/04/21 0915    senna (SENOKOT) tablet 8.6 mg  1 tablet Oral Nightly Kevyn Clinton MD   8.6 mg at 01/03/21 2200    lactulose (CHRONULAC) 10 GM/15ML solution 20 g  20 g Oral TID Kevyn Clinton MD   20 g at 12/31/20 7412  insulin lispro (HUMALOG) injection vial 0-3 Units  0-3 Units Subcutaneous Nightly Fortino Vitale MD   1 Units at 12/29/20 2129    glucose (GLUTOSE) 40 % oral gel 15 g  15 g Oral PRN Fortino Vitale MD        dextrose 50 % IV solution  12.5 g Intravenous PRN Fortino Vitale MD        glucagon (rDNA) injection 1 mg  1 mg Intramuscular PRN Fortino Vitale MD        dextrose 5 % solution  100 mL/hr Intravenous PRN Fortino Vitale MD        Benzocaine-Menthol (CEPACOL) 1 lozenge  1 lozenge Oral Q2H PRN Fortino Vitale MD        phenol 1.4 % mouth spray 1 spray  1 spray Mouth/Throat Q2H PRN Fortino Vitale MD   1 spray at 12/23/20 2081    budesonide-formoterol (SYMBICORT) 160-4.5 MCG/ACT inhaler 2 puff  2 puff Inhalation BID Fortino Vitale MD   2 puff at 01/04/21 2791       Past Medical History:  Past Medical History:   Diagnosis Date    Anxiety     Asthma     Benign essential HTN     Benign hypertensive kidney disease     CAD (coronary artery disease)     sees dr. Bradley Gallego Chronic kidney disease, stage IV (severe) ([facility] 7/18/2016    ESRD (end stage renal disease) ([facility] [address] [facility]     no dialysis at present.  Hemodialysis patient [facility])     mon wed fri at [address] of blood transfusion     Hyperlipidemia     Palliative care patient 11/29/2018    Prolonged emergence from general anesthesia     c/o dizzy and \"over sedated\" with prostate \"scope\".     Renal osteodystrophy     Skin cancer 2018    facial    Throat cancer ([facility] [address] 2012    Thyroid disease        Past Surgical History:  Past Surgical History:   Procedure Laterality Date    CYSTOSCOPY      HERNIA REPAIR      umbilical    LAPAROSCOPY INSERTION PERITONEAL CATHETER N/A 7/18/2016    CATHETER INSERTION PERITONEAL DIALYSIS LAPAROSCOPIC performed by Marc Caban MD at [facility] N/A 1/28/2019    LAPAROSCOPIC REVISION OF PD CATHETER performed by Darwin Zelaya MD at [facility]  PORT SURGERY N/A 12/30/2020    PORT REMOVAL performed by Violet Gallego MD at [facility] CATH DIAL OPEN N/A 11/28/2018    PLACEMENT OF TUNNELED HEMODIALYSIS CATHETER performed by Michael Pinzon MD at [facility] TUNNELED INTRAPERITONEAL CATHETER N/A 10/22/2018    PERITONEAL DIALYSIS CATHETER EXTERIORIZATION performed by Izabela Sanabria MD at [facility]    TUNNELED VENOUS PORT PLACEMENT      VASCULAR SURGERY  07/10/2019    SJS. Removal of tunneled dialysis catheter right internal jugular vein.        Family History  Family History   Problem Relation Age of Onset    Heart Disease Mother     Cancer Father         lung       Social History  Social History     Socioeconomic History    Marital status:      Spouse name: Not on file    Number of children: Not on file    Years of education: Not on file    Highest education level: Not on file   Occupational History    Not on file   Social Needs    Financial resource strain: Not on file    Food insecurity     Worry: Not on file     Inability: Not on file    Transportation needs     Medical: Not on file     Non-medical: Not on file   Tobacco Use    Smoking status: Never Smoker    Smokeless tobacco: Never Used   Substance and Sexual Activity    Alcohol use: No    Drug use: No    Sexual activity: Not on file   Lifestyle    Physical activity     Days per week: Not on file     Minutes per session: Not on file    Stress: Not on file   Relationships    Social connections     Talks on phone: Not on file     Gets together: Not on file     Attends Latter day service: Not on file     Active member of club or organization: Not on file     Attends meetings of clubs or organizations: Not on file     Relationship status: Not on file    Intimate partner violence     Fear of current or ex partner: Not on file     Emotionally abused: Not on file Physically abused: Not on file     Forced sexual activity: Not on file   Other Topics Concern    Not on file   Social History Narrative    Not on file         Review of Systems:  History obtained from chart review and the patient  General ROS: No fever or chills  Respiratory ROS: positive for - shortness of breath  Cardiovascular ROS: No chest pain or palpitations  Gastrointestinal ROS: No abdominal pain or melena  Genito-Urinary ROS: No dysuria or hematuria  Musculoskeletal ROS: No joint pain or swelling         Objective:  Patient Vitals for the past 24 hrs:   BP Temp Temp src Pulse Resp SpO2 Weight   01/04/21 0648 (!) 141/92 97.2 °F (36.2 °C) Temporal 96 20 96 %    01/04/21 0036 128/78 98.7 °F (37.1 °C) Temporal 102 16 94 %    01/03/21 2200    104      01/03/21 1909 (!) 164/108 99.8 °F (37.7 °C) Temporal 109 20 95 % 147 lb 14.4 oz (67.1 kg)   01/03/21 1827      95 %    01/03/21 1526      98 %    01/03/21 1239 (!) 154/84 97.1 °F (36.2 °C)  108 16 97 %    In person clinical examination was not done as he is currently in isolation with COVID-19 pneumonia. This was done to prevent unnecessary use of PPE and exposure to COVID-19. However patient was seen distantly at the door. Physical finding of pertaining to exam was discussed from his registered nurse. General: awake/alert   HEENT: Normocephalic atraumatic head  Chest:  Normal chest wall movement with no labored breathing seen. CVS: regular rate and rhythm  Abdominal: Nondistended abdomen  Extremities: No visible pedal edema.       Labs:  BMP:   Recent Labs     01/02/21  0230 01/03/21  0600 01/04/21  0605   * 127* 127*   K 4.5 4.6 4.8   CL 89* 88* 88*   CO2 22 24 26   BUN 68* 71* 73*   CREATININE 6.8* 6.5* 6.7*   CALCIUM 8.1* 8.3* 8.4*     CBC:   Recent Labs     01/02/21  0230 01/03/21  0600 01/04/21  0605   WBC 4.3* 3.7* 2.9*   HGB 8.4* 9.1* 8.6*   HCT 25.1* 27.3* 25.6*   MCV 93.7 94.1* 93.8    222 184 LIVER PROFILE:   Recent Labs     01/02/21  0230 01/03/21  0600 01/04/21  0605   AST 21 23 24   ALT <5* <5* <5*   BILITOT 0.3 0.3 0.3   ALKPHOS 63 84 69     PT/INR:   Recent Labs     01/02/21  0230 01/03/21  0600 01/04/21  0605   PROTIME 16.3* 14.2 14.8*   INR 1.31* 1.11 1.16     APTT: No results for input(s): APTT in the last 72 hours. BNP:  No results for input(s): BNP in the last 72 hours. Ionized Calcium:No results for input(s): IONCA in the last 72 hours. Magnesium:No results for input(s): MG in the last 72 hours. Phosphorus:No results for input(s): PHOS in the last 72 hours. HgbA1C: No results for input(s): LABA1C in the last 72 hours. Hepatic:   Recent Labs     01/02/21 0230 01/03/21  0600 01/04/21  0605   ALKPHOS 63 84 69   ALT <5* <5* <5*   AST 21 23 24   PROT 4.9* 5.3* 5.1*   BILITOT 0.3 0.3 0.3   LABALBU 1.6* 1.6* 1.6*     Lactic Acid: No results for input(s): LACTA in the last 72 hours. Troponin: No results for input(s): CKTOTAL, CKMB, TROPONINT in the last 72 hours. ABGs:   Lab Results   Component Value Date    PHART 7.480 12/23/2020    PO2ART 114.0 12/23/2020    HBX4XRP 25.0 12/23/2020     CRP:  No results for input(s): CRP in the last 72 hours. Sed Rate:  No results for input(s): SEDRATE in the last 72 hours. Culture Results:   Blood Culture Recent:   Recent Labs     12/31/20  0500   BC No Growth to date. Any change in status will be called. Urine Culture Recent : No results for input(s): LABURIN in the last 720 hours.     Radiology reports as per the Radiologist  Radiology: Xr Chest Portable    Result Date: 12/22/2020 1. No CT evidence of pulmonary embolus. Dilated pulmonary arteries. 2. Atheromatous disease of the thoracic aorta and coronary arteries. Cardiomegaly. 3. Moderate groundglass infiltrates bilaterally consistent with the patient's history of Covid 19 infection. Other etiologies less likely. There is also bronchiectasis. There is dependent atelectasis. 4. There is ascites in the abdomen. 5. Probable renal cysts on the left, not fully evaluated. Signed by Dr Jocelyn Durham on 12/24/2020 2:11 PM       Assessment   1. End-stage renal disease/currently on CCPD. 2.  Hypertensive nephrosclerosis. 3.  Bilateral COVID-19 pneumonia. 4.  Acute respiratory failure now resolving. 5.  Hyponatremia. 6.  Anemia of chronic kidney disease. Plan:  1.  P.o. water restriction. 2.  Resume CCPD/change to 2.5% exchanges. 3.  Resume ANITA.     Georgette Stein MD  01/04/21  10:53 AM

## 2021-01-04 NOTE — PROGRESS NOTES
Goal 2: Patient staff will follow swallow safety recommendations to decrease risk of penetration/aspiration during PO intake.      General  Chart Reviewed: Yes  Behavior/Cognition: Alert; Cooperative  O2 Device: Nasal Cannula  Communication Observation: HOARSE VOCAL QUALITY IMPROVING. Follows Directions: Simple   Patient Positioning: Upright in bed  Consistencies Administered: REGULAR, THIN LIQUID-CUP AND STRAW    Diet Tolerance:  Patient tolerating current diet level without signs/symptoms of penetration/aspiration. P.O. Trials: Thin    Via cup and straw. Pt had delayed cough 2x. Nectar       Honey       Puree       Solid    Tolerated w/o overt s/s of aspiration. Pt's O2 drops to low 80's with PO intake. Impressions:  Pt presents with minimal overt s/s of aspiration at the bedside. He has delayed coughs intermittently with thin liquids. Pt has a strong cough reflex and throat clear to protect his airway. Pt has low 02 that typically drops to lowest number of 82 with PO trials. Pt has risk for aspiration at this time. Plan:  Continued daily Dysphagia treatment with goals per plan of care.      Recommendations:  If PO diet is stable consider ST d/c  Safe swallow precautions  Oral care routine           ARCHANA Loredo   Electronically signed by Forest Pagan- SLP on 1/4/21 at 2:58 PM CST

## 2021-01-04 NOTE — PROGRESS NOTES
[facility]        Hospitalist Progress Note  1/4/2021 10:12 AM  Subjective:   Admit Date: 12/22/2020  PCP: No primary care provider on file. Chief Complaint: Dyspnea    Subjective: Patient seen at bedside. Feels ok. Cumulative Hospital History: The patient is a 77 y.o. male with a past medical history of ESRD on peritoneal dialysis, hypertension, hyperlipidemia, CAD who presented to an outside facility complaining of worsening dyspnea, sore throat and dysphagia since being diagnosed with COVID-19 found to have acute respiratory failure with hypoxia requiring 4L to maintain saturations at OSH. Patient transferred to our facility for further management. Found to have elevated procalcitonin and D-dimer started on empiric antibiotics and heparin drip pending cultures and CT angio PE study. Patient with worsening saturations/respiratory status transferred to CCU. Patient found to have blood cultures growing MSSA with infectious diseases consulted. CT angio pulmonary embolus study showing no evidence of pulmonary embolism and heparin drip discontinued and patient placed on prophylactic lovenox. Patient's respiratory status improving and he was transferred out of CCU. ID recommending [id number] removal.  General surgery consulted for port removal. Port removed 12/30/2020 with general and vascular surgery. ROS: 14 point review of systems is negative except as specifically addressed above.     DIET GENERAL;  No intake or output data in the 24 hours ending 01/04/21 1012  Medications:   dextrose       Current Facility-Administered Medications   Medication Dose Route Frequency Provider Last Rate Last Admin    aspirin chewable tablet 81 mg  81 mg Oral Daily Guillermina Bermeo MD   81 mg at 01/04/21 0915    clopidogrel (PLAVIX) tablet 75 mg  75 mg Oral Daily Guillermina Bermeo MD   75 mg at 01/04/21 5989  lisinopril (PRINIVIL;ZESTRIL) tablet 2.5 mg  2.5 mg Oral Daily Louise Ro MD   2.5 mg at 01/02/21 0649    docusate sodium (COLACE) capsule 100 mg  100 mg Oral Daily Louise Ro MD   100 mg at 01/04/21 0915    senna (SENOKOT) tablet 8.6 mg  1 tablet Oral Nightly Louise Ro MD   8.6 mg at 01/03/21 2200    lactulose (CHRONULAC) 10 GM/15ML solution 20 g  20 g Oral TID Louise Ro MD   20 g at 12/31/20 0932    bisacodyl (DULCOLAX) suppository 10 mg  10 mg Rectal Daily PRN Louise Ro MD        ceFAZolin (ANCEF) 1 g in sterile water 10 mL IV syringe  1 g Intravenous Q24H Louise Ro MD   1 g at 01/04/21 0127    heparin (porcine) injection 5,000 Units  5,000 Units Subcutaneous 3 times per day Louise Ro MD   5,000 Units at 01/04/21 0542    0.9 % sodium chloride bolus  30 mL Intravenous PRN Louise Ro MD        darbepoetin jerald-polysorbate SEVEN [facility]) injection 60 mcg  60 mcg Subcutaneous Weekly Louise Ro MD   60 mcg at 12/23/20 1534    ALPRAZolam Clorinda Crazier) tablet 0.25 mg  0.25 mg Oral Nightly PRN Louise Ro MD   0.25 mg at 01/01/21 0014    atorvastatin (LIPITOR) tablet 40 mg  40 mg Oral Nightly Louise Ro MD   40 mg at 01/03/21 2200    carvedilol (COREG) tablet 3.125 mg  3.125 mg Oral BID WC Louise Ro MD   3.125 mg at 01/04/21 0914    levothyroxine (SYNTHROID) tablet 50 mcg  50 mcg Oral Daily Louise Ro MD   50 mcg at 01/04/21 0543    sevelamer (RENVELA) tablet 800 mg  800 mg Oral TID Louise Ro MD   800 mg at 01/04/21 0915    vitamin D (ERGOCALCIFEROL) capsule 50,000 Units  50,000 Units Oral Q7 Days Louise Ro MD        acetaminophen (TYLENOL) tablet 650 mg  650 mg Oral Q6H PRN Louise Ro MD   650 mg at 01/02/21 2049    Or    acetaminophen (TYLENOL) suppository 650 mg  650 mg Rectal Q6H PRN Louise Ro MD  guaiFENesin-dextromethorphan (ROBITUSSIN DM) 100-10 MG/5ML syrup 5 mL  5 mL Oral Q4H PRN Carl Carrion MD        Vitamin D (CHOLECALCIFEROL) tablet 6,000 Units  6,000 Units Oral Daily Carl Carrion MD   6,000 Units at 01/04/21 0915    albuterol sulfate  (90 Base) MCG/ACT inhaler 2 puff  2 puff Inhalation Q6H PRN Carl Carrion MD   2 puff at 12/23/20 0544    insulin lispro (HUMALOG) injection vial 0-6 Units  0-6 Units Subcutaneous TID WC Carl Carrion MD   2 Units at 12/31/20 6265    insulin lispro (HUMALOG) injection vial 0-3 Units  0-3 Units Subcutaneous Nightly Carl Carrion MD   1 Units at 12/29/20 2129    glucose (GLUTOSE) 40 % oral gel 15 g  15 g Oral PRN Carl Carrion MD        dextrose 50 % IV solution  12.5 g Intravenous PRN Carl Carrion MD        glucagon (rDNA) injection 1 mg  1 mg Intramuscular PRN Carl Carrion MD        dextrose 5 % solution  100 mL/hr Intravenous PRN Carl Carrion MD        Benzocaine-Menthol (CEPACOL) 1 lozenge  1 lozenge Oral Q2H PRN Carl Carrion MD        phenol 1.4 % mouth spray 1 spray  1 spray Mouth/Throat Q2H PRN Carl Carrion MD   1 spray at 12/23/20 8510    budesonide-formoterol (SYMBICORT) 160-4.5 MCG/ACT inhaler 2 puff  2 puff Inhalation BID Carl Carrion MD   2 puff at 01/04/21 0926        Labs:     Recent Labs     01/02/21  0230 01/03/21  0600 01/04/21  0605   WBC 4.3* 3.7* 2.9*   RBC 2.68* 2.90* 2.73*   HGB 8.4* 9.1* 8.6*   HCT 25.1* 27.3* 25.6*   MCV 93.7 94.1* 93.8   MCH 31.3* 31.4* 31.5*   MCHC 33.5 33.3 33.6    222 184     Recent Labs     01/02/21  0230 01/03/21  0600 01/04/21  0605   * 127* 127*   K 4.5 4.6 4.8   ANIONGAP 18 15 13   CL 89* 88* 88*   CO2 22 24 26   BUN 68* 71* 73*   CREATININE 6.8* 6.5* 6.7*   GLUCOSE 97 96 87   CALCIUM 8.1* 8.3* 8.4*     No results for input(s): MG, PHOS in the last 72 hours.   Recent Labs     01/02/21 0230 01/03/21  0600 01/04/21  0605   AST 21 23 24 ALT <5* <5* <5*   BILITOT 0.3 0.3 0.3   ALKPHOS 63 84 69     ABGs:No results for input(s): PH, PO2, PCO2, HCO3, BE, O2SAT in the last 72 hours. Troponin T: No results for input(s): TROPONINI in the last 72 hours. INR:   Recent Labs     01/02/21  0230 01/03/21  0600 01/04/21  0605   INR 1.31* 1.11 1.16     Lactic Acid: No results for input(s): LACTA in the last 72 hours. Objective:   Vitals: BP (!) 141/92   Pulse 96   Temp 97.2 °F (36.2 °C) (Temporal)   Resp 20   Ht 5' 11\" (1.803 m)   Wt 147 lb 14.4 oz (67.1 kg)   SpO2 96%   BMI 20.63 kg/m²   24HR INTAKE/OUTPUT:    No intake or output data in the 24 hours ending 01/04/21 1012  General appearance: Alert  Head: NC/AT  Eyes: conjunctivae/corneas clear   Neck: Supple  Lungs: BLAE   Heart: RRR  Abdomen: BS+  Extremities: +pulses  Skin: warm  Neurologic: Alert, gross motor function intact  Psychiatric:  Mood appropriate    Assessment and Plan: Active Problems:    Renovascular hypertension    Secondary hyperparathyroidism of renal origin [facility])    Palliative care patient    ESRD (end stage renal disease) on dialysis ([facility] [address]    Anemia of chronic disease    Coronary artery disease involving native coronary artery of native heart without angina pectoris    Basal cell carcinoma (BCC) of face    Ischemic cardiomyopathy    COVID-19    Port or reservoir infection  Resolved Problems:    * No resolved hospital problems. *    COVID: Supportive management. Bronchodilators. O2 requirements still high. MSSA bacteremia: Ancef. Blood cultures have cleared - first negative culture 12/29/2020. Port has been removed with surgery 12/30/2020. Attempting antibiotics through PD catheter - will need to be arranged. Anemia: s/p 1U PRBC 1/1/2020. Monitor CBC.     ESRD: Peritoneal dialysis patient. Nephrology on board.     Hypertension: Monitor BP and adjust medications as needed.     Supportive management.     Advance Directive: Full Code    DVT prophylaxis: Heparin Discharge planning: TBD - needs to be weaned from O2 or go to LTAC. Needs outpatient antibiotics arranged once disposition known.     Signed:  Antony Serrato MD 1/4/2021 10:12 AM  Rounding Hospitalist

## 2021-01-04 NOTE — PROGRESS NOTES
Nutrition Assessment     Type and Reason for Visit: Reassess    Nutrition Recommendations/Plan: Modify diet to Renal, start Nepro once daily. Nutrition Assessment:  Pt continues to be at risk for nutritional compromise with impaired respiratory status and ESRD. Glucose remains elevated at times. PO intake >50% most documented meals. Will modify diet to Renal at this time as pt is on PD. Will continue to monitor.     Malnutrition Assessment:  Malnutrition Status: No malnutrition    Estimated Daily Nutrient Needs:  Energy (kcal): 3252-3229; Weight Used for Energy Requirements:  Current     Protein (g): ; Weight Used for Protein Requirements:  Current(1.5-2.0)        Fluid (ml/day): 5868-8625; Weight Used for Fluid Requirements:  1 ml/kcal      Current Nutrition Therapies:    DIET RENAL; Daily Fluid Restriction: 1500 ml  Dietary Nutrition Supplements: Renal Oral Supplement    Anthropometric Measures:  · Height: 5' 11\" (180.3 cm)  · Current Body Wt: 147 lb 14.4 oz (67.1 kg)   · BMI: 20.6    Nutrition Diagnosis:   · Underweight related to increase demand for energy/nutrients, inadequate protein-energy intake as evidenced by weight loss, BMI      Nutrition Interventions:   Food and/or Nutrient Delivery:  Modify Current Diet, Start Oral Nutrition Supplement  Nutrition Education/Counseling:  No recommendation at this time   Coordination of Nutrition Care:  Continue to monitor while inpatient    Goals:  PO intake >50%, wt stable       Nutrition Monitoring and Evaluation:   Behavioral-Environmental Outcomes:  None Identified   Food/Nutrient Intake Outcomes:  Food and Nutrient Intake  Physical Signs/Symptoms Outcomes:  Biochemical Data, Skin, Weight     Discharge Planning:    Continue current diet     Electronically signed by Cecilia Servin RD, LD on 1/4/21 at 1:47 PM CST    Contact: [phone]

## 2021-01-05 NOTE — CARE COORDINATION
Called patient to discuss option of sending a referral to a long-term acute care hospital.  He is agreeable to send a referral and asked for his wife, Thiago Prakash, to be called and discuss which facilities. Called Thiago Prakash. She is also agreeable to send referrals and requested referrals be sent to [facility] and [facility] in [facility], [address] Referrals sent to [address] [place] [address] and [facility] [place], 1/5/2021. Awaiting response. Requires pre-cert. [address] at [address]  Electronically signed by Gerhardt Pun, RN on 1/5/2021 at 2:20 PM    [facility] not [facility] Cov[facility]-19 positive patients at this time. Electronically signed by Gerhardt Pun, RN on 1/5/2021 at 2:44 PM    Multiple attempts made to call [facility] at [phone]. Busy signal each phone call.   Electronically signed by Gerhardt Pun, RN on 1/5/2021 at 2:46 PM

## 2021-01-05 NOTE — PROGRESS NOTES
[facility]        Hospitalist Progress Note  1/5/2021 10:31 AM  Subjective:   Admit Date: 12/22/2020  PCP: No primary care provider on file. Chief Complaint: Dyspnea    Subjective: Patient seen at bedside. Feels ok. Cumulative Hospital History: The patient is a 77 y.o. male with a past medical history of ESRD on peritoneal dialysis, hypertension, hyperlipidemia, CAD who presented to an outside facility complaining of worsening dyspnea, sore throat and dysphagia since being diagnosed with COVID-19 found to have acute respiratory failure with hypoxia requiring 4L to maintain saturations at OSH. Patient transferred to our facility for further management. Found to have elevated procalcitonin and D-dimer started on empiric antibiotics and heparin drip pending cultures and CT angio PE study. Patient with worsening saturations/respiratory status transferred to CCU. Patient found to have blood cultures growing MSSA with infectious diseases consulted. CT angio pulmonary embolus study showing no evidence of pulmonary embolism and heparin drip discontinued and patient placed on prophylactic lovenox. Patient's respiratory status improving and he was transferred out of CCU. ID recommending [id number] removal.  General surgery consulted for port removal. Port removed 12/30/2020 with general and vascular surgery. ID recommending antibiotics with PD changes through 1/20/2021. May need LTAC as he is still requiring 12 liters O2. ROS: 14 point review of systems is negative except as specifically addressed above.     DIET RENAL; Daily Fluid Restriction: 1500 ml  Dietary Nutrition Supplements: Renal Oral Supplement  No intake or output data in the 24 hours ending 01/05/21 1031  Medications:   dextrose       Current Facility-Administered Medications   Medication Dose Route Frequency Provider Last Rate Last Admin  aspirin chewable tablet 81 mg  81 mg Oral Daily Guillermina Bowling MD   81 mg at 01/05/21 0847    clopidogrel (PLAVIX) tablet 75 mg  75 mg Oral Daily Guillermina Bowling MD   75 mg at 01/05/21 0847    lisinopril (PRINIVIL;ZESTRIL) tablet 2.5 mg  2.5 mg Oral Daily Guillermina Bowling MD   2.5 mg at 01/02/21 5838    docusate sodium (COLACE) capsule 100 mg  100 mg Oral Daily Guillermina Bowling MD   100 mg at 01/04/21 0915    senna (SENOKOT) tablet 8.6 mg  1 tablet Oral Nightly Guillermina Bowling MD   8.6 mg at 01/04/21 2121    lactulose ([facility]) 10 GM/15ML solution 20 g  20 g Oral TID Guillermina Bowling MD   20 g at 12/31/20 0932    bisacodyl (DULCOLAX) suppository 10 mg  10 mg Rectal Daily PRN Guillermina Bowling MD        ceFAZolin (ANCEF) 1 g in sterile water 10 mL IV syringe  1 g Intravenous Q24H Guillermina Bowling MD   1 g at 01/05/21 0025    heparin (porcine) injection 5,000 Units  5,000 Units Subcutaneous 3 times per day Guillermina Bowling MD   5,000 Units at 01/05/21 0535    0.9 % sodium chloride bolus  30 mL Intravenous PRN Guillermina Bowling MD        darbepoetin jerald-polysorbate SEVEN [facility]) injection 60 mcg  60 mcg Subcutaneous Weekly Guillermina Bowling MD   60 mcg at 12/23/20 1534    ALPRAZolam [place] ) tablet 0.25 mg  0.25 mg Oral Nightly PRN Guillermina Bowling MD   0.25 mg at 01/01/21 0014    atorvastatin (LIPITOR) tablet 40 mg  40 mg Oral Nightly Guillermina Bowling MD   40 mg at 01/04/21 2121    carvedilol (COREG) tablet 3.125 mg  3.125 mg Oral BID WC Guillermina Bowling MD   3.125 mg at 01/05/21 1357    levothyroxine (SYNTHROID) tablet 50 mcg  50 mcg Oral Daily Guillermina Bowling MD   50 mcg at 01/05/21 0535    sevelamer (RENVELA) tablet 800 mg  800 mg Oral TID Guillermina Bowling MD   800 mg at 01/05/21 0847    vitamin D (ERGOCALCIFEROL) capsule 50,000 Units  50,000 Units Oral Q7 Days Guillermina Bowling MD        acetaminophen (TYLENOL) tablet 650 mg  650 mg Oral Q6H PRN Guillermina Bowling MD   650 mg at 01/02/21 2040 Or    acetaminophen (TYLENOL) suppository 650 mg  650 mg Rectal Q6H PRN Patricia Garrison MD        guaiFENesin-dextromethorphan [facility] DM) 100-10 MG/5ML syrup 5 mL  5 mL Oral Q4H PRN Patricia Garrison MD        Vitamin D (CHOLECALCIFEROL) tablet 6,000 Units  6,000 Units Oral Daily Patricia Garrison MD   6,000 Units at 01/05/21 0847    albuterol sulfate  (90 Base) MCG/ACT inhaler 2 puff  2 puff Inhalation Q6H PRN Patricia Garrison MD   2 puff at 12/23/20 0544    insulin lispro (HUMALOG) injection vial 0-6 Units  0-6 Units Subcutaneous TID WC Patricia Garrison MD   2 Units at 12/31/20 5821    insulin lispro (HUMALOG) injection vial 0-3 Units  0-3 Units Subcutaneous Nightly Patricia Garrison MD   1 Units at 12/29/20 2129    glucose (GLUTOSE) 40 % oral gel 15 g  15 g Oral PRN Patricia Garrison MD        dextrose 50 % IV solution  12.5 g Intravenous PRN Patricia Garrison MD        glucagon (rDNA) injection 1 mg  1 mg Intramuscular PRN Patricia Garrison MD        dextrose 5 % solution  100 mL/hr Intravenous PRN Patricia Garrison MD        Benzocaine-Menthol (CEPACOL) 1 lozenge  1 lozenge Oral Q2H PRN Patricia Garrison MD        phenol 1.4 % mouth spray 1 spray  1 spray Mouth/Throat Q2H PRN Patricia Garrison MD   1 spray at 12/23/20 9846    budesonide-formoterol (SYMBICORT) 160-4.5 MCG/ACT inhaler 2 puff  2 puff Inhalation BID Patricia Garrison MD   2 puff at 01/04/21 2124        Labs:     Recent Labs     01/03/21  0600 01/04/21  0605   WBC 3.7* 2.9*   RBC 2.90* 2.73*   HGB 9.1* 8.6*   HCT 27.3* 25.6*   MCV 94.1* 93.8   MCH 31.4* 31.5*   MCHC 33.3 33.6    184     Recent Labs     01/03/21  0600 01/04/21  0605   * 127*   K 4.6 4.8   ANIONGAP 15 13   CL 88* 88*   CO2 24 26   BUN 71* 73*   CREATININE 6.5* 6.7*   GLUCOSE 96 87   CALCIUM 8.3* 8.4*     No results for input(s): MG, PHOS in the last 72 hours.   Recent Labs     01/03/21  0600 01/04/21  0605   AST 23 24   ALT <5* <5*   BILITOT 0.3 0.3 ALKPHOS 84 71     ABGs:No results for input(s): PH, PO2, PCO2, HCO3, BE, O2SAT in the last 72 hours. Troponin T: No results for input(s): TROPONINI in the last 72 hours. INR:   Recent Labs     01/03/21  0600 01/04/21  0605   INR 1.11 1.16     Lactic Acid: No results for input(s): LACTA in the last 72 hours. Objective:   Vitals: /85   Pulse 102   Temp 97.8 °F (36.6 °C) (Temporal)   Resp 20   Ht 5' 11\" (1.803 m)   Wt 147 lb (66.7 kg)   SpO2 90%   BMI 20.50 kg/m²   24HR INTAKE/OUTPUT:    No intake or output data in the 24 hours ending 01/05/21 1031  General appearance: Alert  Head: NC/AT  Eyes: conjunctivae/corneas clear   Neck: Supple  Lungs: BLAE   Heart: RRR  Abdomen: BS+  Extremities: +pulses  Skin: warm  Neurologic: Alert, gross motor function intact  Psychiatric:  Mood appropriate    Assessment and Plan: Active Problems:    Renovascular hypertension    Secondary hyperparathyroidism of renal origin [facility])    Palliative care patient    ESRD (end stage renal disease) on dialysis ([facility] [address]    Anemia of chronic disease    Coronary artery disease involving native coronary artery of native heart without angina pectoris    Basal cell carcinoma (BCC) of face    Ischemic cardiomyopathy    COVID-19    Port or reservoir infection  Resolved Problems:    * No resolved hospital problems. *    COVID: Supportive management. Bronchodilators. O2 requirements still high. MSSA bacteremia: Ancef. Blood cultures have cleared - first negative culture 12/29/2020. Port has been removed with surgery 12/30/2020. Antibiotics through PD catheter once discharged - go through 1/20/2021. Anemia: s/p 1U PRBC 1/1/2020. Monitor CBC.     ESRD: Peritoneal dialysis patient. Nephrology on board.     Hypertension: Monitor BP and adjust medications as needed.     Supportive management. SW/CM for disposition.     Advance Directive: Full Code    DVT prophylaxis: Heparin Discharge planning: TBD - needs to be weaned from O2 or go to LTAC    Signed:  Caitlin Guillaume MD 1/5/2021 10:31 AM  Rounding Hospitalist

## 2021-01-05 NOTE — PROGRESS NOTES
Nephrology ([facility]) Progress Note    Patient:  Hemant Suarez  YOB: 1945  Date of Service: 1/5/2021  MRN: [id number]   Acct: [de-identified]   Primary Care Physician: No primary care provider on file. Advance Directive: Full Code  Admit Date: 12/22/2020       Hospital Day: 14  Referring Provider: Mary Jo Ritter MD    Patient independently seen and examined, Chart, Consults, Notes, Operative notes, Labs, Cardiology, and Radiology studies reviewed as available. Chief complaint: End-stage renal disease. Subjective:  Hemant Suarez is a 77 y.o. male  whom we were consulted for end-stage renal disease. Patient is currently on CCPD and has hypertensive nephrosclerosis. Presenting to [facility] hospital with increasing shortness of breath as well as dysphagia. Patient was diagnosed with COVID-19/pneumonia. His respiratory status, slowly improving and he is back to nasal cannula from 100% nonrebreather. Patient is growing MSSA in bloodstream.  He is status post removal of infected Mediport. This morning he was seen at the door of his room, he is complaining of more shortness of breath and currently on BiPAP.     Allergies:  Diphenhydramine and Sulfa antibiotics    Medicines:  Current Facility-Administered Medications   Medication Dose Route Frequency Provider Last Rate Last Admin    aspirin chewable tablet 81 mg  81 mg Oral Daily Clark Gardiner MD   81 mg at 01/05/21 0847    clopidogrel (PLAVIX) tablet 75 mg  75 mg Oral Daily Clark Gardiner MD   75 mg at 01/05/21 0847    lisinopril (PRINIVIL;ZESTRIL) tablet 2.5 mg  2.5 mg Oral Daily Clark Gardiner MD   2.5 mg at 01/02/21 0677    docusate sodium (COLACE) capsule 100 mg  100 mg Oral Daily Clark Gardiner MD   100 mg at 01/04/21 0915    senna (SENOKOT) tablet 8.6 mg  1 tablet Oral Nightly Clark Gardiner MD   8.6 mg at 01/04/21 2121  lactulose (CHRONULAC) 10 GM/15ML solution 20 g  20 g Oral TID Felicita Solorio MD   20 g at 12/31/20 0932    bisacodyl (DULCOLAX) suppository 10 mg  10 mg Rectal Daily PRN Felicita Solorio MD        ceFAZolin (ANCEF) 1 g in sterile water 10 mL IV syringe  1 g Intravenous Q24H Felicita Solorio MD   1 g at 01/05/21 0025    heparin (porcine) injection 5,000 Units  5,000 Units Subcutaneous 3 times per day Felicita Solorio MD   5,000 Units at 01/05/21 0535    0.9 % sodium chloride bolus  30 mL Intravenous PRN Felicita Solorio MD        darbepoetin jerald-polysorbate SEVEN [facility]) injection 60 mcg  60 mcg Subcutaneous Weekly Felicita Solorio MD   60 mcg at 12/23/20 1534    ALPRAZolam [facility]) tablet 0.25 mg  0.25 mg Oral Nightly PRN Felicita Solorio MD   0.25 mg at 01/01/21 0014    atorvastatin (LIPITOR) tablet 40 mg  40 mg Oral Nightly Felicita Solorio MD   40 mg at 01/04/21 2121    carvedilol (COREG) tablet 3.125 mg  3.125 mg Oral BID WC Felicita Solorio MD   3.125 mg at 01/05/21 0847    levothyroxine (SYNTHROID) tablet 50 mcg  50 mcg Oral Daily Felicita Solorio MD   50 mcg at 01/05/21 0535    sevelamer (RENVELA) tablet 800 mg  800 mg Oral TID Felicita Solorio MD   800 mg at 01/05/21 0847    vitamin D (ERGOCALCIFEROL) capsule 50,000 Units  50,000 Units Oral Q7 Days Felicita Solorio MD        acetaminophen (TYLENOL) tablet 650 mg  650 mg Oral Q6H PRN Felicita Solorio MD   650 mg at 01/02/21 2049    Or    acetaminophen (TYLENOL) suppository 650 mg  650 mg Rectal Q6H PRN Felicita Solorio MD        guaiFENesin-dextromethorphan (ROBITUSSIN DM) 100-10 MG/5ML syrup 5 mL  5 mL Oral Q4H PRN Felicita Solorio MD        Vitamin D (CHOLECALCIFEROL) tablet 6,000 Units  6,000 Units Oral Daily Felicita Solorio MD   6,000 Units at 01/05/21 0847    albuterol sulfate  (90 Base) MCG/ACT inhaler 2 puff  2 puff Inhalation Q6H PRN Felicita Solorio MD   2 puff at 12/23/20 7800  insulin lispro (HUMALOG) injection vial 0-6 Units  0-6 Units Subcutaneous TID WC Adonis Bonilla MD   2 Units at 12/31/20 8474    insulin lispro (HUMALOG) injection vial 0-3 Units  0-3 Units Subcutaneous Nightly Adonis Bonilla MD   1 Units at 12/29/20 2129    glucose (GLUTOSE) 40 % oral gel 15 g  15 g Oral PRN Adonis Bonilla MD        dextrose 50 % IV solution  12.5 g Intravenous PRN Adonis Bonilla MD        glucagon (rDNA) injection 1 mg  1 mg Intramuscular PRN Adonis Bonilla MD        dextrose 5 % solution  100 mL/hr Intravenous PRN Adonis Bonilla MD        Benzocaine-Menthol (CEPACOL) 1 lozenge  1 lozenge Oral Q2H PRN Adonis Bonilla MD        phenol 1.4 % mouth spray 1 spray  1 spray Mouth/Throat Q2H PRN Adonis Bonilla MD   1 spray at 12/23/20 1612    budesonide-formoterol (SYMBICORT) 160-4.5 MCG/ACT inhaler 2 puff  2 puff Inhalation BID Adonis Bonilla MD   2 puff at 01/04/21 2124       Past Medical History:  Past Medical History:   Diagnosis Date    Anxiety     Asthma     Benign essential HTN     Benign hypertensive kidney disease     CAD (coronary artery disease)     sees dr. Rosalina Villalobos Chronic kidney disease, stage IV (severe) ([facility] [address] 7/18/2016    ESRD (end stage renal disease) ([facility] [address]     no dialysis at present.  Hemodialysis patient [facility])     mon wed fri at [address] of blood transfusion     Hyperlipidemia     Palliative care patient 11/29/2018    Prolonged emergence from general anesthesia     c/o dizzy and \"over sedated\" with prostate \"scope\".     Renal osteodystrophy     Skin cancer 2018    facial    Throat cancer ([facility] [address] 2012    Thyroid disease        Past Surgical History:  Past Surgical History:   Procedure Laterality Date    CYSTOSCOPY      HERNIA REPAIR      umbilical    LAPAROSCOPY INSERTION PERITONEAL CATHETER N/A 7/18/2016    CATHETER INSERTION PERITONEAL DIALYSIS LAPAROSCOPIC performed by Nory Licona MD at [address] [place] [address]  LAPAROSCOPY INSERTION PERITONEAL CATHETER N/A 1/28/2019    LAPAROSCOPIC REVISION OF PD CATHETER performed by Andria Spencer MD at [address] N/A 12/30/2020    PORT REMOVAL performed by Kala Luna MD at [facility] CATH DIAL OPEN N/A 11/28/2018    PLACEMENT OF TUNNELED HEMODIALYSIS CATHETER performed by Tomás Aguilar MD at [facility] TUNNELED INTRAPERITONEAL CATHETER N/A 10/22/2018    PERITONEAL DIALYSIS CATHETER EXTERIORIZATION performed by Andria Spencer MD at [facility]    TUNNELED VENOUS PORT PLACEMENT      VASCULAR SURGERY  07/10/2019    SJS. Removal of tunneled dialysis catheter right internal jugular vein.        Family History  Family History   Problem Relation Age of Onset    Heart Disease Mother     Cancer Father         lung       Social History  Social History     Socioeconomic History    Marital status:      Spouse name: Not on file    Number of children: Not on file    Years of education: Not on file    Highest education level: Not on file   Occupational History    Not on file   Social Needs    Financial resource strain: Not on file    Food insecurity     Worry: Not on file     Inability: Not on file    Transportation needs     Medical: Not on file     Non-medical: Not on file   Tobacco Use    Smoking status: Never Smoker    Smokeless tobacco: Never Used   Substance and Sexual Activity    Alcohol use: No    Drug use: No    Sexual activity: Not on file   Lifestyle    Physical activity     Days per week: Not on file     Minutes per session: Not on file    Stress: Not on file   Relationships    Social connections     Talks on phone: Not on file     Gets together: Not on file     Attends [employer] service: Not on file     Active member of club or organization: Not on file     Attends meetings of clubs or organizations: Not on file Relationship status: Not on file    Intimate partner violence     Fear of current or ex partner: Not on file     Emotionally abused: Not on file     Physically abused: Not on file     Forced sexual activity: Not on file   Other Topics Concern    Not on file   Social History Narrative    Not on file         Review of Systems:  History obtained from chart review and the patient  General ROS: No fever or chills  Respiratory ROS: positive for - shortness of breath  Cardiovascular ROS: No chest pain or palpitations  Gastrointestinal ROS: No abdominal pain or melena  Genito-Urinary ROS: No dysuria or hematuria  Musculoskeletal ROS: No joint pain or swelling         Objective:  Patient Vitals for the past 24 hrs:   BP Temp Temp src Pulse Resp SpO2 Weight   01/05/21 1028      90 %    01/05/21 0721      90 %    01/05/21 0704 128/85 97.8 °F (36.6 °C) Temporal 102 20 90 %    01/04/21 2354 135/85 98.2 °F (36.8 °C) Temporal 98 16 94 %    01/04/21 2155    95      01/04/21 1957      94 %    01/04/21 1908 132/89 98.4 °F (36.9 °C) Temporal 105 16 90 % 147 lb (66.7 kg)   01/04/21 1752 127/76   105      01/04/21 1617      92 %    01/04/21 1107      92 %    In person clinical examination was not done as he is currently in isolation with COVID-19 pneumonia. This was done to prevent unnecessary use of PPE and exposure to COVID-19. However patient was seen distantly at the door. Physical finding of pertaining to exam was discussed from his registered nurse. General: awake/alert   HEENT: Normocephalic atraumatic head  Chest:  Normal chest wall movement with no labored breathing seen. CVS: regular rate and rhythm  Abdominal: Nondistended abdomen  Extremities: No visible pedal edema.       Labs:  BMP:   Recent Labs     01/03/21  0600 01/04/21  0605   * 127*   K 4.6 4.8   CL 88* 88*   CO2 24 26   BUN 71* 73*   CREATININE 6.5* 6.7*   CALCIUM 8.3* 8.4*     CBC:   Recent Labs 01/03/21  0600 01/04/21  0605   WBC 3.7* 2.9*   HGB 9.1* 8.6*   HCT 27.3* 25.6*   MCV 94.1* 93.8    184     LIVER PROFILE:   Recent Labs     01/03/21  0600 01/04/21  0605   AST 23 24   ALT <5* <5*   BILITOT 0.3 0.3   ALKPHOS 84 69     PT/INR:   Recent Labs     01/03/21  0600 01/04/21 0605   PROTIME 14.2 14.8*   INR 1.11 1.16     APTT: No results for input(s): APTT in the last 72 hours. BNP:  No results for input(s): BNP in the last 72 hours. Ionized Calcium:No results for input(s): IONCA in the last 72 hours. Magnesium:No results for input(s): MG in the last 72 hours. Phosphorus:No results for input(s): PHOS in the last 72 hours. HgbA1C: No results for input(s): LABA1C in the last 72 hours. Hepatic:   Recent Labs     01/03/21  0600 01/04/21 0605   ALKPHOS 84 69   ALT <5* <5*   AST 23 24   PROT 5.3* 5.1*   BILITOT 0.3 0.3   LABALBU 1.6* 1.6*     Lactic Acid: No results for input(s): LACTA in the last 72 hours. Troponin: No results for input(s): CKTOTAL, CKMB, TROPONINT in the last 72 hours. ABGs:   Lab Results   Component Value Date    PHART 7.480 12/23/2020    PO2ART 114.0 12/23/2020    VCO2EEQ 25.0 12/23/2020     CRP:  No results for input(s): CRP in the last 72 hours. Sed Rate:  No results for input(s): SEDRATE in the last 72 hours. Culture Results:   Blood Culture Recent:   Recent Labs     12/31/20  0500   BC No growth after 5 days of incubation. Urine Culture Recent : No results for input(s): LABURIN in the last 720 hours.     Radiology reports as per the Radiologist  Radiology: Xr Chest Portable    Result Date: 12/22/2020 XR CHEST PORTABLE 12/22/2020 7:03 PM History: Short of breath. Covid positive. Portable chest x-ray compared with 4 March 2019. Chronic interstitial lung disease. Patchy pneumonia within the right upper lobe, left lower lobe, and within the base of the left upper lobe. Less prominent disease within the right lower lobe. Relative sparing of the left apex. Stable heart and mediastinum. Aortic arch calcification. Unchanged left subclavian port. 1. Bilateral pneumonia compatible with the history of Covid positive. Signed by Dr Nikki Haynes on 12/22/2020 7:04 PM    Cta Pulmonary W Contrast    Result Date: 12/24/2020  EXAMINATION:  CTA PULMONARY W CONTRAST  12/24/2020 2:04 PM HISTORY: Hypoxia. Elevated d-dimer. Covid 19 infection. COMPARISON: No comparison study. DLP: 315 mGy-cm. Automated exposure control was utilized. TECHNIQUE: Spiral CT angiography was performed of the chest with contrast. Sagittal, coronal and 3-D images were reconstructed. MEDIASTINUM/VASCULAR: There is atheromatous disease of the thoracic aorta and coronary arteries. There is cardiomegaly. There is no CT evidence of pulmonary embolus. The pulmonary arteries are dilated with main pulmonary artery segment measuring 3.5 cm. There are small mediastinal lymph nodes. LUNGS: There are moderate groundglass appearing infiltrates in both lungs. Bronchiectasis is noted. There is no significant pleural effusion. There is mild dependent atelectasis in the lung bases posteriorly. BONES/SOFT TISSUES/: There are degenerative changes of the spine and shoulders. UPPER ABDOMEN: There is ascites in the upper abdomen. There is a 1.4 cm probable cyst in the upper pole left kidney. There is another 7 mm probable cyst in the upper pole left kidney. These areas are not fully evaluated. 1. No CT evidence of pulmonary embolus. Dilated pulmonary arteries. 2. Atheromatous disease of the thoracic aorta and coronary arteries. Cardiomegaly. 3. Moderate groundglass infiltrates bilaterally consistent with the patient's history of Covid 19 infection. Other etiologies less likely. There is also bronchiectasis. There is dependent atelectasis. 4. There is ascites in the abdomen. 5. Probable renal cysts on the left, not fully evaluated. Signed by Dr Jake Kramer on 12/24/2020 2:11 PM       Assessment   1. End-stage renal disease/currently on CCPD. 2.  Hypertensive nephrosclerosis. 3.  Bilateral COVID-19 pneumonia. 4.  Acute respiratory failure now resolving. 5.  Hyponatremia. 6.  Anemia of chronic kidney disease. 7. MSSA bacteremia. Plan:  1.  P.o. water restriction. 2.  Resume CCPD/change to 2.5% exchanges. 3.  Resume ANITA. 4.  Intraperitoneal cefazolin for MSSA bacteremia for total of 4 weeks. 5.  Plan was discussed with Dr. Fuentes Witt.     Thi Alexis MD  01/05/21  10:53 AM

## 2021-01-05 NOTE — PROGRESS NOTES
Infectious Diseases Progress Note    Patient:  Gloria Murcia  YOB: 1945  MRN: [id number]   Admit date: 12/22/2020   Admitting Physician: Aries Liu MD  Primary Care Physician: No primary care provider on file. Chief Complaint/Interval History: He feels okay. No new symptoms. No fever or chills. Discussed with renal placement of cefazolin in PD fluid. They are going to make arrangements. Oxygen saturations remaining in the mid to high 90s at rest.  He will desaturate to the high 80s with exertion. In/Out  No intake or output data in the 24 hours ending 01/05/21 0926  Allergies:    Allergies   Allergen Reactions    Diphenhydramine      Other reaction(s): Blacked out    Sulfa Antibiotics      made me feel like I had the flu     Current Meds:     aspirin chewable tablet 81 mg, Daily      clopidogrel (PLAVIX) tablet 75 mg, Daily      lisinopril (PRINIVIL;ZESTRIL) tablet 2.5 mg, Daily      docusate sodium (COLACE) capsule 100 mg, Daily      senna (SENOKOT) tablet 8.6 mg, Nightly      lactulose (CHRONULAC) 10 GM/15ML solution 20 g, TID      bisacodyl (DULCOLAX) suppository 10 mg, Daily PRN      ceFAZolin (ANCEF) 1 g in sterile water 10 mL IV syringe, Q24H      heparin (porcine) injection 5,000 Units, 3 times per day      0.9 % sodium chloride bolus, PRN      darbepoetin jerald-polysorbate (ARANESP) injection 60 mcg, Weekly      ALPRAZolam (XANAX) tablet 0.25 mg, Nightly PRN      atorvastatin (LIPITOR) tablet 40 mg, Nightly      carvedilol (COREG) tablet 3.125 mg, BID WC      levothyroxine (SYNTHROID) tablet 50 mcg, Daily      sevelamer (RENVELA) tablet 800 mg, TID      vitamin D (ERGOCALCIFEROL) capsule 50,000 Units, Q7 Days      acetaminophen (TYLENOL) tablet 650 mg, Q6H PRN    Or      acetaminophen (TYLENOL) suppository 650 mg, Q6H PRN      guaiFENesin-dextromethorphan (ROBITUSSIN DM) 100-10 MG/5ML syrup 5 mL, Q4H PRN   Vitamin D (CHOLECALCIFEROL) tablet 6,000 Units, Daily      albuterol sulfate  (90 Base) MCG/ACT inhaler 2 puff, Q6H PRN      insulin lispro (HUMALOG) injection vial 0-6 Units, TID WC      insulin lispro (HUMALOG) injection vial 0-3 Units, Nightly      glucose (GLUTOSE) 40 % oral gel 15 g, PRN      dextrose 50 % IV solution, PRN      glucagon (rDNA) injection 1 mg, PRN      dextrose 5 % solution, PRN      Benzocaine-Menthol (CEPACOL) 1 lozenge, Q2H PRN      phenol 1.4 % mouth spray 1 spray, Q2H PRN      budesonide-formoterol (SYMBICORT) 160-4.5 MCG/ACT inhaler 2 puff, BID      Review of Systems no nausea, diarrhea, or rash. VitalSigns:  /85   Pulse 102   Temp 97.8 °F (36.6 °C) (Temporal)   Resp 20   Ht 5' 11\" (1.803 m)   Wt 147 lb (66.7 kg)   SpO2 90%   BMI 20.50 kg/m²      Physical Exam  Line/IV site: No erythema, warmth, induration, or tenderness.   Extremities without significant edema  He remains on high flow oxygen  Breathing comfortably at present without productive cough    Lab Results:  CBC:   Recent Labs     01/03/21  0600 01/04/21  0605   WBC 3.7* 2.9*   HGB 9.1* 8.6*    184     BMP:  Recent Labs     01/03/21  0600 01/04/21  0605   * 127*   K 4.6 4.8   CL 88* 88*   CO2 24 26   BUN 71* 73*   CREATININE 6.5* 6.7*   GLUCOSE 96 87     CultureResults:  Blood cultures December 31, 2020-no growth  Blood cultures December 30, 2020-no growth  Blood cultures December 29, 2030-no growth  Blood cultures December 23, 2020 through December 27, 2020-MSSA    Susceptibility  Staphylococcus aureus (1)    Antibiotic Interpretation LINDA Status    benzylpenicillin Resistant >=0.5 mcg/mL     clindamycin Sensitive 0.25 mcg/mL     erythromycin Sensitive <=0.25 mcg/mL     inducible clindamycin resistance  Neg mcg/mL     moxifloxacin Sensitive <=0.25 mcg/mL     oxacillin Sensitive 0.5 mcg/mL     tetracycline Sensitive <=1 mcg/mL trimethoprim-sulfamethoxazole Sensitive <=10 mcg/mL     vancomycin Sensitive 1 mcg/mL       Radiology: None    Additional Studies Reviewed:  None    Impression:  1. MSSA bacteremia-cultures now clear  2. Acute respiratory failure secondary to COVID-19-stable FiO2 requirements at present  3.   End-stage renal disease on peritoneal dialysis    Recommendations:  Continue treatment with cefazolin  Discussed with renal  IV cefazolin can be discontinued when arrangements made for placement of cefazolin in PD fluid  Would suggest continuing cefazolin administration through his PD exchanges per her renal arrangements through a cefazolin stop date of January 20th 2021 (that would be 4 weeks following initiation of treatment and greater than 2 weeks following clearing of cultures and port removal)    Baljinder Hayden MD

## 2021-01-05 NOTE — PROGRESS NOTES
Physical Therapy  Name: Alfa Mata  MRN:  [id number]  Date of service:  1/5/2021 01/05/21 1028   Restrictions/Precautions   Restrictions/Precautions Fall Risk;Isolation;Contact Precautions   General   Chart Reviewed Yes   Subjective   Subjective Pt states that he wants to get up and walk across the room. Spoke with RN about pt's SpO2, she states that it drops quickly but he is OK to sit EOB. Pain Screening   Patient Currently in Pain Denies   Intervention List Patient able to continue with treatment   Oxygen Therapy   SpO2 90 %  (drops as low as 68% with exertion)   O2 Device High flow nasal cannula   O2 Flow Rate (L/min) 12 L/min   Bed Mobility   Supine to Sit Minimal assistance   Sit to Supine Minimal assistance   Scooting Minimal assistance   Comment Pt sits EOB x5 min, SpO2 drops as low as 68% while sitting so returned to supine   Short term goals   Time Frame for Short term goals 2 wks   Short term goal 1 supine to sit indep   Short term goal 2 sit to stand indep   Short term goal 3 amb. 100' with RW   Conditions Requiring Skilled Therapeutic Intervention   Body structures, Functions, Activity limitations Decreased functional mobility ; Decreased strength;Decreased safe awareness;Decreased cognition;Decreased posture; Increased pain;Decreased balance;Decreased endurance   Assessment Pt requires min assist for all bed mobility, becomes SOB easily but able to sit EOB with good sitting balance. SpO2 drops as low as 68% while sitting EOB so pt returned to supine and positioned for comfort with all needs in reach. RN notified of pt's SpO2 levels during tx. Activity Tolerance   Activity Tolerance Patient limited by endurance   Safety Devices   Type of devices Bed alarm in place;Call light within reach; Left in bed         Electronically signed by Peggy Dao PTA on 1/5/2021 at 10:32 AM

## 2021-01-06 NOTE — CARE COORDINATION
Phuong with [facility] in [place] called stating they do not have any available beds and do not expect any in the the near future. Called patient's wife, Henri Barrera, and provided update regarding referrals. She granted permission to send additional referrals until an accepting facility is located. Referrals sent to following facilities:  [facility] [phone]   [phone] Cher Mckenna  f [phone]    [facility] (in [place], [place])  [address] [place] [address] [phone]  f [phone]    Electronically signed by Tanna Ventura RN on 1/6/2021 at 11:03 AM    Liz Baltazar with [facility] called. Their facility is out-of-network for patient. Ofelia Amin with [facility] of [place] called requesting additional information. Clinical information faxed. Their facility is within network. Electronically signed by Tanna Ventura RN on 1/6/2021 at 3:03 PM     Nadja with [facility], called reporting that their facility is unable to accept a Peritoneal Dialysis patient at this time.   Electronically signed by Tanna Ventura RN on 1/6/2021 at 3:16 PM

## 2021-01-06 NOTE — PROGRESS NOTES
Speech Language Pathology  Facility/Department: [facility] 4 ONCOLOGY UNIT  SWALLOW THERAPY     NAME: Rita Jewell  : 1945  MRN: [id number]    ADMISSION DATE: 2020  ADMITTING DIAGNOSIS: has NSTEMI (non-ST elevated myocardial infarction) ([facility] Renovascular hypertension; Secondary hyperparathyroidism of renal origin ([facility] Anemia due to stage 4 chronic kidney disease ([facility] Palliative care patient; ESRD (end stage renal disease) on dialysis ([facility] Anemia of chronic disease; Peritoneal dialysis catheter dysfunction ([facility] Skin lesion of face; Coronary artery disease involving native coronary artery of native heart without angina pectoris; Basal cell carcinoma (BCC) of face; Chest pain; Chest discomfort; Ischemic cardiomyopathy; COVID-19; and Port or reservoir infection on their problem list.    Date of Treat: 2021  Evaluating Therapist: Celina Jaramillo    Current Diet level:  Regular solid consistency with thin liquids    Reason for Referral  Rita Jewell was referred for a bedside swallow evaluation to assess the efficiency of his swallow function, identify signs and symptoms of aspiration and make recommendations regarding safe dietary consistencies, effective compensatory strategies, and safe eating environment. Impression  Re-assessed patient's swallowing function. Patient exhibits fast oral transit and suspected swallow delay with even thickened liquids as well as sluggish, moderate-severely decreased laryngeal elevation for swallow airway protection. No outward S/S penetration/aspiration was noted/reported with any puree consistency presentation, regular solid consistency presentation, or honey thick liquid trial presented during treatment session this date. Patient did exhibit S/S penetration/aspiration with nectar thick liquid presentations and thin H2O presentations with inconsistent delayed coughs observed post swallows. At this time, would recommend diet downgrade to honey thick liquids. Continue regular solid consistency. Self-feed. Recommend meds in pudding/applesauce. Will continue to follow.     Treatment Plan  Requires SLP Intervention: Yes     Recommended Diet and Intervention  Solid Consistency Reincarnation: Regular solid   Liquid Consistency Recommendation: Honey thick  Recommended Form of Meds: Meds in pudding/applesauce   Therapeutic Interventions: Patient/Family education;Diet tolerance monitoring; Therapeutic PO trials with SLP     Compensatory Swallowing Strategies  Compensatory Swallowing Strategies: Upright as possible for all oral intake;Small bites/sips;Eat/Feed slowly; Alternate solids and liquids; Remain upright for 30-45 minutes after meals     Treatment/Goals  Timeframe for Short-term Goals: 1-2xday for 3 days   Goal 1: Patient will tolerate regular solid consistency and honey thick liquids with min S/S penetration/aspiration during PO intake. Goal 2: Patient staff will follow swallow safety recommendations to decrease risk of penetration/aspiration during PO intake. Goal 3: Re-assessment of swallow function for potential diet upgrade. Goal 4: Trial oral motor, lingual, and pharyngeal exercises with provision of mod cues/prompts.      General  Chart Reviewed: Yes  Behavior/Cognition: Alert; Cooperative  O2 Device: Nasal Cannula  Communication Observation: (Patient was aphonic and whispered all verbalizations.)  Follows Directions: Simple   Patient Positioning: Upright in bed  Consistencies Administered: Dysphagia Pureed (Dysphagia I); Regular solid; Honey - cup;Nectar-cup; Thin - cup      Re-assessed patient's swallowing function with the following observations noted:     Oral Phase  Mastication: Regular solid (Patient exhibited slow oral prep of regular solid consistency presentations administered independently.) Suspected Premature Bolus Loss: Puree;Regular solid; Nectar - cup; Thin - cup (Oral transit of puree consistency presentations and regular solid consistency presentations, all administered independently, varied from 1-3 seconds in length. Patient exhibited fast oral transit of nectar thick liquid presentations and thin H2O presentations presented via cup by SLP.)  Oral Phase - Comment: No puree consistency residue was noted post swallows. Min regular solid consistency residue was observed post swallows; residue cleared from the mouth with additional dry swallows. Oral transit of honey thick liquid trials, presented via cup by SLP, primarily measured 1-2 seconds in length.      Pharyngeal Phase  Suspected Swallow Delay: Puree;Regular solid; Nectar - cup; Thin - cup (Suspect secondary to oral transit times.)  Laryngeal Elevation: (Patient exhibited sluggish, moderate-severely decreased laryngeal elevation for swallow airway protection.)  Delayed Cough: Nectar-cup; Thin - cup   Pharyngeal Phase - Comment: No outward S/S penetration/aspiration was noted/reported with any puree consistency presentation, regular solid consistency presentation, or honey thick liquid trial presented during treatment session this date. Patient did exhibit S/S penetration/aspiration with nectar thick liquid presentations and thin H2O presentations with inconsistent delayed coughs observed post swallows. At this time, would recommend diet downgrade to honey thick liquids. Continue regular solid consistency. Self-feed. Recommend meds in pudding/applesauce. Will continue to follow.     Electronically signed by ARCHANA Roy on 1/6/2021 at 12:24 PM

## 2021-01-06 NOTE — PROGRESS NOTES
Infectious Diseases Progress Note    Patient:  Karyn Cotter  YOB: 1945  MRN: [id number]   Admit date: 12/22/2020   Admitting Physician: Kiara Jo MD  Primary Care Physician: No primary care provider on file. Chief Complaint/Interval History: No new symptoms. Comfortable at present. No fever. Continues to receive IV cefazolin. He does not report cough or dyspnea. Remains on high flow oxygen. In/Out  No intake or output data in the 24 hours ending 01/06/21 7621  Allergies:    Allergies   Allergen Reactions    Diphenhydramine      Other reaction(s): Blacked out    Sulfa Antibiotics      made me feel like I had the flu     Current Meds:     ceFAZolin (ANCEF) 1 g in sterile water 10 mL IV syringe, Daily      apixaban (ELIQUIS) tablet 2.5 mg, BID      aspirin chewable tablet 81 mg, Daily      clopidogrel (PLAVIX) tablet 75 mg, Daily      lisinopril (PRINIVIL;ZESTRIL) tablet 2.5 mg, Daily      docusate sodium (COLACE) capsule 100 mg, Daily      senna (SENOKOT) tablet 8.6 mg, Nightly      lactulose (CHRONULAC) 10 GM/15ML solution 20 g, TID      bisacodyl (DULCOLAX) suppository 10 mg, Daily PRN      0.9 % sodium chloride bolus, PRN      darbepoetin jerald-polysorbate (ARANESP) injection 60 mcg, Weekly      ALPRAZolam (XANAX) tablet 0.25 mg, Nightly PRN      atorvastatin (LIPITOR) tablet 40 mg, Nightly      carvedilol (COREG) tablet 3.125 mg, BID WC      levothyroxine (SYNTHROID) tablet 50 mcg, Daily      sevelamer (RENVELA) tablet 800 mg, TID      vitamin D (ERGOCALCIFEROL) capsule 50,000 Units, Q7 Days      acetaminophen (TYLENOL) tablet 650 mg, Q6H PRN    Or      acetaminophen (TYLENOL) suppository 650 mg, Q6H PRN      guaiFENesin-dextromethorphan (ROBITUSSIN DM) 100-10 MG/5ML syrup 5 mL, Q4H PRN      Vitamin D (CHOLECALCIFEROL) tablet 6,000 Units, Daily      albuterol sulfate  (90 Base) MCG/ACT inhaler 2 puff, Q6H PRN   insulin lispro (HUMALOG) injection vial 0-6 Units, TID WC      insulin lispro (HUMALOG) injection vial 0-3 Units, Nightly      glucose (GLUTOSE) 40 % oral gel 15 g, PRN      dextrose 50 % IV solution, PRN      glucagon (rDNA) injection 1 mg, PRN      dextrose 5 % solution, PRN      Benzocaine-Menthol (CEPACOL) 1 lozenge, Q2H PRN      phenol 1.4 % mouth spray 1 spray, Q2H PRN      budesonide-formoterol (SYMBICORT) 160-4.5 MCG/ACT inhaler 2 puff, BID      Review of Systems see HPI    VitalSigns:  /79   Pulse 95   Temp 97 °F (36.1 °C) (Temporal)   Resp 20   Ht 5' 11\" (1.803 m)   Wt 128 lb 9.6 oz (58.3 kg)   SpO2 91%   BMI 17.94 kg/m²      Physical Exam  Line/IV site: No erythema, warmth, induration, or tenderness.   Exam without change    Lab Results:  CBC:   Recent Labs     01/04/21  0605 01/06/21  0215   WBC 2.9* 2.7*   HGB 8.6* 9.2*    81*     BMP:  Recent Labs     01/04/21  0605 01/06/21  0215   * 126*   K 4.8 5.0   CL 88* 87*   CO2 26 25   BUN 73* 82*   CREATININE 6.7* 7.3*   GLUCOSE 87 103     CultureResults:  Blood cultures December 31, 2020-no growth  Blood cultures December 30, 2020-no growth  Blood cultures December 29, 2030-no growth  Blood cultures December 23, 2020 through December 27, 2020-MSSA     Susceptibility  Staphylococcus aureus (1)             Antibiotic Interpretation LINDA Status     benzylpenicillin Resistant >=0.5 mcg/mL       clindamycin Sensitive 0.25 mcg/mL       erythromycin Sensitive <=0.25 mcg/mL       inducible clindamycin resistance   Neg mcg/mL       moxifloxacin Sensitive <=0.25 mcg/mL       oxacillin Sensitive 0.5 mcg/mL       tetracycline Sensitive <=1 mcg/mL       trimethoprim-sulfamethoxazole Sensitive <=10 mcg/mL       vancomycin Sensitive 1 mcg/mL       Radiology: None    Additional Studies Reviewed:  None    Impression:  MSSA bacteremia  Respiratory failure secondary to COVID-19  End-stage renal disease on peritoneal dialysis

## 2021-01-06 NOTE — PROGRESS NOTES
Called PD nurse to inform her that patients dialysis machine is not filling . It is on fill 4 of 5, she had me to call technical support number on machine in which I was told from that gentleman that is was probably something with the programming when they program the machine and therefore was told to turn off machine d/t I myself know nothing about setting up or running dialysis. Patient did not get his antibiotic this shift d/t to machine not working properly.

## 2021-01-06 NOTE — PROGRESS NOTES
Physical Therapy  Abby Campbell  [id number]     01/06/21 1433   Subjective   Subjective Agrees to work with therapy. Bed Mobility   Supine to Sit Minimal assistance   Sit to Supine Minimal assistance   Transfers   Sit to Stand Minimal Assistance   Stand to sit Minimal Assistance   Ambulation   Ambulation? No   Exercises   Hip Flexion 10   Hip Abduction 10   Knee Long Arc Quad 10   Ankle Pumps 10   Comments B LE ex's in sitting to increase strength and functional mobility with PRN rest breaks   Other Activities   Comment Patient's O2 sats dropped into the 70's with activity. Patient required PRN rest breaks and needed to return to supine for O2 to recover. Patient positioned for comfort with all needs in reach. Short term goals   Time Frame for Short term goals 2 wks   Short term goal 1 supine to sit indep   Short term goal 2 sit to stand indep   Short term goal 3 amb. 100' with RW   Activity Tolerance   Activity Tolerance Patient limited by endurance   Safety Devices   Type of devices Bed alarm in place;Call light within reach; Left in bed   Electronically signed by Anahi Hoskins PTA on 1/6/2021 at 2:39 PM

## 2021-01-06 NOTE — PROGRESS NOTES
Nephrology ([facility]) Progress Note    Patient:  Avani Zhou  YOB: 1945  Date of Service: 1/6/2021  MRN: [id number]   Acct: [de-identified]   Primary Care Physician: No primary care provider on file. Advance Directive: Full Code  Admit Date: 12/22/2020       Hospital Day: 13  Referring Provider: Koleen Bumpers, MD    Patient independently seen and examined, Chart, Consults, Notes, Operative notes, Labs, Cardiology, and Radiology studies reviewed as available. Chief complaint: End-stage renal disease. Subjective:  Avani Zhou is a 77 y.o. male  whom we were consulted for end-stage renal disease. Patient is currently on CCPD and has hypertensive nephrosclerosis. Presenting to [facility] hospital with increasing shortness of breath as well as dysphagia. Patient was diagnosed with COVID-19/pneumonia. His respiratory status, slowly improving and he is back to nasal cannula from 100% nonrebreather. Patient is growing MSSA in bloodstream.  He is status post removal of infected Mediport. Last night patient has trouble with CCPD and treatment was suspended group home through. This morning he is breathing better and is off the BiPAP. He has mild shortness of breath.   O2 saturation is 95%    Allergies:  Diphenhydramine and Sulfa antibiotics    Medicines:  Current Facility-Administered Medications   Medication Dose Route Frequency Provider Last Rate Last Admin    aspirin chewable tablet 81 mg  81 mg Oral Daily Guillermina Bermeo MD   81 mg at 01/06/21 0934    clopidogrel (PLAVIX) tablet 75 mg  75 mg Oral Daily Guillermina Bermeo MD   75 mg at 01/06/21 0934    lisinopril (PRINIVIL;ZESTRIL) tablet 2.5 mg  2.5 mg Oral Daily Guillermina Bermeo MD   2.5 mg at 01/06/21 1572    docusate sodium (COLACE) capsule 100 mg  100 mg Oral Daily Guillermina Bermeo MD   100 mg at 01/06/21 5930    senna (SENOKOT) tablet 8.6 mg  1 tablet Oral Nightly Guillermina Bermeo MD   8.6 mg at 01/05/21 2002  lactulose (CHRONULAC) 10 GM/15ML solution 20 g  20 g Oral TID Sergo Clemente MD   20 g at 12/31/20 0932    bisacodyl (DULCOLAX) suppository 10 mg  10 mg Rectal Daily PRN Sergo Clemente MD        ceFAZolin (ANCEF) 1 g in sterile water 10 mL IV syringe  1 g Intravenous Q24H Sergo Clemente MD   1 g at 01/05/21 0025    heparin (porcine) injection 5,000 Units  5,000 Units Subcutaneous 3 times per day Sergo Clemente MD   5,000 Units at 01/06/21 0545    0.9 % sodium chloride bolus  30 mL Intravenous PRN Sergo Clemente MD        darbepoetin jerald-polysorbate SEVEN [facility]) injection 60 mcg  60 mcg Subcutaneous Weekly Sergo Clemente MD   60 mcg at 12/23/20 1534    ALPRAZolam Griselda Bliss) tablet 0.25 mg  0.25 mg Oral Nightly PRN Sergo Clemente MD   0.25 mg at 01/01/21 0014    atorvastatin (LIPITOR) tablet 40 mg  40 mg Oral Nightly Sergo Clemente MD   40 mg at 01/05/21 2002    carvedilol (COREG) tablet 3.125 mg  3.125 mg Oral BID WC Sergo Clemente MD   3.125 mg at 01/06/21 6364    levothyroxine (SYNTHROID) tablet 50 mcg  50 mcg Oral Daily Sergo Clemente MD   50 mcg at 01/06/21 0545    sevelamer (RENVELA) tablet 800 mg  800 mg Oral TID Sergo Clemente MD   800 mg at 01/06/21 5851    vitamin D (ERGOCALCIFEROL) capsule 50,000 Units  50,000 Units Oral Q7 Days Sergo Clemente MD        acetaminophen (TYLENOL) tablet 650 mg  650 mg Oral Q6H PRN Sergo Clemente MD   650 mg at 01/02/21 2049    Or    acetaminophen (TYLENOL) suppository 650 mg  650 mg Rectal Q6H PRN Sergo Clemente MD        guaiFENesin-dextromethorphan (ROBITUSSIN DM) 100-10 MG/5ML syrup 5 mL  5 mL Oral Q4H PRN Sergo Clemente MD        Vitamin D (CHOLECALCIFEROL) tablet 6,000 Units  6,000 Units Oral Daily Sergo Clemente MD   6,000 Units at 01/06/21 0933    albuterol sulfate  (90 Base) MCG/ACT inhaler 2 puff  2 puff Inhalation Q6H PRN Sergo Clemente MD   2 puff at 12/23/20 5495  insulin lispro (HUMALOG) injection vial 0-6 Units  0-6 Units Subcutaneous TID WC Kevyn Clinton MD   2 Units at 12/31/20 8160    insulin lispro (HUMALOG) injection vial 0-3 Units  0-3 Units Subcutaneous Nightly Kevyn Clinton MD   1 Units at 12/29/20 2129    glucose (GLUTOSE) 40 % oral gel 15 g  15 g Oral PRN Kevyn Clinton MD        dextrose 50 % IV solution  12.5 g Intravenous PRN Kevyn Clinton MD        glucagon (rDNA) injection 1 mg  1 mg Intramuscular PRN Kevyn Clinton MD        dextrose 5 % solution  100 mL/hr Intravenous PRN Kevyn Clinton MD        Benzocaine-Menthol (CEPACOL) 1 lozenge  1 lozenge Oral Q2H PRN Kevyn Clinton MD        phenol 1.4 % mouth spray 1 spray  1 spray Mouth/Throat Q2H PRN Kevyn Clinton MD   1 spray at 12/23/20 6858    budesonide-formoterol (SYMBICORT) 160-4.5 MCG/ACT inhaler 2 puff  2 puff Inhalation BID Kevyn Clinton MD   2 puff at 01/06/21 0932       Past Medical History:  Past Medical History:   Diagnosis Date    Anxiety     Asthma     Benign essential HTN     Benign hypertensive kidney disease     CAD (coronary artery disease)     sees dr. Aj Tracy Chronic kidney disease, stage IV (severe) ([facility] 7/18/2016    ESRD (end stage renal disease) ([facility]     no dialysis at present.  Hemodialysis patient [facility])     mon wed fri at [address] of blood transfusion     Hyperlipidemia     Palliative care patient 11/29/2018    Prolonged emergence from general anesthesia     c/o dizzy and \"over sedated\" with prostate \"scope\".     Renal osteodystrophy     Skin cancer 2018    facial    Throat cancer ([facility] 2012    Thyroid disease        Past Surgical History:  Past Surgical History:   Procedure Laterality Date    CYSTOSCOPY      HERNIA REPAIR      umbilical    LAPAROSCOPY INSERTION PERITONEAL CATHETER N/A 7/18/2016    CATHETER INSERTION PERITONEAL DIALYSIS LAPAROSCOPIC performed by Sury Forte MD at [address] [employer] [facility]  LAPAROSCOPY INSERTION PERITONEAL CATHETER N/A 1/28/2019    LAPAROSCOPIC REVISION OF PD CATHETER performed by Anila Eric MD at [address] N/A 12/30/2020    PORT REMOVAL performed by Irais Williamson MD at [facility] CATH DIAL OPEN N/A 11/28/2018    PLACEMENT OF TUNNELED HEMODIALYSIS CATHETER performed by Renato Escobedo MD at [facility] TUNNELED INTRAPERITONEAL CATHETER N/A 10/22/2018    PERITONEAL DIALYSIS CATHETER EXTERIORIZATION performed by Anila Eric MD at [facility]    TUNNELED VENOUS PORT PLACEMENT      VASCULAR SURGERY  07/10/2019    SJS. Removal of tunneled dialysis catheter right internal jugular vein.        Family History  Family History   Problem Relation Age of Onset    Heart Disease Mother     Cancer Father         lung       Social History  Social History     Socioeconomic History    Marital status:      Spouse name: Not on file    Number of children: Not on file    Years of education: Not on file    Highest education level: Not on file   Occupational History    Not on file   Social Needs    Financial resource strain: Not on file    Food insecurity     Worry: Not on file     Inability: Not on file    Transportation needs     Medical: Not on file     Non-medical: Not on file   Tobacco Use    Smoking status: Never Smoker    Smokeless tobacco: Never Used   Substance and Sexual Activity    Alcohol use: No    Drug use: No    Sexual activity: Not on file   Lifestyle    Physical activity     Days per week: Not on file     Minutes per session: Not on file    Stress: Not on file   Relationships    Social connections     Talks on phone: Not on file     Gets together: Not on file     Attends Yarsani service: Not on file     Active member of club or organization: Not on file     Attends meetings of clubs or organizations: Not on file Relationship status: Not on file    Intimate partner violence     Fear of current or ex partner: Not on file     Emotionally abused: Not on file     Physically abused: Not on file     Forced sexual activity: Not on file   Other Topics Concern    Not on file   Social History Narrative    Not on file         Review of Systems:  History obtained from chart review and the patient  General ROS: No fever or chills  Respiratory ROS: positive for - shortness of breath  Cardiovascular ROS: No chest pain or palpitations  Gastrointestinal ROS: No abdominal pain or melena  Genito-Urinary ROS: No dysuria or hematuria  Musculoskeletal ROS: No joint pain or swelling         Objective:  Patient Vitals for the past 24 hrs:   BP Temp Temp src Pulse Resp SpO2 Weight   01/06/21 0730      94 %    01/06/21 0659 121/79 97 °F (36.1 °C) Temporal 95 20 91 %    01/06/21 0207 126/81 97.5 °F (36.4 °C) Temporal 104 16 93 %    01/05/21 2057      (!) 89 %    01/05/21 2016    99      01/05/21 1815 97/72 97 °F (36.1 °C) Temporal 105 16 (!) 88 % 128 lb 9.6 oz (58.3 kg)   01/05/21 1745 118/74   104      01/05/21 1514      95 %    01/05/21 1220 117/74 97.8 °F (36.6 °C) Temporal 100 20 91 %    01/05/21 1028      90 %    In person clinical examination was not done as he is currently in isolation with COVID-19 pneumonia. This was done to prevent unnecessary use of PPE and exposure to COVID-19. However patient was seen distantly at the door. Physical finding of pertaining to exam was discussed from his registered nurse. General: awake/alert   HEENT: Normocephalic atraumatic head  Chest:  Normal chest wall movement with no labored breathing seen. CVS: regular rate and rhythm  Abdominal: Nondistended abdomen  Extremities: No visible pedal edema.       Labs:  BMP:   Recent Labs     01/04/21  0605 01/06/21  0215   * 126*   K 4.8 5.0   CL 88* 87*   CO2 26 25   BUN 73* 82*   CREATININE 6.7* 7.3* CALCIUM 8.4* 8.0*     CBC:   Recent Labs     01/04/21  0605 01/06/21 0215   WBC 2.9* 2.7*   HGB 8.6* 9.2*   HCT 25.6* 27.8*   MCV 93.8 94.9*    81*     LIVER PROFILE:   Recent Labs     01/04/21  0605 01/06/21 0215   AST 24 44*   ALT <5* <5*   BILITOT 0.3 <0.2   ALKPHOS 69 64     PT/INR:   Recent Labs     01/04/21  0605 01/06/21 0215   PROTIME 14.8* 15.4*   INR 1.16 1.22*     APTT: No results for input(s): APTT in the last 72 hours. BNP:  No results for input(s): BNP in the last 72 hours. Ionized Calcium:No results for input(s): IONCA in the last 72 hours. Magnesium:No results for input(s): MG in the last 72 hours. Phosphorus:No results for input(s): PHOS in the last 72 hours. HgbA1C: No results for input(s): LABA1C in the last 72 hours. Hepatic:   Recent Labs     01/04/21 0605 01/06/21 0215   ALKPHOS 69 64   ALT <5* <5*   AST 24 44*   PROT 5.1* 4.9*   BILITOT 0.3 <0.2   LABALBU 1.6* 1.6*     Lactic Acid: No results for input(s): LACTA in the last 72 hours. Troponin: No results for input(s): CKTOTAL, CKMB, TROPONINT in the last 72 hours. ABGs:   Lab Results   Component Value Date    PHART 7.480 12/23/2020    PO2ART 114.0 12/23/2020    FIE6ELA 25.0 12/23/2020     CRP:  No results for input(s): CRP in the last 72 hours. Sed Rate:  No results for input(s): SEDRATE in the last 72 hours. Culture Results:   Blood Culture Recent:   Recent Labs     12/31/20  0500   BC No growth after 5 days of incubation. Urine Culture Recent : No results for input(s): LABURIN in the last 720 hours.     Radiology reports as per the Radiologist  Radiology: Xr Chest Portable    Result Date: 12/22/2020 XR CHEST PORTABLE 12/22/2020 7:03 PM History: Short of breath. Covid positive. Portable chest x-ray compared with 4 March 2019. Chronic interstitial lung disease. Patchy pneumonia within the right upper lobe, left lower lobe, and within the base of the left upper lobe. Less prominent disease within the right lower lobe. Relative sparing of the left apex. Stable heart and mediastinum. Aortic arch calcification. Unchanged left subclavian port. 1. Bilateral pneumonia compatible with the history of Covid positive. Signed by Dr Sade Keating on 12/22/2020 7:04 PM    Cta Pulmonary W Contrast    Result Date: 12/24/2020  EXAMINATION:  CTA PULMONARY W CONTRAST  12/24/2020 2:04 PM HISTORY: Hypoxia. Elevated d-dimer. Covid 19 infection. COMPARISON: No comparison study. DLP: 315 mGy-cm. Automated exposure control was utilized. TECHNIQUE: Spiral CT angiography was performed of the chest with contrast. Sagittal, coronal and 3-D images were reconstructed. MEDIASTINUM/VASCULAR: There is atheromatous disease of the thoracic aorta and coronary arteries. There is cardiomegaly. There is no CT evidence of pulmonary embolus. The pulmonary arteries are dilated with main pulmonary artery segment measuring 3.5 cm. There are small mediastinal lymph nodes. LUNGS: There are moderate groundglass appearing infiltrates in both lungs. Bronchiectasis is noted. There is no significant pleural effusion. There is mild dependent atelectasis in the lung bases posteriorly. BONES/SOFT TISSUES/: There are degenerative changes of the spine and shoulders. UPPER ABDOMEN: There is ascites in the upper abdomen. There is a 1.4 cm probable cyst in the upper pole left kidney. There is another 7 mm probable cyst in the upper pole left kidney. These areas are not fully evaluated. 1. No CT evidence of pulmonary embolus. Dilated pulmonary arteries. 2. Atheromatous disease of the thoracic aorta and coronary arteries. Cardiomegaly. 3. Moderate groundglass infiltrates bilaterally consistent with the patient's history of Covid 19 infection. Other etiologies less likely. There is also bronchiectasis. There is dependent atelectasis. 4. There is ascites in the abdomen. 5. Probable renal cysts on the left, not fully evaluated. Signed by Dr Morris Person on 12/24/2020 2:11 PM       Assessment   1. End-stage renal disease/on CCPD. 2.  Hypertensive nephrosclerosis. 3.  Bilateral COVID-19 pneumonia. 4.  Acute respiratory failure now resolving. 5.  Hyponatremia. 6.  Anemia of chronic kidney disease. 7. MSSA bacteremia. Plan:  1.  P.o. water restriction. 2.  Resume CCPD/change to 2.5% exchanges. 3.  Continue ANITA. 4.  IV cefazolin today. 5.  Transfuse as needed.     Vonnie Elmore MD  01/06/21  10:21 AM

## 2021-01-07 NOTE — CARE COORDINATION
CM outreached to [facility] and [address] [place] [address] both in [place], [place]. CM will need to contact tomorrow to discuss possible admission.    Electronically signed by Robert Will RN on 1/7/2021 at 4:38 PM

## 2021-01-07 NOTE — PROGRESS NOTES
Spoke with pt's wife on the phone to provide spiritual care. Pt's wife says they are looking at sending the pt to a SNF. Pt's wife seemed to be struggling with the separation from her . Provided spiritual care with support, nurtured hope, and prayer. Pt's wife expressed gratitude for spiritual care.     Electronically signed by Jaya Cid on 1/7/2021 at 3:11 PM

## 2021-01-07 NOTE — PROGRESS NOTES
Infectious Diseases Progress Note    Patient:  [place]  YOB: 1945  MRN: [id number]   Admit date: 12/22/2020   Admitting Physician: Malia Martines MD  Primary Care Physician: No primary care provider on file. Chief Complaint/Interval History: He is without fever. He is hemodynamically stable. Currently remaining on 15 L of high flow oxygen. Oxygen saturations in the low 90s. In/Out    Intake/Output Summary (Last 24 hours) at 1/7/2021 0850  Last data filed at 1/6/2021 1928  Gross per 24 hour   Intake 240 ml   Output    Net 240 ml     Allergies:    Allergies   Allergen Reactions    Diphenhydramine      Other reaction(s): Blacked out    Sulfa Antibiotics      made me feel like I had the flu     Current Meds:     ceFAZolin (ANCEF) 1 g in sterile water 10 mL IV syringe, Daily      apixaban (ELIQUIS) tablet 2.5 mg, BID      melatonin disintegrating tablet 5 mg, Nightly      zinc sulfate (ZINCATE) capsule 50 mg, Daily      aspirin chewable tablet 81 mg, Daily      clopidogrel (PLAVIX) tablet 75 mg, Daily      lisinopril (PRINIVIL;ZESTRIL) tablet 2.5 mg, Daily      docusate sodium (COLACE) capsule 100 mg, Daily      senna (SENOKOT) tablet 8.6 mg, Nightly      lactulose (CHRONULAC) 10 GM/15ML solution 20 g, TID      bisacodyl (DULCOLAX) suppository 10 mg, Daily PRN      0.9 % sodium chloride bolus, PRN      darbepoetin jerald-polysorbate (ARANESP) injection 60 mcg, Weekly      ALPRAZolam (XANAX) tablet 0.25 mg, Nightly PRN      atorvastatin (LIPITOR) tablet 40 mg, Nightly      carvedilol (COREG) tablet 3.125 mg, BID WC      levothyroxine (SYNTHROID) tablet 50 mcg, Daily      sevelamer (RENVELA) tablet 800 mg, TID      vitamin D (ERGOCALCIFEROL) capsule 50,000 Units, Q7 Days      acetaminophen (TYLENOL) tablet 650 mg, Q6H PRN    Or      acetaminophen (TYLENOL) suppository 650 mg, Q6H PRN      guaiFENesin-dextromethorphan (ROBITUSSIN DM) 100-10 MG/5ML syrup 5 mL, Q4H PRN   Vitamin D (CHOLECALCIFEROL) tablet 6,000 Units, Daily      albuterol sulfate  (90 Base) MCG/ACT inhaler 2 puff, Q6H PRN      insulin lispro (HUMALOG) injection vial 0-6 Units, TID WC      insulin lispro (HUMALOG) injection vial 0-3 Units, Nightly      glucose (GLUTOSE) 40 % oral gel 15 g, PRN      dextrose 50 % IV solution, PRN      glucagon (rDNA) injection 1 mg, PRN      dextrose 5 % solution, PRN      Benzocaine-Menthol (CEPACOL) 1 lozenge, Q2H PRN      phenol 1.4 % mouth spray 1 spray, Q2H PRN      budesonide-formoterol (SYMBICORT) 160-4.5 MCG/ACT inhaler 2 puff, BID      Review of Systems see HPI     VitalSigns:  /71   Pulse 107   Temp 98 °F (36.7 °C) (Temporal)   Resp 20   Ht 5' 11\" (1.803 m)   Wt 137 lb 3.2 oz (62.2 kg)   SpO2 90%   BMI 19.14 kg/m²      Physical Exam  Lungs without crackles  Abdomen nontender    Lab Results:  CBC:   Recent Labs     01/06/21  0215 01/07/21  0345   WBC 2.7* 2.4*   HGB 9.2* 9.1*   PLT 81* 64*     BMP:  Recent Labs     01/06/21  0215 01/07/21  0345   * 129*   K 5.0 5.3*   CL 87* 88*   CO2 25 22   BUN 82* 87*   CREATININE 7.3* 7.4*   GLUCOSE 103 148*     Radiology: None    Additional Studies Reviewed:  None    Impression:  1. MSSA bacteremia  2. Respiratory failure secondary to COVID-19  3.   End-stage renal disease on peritoneal dialysis    Recommendations:  Continue cefazolin  Continue supportive care  Continue anticoagulation  Respiratory status borderline  Going to continue dexamethasone    Sarbjit Villavicencio MD

## 2021-01-07 NOTE — CARE COORDINATION
CM received call back from [facility], [address] [facility] [phone], unable to accept pt on peritoneal dialysis. CM outreached to [facility] in [place], [place] still no beds. CM outreached to [facility] [phone], per Christian[facility] No unable to accept patient's with peritoneal dialysis. CM outreached to [facility], [place] [address] [facility], 40 pt wait list, but are able to manage patient's with peritoneal dialysis  CM outreached to [facility] in [place], [address], unable to manage peritoneal dialysis patient. However, referred CM to Trell Scott at [address] [facility] and CM left message to cb. CM outreached to [facility] in [place], [place], [phone]. CM left message and awat cb.   Electronically signed by Robert Will RN on 1/7/2021 at 4:25 PM

## 2021-01-07 NOTE — PROGRESS NOTES
[facility]        Hospitalist Progress Note  1/6/2021 10:13 PM  Subjective:   Admit Date: 12/22/2020  PCP: No primary care provider on file. Chief Complaint: Dyspnea    Subjective: Patient seen at bedside. Awaiting SNF placement. Cumulative Hospital History: The patient is a 77 y.o. male with a past medical history of ESRD on peritoneal dialysis, hypertension, hyperlipidemia, CAD who presented to an outside facility complaining of worsening dyspnea, sore throat and dysphagia since being diagnosed with COVID-19 found to have acute respiratory failure with hypoxia requiring 4L to maintain saturations at OSH. Patient transferred to our facility for further management. Found to have elevated procalcitonin and D-dimer started on empiric antibiotics and heparin drip pending cultures and CT angio PE study. Patient with worsening saturations/respiratory status transferred to CCU. Patient found to have blood cultures growing MSSA with infectious diseases consulted. CT angio pulmonary embolus study showing no evidence of pulmonary embolism and heparin drip discontinued and patient placed on prophylactic lovenox. Patient's respiratory status improving and he was transferred out of CCU. ID recommending [id number] removal.  General surgery consulted for port removal. Port removed 12/30/2020 with general and vascular surgery. ID recommending antibiotics with PD changes through 1/20/2021. He would need LTAC as he is still requiring 12 liters O2. ROS: 14 point review of systems is negative except as specifically addressed above. Dietary Nutrition Supplements: Renal Oral Supplement  DIET RENAL; Moderately Thick (Honey);  Daily Fluid Restriction: 1500 ml    Intake/Output Summary (Last 24 hours) at 1/6/2021 2213  Last data filed at 1/6/2021 1928  Gross per 24 hour   Intake 240 ml   Output    Net 240 ml     Medications:   dextrose       Current Facility-Administered Medications Medication Dose Route Frequency Provider Last Rate Last Admin    ceFAZolin (ANCEF) 1 g in sterile water 10 mL IV syringe  1 g Intravenous Daily Suzie Guardado MD   1 g at 01/06/21 1238    apixaban (ELIQUIS) tablet 2.5 mg  2.5 mg Oral BID Robin Palmer MD   2.5 mg at 01/06/21 2019    aspirin chewable tablet 81 mg  81 mg Oral Daily Carl Carrion MD   81 mg at 01/06/21 0934    clopidogrel (PLAVIX) tablet 75 mg  75 mg Oral Daily Carl Carrion MD   75 mg at 01/06/21 0934    lisinopril (PRINIVIL;ZESTRIL) tablet 2.5 mg  2.5 mg Oral Daily Carl Carrion MD   2.5 mg at 01/06/21 5443    docusate sodium (COLACE) capsule 100 mg  100 mg Oral Daily Carl Carrion MD   100 mg at 01/06/21 0933    senna (SENOKOT) tablet 8.6 mg  1 tablet Oral Nightly Carl Carrion MD   8.6 mg at 01/06/21 2020    [facility] 10 GM/15ML solution 20 g  20 g Oral TID Carl Carrion MD   20 g at 12/31/20 0932    bisacodyl (DULCOLAX) suppository 10 mg  10 mg Rectal Daily PRN Carl Carrion MD        0.9 % sodium chloride bolus  30 mL Intravenous PRN Carl Carrion MD        darbepoetin jerald-polysorbate SEVEN [facility]) injection 60 mcg  60 mcg Subcutaneous Weekly Carl Carrion MD   60 mcg at 12/23/20 1534    ALPRAZolam Kiya Clapper) tablet 0.25 mg  0.25 mg Oral Nightly PRN Carl Carrion MD   0.25 mg at 01/01/21 0014    atorvastatin (LIPITOR) tablet 40 mg  40 mg Oral Nightly Carl Carrion MD   40 mg at 01/06/21 2020    carvedilol (COREG) tablet 3.125 mg  3.125 mg Oral BID WC Carl Carrion MD   3.125 mg at 01/06/21 0709    levothyroxine (SYNTHROID) tablet 50 mcg  50 mcg Oral Daily Carl Carrino MD   50 mcg at 01/06/21 0545    sevelamer (RENVELA) tablet 800 mg  800 mg Oral TID Carl Carrion MD   800 mg at 01/06/21 0934    vitamin D (ERGOCALCIFEROL) capsule 50,000 Units  50,000 Units Oral Q7 Days Carl Carrion MD  acetaminophen (TYLENOL) tablet 650 mg  650 mg Oral Q6H PRN Gumaro Ayala MD   650 mg at 01/02/21 2049    Or    acetaminophen (TYLENOL) suppository 650 mg  650 mg Rectal Q6H PRN Gumaro Ayala MD        guaiFENesin-dextromethorphan [facility]) 100-10 MG/5ML syrup 5 mL  5 mL Oral Q4H PRN Gumaro Ayala MD        Vitamin D (CHOLECALCIFEROL) tablet 6,000 Units  6,000 Units Oral Daily Gumaro Ayala MD   6,000 Units at 01/06/21 0933    albuterol sulfate  (90 Base) MCG/ACT inhaler 2 puff  2 puff Inhalation Q6H PRN Gumaro Ayala MD   2 puff at 12/23/20 0544    insulin lispro (HUMALOG) injection vial 0-6 Units  0-6 Units Subcutaneous TID WC Gumaro Ayala MD   2 Units at 12/31/20 9408    insulin lispro (HUMALOG) injection vial 0-3 Units  0-3 Units Subcutaneous Nightly Gumaro Ayala MD   1 Units at 12/29/20 2129    glucose (GLUTOSE) 40 % oral gel 15 g  15 g Oral PRN Gumaro Ayala MD        dextrose 50 % IV solution  12.5 g Intravenous PRN Gumaro Ayala MD        glucagon (rDNA) injection 1 mg  1 mg Intramuscular PRN Gumaro Ayala MD        dextrose 5 % solution  100 mL/hr Intravenous PRN Gumaro Ayala MD        Benzocaine-Menthol (CEPACOL) 1 lozenge  1 lozenge Oral Q2H PRN Gumaro Ayala MD        phenol 1.4 % mouth spray 1 spray  1 spray Mouth/Throat Q2H PRN Gumaro Ayala MD   1 spray at 12/23/20 8573    budesonide-formoterol (SYMBICORT) 160-4.5 MCG/ACT inhaler 2 puff  2 puff Inhalation BID Gumaro Ayala MD   2 puff at 01/06/21 2021        Labs:     Recent Labs     01/04/21  0605 01/06/21  0215   WBC 2.9* 2.7*   RBC 2.73* 2.93*   HGB 8.6* 9.2*   HCT 25.6* 27.8*   MCV 93.8 94.9*   MCH 31.5* 31.4*   MCHC 33.6 33.1    81*     Recent Labs     01/04/21  0605 01/06/21  0215   * 126*   K 4.8 5.0   ANIONGAP 13 14   CL 88* 87*   CO2 26 25   BUN 73* 82*   CREATININE 6.7* 7.3*   GLUCOSE 87 103   CALCIUM 8.4* 8.0* Ischemic cardiomyopathy    COVID-19    Port or reservoir infection  Resolved Problems:    * No resolved hospital problems. *    COVID: Supportive management. Bronchodilators. O2 requirements still high. MSSA bacteremia: Ancef. Blood cultures have cleared - first negative culture 12/29/2020. Port has been removed with surgery 12/30/2020. Antibiotics through PD catheter once discharged - go through 1/20/2021. Anemia: s/p 1U PRBC 1/1/2020. Monitor CBC.     ESRD: Peritoneal dialysis patient. Nephrology on board.     Hypertension: Monitor BP and adjust medications as needed.     Supportive management. SW/CM for disposition. Advance Directive: Full Code    DVT prophylaxis: Heparin    Discharge planning: Patient to be weaned from oxygen. Would need LTAC placement.     Elyssa Buitrago MD 1/6/2021 10:13 PM  Rounding Hospitalist

## 2021-01-07 NOTE — CARE COORDINATION
TANISHA outreached to Giuliano Bejarano at [facility] in [place] regarding his potential visit to see patient in . TANISHA has left message at:  [phone] and at the Admission office stating he would not be allowed to see patient today.  No outside vendors are allowed into hospital  Electronically signed by Chucho Fernando RN on 1/7/2021 at 9:36 AM

## 2021-01-07 NOTE — PROGRESS NOTES
 clopidogrel (PLAVIX) tablet 75 mg  75 mg Oral Daily Gumaro Ayala MD   75 mg at 01/07/21 3135    lisinopril (PRINIVIL;ZESTRIL) tablet 2.5 mg  2.5 mg Oral Daily Gumaro Ayala MD   2.5 mg at 01/07/21 0267    docusate sodium (COLACE) capsule 100 mg  100 mg Oral Daily Gumaro Ayala MD   100 mg at 01/07/21 4272    senna (SENOKOT) tablet 8.6 mg  1 tablet Oral Nightly Gumaro Ayala MD   8.6 mg at 01/06/21 2020    lactulose ([address]) 10 GM/15ML solution 20 g  20 g Oral TID Gumaro Ayala MD   20 g at 12/31/20 0932    bisacodyl (DULCOLAX) suppository 10 mg  10 mg Rectal Daily PRN Gumaro Ayala MD        0.9 % sodium chloride bolus  30 mL Intravenous PRN Gumaro Ayala MD        darbepoetin jerald-polysorbate SEVEN [facility]) injection 60 mcg  60 mcg Subcutaneous Weekly Gumaro Ayala MD   60 mcg at 12/23/20 1534    ALPRAZolam Rosalene Rhodes) tablet 0.25 mg  0.25 mg Oral Nightly PRN Gumaro Ayala MD   0.25 mg at 01/01/21 0014    atorvastatin (LIPITOR) tablet 40 mg  40 mg Oral Nightly Gumaro Ayala MD   40 mg at 01/06/21 2020    carvedilol (COREG) tablet 3.125 mg  3.125 mg Oral BID WC Gumaro Ayala MD   3.125 mg at 01/07/21 9354    levothyroxine (SYNTHROID) tablet 50 mcg  50 mcg Oral Daily Gumaro Ayala MD   50 mcg at 01/07/21 0612    sevelamer (RENVELA) tablet 800 mg  800 mg Oral TID Gumaro Ayala MD   800 mg at 01/06/21 2031    vitamin D (ERGOCALCIFEROL) capsule 50,000 Units  50,000 Units Oral Q7 Days Gumaro Ayala MD        acetaminophen (TYLENOL) tablet 650 mg  650 mg Oral Q6H PRN Gumaro Ayala MD   650 mg at 01/02/21 2049    Or    acetaminophen (TYLENOL) suppository 650 mg  650 mg Rectal Q6H PRN Gumaro Ayala MD        guaiFENesin-dextromethorphan (ROBITUSSIN DM) 100-10 MG/5ML syrup 5 mL  5 mL Oral Q4H PRN Gumaro Ayala MD        Vitamin D (CHOLECALCIFEROL) tablet 6,000 Units  6,000 Units Oral Daily Gumaro Ayala MD   6,000 Units at 01/07/21 6344  albuterol sulfate  (90 Base) MCG/ACT inhaler 2 puff  2 puff Inhalation Q6H PRN Felicita Solorio MD   2 puff at 12/23/20 0544    insulin lispro (HUMALOG) injection vial 0-6 Units  0-6 Units Subcutaneous TID WC Felicita Solorio MD   2 Units at 12/31/20 6756    insulin lispro (HUMALOG) injection vial 0-3 Units  0-3 Units Subcutaneous Nightly Felicita Solorio MD   1 Units at 12/29/20 2129    glucose (GLUTOSE) 40 % oral gel 15 g  15 g Oral PRN Felicita Solorio MD        dextrose 50 % IV solution  12.5 g Intravenous PRN Felicita Solorio MD        glucagon (rDNA) injection 1 mg  1 mg Intramuscular PRN Felicita Solorio MD        dextrose 5 % solution  100 mL/hr Intravenous PRN Felicita Solorio MD        Benzocaine-Menthol (CEPACOL) 1 lozenge  1 lozenge Oral Q2H PRN Felicita Solorio MD        phenol 1.4 % mouth spray 1 spray  1 spray Mouth/Throat Q2H PRN Felicita Solorio MD   1 spray at 12/23/20 8169    budesonide-formoterol (SYMBICORT) 160-4.5 MCG/ACT inhaler 2 puff  2 puff Inhalation BID Felicita Solorio MD   2 puff at 01/07/21 0930       Past Medical History:  Past Medical History:   Diagnosis Date    Anxiety     Asthma     Benign essential HTN     Benign hypertensive kidney disease     CAD (coronary artery disease)     sees dr. Cathie Guaman Chronic kidney disease, stage IV (severe) ([facility] [address] 7/18/2016    ESRD (end stage renal disease) ([facility] [address]     no dialysis at present.  Hemodialysis patient [facility])     mon wed fri at [address] of blood transfusion     Hyperlipidemia     Palliative care patient 11/29/2018    Prolonged emergence from general anesthesia     c/o dizzy and \"over sedated\" with prostate \"scope\".     Renal osteodystrophy     Skin cancer 2018    facial    Throat cancer [facility]) 2012    Thyroid disease        Past Surgical History:  Past Surgical History:   Procedure Laterality Date    CYSTOSCOPY      HERNIA REPAIR      umbilical  LAPAROSCOPY INSERTION PERITONEAL CATHETER N/A 7/18/2016    CATHETER INSERTION PERITONEAL DIALYSIS LAPAROSCOPIC performed by Gina Geiger MD at [facility] N/A 1/28/2019    LAPAROSCOPIC REVISION OF PD CATHETER performed by Yamileth Myers MD at [address] N/A 12/30/2020    PORT REMOVAL performed by Eugene Terrazas MD at [facility] CATH DIAL OPEN N/A 11/28/2018    PLACEMENT OF TUNNELED HEMODIALYSIS CATHETER performed by Saritha Wong MD at [facility] TUNNELED INTRAPERITONEAL CATHETER N/A 10/22/2018    PERITONEAL DIALYSIS CATHETER EXTERIORIZATION performed by Yamileth Myers MD at [place] [address]    TUNNELED VENOUS PORT PLACEMENT      VASCULAR SURGERY  07/10/2019    SJS. Removal of tunneled dialysis catheter right internal jugular vein.        Family History  Family History   Problem Relation Age of Onset    Heart Disease Mother     Cancer Father         lung       Social History  Social History     Socioeconomic History    Marital status:      Spouse name: Not on file    Number of children: Not on file    Years of education: Not on file    Highest education level: Not on file   Occupational History    Not on file   Social Needs    Financial resource strain: Not on file    Food insecurity     Worry: Not on file     Inability: Not on file    Transportation needs     Medical: Not on file     Non-medical: Not on file   Tobacco Use    Smoking status: Never Smoker    Smokeless tobacco: Never Used   Substance and Sexual Activity    Alcohol use: No    Drug use: No    Sexual activity: Not on file   Lifestyle    Physical activity     Days per week: Not on file     Minutes per session: Not on file    Stress: Not on file   Relationships    Social connections     Talks on phone: Not on file     Gets together: Not on file Attends [employer] service: Not on file     Active member of club or organization: Not on file     Attends meetings of clubs or organizations: Not on file     Relationship status: Not on file    Intimate partner violence     Fear of current or ex partner: Not on file     Emotionally abused: Not on file     Physically abused: Not on file     Forced sexual activity: Not on file   Other Topics Concern    Not on file   Social History Narrative    Not on file         Review of Systems:  History obtained from chart review and the patient  General ROS: No fever or chills  Respiratory ROS: positive for - shortness of breath  Cardiovascular ROS: No chest pain or palpitations  Gastrointestinal ROS: No abdominal pain or melena  Genito-Urinary ROS: No dysuria or hematuria  Musculoskeletal ROS: No joint pain or swelling         Objective:  Patient Vitals for the past 24 hrs:   BP Temp Temp src Pulse Resp SpO2 Weight   01/07/21 0804      90 %    01/07/21 0708 107/71 98 °F (36.7 °C) Temporal 107 20 (!) 84 % 137 lb 3.2 oz (62.2 kg)   01/07/21 0022 117/75 97.3 °F (36.3 °C) Temporal 107 24 (!) 89 %    01/06/21 2124      92 %    01/06/21 2030    97      01/06/21 1855 112/65 98.2 °F (36.8 °C) Temporal 100 20 (!) 86 %    01/06/21 1540      91 %    In person clinical examination was not done as he is currently in isolation with COVID-19 pneumonia. This was done to prevent unnecessary use of PPE and exposure to COVID-19. However patient was seen distantly at the door. Physical finding of pertaining to exam was discussed from his registered nurse. General: awake/alert   HEENT: Normocephalic atraumatic head  Chest:  Normal chest wall movement with no labored breathing seen. CVS: regular rate and rhythm  Abdominal: Nondistended abdomen  Extremities: No visible pedal edema.       Labs:  BMP:   Recent Labs     01/06/21  0215 01/07/21  0345   * 129*   K 5.0 5.3*   CL 87* 88*   CO2 25 22   BUN 82* 87* CREATININE 7.3* 7.4*   CALCIUM 8.0* 8.0*     CBC:   Recent Labs     01/06/21 0215 01/07/21 0345   WBC 2.7* 2.4*   HGB 9.2* 9.1*   HCT 27.8* 27.8*   MCV 94.9* 95.5*   PLT 81* 64*     LIVER PROFILE:   Recent Labs     01/06/21 0215 01/07/21 0345   AST 44* 51*   ALT <5* <5*   BILITOT <0.2 <0.2   ALKPHOS 64 67     PT/INR:   Recent Labs     01/06/21 0215 01/07/21 0345   PROTIME 15.4* 15.8*   INR 1.22* 1.26*     APTT: No results for input(s): APTT in the last 72 hours. BNP:  No results for input(s): BNP in the last 72 hours. Ionized Calcium:No results for input(s): IONCA in the last 72 hours. Magnesium:No results for input(s): MG in the last 72 hours. Phosphorus:No results for input(s): PHOS in the last 72 hours. HgbA1C: No results for input(s): LABA1C in the last 72 hours. Hepatic:   Recent Labs     01/06/21 0215 01/07/21 0345   ALKPHOS 64 67   ALT <5* <5*   AST 44* 51*   PROT 4.9* 5.3*   BILITOT <0.2 <0.2   LABALBU 1.6* 1.5*     Lactic Acid: No results for input(s): LACTA in the last 72 hours. Troponin: No results for input(s): CKTOTAL, CKMB, TROPONINT in the last 72 hours. ABGs:   Lab Results   Component Value Date    PHART 7.480 12/23/2020    PO2ART 114.0 12/23/2020    MQQ7PMK 25.0 12/23/2020     CRP:  No results for input(s): CRP in the last 72 hours. Sed Rate:  No results for input(s): SEDRATE in the last 72 hours. Culture Results:   Blood Culture Recent:   Recent Labs     12/31/20  0500   BC No growth after 5 days of incubation. Urine Culture Recent : No results for input(s): LABURIN in the last 720 hours.     Radiology reports as per the Radiologist  Radiology: Xr Chest Portable    Result Date: 12/22/2020 XR CHEST PORTABLE 12/22/2020 7:03 PM History: Short of breath. Covid positive. Portable chest x-ray compared with 4 March 2019. Chronic interstitial lung disease. Patchy pneumonia within the right upper lobe, left lower lobe, and within the base of the left upper lobe. Less prominent disease within the right lower lobe. Relative sparing of the left apex. Stable heart and mediastinum. Aortic arch calcification. Unchanged left subclavian port. 1. Bilateral pneumonia compatible with the history of Covid positive. Signed by Dr Marium Goodwin on 12/22/2020 7:04 PM    Cta Pulmonary W Contrast    Result Date: 12/24/2020  EXAMINATION:  CTA PULMONARY W CONTRAST  12/24/2020 2:04 PM HISTORY: Hypoxia. Elevated d-dimer. Covid 19 infection. COMPARISON: No comparison study. DLP: 315 mGy-cm. Automated exposure control was utilized. TECHNIQUE: Spiral CT angiography was performed of the chest with contrast. Sagittal, coronal and 3-D images were reconstructed. MEDIASTINUM/VASCULAR: There is atheromatous disease of the thoracic aorta and coronary arteries. There is cardiomegaly. There is no CT evidence of pulmonary embolus. The pulmonary arteries are dilated with main pulmonary artery segment measuring 3.5 cm. There are small mediastinal lymph nodes. LUNGS: There are moderate groundglass appearing infiltrates in both lungs. Bronchiectasis is noted. There is no significant pleural effusion. There is mild dependent atelectasis in the lung bases posteriorly. BONES/SOFT TISSUES/: There are degenerative changes of the spine and shoulders. UPPER ABDOMEN: There is ascites in the upper abdomen. There is a 1.4 cm probable cyst in the upper pole left kidney. There is another 7 mm probable cyst in the upper pole left kidney. These areas are not fully evaluated.

## 2021-01-07 NOTE — PROGRESS NOTES
Speech Language Pathology  Facility/Department: [facility] 4 ONCOLOGY UNIT  SWALLOW THERAPY     NAME: Rita Jewell  : 1945  MRN: [id number]    ADMISSION DATE: 2020  ADMITTING DIAGNOSIS: has NSTEMI (non-ST elevated myocardial infarction) ([facility] [address] Renovascular hypertension; Secondary hyperparathyroidism of renal origin ([facility] [address] Anemia due to stage 4 chronic kidney disease ([facility] [address] Palliative care patient; ESRD (end stage renal disease) on dialysis ([facility] [address] Anemia of chronic disease; Peritoneal dialysis catheter dysfunction ([facility] [address] Skin lesion of face; Coronary artery disease involving native coronary artery of native heart without angina pectoris; Basal cell carcinoma (BCC) of face; Chest pain; Chest discomfort; Ischemic cardiomyopathy; COVID-19; and Port or reservoir infection on their problem list.    Date of Treat: 2021  Evaluating Therapist: Celina Jaramillo    Current Diet level:  Regular solid consistency with honey thick liquids    Reason for Referral  Rita Jewell was referred for a bedside swallow evaluation to assess the efficiency of his swallow function, identify signs and symptoms of aspiration and make recommendations regarding safe dietary consistencies, effective compensatory strategies, and safe eating environment. Impression  Monitored patient's swallowing function with liquids. Patient exhibits sluggish, moderate-severely decreased laryngeal elevation for swallow airway protection. No outward S/S penetration/aspiration was noted with any honey thick liquid presentation administered during treatment session this date. Did not observe swallow function with thinner liquid consistencies secondary to timing and strength of throat movement with honey thick.     At this time, would recommend continuation current diet. Meds in pudding/applesauce. Will continue to follow.     Treatment Plan  Requires SLP Intervention:  Yes     Recommended Diet and Intervention Solid Consistency Reincarnation: Regular solid   Liquid Consistency Recommendation: Honey thick  Recommended Form of Meds: Meds in pudding/applesauce   Therapeutic Interventions: Patient/Family education;Diet tolerance monitoring; Therapeutic PO trials with SLP     Compensatory Swallowing Strategies  Compensatory Swallowing Strategies: Upright as possible for all oral intake;Small bites/sips;Eat/Feed slowly; Alternate solids and liquids; Remain upright for 30-45 minutes after meals     Treatment/Goals  Timeframe for Short-term Goals: 1-2xday for 3 days   Goal 1: Patient will tolerate regular solid consistency and honey thick liquids with min S/S penetration/aspiration during PO intake. Goal 2: Patient staff will follow swallow safety recommendations to decrease risk of penetration/aspiration during PO intake. Goal 3: Re-assessment of swallow function for potential diet upgrade. Goal 4: Trial oral motor, lingual, and pharyngeal exercises with provision of mod cues/prompts.      General  Chart Reviewed: Yes  Behavior/Cognition: Alert; Cooperative  O2 Device: Nasal Cannula  Communication Observation: (Patient was aphonic and whispered all verbalizations.)  Follows Directions: Simple   Patient Positioning: Upright in bed  Consistencies Administered: Honey - cup     Monitored patient's swallowing function with the following observations noted:     Oral Phase  Oral Phase - Comment: Oral transit of honey thick liquid presentations, administered via cup by SLP, primarily measured 1-2 seconds in length.      Pharyngeal Phase  Laryngeal Elevation: (Patient exhibited sluggish, moderate-severely decreased laryngeal elevation for swallow airway protection.) Pharyngeal Phase - Comment: No outward S/S penetration/aspiration was noted with any honey thick liquid presentation administered during treatment session this date. Did not observe swallow function with thinner liquid consistencies secondary to timing and strength of throat movement with honey thick.     At this time, would recommend continuation current diet. Meds in pudding/applesauce.  Will continue to follow.     Electronically signed by ARCHANA Causey on 1/7/2021 at 12:15 PM

## 2021-01-08 NOTE — CARE COORDINATION
CM reached out to [facility] at [address] [facility], message left to  [phone]  Electronically signed by Jocelynn Mott RN on 1/8/2021 at 9:46 AM  CM outreached to [facility] in [place] and left message w/[facility] admissions to call back.   Electronically signed by Jocelynn Mott RN on 1/8/2021 at 9:51 AM

## 2021-01-08 NOTE — PROGRESS NOTES
Received order for intubation from Dr. Jeff Flores. Procedure explained to the pt. Pt verbalized understanding and agrees to proceed the procedure. Pt was intubated at 1405 by Dr. Jeff Flores, ET tube secured and marked at 24 cm per RT. Call placed to pt's spouse Vanesa Myles, notified pt has been intubated.

## 2021-01-08 NOTE — PROGRESS NOTES
Medication Dose Route Frequency Provider Last Rate Last Admin    [START ON 1/8/2021] dexamethasone (DECADRON) tablet 6 mg  6 mg Oral Daily Lacie Lockwood MD        ceFAZolin (ANCEF) 1 g in sterile water 10 mL IV syringe  1 g Intravenous Daily Grant Meek MD   1 g at 01/07/21 0929    apixaban (ELIQUIS) tablet 2.5 mg  2.5 mg Oral BID Robin Palmer MD   2.5 mg at 01/07/21 2025    melatonin disintegrating tablet 5 mg  5 mg Oral Nightly Robin Palmer MD   5 mg at 01/07/21 2022    zinc sulfate (ZINCATE) capsule 50 mg  50 mg Oral Daily Robin Palmer MD   50 mg at 01/07/21 0931    aspirin chewable tablet 81 mg  81 mg Oral Daily eSrgo Clemente MD   81 mg at 01/07/21 8827    clopidogrel (PLAVIX) tablet 75 mg  75 mg Oral Daily Sergo Clemente MD   75 mg at 01/07/21 9809    lisinopril (PRINIVIL;ZESTRIL) tablet 2.5 mg  2.5 mg Oral Daily Sergo Clemente MD   2.5 mg at 01/07/21 2907    docusate sodium (COLACE) capsule 100 mg  100 mg Oral Daily Sergo Clemente MD   100 mg at 01/07/21 6562    senna (SENOKOT) tablet 8.6 mg  1 tablet Oral Nightly Sergo Clemente MD   8.6 mg at 01/07/21 2022    lactulose (CHRONULAC) 10 GM/15ML solution 20 g  20 g Oral TID Sergo Clemente MD   20 g at 12/31/20 0932    bisacodyl (DULCOLAX) suppository 10 mg  10 mg Rectal Daily PRN Sergo Clemente MD        0.9 % sodium chloride bolus  30 mL Intravenous PRN Sergo Clemente MD        darbepoetin jerald-polysorbate SEVEN [facility]) injection 60 mcg  60 mcg Subcutaneous Weekly Sergo Clemente MD   60 mcg at 12/23/20 1534    ALPRAZolam [facility] Bliss) tablet 0.25 mg  0.25 mg Oral Nightly PRN Sergo Clemente MD   0.25 mg at 01/01/21 0014    atorvastatin (LIPITOR) tablet 40 mg  40 mg Oral Nightly Sergo Clemente MD   40 mg at 01/07/21 2025    carvedilol (COREG) tablet 3.125 mg  3.125 mg Oral BID  Sergo Clemente MD   3.125 mg at 01/07/21 6227  levothyroxine (SYNTHROID) tablet 50 mcg  50 mcg Oral Daily Amada Sun MD   50 mcg at 01/07/21 0612    sevelamer (RENVELA) tablet 800 mg  800 mg Oral TID Amada Sun MD   800 mg at 01/06/21 4539    vitamin D (ERGOCALCIFEROL) capsule 50,000 Units  50,000 Units Oral Q7 Days Amada Sun MD        acetaminophen (TYLENOL) tablet 650 mg  650 mg Oral Q6H PRN Amada Sun MD   650 mg at 01/02/21 2049    Or    acetaminophen (TYLENOL) suppository 650 mg  650 mg Rectal Q6H PRN Amada Sun MD        guaiFENesin-dextromethorphan (ROBITUSSIN DM) 100-10 MG/5ML syrup 5 mL  5 mL Oral Q4H PRN Amada Sun MD        Vitamin D (CHOLECALCIFEROL) tablet 6,000 Units  6,000 Units Oral Daily Amada Sun MD   6,000 Units at 01/07/21 0929    albuterol sulfate  (90 Base) MCG/ACT inhaler 2 puff  2 puff Inhalation Q6H PRN Amada Sun MD   2 puff at 12/23/20 0544    insulin lispro (HUMALOG) injection vial 0-6 Units  0-6 Units Subcutaneous TID WC Amada Sun MD   2 Units at 12/31/20 9526    insulin lispro (HUMALOG) injection vial 0-3 Units  0-3 Units Subcutaneous Nightly Amada Sun MD   1 Units at 12/29/20 2129    glucose (GLUTOSE) 40 % oral gel 15 g  15 g Oral PRN Amada Sun MD        dextrose 50 % IV solution  12.5 g Intravenous PRN Amada Sun MD        glucagon (rDNA) injection 1 mg  1 mg Intramuscular PRN Amada Sun MD        dextrose 5 % solution  100 mL/hr Intravenous PRN Amada Snu MD        Benzocaine-Menthol (CEPACOL) 1 lozenge  1 lozenge Oral Q2H PRN Amada Sun MD        phenol 1.4 % mouth spray 1 spray  1 spray Mouth/Throat Q2H PRN Amada Sun MD   1 spray at 01/07/21 1830    budesonide-formoterol (SYMBICORT) 160-4.5 MCG/ACT inhaler 2 puff  2 puff Inhalation BID Amada Sun MD   2 puff at 01/07/21 2023        Labs:     Recent Labs     01/06/21  0215 01/07/21  0345   WBC 2.7* 2.4*   RBC 2.93* 2.91*   HGB 9.2* 9.1* HCT 27.8* 27.8*   MCV 94.9* 95.5*   MCH 31.4* 31.3*   MCHC 33.1 32.7*   PLT 81* 64*     Recent Labs     01/06/21 0215 01/07/21 0345   * 129*   K 5.0 5.3*   ANIONGAP 14 19   CL 87* 88*   CO2 25 22   BUN 82* 87*   CREATININE 7.3* 7.4*   GLUCOSE 103 148*   CALCIUM 8.0* 8.0*     No results for input(s): MG, PHOS in the last 72 hours. Recent Labs     01/06/21 0215 01/07/21 0345   AST 44* 51*   ALT <5* <5*   BILITOT <0.2 <0.2   ALKPHOS 64 67     ABGs:No results for input(s): PH, PO2, PCO2, HCO3, BE, O2SAT in the last 72 hours. Troponin T: No results for input(s): TROPONINI in the last 72 hours. INR:   Recent Labs     01/06/21 0215 01/07/21 0345   INR 1.22* 1.26*     Lactic Acid: No results for input(s): LACTA in the last 72 hours. Objective:   Vitals: BP 95/63   Pulse 97   Temp 97.5 °F (36.4 °C) (Temporal)   Resp 26   Ht 5' 11\" (1.803 m)   Wt 137 lb 3.2 oz (62.2 kg)   SpO2 (!) 87%   BMI 19.14 kg/m²   24HR INTAKE/OUTPUT:      Intake/Output Summary (Last 24 hours) at 1/7/2021 2227  Last data filed at 1/7/2021 1926  Gross per 24 hour   Intake 0 ml   Output    Net 0 ml        PHYSICAL  EXAMINATION (employing telemetry and examining from doorstep to avoid COVID-19 exposure and to conserve PPE)   . .. Captured in coordination with the floor nurse:        CORINA:  Awake, alert, oriented x 3, patient appears tired and fatigued   Head/Eyes:  Normocephalic, atraumatic, EOMI and PERRLA bilaterally   Respiratory:   Bilateral decreased air entry in both lung fields, coarse bilateral breath sounds, scattered bilateral rhonchi (As per floor nurse)   Cardiovascular:  Regular rate and rhythm, S1+S2+0 (As per floor nurse)   Abdomen:   Non-distended   Extremities: Moves all, decreased range of motion, no edema   Neurologic: Awake, alert, oriented x 3, cranial nerves II-XII intact   Psychiatric: Normal mood, non-suicidal       Assessment and Plan:    Active Problems:    Renovascular hypertension Secondary hyperparathyroidism of renal origin [facility])    Palliative care patient    ESRD (end stage renal disease) on dialysis ([facility] [address]    Anemia of chronic disease    Coronary artery disease involving native coronary artery of native heart without angina pectoris    Basal cell carcinoma (BCC) of face    Ischemic cardiomyopathy    COVID-19    Port or reservoir infection  Resolved Problems:    * No resolved hospital problems. *    COVID: Supportive management. Bronchodilators. O2 requirements still high. MSSA bacteremia: Ancef. Blood cultures have cleared - first negative culture 12/29/2020. Port has been removed with surgery 12/30/2020. Antibiotics through PD catheter once discharged - go through 1/20/2021. Anemia: s/p 1U PRBC 1/1/2020. Monitor CBC.     ESRD: Peritoneal dialysis patient. Nephrology on board.     Hypertension: Monitor BP and adjust medications as needed.     Supportive management. SW/CM for disposition. Advance Directive: Full Code    DVT prophylaxis: Heparin    Discharge planning: Patient to be weaned from oxygen. Would need LTAC placement. Case management team is working hard on arranging placement in an LTAC with the peritoneal dialysis abilities in several facilities and multiple states!     SignedDenton Abdalla MD 1/7/2021 10:27 PM  Rounding Hospitalist

## 2021-01-08 NOTE — PROGRESS NOTES
Speech Language Pathology  Facility/Department: [facility] 4 ONCOLOGY UNIT  SWALLOW THERAPY     NAME: Jovi Mendoza  : 1945  MRN: [id number]    ADMISSION DATE: 2020  ADMITTING DIAGNOSIS: has NSTEMI (non-ST elevated myocardial infarction) ([facility] [address] Renovascular hypertension; Secondary hyperparathyroidism of renal origin ([facility] [address] Anemia due to stage 4 chronic kidney disease ([facility] [address] Palliative care patient; ESRD (end stage renal disease) on dialysis ([facility] [address] Anemia of chronic disease; Peritoneal dialysis catheter dysfunction ([facility] [address] Skin lesion of face; Coronary artery disease involving native coronary artery of native heart without angina pectoris; Basal cell carcinoma (BCC) of face; Chest pain; Chest discomfort; Ischemic cardiomyopathy; COVID-19; and Port or reservoir infection on their problem list.    Date of Treat 2021  Evaluating Therapist: Giuliana Moreno    Current Diet level:  Regular solid consistency with honey thick liquids    Reason for Referral  Jovi Mendoza was referred for a bedside swallow evaluation to assess the efficiency of his swallow function, identify signs and symptoms of aspiration and make recommendations regarding safe dietary consistencies, effective compensatory strategies, and safe eating environment. Impression  Monitored patient's swallowing function. Patient exhibits sluggish, moderate-severely decreased laryngeal elevation for swallow airway protection. No outward S/S penetration/aspiration was noted with any honey thick liquid presentation administered during treatment session this date. Did not observe swallow function with any additional consistency as patient declined, requesting to rest. Speech complied.     At this time, would recommend continuation current diet. Meds in pudding/applesauce. Will continue to follow.     Treatment Plan  Requires SLP Intervention:  Yes     Recommended Diet and Intervention  Solid Consistency Reincarnation: Regular solid  Liquid Consistency Recommendation: Honey thick  Recommended Form of Meds: Meds in pudding/applesauce   Therapeutic Interventions: Patient/Family education;Diet tolerance monitoring; Therapeutic PO trials with SLP     Compensatory Swallowing Strategies  Compensatory Swallowing Strategies: Upright as possible for all oral intake;Small bites/sips;Eat/Feed slowly; Alternate solids and liquids; Remain upright for 30-45 minutes after meals     Treatment/Goals  Timeframe for Short-term Goals: 1-2xday for 3 days   Goal 1: Patient will tolerate regular solid consistency and honey thick liquids with min S/S penetration/aspiration during PO intake. Goal 2: Patient staff will follow swallow safety recommendations to decrease risk of penetration/aspiration during PO intake. Goal 3: Re-assessment of swallow function for potential diet upgrade.   Goal 4: Trial oral motor, lingual, and pharyngeal exercises with provision of mod cues/prompts.      General  Chart Reviewed: Yes  Behavior/Cognition: Alert; Cooperative  O2 Device: Air entrainment mask    Communication Observation: (Patient was aphonic and whispered all verbalizations.)  Follows Directions: Simple   Patient Positioning: Upright in bed  Consistencies Administered: Honey - cup     Monitored patient's swallowing function with the following observations noted:     Oral Phase  Oral Phase - Comment: Oral transit of honey thick liquid presentations, administered via cup by SLP, primarily measured 1-2 seconds in length.      Pharyngeal Phase  Laryngeal Elevation: (Patient exhibited sluggish, moderate-severely decreased laryngeal elevation for swallow airway protection.)  Pharyngeal Phase - Comment: No outward S/S penetration/aspiration was noted with any honey thick liquid presentation administered during treatment session this date.  Did not observe swallow function with any additional consistency as patient declined, requesting to rest. Speech complied.    At this time, would recommend continuation current diet. Meds in pudding/applesauce. Will continue to follow.     Electronically signed by ARCHANA Causey on 1/8/2021 at 12:09 PM

## 2021-01-08 NOTE — PLAN OF CARE
Problem: Nutrition  Goal: Optimal nutrition therapy  Outcome: Ongoing   Nutrition Problem #1: Underweight  Intervention: Food and/or Nutrient Delivery: Continue Current Diet, Modify Oral Nutrition Supplement  Nutritional Goals: PO intake >50%, wt stable

## 2021-01-08 NOTE — CARE COORDINATION
CM outreached to Holiday at [facility] in [place], [address] Per Holiday, they are \"overwhelmed\" with referrals, but could potential meets pt needs for highflow 02 and PD. Per Holiday, there are 2 other SNF in town; [facility] and [facility] that accommodate higher acuity patients if appropriate for patient. CM outreached to [place] at [facility] in [facility], [phone]. He has agreed to look at referral when appropriate timing in patient medical course. [phone] p  [phone] f    CM outreached to spouse to discuss LTAC placement. She is aware of patient's special needs. Spouse is accepting of sending referral to [facility] in [facility]     CM faxed preliminary documentation to [place]. Per MD request CM can outreach to discuss with wife today.   Electronically signed by Terri Walters RN on 1/8/2021 at 11:34 AM

## 2021-01-08 NOTE — PROGRESS NOTES
Procedure Component Value Units Date/Time     Potassium, Whole Blood [[id number]] Collected: 01/08/21 1537      Updated: 01/08/21 1538      Potassium, Whole Blood 5.6     Narrative:       CALL  Oziel Avila RN, 01/08/2021 15:38, by Collette Spoon     Blood Gas, Arterial [[id number]] (Abnormal) Collected: 01/08/21 1537     Specimen: Blood gases Updated: 01/08/21 1538      pH, Arterial 7.160      pCO2, Arterial 79.0 mmHg       pO2, Arterial 67.0 mmHg       HCO3, Arterial 28.2 mmol/L       Base Excess, Arterial -1.3 mmol/L       Hemoglobin, Art, Extended 8.9 g/dL       O2 Sat, Arterial 87.6 %       Carboxyhgb, Arterial 1.0 %       Methemoglobin, Arterial 0.9 %       O2 Content, Arterial 11.0 mL/dL       O2 Therapy Unknown     Narrative:       CALL  Oziel Avila RN, 01/08/2021 15:38, by Collette Spoon     AC/VC14 500 100% 8PEEP RB POS TERRY

## 2021-01-08 NOTE — PROGRESS NOTES
Nephrology ([facility]) Progress Note    Patient:  Antonia Mustafa  YOB: 1945  Date of Service: 1/8/2021  MRN: [id number]   Acct: [de-identified]   Primary Care Physician: No primary care provider on file. Advance Directive: Full Code  Admit Date: 12/22/2020       Hospital Day: 16  Referring Provider: Ema Saldivar MD    Patient independently seen and examined, Chart, Consults, Notes, Operative notes, Labs, Cardiology, and Radiology studies reviewed as available. Chief complaint: End-stage renal disease. Subjective:  Antonia Mustafa is a 77 y.o. male  whom we were consulted for end-stage renal disease. Patient is currently on CCPD and has hypertensive nephrosclerosis. Presenting to [facility] hospital with increasing shortness of breath as well as dysphagia. Patient was diagnosed with COVID-19/pneumonia. His respiratory status, slowly improving and he is back to nasal cannula from 100% nonrebreather. Patient is growing MSSA in bloodstream.  He is status post removal of infected Mediport. This morning he was complaining of increasing shortness of breath and was breathing on nonrebreather with O2 saturation of 80%. This afternoon he was moved to CCU, intubated due to hypercapnia and hypoxemia. He is currently on a Levophed and his systolic blood pressures in 60s.     Allergies:  Diphenhydramine and Sulfa antibiotics    Medicines:  Current Facility-Administered Medications   Medication Dose Route Frequency Provider Last Rate Last Admin    propofol 1000 MG/100ML injection             norepinephrine (LEVOPHED) 16 mg in sodium chloride 0.9 % 250 mL infusion  2 mcg/min Intravenous Continuous Ema Saldivar MD        propofol injection  10 mcg/kg/min Intravenous Titrated Ema Saldivar MD        dexamethasone (DECADRON) tablet 6 mg  6 mg Oral Daily Robin Paul MD  ceFAZolin (ANCEF) 1 g in sterile water 10 mL IV syringe  1 g Intravenous Daily Robin Palmer MD   1 g at 01/08/21 1005    apixaban (ELIQUIS) tablet 2.5 mg  2.5 mg Oral BID Robin Palmer MD   2.5 mg at 01/07/21 2025    melatonin disintegrating tablet 5 mg  5 mg Oral Nightly Robin Palmer MD   5 mg at 01/07/21 2022    zinc sulfate (ZINCATE) capsule 50 mg  50 mg Oral Daily Robin Palmer MD   50 mg at 01/07/21 0931    aspirin chewable tablet 81 mg  81 mg Oral Daily Robin Palmer MD   81 mg at 01/07/21 0928    clopidogrel (PLAVIX) tablet 75 mg  75 mg Oral Daily Robin Palmer MD   75 mg at 01/07/21 0929    lisinopril (PRINIVIL;ZESTRIL) tablet 2.5 mg  2.5 mg Oral Daily Robin Palmer MD   2.5 mg at 01/07/21 1402    docusate sodium (COLACE) capsule 100 mg  100 mg Oral Daily Robin Palmer MD   100 mg at 01/07/21 0928    senna (SENOKOT) tablet 8.6 mg  1 tablet Oral Nightly Robin Palmer MD   8.6 mg at 01/07/21 2022    lactulose (CHRONULAC) 10 GM/15ML solution 20 g  20 g Oral TID Robin Palmer MD   20 g at 12/31/20 0932    bisacodyl (DULCOLAX) suppository 10 mg  10 mg Rectal Daily PRN Robin Palmer MD        0.9 % sodium chloride bolus  30 mL Intravenous PRN Robin Palmer MD        darbepoetin jerald-polysorbate (ARANESP) injection 60 mcg  60 mcg Subcutaneous Weekly Robin Palmer MD   60 mcg at 12/23/20 1534    ALPRAZolam (XANAX) tablet 0.25 mg  0.25 mg Oral Nightly PRN Robin Palmer MD   0.25 mg at 01/01/21 0014    atorvastatin (LIPITOR) tablet 40 mg  40 mg Oral Nightly Robin Palmer MD   40 mg at 01/07/21 2025    carvedilol (COREG) tablet 3.125 mg  3.125 mg Oral BID WC Robin Palmer MD   3.125 mg at 01/07/21 0928    levothyroxine (SYNTHROID) tablet 50 mcg  50 mcg Oral Daily Robin Palmer MD   50 mcg at 01/07/21 0612    sevelamer (RENVELA) tablet 800 mg  800 mg Oral TID Robin Palmer MD   800 mg at 01/06/21 0934    vitamin D (ERGOCALCIFEROL) capsule 50,000 Units  50,000 Units Oral Q7 Days Brandy Liu MD  acetaminophen (TYLENOL) tablet 650 mg  650 mg Oral Q6H PRN Robin Palmer MD   650 mg at 01/02/21 2049    Or    acetaminophen (TYLENOL) suppository 650 mg  650 mg Rectal Q6H PRN Robin Palmer MD        guaiFENesin-dextromethorphan (ROBITUSSIN DM) 100-10 MG/5ML syrup 5 mL  5 mL Oral Q4H PRN Robin Palmer MD        Vitamin D (CHOLECALCIFEROL) tablet 6,000 Units  6,000 Units Oral Daily Robin Palmer MD   6,000 Units at 01/07/21 0929    albuterol sulfate  (90 Base) MCG/ACT inhaler 2 puff  2 puff Inhalation Q6H PRN Robin Palmer MD   2 puff at 12/23/20 0544    insulin lispro (HUMALOG) injection vial 0-6 Units  0-6 Units Subcutaneous TID WC Robin Palmer MD   2 Units at 12/31/20 0936    insulin lispro (HUMALOG) injection vial 0-3 Units  0-3 Units Subcutaneous Nightly Robin Palmer MD   1 Units at 12/29/20 2129    glucose (GLUTOSE) 40 % oral gel 15 g  15 g Oral PRN Robin Palmer MD        dextrose 50 % IV solution  12.5 g Intravenous PRN Robin Palmer MD        glucagon (rDNA) injection 1 mg  1 mg Intramuscular PRN Robin Palmer MD        dextrose 5 % solution  100 mL/hr Intravenous PRN Robin Palmer MD        Benzocaine-Menthol (CEPACOL) 1 lozenge  1 lozenge Oral Q2H PRN Robin Palmer MD        phenol 1.4 % mouth spray 1 spray  1 spray Mouth/Throat Q2H PRN Robin Palmer MD   1 spray at 01/07/21 1830    budesonide-formoterol (SYMBICORT) 160-4.5 MCG/ACT inhaler 2 puff  2 puff Inhalation BID Robin Palmer MD   2 puff at 01/08/21 0800       Past Medical History:  Past Medical History:   Diagnosis Date    Anxiety     Asthma     Benign essential HTN     Benign hypertensive kidney disease     CAD (coronary artery disease)     sees dr. Romana Chancellor Chronic kidney disease, stage IV (severe) ([facility] [address] 7/18/2016    ESRD (end stage renal disease) ([address]     no dialysis at present.     Hemodialysis patient [facility])     mon wed fri at [address] of blood transfusion     Hyperlipidemia  Palliative care patient 11/29/2018    Prolonged emergence from general anesthesia     c/o dizzy and \"over sedated\" with prostate \"scope\".  Renal osteodystrophy     Skin cancer 2018    facial    Throat cancer ([address] 2012    Thyroid disease        Past Surgical History:  Past Surgical History:   Procedure Laterality Date    CYSTOSCOPY      HERNIA REPAIR      umbilical    LAPAROSCOPY INSERTION PERITONEAL CATHETER N/A 7/18/2016    CATHETER INSERTION PERITONEAL DIALYSIS LAPAROSCOPIC performed by Reva Gates MD at [address] N/A 1/28/2019    LAPAROSCOPIC REVISION OF PD CATHETER performed by Inna Trevizo MD at [address] [facility] [address] N/A 12/30/2020    PORT REMOVAL performed by Nellie Latif MD at [address] [facility] CATH DIAL OPEN N/A 11/28/2018    PLACEMENT OF TUNNELED HEMODIALYSIS CATHETER performed by Rajan Harris MD at [facility] TUNNELED INTRAPERITONEAL CATHETER N/A 10/22/2018    PERITONEAL DIALYSIS CATHETER EXTERIORIZATION performed by Inna Trevizo MD at [place] [id number]    TUNNELED VENOUS PORT PLACEMENT      VASCULAR SURGERY  07/10/2019    SJS. Removal of tunneled dialysis catheter right internal jugular vein.        Family History  Family History   Problem Relation Age of Onset    Heart Disease Mother     Cancer Father         lung       Social History  Social History     Socioeconomic History    Marital status:      Spouse name: Not on file    Number of children: Not on file    Years of education: Not on file    Highest education level: Not on file   Occupational History    Not on file   Social Needs    Financial resource strain: Not on file    Food insecurity     Worry: Not on file     Inability: Not on file    Transportation needs     Medical: Not on file     Non-medical: Not on file   Tobacco Use    Smoking status: Never Smoker  Smokeless tobacco: Never Used   Substance and Sexual Activity    Alcohol use: No    Drug use: No    Sexual activity: Not on file   Lifestyle    Physical activity     Days per week: Not on file     Minutes per session: Not on file    Stress: Not on file   Relationships    Social connections     Talks on phone: Not on file     Gets together: Not on file     Attends Rastafari service: Not on file     Active member of club or organization: Not on file     Attends meetings of clubs or organizations: Not on file     Relationship status: Not on file    Intimate partner violence     Fear of current or ex partner: Not on file     Emotionally abused: Not on file     Physically abused: Not on file     Forced sexual activity: Not on file   Other Topics Concern    Not on file   Social History Narrative    Not on file         Review of Systems:  History obtained from chart review and the patient  General ROS: No fever or chills  Respiratory ROS: positive for increasing shortness of breath  Cardiovascular ROS: No chest pain or palpitations  Gastrointestinal ROS: No abdominal pain or melena  Genito-Urinary ROS: No dysuria or hematuria  Musculoskeletal ROS: No joint pain or swelling         Objective:  Patient Vitals for the past 24 hrs:   BP Temp Temp src Pulse Resp SpO2   01/08/21 1513    84 19 (!) 88 %   01/08/21 1418    95 26 98 %   01/08/21 1004 129/79 97.5 °F (36.4 °C) Temporal 104 22 (!) 80 %   01/08/21 0652 124/76 97.9 °F (36.6 °C) Temporal 103 20 91 %   01/07/21 2342 110/69 97 °F (36.1 °C) Temporal 101 28 (!) 86 %   01/07/21 2101      (!) 87 %   01/07/21 2044    97     01/07/21 1926 95/63 97.5 °F (36.4 °C) Temporal 96 26 90 %   01/07/21 1636      (!) 87 % In person clinical examination was not done as he is currently in isolation with COVID-19 pneumonia. This was done to prevent unnecessary use of PPE and exposure to COVID-19. However patient was seen distantly through the glass doors. Physical finding of pertaining to exam was discussed from his registered nurse. General: Intubated/sedated  HEENT: Normocephalic atraumatic head  Chest:  No respiratory distress seen. CVS: regular rate and rhythm  Abdominal: Nondistended abdomen  Extremities: No visible pedal edema. Labs:  BMP:   Recent Labs     01/06/21 0215 01/07/21 0345 01/08/21  0323 01/08/21  1146 01/08/21  1537   * 129* 133*  --   --    K 5.0 5.3* 5.4*  5.4* 5.2 5.6   CL 87* 88* 90*  --   --    CO2 25 22 22  --   --    BUN 82* 87* 91*  --   --    CREATININE 7.3* 7.4* 7.6*  --   --    CALCIUM 8.0* 8.0* 7.9*  --   --      CBC:   Recent Labs     01/06/21 0215 01/07/21 0345 01/08/21 0323   WBC 2.7* 2.4* 2.1*   HGB 9.2* 9.1* 9.9*   HCT 27.8* 27.8* 30.2*   MCV 94.9* 95.5* 95.9*   PLT 81* 64* 36*     LIVER PROFILE:   Recent Labs     01/06/21 0215 01/07/21 0345 01/08/21  0323   AST 44* 51* 68*   ALT <5* <5* <5*   BILITOT <0.2 <0.2 <0.2   ALKPHOS 64 67 73     PT/INR:   Recent Labs     01/06/21 0215 01/07/21 0345 01/08/21 0323   PROTIME 15.4* 15.8* 21.2*   INR 1.22* 1.26* 1.81*     APTT: No results for input(s): APTT in the last 72 hours. BNP:  No results for input(s): BNP in the last 72 hours. Ionized Calcium:No results for input(s): IONCA in the last 72 hours. Magnesium:No results for input(s): MG in the last 72 hours. Phosphorus:No results for input(s): PHOS in the last 72 hours. HgbA1C: No results for input(s): LABA1C in the last 72 hours.   Hepatic:   Recent Labs     01/06/21  0215 01/07/21  0345 01/08/21  0323   ALKPHOS 64 67 73   ALT <5* <5* <5*   AST 44* 51* 68*   PROT 4.9* 5.3* 5.5*   BILITOT <0.2 <0.2 <0.2   LABALBU 1.6* 1.5* 1.8* Lactic Acid: No results for input(s): LACTA in the last 72 hours. Troponin: No results for input(s): CKTOTAL, CKMB, TROPONINT in the last 72 hours. ABGs:   Lab Results   Component Value Date    PHART 7.160 01/08/2021    PO2ART 67.0 01/08/2021    DST9LNY 79.0 01/08/2021     CRP:  No results for input(s): CRP in the last 72 hours. Sed Rate:  No results for input(s): SEDRATE in the last 72 hours. Culture Results:   Blood Culture Recent:   Recent Labs     12/31/20  0500   BC No growth after 5 days of incubation. Urine Culture Recent : No results for input(s): LABURIN in the last 720 hours. Radiology reports as per the Radiologist  Radiology: Xr Chest Portable    Result Date: 12/22/2020  XR CHEST PORTABLE 12/22/2020 7:03 PM History: Short of breath. Covid positive. Portable chest x-ray compared with 4 March 2019. Chronic interstitial lung disease. Patchy pneumonia within the right upper lobe, left lower lobe, and within the base of the left upper lobe. Less prominent disease within the right lower lobe. Relative sparing of the left apex. Stable heart and mediastinum. Aortic arch calcification. Unchanged left subclavian port. 1. Bilateral pneumonia compatible with the history of Covid positive.  Signed by Dr Bryant Pena on 12/22/2020 7:04 PM    Cta Pulmonary W Contrast    Result Date: 12/24/2020 EXAMINATION:  CTA PULMONARY W CONTRAST  12/24/2020 2:04 PM HISTORY: Hypoxia. Elevated d-dimer. Covid 19 infection. COMPARISON: No comparison study. DLP: 315 mGy-cm. Automated exposure control was utilized. TECHNIQUE: Spiral CT angiography was performed of the chest with contrast. Sagittal, coronal and 3-D images were reconstructed. MEDIASTINUM/VASCULAR: There is atheromatous disease of the thoracic aorta and coronary arteries. There is cardiomegaly. There is no CT evidence of pulmonary embolus. The pulmonary arteries are dilated with main pulmonary artery segment measuring 3.5 cm. There are small mediastinal lymph nodes. LUNGS: There are moderate groundglass appearing infiltrates in both lungs. Bronchiectasis is noted. There is no significant pleural effusion. There is mild dependent atelectasis in the lung bases posteriorly. BONES/SOFT TISSUES/: There are degenerative changes of the spine and shoulders. UPPER ABDOMEN: There is ascites in the upper abdomen. There is a 1.4 cm probable cyst in the upper pole left kidney. There is another 7 mm probable cyst in the upper pole left kidney. These areas are not fully evaluated. 1. No CT evidence of pulmonary embolus. Dilated pulmonary arteries. 2. Atheromatous disease of the thoracic aorta and coronary arteries. Cardiomegaly. 3. Moderate groundglass infiltrates bilaterally consistent with the patient's history of Covid 19 infection. Other etiologies less likely. There is also bronchiectasis. There is dependent atelectasis. 4. There is ascites in the abdomen. 5. Probable renal cysts on the left, not fully evaluated. Signed by Dr Effie Mcfadden on 12/24/2020 2:11 PM       Assessment   1. End-stage renal disease/on CCPD. 2.  Hypertensive nephrosclerosis. 3.  Bilateral COVID-19 pneumonia. 4.  Acute respiratory failure. 5.  Hyponatremia. 6.  Anemia of chronic kidney disease. 7. MSSA bacteremia. 8.  Mild hyperkalemia. 9.  Sepsis/septic shock.     Plan: 1.  Hold CCPD due hemodynamics instability. 3.  Continue supportive care. 3.  Overall prognosis looks poor.   Dorita Jalloh MD  01/08/21  3:39 PM

## 2021-01-08 NOTE — PROGRESS NOTES
Infectious Diseases Progress Note    Patient:  Nelly Boyce  YOB: 1945  MRN: [id number]   Admit date: 12/22/2020   Admitting Physician: Mckayla Moise MD  Primary Care Physician: No primary care provider on file. Chief Complaint/Interval History: He has had increasing oxygen requirements. He is not coughing. He does not have productive cough. He is on maximal oxygen support and is not adequately maintaining saturations at present. Per discussion with nursing he is full code. In/Out    Intake/Output Summary (Last 24 hours) at 1/8/2021 0930  Last data filed at 1/8/2021 0437  Gross per 24 hour   Intake 0 ml   Output    Net 0 ml     Allergies:    Allergies   Allergen Reactions    Diphenhydramine      Other reaction(s): Blacked out    Sulfa Antibiotics      made me feel like I had the flu     Current Meds:     dexamethasone (DECADRON) tablet 6 mg, Daily      ceFAZolin (ANCEF) 1 g in sterile water 10 mL IV syringe, Daily      apixaban (ELIQUIS) tablet 2.5 mg, BID      melatonin disintegrating tablet 5 mg, Nightly      zinc sulfate (ZINCATE) capsule 50 mg, Daily      aspirin chewable tablet 81 mg, Daily      clopidogrel (PLAVIX) tablet 75 mg, Daily      lisinopril (PRINIVIL;ZESTRIL) tablet 2.5 mg, Daily      docusate sodium (COLACE) capsule 100 mg, Daily      senna (SENOKOT) tablet 8.6 mg, Nightly      lactulose (CHRONULAC) 10 GM/15ML solution 20 g, TID      bisacodyl (DULCOLAX) suppository 10 mg, Daily PRN      0.9 % sodium chloride bolus, PRN      darbepoetin jerald-polysorbate (ARANESP) injection 60 mcg, Weekly      ALPRAZolam (XANAX) tablet 0.25 mg, Nightly PRN      atorvastatin (LIPITOR) tablet 40 mg, Nightly      carvedilol (COREG) tablet 3.125 mg, BID WC      levothyroxine (SYNTHROID) tablet 50 mcg, Daily      sevelamer (RENVELA) tablet 800 mg, TID      vitamin D (ERGOCALCIFEROL) capsule 50,000 Units, Q7 Days      acetaminophen (TYLENOL) tablet 650 mg, Q6H PRN    Or   acetaminophen (TYLENOL) suppository 650 mg, Q6H PRN      guaiFENesin-dextromethorphan (ROBITUSSIN DM) 100-10 MG/5ML syrup 5 mL, Q4H PRN      Vitamin D (CHOLECALCIFEROL) tablet 6,000 Units, Daily      albuterol sulfate  (90 Base) MCG/ACT inhaler 2 puff, Q6H PRN      insulin lispro (HUMALOG) injection vial 0-6 Units, TID WC      insulin lispro (HUMALOG) injection vial 0-3 Units, Nightly      glucose (GLUTOSE) 40 % oral gel 15 g, PRN      dextrose 50 % IV solution, PRN      glucagon (rDNA) injection 1 mg, PRN      dextrose 5 % solution, PRN      Benzocaine-Menthol (CEPACOL) 1 lozenge, Q2H PRN      phenol 1.4 % mouth spray 1 spray, Q2H PRN      budesonide-formoterol (SYMBICORT) 160-4.5 MCG/ACT inhaler 2 puff, BID      Review of Systems see HPI    VitalSigns:  /76   Pulse 103   Temp 97.9 °F (36.6 °C) (Temporal)   Resp 20   Ht 5' 11\" (1.803 m)   Wt 137 lb 3.2 oz (62.2 kg)   SpO2 91%   BMI 19.14 kg/m²      Physical Exam  Line/IV site: No erythema, warmth, induration, or tenderness. He has some prolonged expiratory phase. No crackles or wheezes.   Abdomen nontender    Lab Results:  CBC:   Recent Labs     01/06/21  0215 01/07/21  0345 01/08/21  0323   WBC 2.7* 2.4* 2.1*   HGB 9.2* 9.1* 9.9*   PLT 81* 64* 36*     BMP:  Recent Labs     01/06/21  0215 01/07/21  0345 01/08/21  0323   * 129* 133*   K 5.0 5.3* 5.4*  5.4*   CL 87* 88* 90*   CO2 25 22 22   BUN 82* 87* 91*   CREATININE 7.3* 7.4* 7.6*   GLUCOSE 103 148* 147*     CultureResults:  Blood cultures December 31, 2020-no growth  Blood cultures December 30, 2020-no growth  Blood cultures December 29, 2030-no growth  Blood cultures December 23, 2020 through December 27, 2020-MSSA     Susceptibility  Staphylococcus aureus (1)             Antibiotic Interpretation LINDA Status     benzylpenicillin Resistant >=0.5 mcg/mL       clindamycin Sensitive 0.25 mcg/mL       erythromycin Sensitive <=0.25 mcg/mL     inducible clindamycin resistance   Neg mcg/mL       moxifloxacin Sensitive <=0.25 mcg/mL       oxacillin Sensitive 0.5 mcg/mL       tetracycline Sensitive <=1 mcg/mL       trimethoprim-sulfamethoxazole Sensitive <=10 mcg/mL       vancomycin Sensitive 1 mcg/mL        Radiology: None    Additional Studies Reviewed:  None    Impression:  1. MSSA bacteremia  2. Respiratory failure secondary to COVID-19-appears to have increasing oxygen requirements  3.   End-stage renal disease on peritoneal dialysis    Recommendations:  Increasing oxygen requirements  May need ventilatory support  Continue dexamethasone  Continue zinc and vitamin D  Continue anticoagulation  Continue cefazolin  May need transfer to ICU with BiPAP and possibly mechanical ventilation    Go Emery MD

## 2021-01-08 NOTE — CARE COORDINATION
TANISHA received cb from Berny, at [facility] in [place], [place]. Per Berny, neither their facility or the [facility] provided PD for patients.    Electronically signed by Nura Castañeda RN on 1/8/2021 at 10:01 AM

## 2021-01-08 NOTE — PROGRESS NOTES
pH, Arterial 7.450  7.350 - 7.450 Final 01/08/2021 11:46 AM [facility]   pCO2, Arterial 36.0  35.0 - 45.0 mmHg Final 01/08/2021 11:46 AM [facility]   pO2, Arterial 39. 0Low Panic   80.0 - 100.0 mmHg Final 01/08/2021 11:46 AM [facility]   HCO3, Arterial 25.0  22.0 - 26.0 mmol/L Final 01/08/2021 11:46 AM [facility] Excess, Arterial 1.1  -2.0 - 2.0 mmol/L Final 01/08/2021 11:46 AM [facility]   Hemoglobin, Art, Extended 8.9Low   14.0 - 18.0 g/dL Final 01/08/2021 11:46 AM [facility]   O2 Sat, Arterial 74. 0Low Panic   >92 % Final 01/08/2021 11:46 AM [facility]   Carboxyhgb, Arterial 0.9  0.0 - 5.0 % Final 01/08/2021 11:46 AM [facility]        0.0-1.5   (Smokers 1.5-5.0)    Methemoglobin, Arterial 0.8  <1.5 % Final 01/08/2021 11:46 AM [facility]   O2 Content, Arterial 9.3  Not Established mL/dL Final 01/08/2021 11:46 AM [facility]     Patient on heated high-flow nasal cannula @ 70 l/m and 100%. ABG's drawn from [facility], site of choice.

## 2021-01-08 NOTE — PROGRESS NOTES
Comprehensive Nutrition Assessment    Type and Reason for Visit:  Reassess    Nutrition Recommendations/Plan: Modify ONS. Nutrition Assessment:  Pt declining from a nutritional standpoint over the past few days. Intake 25-75% with 0% documented at several meals. Wt fluctuations continue with PD. SLP has recommended HTL, modified supplement to Boost pudding. Recommend using to give meds as well. Discharge planning underway. Malnutrition Assessment:  Malnutrition Status: At risk for malnutrition (Comment)    Context:  Acute Illness     Findings of the 6 clinical characteristics of malnutrition:  Energy Intake:  7 - 50% or less of estimated energy requirements for 5 or more days  Weight Loss:  No significant weight loss     Body Fat Loss:  Unable to assess     Muscle Mass Loss:  Unable to assess    Fluid Accumulation:  1 - Mild Extremities(LUE only)   Strength:  Not Performed    Estimated Daily Nutrient Needs:  Energy (kcal):  1408-5753; Weight Used for Energy Requirements:  Current     Protein (g):  ; Weight Used for Protein Requirements:  Current(1.5-2.0)        Fluid (ml/day):  0289-3465; Method Used for Fluid Requirements:  1 ml/kcal      Wounds:  None       Current Nutrition Therapies:    DIET RENAL; Moderately Thick (Honey);  Daily Fluid Restriction: 1500 ml  Dietary Nutrition Supplements: Other Oral Supplement (see comment)    Anthropometric Measures:  · Height: 5' 11\" (180.3 cm)  · Current Body Weight: 137 lb 3.2 oz (62.2 kg)   · Usual Body Weight: 153 lb (69.4 kg)(1/2020)     · Ideal Body Weight: 172 lbs; % Ideal Body Weight 84.4 %   · BMI: 19.1  · BMI Categories: Underweight (BMI less than 22) age over 72       Nutrition Diagnosis:   · Underweight related to increase demand for energy/nutrients, inadequate protein-energy intake as evidenced by weight loss, BMI      Nutrition Interventions:   Food and/or Nutrient Delivery:  Continue Current Diet, Modify Oral Nutrition Supplement Nutrition Education/Counseling:  No recommendation at this time   Coordination of Nutrition Care:  Continue to monitor while inpatient    Goals:  PO intake >50%, wt stable       Nutrition Monitoring and Evaluation:   Behavioral-Environmental Outcomes:  None Identified   Food/Nutrient Intake Outcomes:  Food and Nutrient Intake, Supplement Intake  Physical Signs/Symptoms Outcomes:  Biochemical Data, Skin, Weight     Discharge Planning:    Continue current diet     Electronically signed by Carey Herrera RD, LD on 1/8/21 at 9:34 AM CST    Contact: [phone]

## 2021-01-08 NOTE — PROGRESS NOTES
Susy Pepe received from [facility] to room # [id number]. Mental Status: Patient is logical.   Vitals:    01/08/21 1004   BP: 129/79   Pulse: 104   Resp: 22   Temp: 97.5 °F (36.4 °C)   SpO2: (!) 80%     Placed on cardiac monitor: Yes. Bedside monitor. Belongings: None with patient at bedside . Family at bedside Yes. Oriented Patient to room. Call light within reach. Yes. Transfer was: Poorly tolerated by patient. .    Electronically signed by Ronald Bright RN on 1/8/2021 at 1:06 PM

## 2021-01-08 NOTE — PROGRESS NOTES
Pt appears very lethargic upon arrival to the Unit. Heated high flow 70L, 100% applied to the pt per Dr. Harley Canavan order. O2 sat remains 86-87%. Resp rate 35-40, notified Dr. Melissa Wharton per voalte.

## 2021-01-08 NOTE — PROGRESS NOTES
Patients wife was notified of the transfer to .       Electronically signed by Coral Christiansen RN on 1/8/2021 at 12:40 PM

## 2021-01-09 NOTE — PROCEDURES
Abby Campbell is a 77 y.o. male patient. Intubation    Date/Time: 1/8/2021 2:05 PM  Performed by: Kristin Harding MD  Authorized by: Kristin Harding MD   Consent: The procedure was performed in an emergent situation. Verbal consent obtained.   Risks and benefits: risks, benefits and alternatives were discussed  Consent given by: patient  Patient understanding: patient states understanding of the procedure being performed  Patient consent: the patient's understanding of the procedure matches consent given  Procedure consent: procedure consent matches procedure scheduled  Patient identity confirmed: verbally with patient, arm band and hospital-assigned identification number  Indications: respiratory failure  Intubation method: video-assisted  Sedatives: etomidate  Paralytic: vecuronium  Laryngoscope size: Mac 4  Tube size: 7.0 mm  Tube type: cuffed  Number of attempts: 1  Cords visualized: yes  Post-procedure assessment: chest rise and ETCO2 monitor  Breath sounds: equal  Cuff inflated: yes  ETT to lip: 24 cm  Tube secured with: ETT ortega  Chest x-ray interpreted by me and radiologist.  Chest x-ray findings: endotracheal tube in appropriate position  Patient tolerance: patient tolerated the procedure well with no immediate complications        Kristin Harding MD  1/8/2021

## 2021-01-09 NOTE — PROGRESS NOTES
Status:  Final result   Ref Range & Units 01/08/21 2052   pH, Arterial 7.350 - 7.450 7.230Low Panic     pCO2, Arterial 35.0 - 45.0 mmHg 63. 0High     pO2, Arterial 80.0 - 100.0 mmHg 55. 0Low     HCO3, Arterial 22.0 - 26.0 mmol/L 26.4High     Base Excess, Arterial -2.0 - 2.0 mmol/L -1.6    Hemoglobin, Art, Extended 14.0 - 18.0 g/dL 8.7Low     O2 Sat, Arterial >92 % 85.1Low Panic     Carboxyhgb, Arterial 0.0 - 5.0 % 1.0    Comment:      0.0-1.5   (Smokers 1.5-5.0)    Methemoglobin, Arterial <1.5 % 1.0    O2 Content, Arterial Not Established mL/dL 10.5    O2 Therapy  Unknown    Resulting Agency  [facility]   Narrative    CALL  [facility] tel. ,   sasha yoder RN, 01/08/2021 20:53, by Sj Shabazz        Specimen Collected: 01/08/21 2052 Last Resulted: 01/08/21 2052 View Other Order Details      VC,24,500,PEEP 8, 100%  RIGHT BRACHIAL

## 2021-01-09 NOTE — CONSULTS
Comprehensive Nutrition Assessment    Type and Reason for Visit:  Reassess, Consult    Nutrition Recommendations/Plan: If EN is pursued, recommend Nepro with goal rate of 30 mL/hr. One bottle Proteinex as TF flush BID. Flush with 25 mL free water hourly via pump. This regimen along with current Propofol rate provides 1652 kcal, 110 g PRO, 116 g CHO, and 1123 mL free water. Nutrition Assessment:  Consult received for TF recommendations. Pt declining from a nutritional standpoint AEB intubation and NPO status. Pt on pressors at this time. Propofol running at 5.6 mL/hr providing 148 kcal/day. Malnutrition Assessment:  Malnutrition Status:   At risk for malnutrition (Comment)    Context:  Acute Illness       Estimated Daily Nutrient Needs:  Energy (kcal):  2513-0253; Weight Used for Energy Requirements:  Current     Protein (g):  106; Weight Used for Protein Requirements:  Current(1.7)        Fluid (ml/day):  1100; Method Used for Fluid Requirements:  Standard Renal(1000 mL + UOP)      Wounds:  None       Current Nutrition Therapies:    No diet orders on file  Additional Calorie Sources:   Propofol @ 5.6 mL/hr= 148 kcal/day    Anthropometric Measures:  · Height: 5' 11\" (180.3 cm)  · Current Body Weight: 137 lb 3.2 oz (62.2 kg)   · Usual Body Weight: 153 lb (69.4 kg)(1/2020)     · Ideal Body Weight: 172 lbs; % Ideal Body Weight 84.4 %   · BMI: 19.1  · BMI Categories: Underweight (BMI less than 22) age over 72       Nutrition Diagnosis:   · Inadequate oral intake related to acute injury/trauma, impaired respiratory function as evidenced by NPO or clear liquid status due to medical condition, intubation      Nutrition Interventions:   Food and/or Nutrient Delivery:  Continue NPO, Start Tube Feeding  Nutrition Education/Counseling:  No recommendation at this time   Coordination of Nutrition Care:  Continue to monitor while inpatient    Goals: New goal: meet nutrient needs through EN with no s/s intolerance       Nutrition Monitoring and Evaluation:   Behavioral-Environmental Outcomes:  None Identified   Food/Nutrient Intake Outcomes:  Enteral Nutrition Intake/Tolerance  Physical Signs/Symptoms Outcomes:  Biochemical Data, Skin, Weight, GI Status     Discharge Planning:     Too soon to determine     Electronically signed by Adam Best RD, LD on 1/9/21 at 2:37 PM CST    Contact: [phone]

## 2021-01-09 NOTE — PROGRESS NOTES
[facility]        Hospitalist Progress Note  1/8/2021 9:48 PM  Subjective:   Admit Date: 12/22/2020  PCP: No primary care provider on file. Chief Complaint: Dyspnea    Subjective: I was called to evaluate the patient at the bedside by the COVID-19 charge nurse. His oxygen requirements have increased substantially, he feels very weak and tired and wanted to be moved to ICU for ventilation. Cumulative Hospital History: The patient is a 77 y.o. male with a past medical history of ESRD on peritoneal dialysis, hypertension, hyperlipidemia, CAD who presented to an outside facility complaining of worsening dyspnea, sore throat and dysphagia since being diagnosed with COVID-19 found to have acute respiratory failure with hypoxia requiring 4L to maintain saturations at OSH. Patient transferred to our facility for further management. Found to have elevated procalcitonin and D-dimer started on empiric antibiotics and heparin drip pending cultures and CT angio PE study. Patient with worsening saturations/respiratory status transferred to CCU. Patient found to have blood cultures growing MSSA with infectious diseases consulted. CT angio pulmonary embolus study showing no evidence of pulmonary embolism and heparin drip discontinued and patient placed on prophylactic lovenox. Patient's respiratory status improving and he was transferred out of CCU. ID recommending [id number] removal.  General surgery consulted for port removal. Port removed 12/30/2020 with general and vascular surgery. ID recommending antibiotics with PD changes through 1/20/2021. He would need LTAC as he is still requiring 12-15 liters O2.  1/8/2021, patient's oxygen requirement increased to 30 L, he felt very weak and tired and requested transfer to ICU. I spoke with our intensivist, and transferred the patient to ICU after ABGs and placed on high flow oxygen. He still felt very weak and tired and decision was made by intensivist to intubate the patient. ROS: Unable to obtain . .. Patient is intubated and sedated! DIET RENAL; Moderately Thick (Honey);  Daily Fluid Restriction: 1500 ml  Dietary Nutrition Supplements: Other Oral Supplement (see comment)    Intake/Output Summary (Last 24 hours) at 1/8/2021 9204  Last data filed at 1/8/2021 0437  Gross per 24 hour   Intake 0 ml   Output    Net 0 ml     Medications:  norepinephrine 18 mcg/min (01/08/21 2109)    propofol 40 mcg/kg/min (01/08/21 2013)    phenylephrine (KONSTANTIN-SYNEPHRINE) 50mg/250mL infusion Stopped (01/08/21 1621)    fentaNYL 5 mcg/ml in 0.9%  ml infusion 5 mcg/hr (01/08/21 1903)    dexmedetomidine HCl in NaCl Stopped (01/08/21 1848)    dextrose       Current Facility-Administered Medications   Medication Dose Route Frequency Provider Last Rate Last Admin    norepinephrine (LEVOPHED) 16 mg in sodium chloride 0.9 % 250 mL infusion  2 mcg/min Intravenous Continuous Fe Holder MD 16.9 mL/hr at 01/08/21 2109 18 mcg/min at 01/08/21 2109    propofol injection  10 mcg/kg/min Intravenous Titrated Fe Holder MD 14.9 mL/hr at 01/08/21 2013 40 mcg/kg/min at 01/08/21 2013    phenylephrine (KONSTANTIN-SYNEPHRINE) 50 mg in dextrose 5 % 250 mL infusion  100 mcg/min Intravenous Continuous Fe Holder MD   Stopped at 01/08/21 1621    fentaNYL 5 mcg/ml in 0.9%  ml infusion  25 mcg/hr Intravenous Continuous Fe Holder MD 1 mL/hr at 01/08/21 1903 5 mcg/hr at 01/08/21 1903    dexmedetomidine (PRECEDEX) 400 mcg in sodium chloride 0.9 % 100 mL infusion  0.2 mcg/kg/hr Intravenous Continuous Fe Holder MD   Stopped at 01/08/21 1848    dexamethasone (DECADRON) tablet 6 mg  6 mg Oral Daily Robin Palmer MD        ceFAZolin (ANCEF) 1 g in sterile water 10 mL IV syringe  1 g Intravenous Daily Robin Palmer MD   1 g at 01/08/21 1005    apixaban (ELIQUIS) tablet 2.5 mg  2.5 mg Oral BID Robin Palmer MD   2.5 mg at 01/07/21 2025    melatonin disintegrating tablet 5 mg  5 mg Oral Nightly Robin Palmer MD   5 mg at 01/07/21 2022    zinc sulfate (ZINCATE) capsule 50 mg  50 mg Oral Daily Robin Palmer MD   50 mg at 01/07/21 0931    aspirin chewable tablet 81 mg  81 mg Oral Daily Robin Palmer MD   81 mg at 01/07/21 0928    clopidogrel (PLAVIX) tablet 75 mg  75 mg Oral Daily Robin Palmer MD   75 mg at 01/07/21 8015  lisinopril (PRINIVIL;ZESTRIL) tablet 2.5 mg  2.5 mg Oral Daily Robin Palmer MD   2.5 mg at 01/07/21 4731    docusate sodium (COLACE) capsule 100 mg  100 mg Oral Daily Robin Palmer MD   100 mg at 01/07/21 0928    senna (SENOKOT) tablet 8.6 mg  1 tablet Oral Nightly Robin Palmer MD   8.6 mg at 01/07/21 2022    lactulose (CHRONULAC) 10 GM/15ML solution 20 g  20 g Oral TID Robin Palmer MD   20 g at 12/31/20 0932    bisacodyl (DULCOLAX) suppository 10 mg  10 mg Rectal Daily PRN Robin Palmer MD        0.9 % sodium chloride bolus  30 mL Intravenous PRN Robin Palmer MD        darbepoetin jerald-polysorbate (ARANESP) injection 60 mcg  60 mcg Subcutaneous Weekly Robin Palmer MD   60 mcg at 12/23/20 1534    ALPRAZolam (XANAX) tablet 0.25 mg  0.25 mg Oral Nightly PRN Robin Palmer MD   0.25 mg at 01/01/21 0014    atorvastatin (LIPITOR) tablet 40 mg  40 mg Oral Nightly Robin Palmer MD   40 mg at 01/07/21 2025    carvedilol (COREG) tablet 3.125 mg  3.125 mg Oral BID WC Robin Palmer MD   3.125 mg at 01/07/21 0928    levothyroxine (SYNTHROID) tablet 50 mcg  50 mcg Oral Daily Robin Palmer MD   50 mcg at 01/07/21 0612    sevelamer (RENVELA) tablet 800 mg  800 mg Oral TID Robin Palmer MD   800 mg at 01/06/21 0934    vitamin D (ERGOCALCIFEROL) capsule 50,000 Units  50,000 Units Oral Q7 Days Robin Palmer MD        acetaminophen (TYLENOL) tablet 650 mg  650 mg Oral Q6H PRN Robin Palmer MD   650 mg at 01/02/21 2049    Or    acetaminophen (TYLENOL) suppository 650 mg  650 mg Rectal Q6H PRN Robin Palmer MD        guaiFENesin-dextromethorphan (ROBITUSSIN DM) 100-10 MG/5ML syrup 5 mL  5 mL Oral Q4H PRN Robin Palmer MD        Vitamin D (CHOLECALCIFEROL) tablet 6,000 Units  6,000 Units Oral Daily Robin Palmer MD   6,000 Units at 01/07/21 0914    albuterol sulfate  (90 Base) MCG/ACT inhaler 2 puff  2 puff Inhalation Q6H PRN Robin Palmer MD   2 puff at 12/23/20 0572  insulin lispro (HUMALOG) injection vial 0-6 Units  0-6 Units Subcutaneous TID WC Robin Palmer MD   2 Units at 12/31/20 0936    insulin lispro (HUMALOG) injection vial 0-3 Units  0-3 Units Subcutaneous Nightly Robin Palmer MD   1 Units at 12/29/20 2129    glucose (GLUTOSE) 40 % oral gel 15 g  15 g Oral PRN Robin Palmer MD        dextrose 50 % IV solution  12.5 g Intravenous PRN Robin Palmer MD        glucagon (rDNA) injection 1 mg  1 mg Intramuscular PRN Robin Palmer MD        dextrose 5 % solution  100 mL/hr Intravenous PRN Robin Palmer MD        Benzocaine-Menthol (CEPACOL) 1 lozenge  1 lozenge Oral Q2H PRN Robin Palmer MD        phenol 1.4 % mouth spray 1 spray  1 spray Mouth/Throat Q2H PRN Robin Palmer MD   1 spray at 01/07/21 1830    budesonide-formoterol (SYMBICORT) 160-4.5 MCG/ACT inhaler 2 puff  2 puff Inhalation BID Robin Palmer MD   2 puff at 01/08/21 0800        Labs:     Recent Labs     01/06/21  0215 01/07/21  0345 01/08/21  0323   WBC 2.7* 2.4* 2.1*   RBC 2.93* 2.91* 3.15*   HGB 9.2* 9.1* 9.9*   HCT 27.8* 27.8* 30.2*   MCV 94.9* 95.5* 95.9*   MCH 31.4* 31.3* 31.4*   MCHC 33.1 32.7* 32.8*   PLT 81* 64* 36*     Recent Labs     01/06/21  0215 01/07/21  0345 01/08/21  0323 01/08/21  1146 01/08/21  1537 01/08/21 2052   * 129* 133*  --   --   --    K 5.0 5.3* 5.4*  5.4* 5.2 5.6 5.9   ANIONGAP 14 19 21*  --   --   --    CL 87* 88* 90*  --   --   --    CO2 25 22 22  --   --   --    BUN 82* 87* 91*  --   --   --    CREATININE 7.3* 7.4* 7.6*  --   --   --    GLUCOSE 103 148* 147*  --   --   --    CALCIUM 8.0* 8.0* 7.9*  --   --   --      No results for input(s): MG, PHOS in the last 72 hours. Recent Labs     01/06/21  0215 01/07/21  0345 01/08/21  0323   AST 44* 51* 68*   ALT <5* <5* <5*   BILITOT <0.2 <0.2 <0.2   ALKPHOS 64 67 73     ABGs:No results for input(s): PH, PO2, PCO2, HCO3, BE, O2SAT in the last 72 hours. Troponin T: No results for input(s): TROPONINI in the last 72 hours.   INR: Recent Labs     01/06/21  0215 01/07/21  0345 01/08/21  0323   INR 1.22* 1.26* 1.81*     Lactic Acid: No results for input(s): LACTA in the last 72 hours. Objective:   Vitals: BP (!) 137/53   Pulse 93   Temp 97.5 °F (36.4 °C) (Temporal)   Resp (!) 31   Ht 5' 11\" (1.803 m)   Wt 137 lb 3.2 oz (62.2 kg)   SpO2 92%   BMI 19.14 kg/m²   24HR INTAKE/OUTPUT:      Intake/Output Summary (Last 24 hours) at 1/8/2021 2144  Last data filed at 1/8/2021 0437  Gross per 24 hour   Intake 0 ml   Output    Net 0 ml        PHYSICAL  EXAMINATION (employing telemetry and examining from doorstep to avoid COVID-19 exposure and to conserve PPE)   . .. Captured in coordination with the floor nurse:        CORINA:  Patient is intubated and sedated! Head/Eyes:  Normocephalic, atraumatic, EOMI and PERRLA bilaterally   Respiratory:   Bilateral decreased air entry in both lung fields, coarse bilateral breath sounds, scattered bilateral rhonchi (As per floor nurse), ET tube in place attached to mechanical ventilation   Cardiovascular:  Regular rate and rhythm, S1+S2+0 (As per floor nurse)   Abdomen:   Non-distended   Extremities: Moves all, decreased range of motion, no edema   Neurologic:  Unable to obtain . .. Patient is intubated and sedated! Psychiatric:  Unable to obtain . .. Patient is intubated and sedated! Assessment and Plan: Active Problems:    Renovascular hypertension    Secondary hyperparathyroidism of renal origin [facility])    Palliative care patient    ESRD (end stage renal disease) on dialysis ([facility] [address]    Anemia of chronic disease    Coronary artery disease involving native coronary artery of native heart without angina pectoris    Basal cell carcinoma (BCC) of face    Ischemic cardiomyopathy    COVID-19    Port or reservoir infection  Resolved Problems:    * No resolved hospital problems. *      COVID: Supportive management. Bronchodilators. O2 requirements still high. Worsening respiratory failure with hypoxemia: Patient is progressively increasing oxygen requirements. Patient transferred to [place] [address] ICU where he was intubated. Currently patient is intubated and sedated. Transferred to intensivist service for close monitoring. MSSA bacteremia: Ancef. Blood cultures have cleared - first negative culture 12/29/2020. Port has been removed with surgery 12/30/2020. Antibiotics through PD catheter once discharged - go through 1/20/2021. Anemia: s/p 1U PRBC 1/1/2020. Monitor CBC.     ESRD: Peritoneal dialysis patient, being held by nephrology at this time as patient is intubated in the [place] [address] ICU. Hypertension: Monitor BP and adjust medications as needed.     Supportive management. SW/CM for disposition. Advance Directive: Full Code    DVT prophylaxis: Heparin    Discharge planning: Patient to be weaned from oxygen. Would need LTAC placement. Case management team is working hard on arranging placement in an LTAC with the peritoneal dialysis abilities in several facilities and multiple states! Repeat labs in a.m. Electrolyte replacement as per protocol. Patient will be monitored very closely in the COVID-19 ICU. Further recommendations as per the hospital course. The patient's management will be taken over by my covering Hospitalist intensivist, Dr. Anyi Houston, in a.m . .. I am signing off! Patient  is on DVT prophylaxis  Current medications reviewed  Lab work reviewed  Radiology/Chest x-ray films reviewed  Treatment recommendations from suspecialities reviewed, appreciated and agreed with  Discussed with the nurse and addressed all questions/concerns  Discussed with Patient and at the bedside in detail . .. He expressed the desire to be transferred to [place] [address] ICU and be intubated if needed to help with his worsening COVID-19 pneumonia. I attest that I spend >45 minutes engaged in critical care of this patient. This includes review of EMR data, imaging, bedside evaluation, patient/family counseling and coordination of care with nursing, providers and consultants.     Shira Ellis MD 1/8/2021 9:48 PM  Rounding Hospitalist

## 2021-01-09 NOTE — PROGRESS NOTES
Nephrology ([facility]) Progress Note    Patient:  Joyce Alvarado  YOB: 1945  Date of Service: 1/9/2021  MRN: [id number]   Acct: [de-identified]   Primary Care Physician: No primary care provider on file. Advance Directive: Full Code  Admit Date: 12/22/2020       Hospital Day: 18  Referring Provider: Baldev De Jesus MD    Patient independently seen and examined, Chart, Consults, Notes, Operative notes, Labs, Cardiology, and Radiology studies reviewed as available. Chief complaint: End-stage renal disease. Subjective:  Joyce Alvarado is a 77 y.o. male  whom we were consulted for end-stage renal disease. Patient is currently on CCPD and has hypertensive nephrosclerosis. Presenting to [facility] with increasing shortness of breath as well as dysphagia. Patient was diagnosed with COVID-19/pneumonia. His respiratory status, slowly improving and he is back to nasal cannula from 100% nonrebreather. Patient is growing MSSA in bloodstream.  He is status post removal of infected Mediport. On January 8, he was transferred to CCU due to severe respiratory failure. He was intubated, needing vasopressors. This morning he was seen in CCU. Patient is currently on 80% FiO2 with a PEEP of 10. He is still on low-dose Levophed for blood pressure support. He is currently in proning position which has improved his oxygenation status.     Allergies:  Diphenhydramine and Sulfa antibiotics    Medicines:  Current Facility-Administered Medications   Medication Dose Route Frequency Provider Last Rate Last Admin    0.9 % sodium chloride infusion   Intravenous PRN Evy Holder MD        norepinephrine (LEVOPHED) 16 mg in sodium chloride 0.9 % 250 mL infusion  2 mcg/min Intravenous Continuous Baldev De Jesus MD 42.2 mL/hr at 01/09/21 1054 45 mcg/min at 01/09/21 1054  propofol injection  10 mcg/kg/min Intravenous Titrated Kristin Harding MD 7.5 mL/hr at 01/09/21 1053 20 mcg/kg/min at 01/09/21 1053    phenylephrine (KONSTANTIN-SYNEPHRINE) 50 mg in dextrose 5 % 250 mL infusion  100 mcg/min Intravenous Continuous Kristin Harding MD   Stopped at 01/08/21 1621    fentaNYL 5 mcg/ml in 0.9%  ml infusion  25 mcg/hr Intravenous Continuous Kristin Harding MD 15 mL/hr at 01/09/21 1000 75 mcg/hr at 01/09/21 1000    dexmedetomidine (PRECEDEX) 400 mcg in sodium chloride 0.9 % 100 mL infusion  0.2 mcg/kg/hr Intravenous Continuous Kristin Harding MD   Stopped at 01/08/21 1848    dexamethasone (DECADRON) tablet 6 mg  6 mg Oral Daily Robin Palmer MD   6 mg at 01/09/21 1054    ceFAZolin (ANCEF) 1 g in sterile water 10 mL IV syringe  1 g Intravenous Daily Robin Palmer MD   1 g at 01/09/21 1055    apixaban (ELIQUIS) tablet 2.5 mg  2.5 mg Oral BID Robin Palmer MD   2.5 mg at 01/08/21 2158    melatonin disintegrating tablet 5 mg  5 mg Oral Nightly Robin Palmer MD   5 mg at 01/08/21 2159    zinc sulfate (ZINCATE) capsule 50 mg  50 mg Oral Daily Robin Palmer MD   50 mg at 01/09/21 1054    aspirin chewable tablet 81 mg  81 mg Oral Daily Robin Palmer MD   81 mg at 01/07/21 0928    clopidogrel (PLAVIX) tablet 75 mg  75 mg Oral Daily Robin Palmer MD   75 mg at 01/07/21 0929    lisinopril (PRINIVIL;ZESTRIL) tablet 2.5 mg  2.5 mg Oral Daily Robin Palmer MD   2.5 mg at 01/07/21 1178    docusate sodium (COLACE) capsule 100 mg  100 mg Oral Daily Robin Palmer MD   100 mg at 01/07/21 0928    senna (SENOKOT) tablet 8.6 mg  1 tablet Oral Nightly Robin Palmer MD   8.6 mg at 01/07/21 2022    lactulose (CHRONULAC) 10 GM/15ML solution 20 g  20 g Oral TID Robin Palmer MD   20 g at 12/31/20 0932    bisacodyl (DULCOLAX) suppository 10 mg  10 mg Rectal Daily PRN Robin Palmer MD        0.9 % sodium chloride bolus  30 mL Intravenous PRN Taqueria Devine MD  darbepoetin jerald-polysorbate (ARANESP) injection 60 mcg  60 mcg Subcutaneous Weekly Robin Palmer MD   60 mcg at 12/23/20 1534    ALPRAZolam (XANAX) tablet 0.25 mg  0.25 mg Oral Nightly PRN Robin Palmer MD   0.25 mg at 01/01/21 0014    atorvastatin (LIPITOR) tablet 40 mg  40 mg Oral Nightly Robin Palmer MD   40 mg at 01/08/21 2158    carvedilol (COREG) tablet 3.125 mg  3.125 mg Oral BID  Robin Palmer MD   3.125 mg at 01/09/21 1121    levothyroxine (SYNTHROID) tablet 50 mcg  50 mcg Oral Daily Robin Palmer MD   50 mcg at 01/09/21 1055    sevelamer (RENVELA) tablet 800 mg  800 mg Oral TID Robin Palmer MD   800 mg at 01/09/21 1055    vitamin D (ERGOCALCIFEROL) capsule 50,000 Units  50,000 Units Oral Q7 Days Robin Palmer MD        acetaminophen (TYLENOL) tablet 650 mg  650 mg Oral Q6H PRN Robin Palmer MD   650 mg at 01/02/21 2049    Or    acetaminophen (TYLENOL) suppository 650 mg  650 mg Rectal Q6H PRN Robin Palmer MD        guaiFENesin-dextromethorphan (ROBITUSSIN DM) 100-10 MG/5ML syrup 5 mL  5 mL Oral Q4H PRN Robin Palmer MD   5 mL at 01/09/21 1055    Vitamin D (CHOLECALCIFEROL) tablet 6,000 Units  6,000 Units Oral Daily Robin Palmer MD   6,000 Units at 01/09/21 1054    albuterol sulfate  (90 Base) MCG/ACT inhaler 2 puff  2 puff Inhalation Q6H PRN Robin Palmer MD   2 puff at 12/23/20 0544    insulin lispro (HUMALOG) injection vial 0-6 Units  0-6 Units Subcutaneous TID  Robin Palmer MD   2 Units at 12/31/20 0936    insulin lispro (HUMALOG) injection vial 0-3 Units  0-3 Units Subcutaneous Nightly Robin Palmer MD   1 Units at 12/29/20 2129    glucose (GLUTOSE) 40 % oral gel 15 g  15 g Oral PRN Robin Palmer MD        dextrose 50 % IV solution  12.5 g Intravenous PRN Robin Palmer MD   12.5 g at 01/09/21 1009    glucagon (rDNA) injection 1 mg  1 mg Intramuscular PRN Roberto Almanzar MD  dextrose 5 % solution  100 mL/hr Intravenous PRN Robin Palmer MD 50 mL/hr at 01/09/21 1034 50 mL/hr at 01/09/21 1034    Benzocaine-Menthol (CEPACOL) 1 lozenge  1 lozenge Oral Q2H PRN Robin Palmer MD        phenol 1.4 % mouth spray 1 spray  1 spray Mouth/Throat Q2H PRN Robin Palmer MD   1 spray at 01/07/21 1830    budesonide-formoterol (SYMBICORT) 160-4.5 MCG/ACT inhaler 2 puff  2 puff Inhalation BID Robin Palmer MD   2 puff at 01/09/21 1056       Past Medical History:  Past Medical History:   Diagnosis Date    Anxiety     Asthma     Benign essential HTN     Benign hypertensive kidney disease     CAD (coronary artery disease)     sees dr. Keegan Ovalles Chronic kidney disease, stage IV (severe) ([facility] [address] 7/18/2016    ESRD (end stage renal disease) ([facility] [address]     no dialysis at present.  Hemodialysis patient [facility])     mon [facility]     Hyperlipidemia     Palliative care patient 11/29/2018    Prolonged emergence from general anesthesia     c/o dizzy and \"over sedated\" with prostate \"scope\".     Renal osteodystrophy     Skin cancer 2018    facial    Throat cancer ([facility] [address] 2012    Thyroid disease        Past Surgical History:  Past Surgical History:   Procedure Laterality Date    CYSTOSCOPY      HERNIA REPAIR      umbilical    LAPAROSCOPY INSERTION PERITONEAL CATHETER N/A 7/18/2016    CATHETER INSERTION PERITONEAL DIALYSIS LAPAROSCOPIC performed by Fortino Medel MD at [address] N/A 1/28/2019    LAPAROSCOPIC REVISION OF PD CATHETER performed by Cornel Villeda MD at [address] N/A 12/30/2020    PORT REMOVAL performed by Marlena Nur MD at [address] [facility] CATH DIAL OPEN N/A 11/28/2018    PLACEMENT OF TUNNELED HEMODIALYSIS CATHETER performed by Arnold Smith MD at [address] LAP INSERTION TUNNELED INTRAPERITONEAL CATHETER N/A 10/22/2018 PERITONEAL DIALYSIS CATHETER EXTERIORIZATION performed by Blaire Marquez MD at [address] [place] [address]      Cancer    TUNNELED VENOUS PORT PLACEMENT      VASCULAR SURGERY  07/10/2019    [facility]. Removal of tunneled dialysis catheter right internal jugular vein. Family History  Family History   Problem Relation Age of Onset    Heart Disease Mother     Cancer Father         lung       Social History  Social History     Socioeconomic History    Marital status:      Spouse name: Not on file    Number of children: Not on file    Years of education: Not on file    Highest education level: Not on file   Occupational History    Not on file   Social Needs    Financial resource strain: Not on file    Food insecurity     Worry: Not on file     Inability: Not on file    Transportation needs     Medical: Not on file     Non-medical: Not on file   Tobacco Use    Smoking status: Never Smoker    Smokeless tobacco: Never Used   Substance and Sexual Activity    Alcohol use: No    Drug use: No    Sexual activity: Not on file   Lifestyle    Physical activity     Days per week: Not on file     Minutes per session: Not on file    Stress: Not on file   Relationships    Social connections     Talks on phone: Not on file     Gets together: Not on file     Attends [employer] service: Not on file     Active member of club or organization: Not on file     Attends meetings of clubs or organizations: Not on file     Relationship status: Not on file    Intimate partner violence     Fear of current or ex partner: Not on file     Emotionally abused: Not on file     Physically abused: Not on file     Forced sexual activity: Not on file   Other Topics Concern    Not on file   Social History Narrative    Not on file         Review of Systems:  Intubated/sedated and review of system is unobtainable.       Objective:  Patient Vitals for the past 24 hrs:   BP Temp Temp src Pulse Resp SpO2 Abdominal: Nondistended abdomen  Extremities: No visible pedal edema. Labs:  BMP:   Recent Labs     01/07/21  0345 01/08/21 0323 01/08/21 0323 01/08/21  1537 01/08/21 2052 01/09/21  0400   * 133*  --   --   --  123*   K 5.3* 5.4*  5.4*   < > 5.6 5.9 5.9*   CL 88* 90*  --   --   --  85*   CO2 22 22  --   --   --  21*   BUN 87* 91*  --   --   --  98*   CREATININE 7.4* 7.6*  --   --   --  7.6*   CALCIUM 8.0* 7.9*  --   --   --  6.4*    < > = values in this interval not displayed. CBC:   Recent Labs     01/07/21 0345 01/08/21 0323 01/09/21  0400   WBC 2.4* 2.1* 2.8*   HGB 9.1* 9.9* 8.2*   HCT 27.8* 30.2* 24.1*   MCV 95.5* 95.9* 100.0*   PLT 64* 36* 22*     LIVER PROFILE:   Recent Labs     01/07/21 0345 01/08/21 0323 01/09/21  0400   AST 51* 68* 5   ALT <5* <5* 5   BILITOT <0.2 <0.2 <0.2   ALKPHOS 67 73 63     PT/INR:   Recent Labs     01/07/21 0345 01/08/21 0323 01/09/21  0400   PROTIME 15.8* 21.2* 31.1*   INR 1.26* 1.81* 2.90*     APTT: No results for input(s): APTT in the last 72 hours. BNP:  No results for input(s): BNP in the last 72 hours. Ionized Calcium:No results for input(s): IONCA in the last 72 hours. Magnesium:No results for input(s): MG in the last 72 hours. Phosphorus:No results for input(s): PHOS in the last 72 hours. HgbA1C: No results for input(s): LABA1C in the last 72 hours. Hepatic:   Recent Labs     01/07/21 0345 01/08/21  0323 01/09/21  0400   ALKPHOS 67 73 63   ALT <5* <5* 5   AST 51* 68* 5   PROT 5.3* 5.5* 4.4*   BILITOT <0.2 <0.2 <0.2   LABALBU 1.5* 1.8* 0.3*     Lactic Acid: No results for input(s): LACTA in the last 72 hours. Troponin: No results for input(s): CKTOTAL, CKMB, TROPONINT in the last 72 hours. ABGs:   Lab Results   Component Value Date    PHART 7.230 01/08/2021    PO2ART 55.0 01/08/2021    TGX4FJX 63.0 01/08/2021     CRP:  No results for input(s): CRP in the last 72 hours. Sed Rate:  No results for input(s): SEDRATE in the last 72 hours. EXAMINATION:  CTA PULMONARY W CONTRAST  12/24/2020 2:04 PM HISTORY: Hypoxia. Elevated d-dimer. Covid 19 infection. COMPARISON: No comparison study. DLP: 315 mGy-cm. Automated exposure control was utilized. TECHNIQUE: Spiral CT angiography was performed of the chest with contrast. Sagittal, coronal and 3-D images were reconstructed. MEDIASTINUM/VASCULAR: There is atheromatous disease of the thoracic aorta and coronary arteries. There is cardiomegaly. There is no CT evidence of pulmonary embolus. The pulmonary arteries are dilated with main pulmonary artery segment measuring 3.5 cm. There are small mediastinal lymph nodes. LUNGS: There are moderate groundglass appearing infiltrates in both lungs. Bronchiectasis is noted. There is no significant pleural effusion. There is mild dependent atelectasis in the lung bases posteriorly. BONES/SOFT TISSUES/: There are degenerative changes of the spine and shoulders. UPPER ABDOMEN: There is ascites in the upper abdomen. There is a 1.4 cm probable cyst in the upper pole left kidney. There is another 7 mm probable cyst in the upper pole left kidney. These areas are not fully evaluated. 1. No CT evidence of pulmonary embolus. Dilated pulmonary arteries. 2. Atheromatous disease of the thoracic aorta and coronary arteries. Cardiomegaly. 3. Moderate groundglass infiltrates bilaterally consistent with the patient's history of Covid 19 infection. Other etiologies less likely. There is also bronchiectasis. There is dependent atelectasis. 4. There is ascites in the abdomen. 5. Probable renal cysts on the left, not fully evaluated. Signed by Dr Sandra Oden on 12/24/2020 2:11 PM       Assessment   1. End-stage renal disease/on CCPD. 2.  Hypertensive nephrosclerosis. 3.  Bilateral COVID-19 pneumonia. 4.  Acute respiratory failure/intubated. 5.  Hyponatremia. 6.  Anemia of chronic kidney disease. 7. MSSA bacteremia. 8.  Mild hyperkalemia. 9.  Sepsis/septic shock/on pressors. Plan:  1. Resume CCPD today. 3.  Continue supportive care. 3.  Overall prognosis looks poor.     Suzie Guardado MD  01/09/21  11:50 AM

## 2021-01-09 NOTE — PROGRESS NOTES
Hospitalist Progress Note  [facility]     Patient: Lily Treviño  : 1945  MRN: [id number]  Code Status: Full Code    Hospital Day: 18   Date of Service: 2021    Subjective:   Pt seen in [place] [address] critical care unit. Intubated and sedated. Past Medical History:   Diagnosis Date    Anxiety     Asthma     Benign essential HTN     Benign hypertensive kidney disease     CAD (coronary artery disease)     sees dr. Ryan Whitley Chronic kidney disease, stage IV (severe) ([facility] [address] 2016    ESRD (end stage renal disease) ([facility] [address]     no dialysis at present.  Hemodialysis patient [facility])     mon  at [address] of blood transfusion     Hyperlipidemia     Palliative care patient 2018    Prolonged emergence from general anesthesia     c/o dizzy and \"over sedated\" with prostate \"scope\".     Renal osteodystrophy     Skin cancer 2018    facial    Throat cancer ([facility] [address] 2012    Thyroid disease        Past Surgical History:   Procedure Laterality Date    CYSTOSCOPY      HERNIA REPAIR      umbilical    LAPAROSCOPY INSERTION PERITONEAL CATHETER N/A 2016    CATHETER INSERTION PERITONEAL DIALYSIS LAPAROSCOPIC performed by Reva Gates MD at [address] N/A 2019    LAPAROSCOPIC REVISION OF PD CATHETER performed by Inna Trevizo MD at [address] N/A 2020    PORT REMOVAL performed by Nellie Latif MD at [facility] CATH DIAL OPEN N/A 2018    PLACEMENT OF TUNNELED HEMODIALYSIS CATHETER performed by Rajan Harris MD at [facility] TUNNELED INTRAPERITONEAL CATHETER N/A 10/22/2018    PERITONEAL DIALYSIS CATHETER EXTERIORIZATION performed by Inna Trevizo MD at [facility]    TUNNELED VENOUS PORT PLACEMENT      VASCULAR SURGERY  07/10/2019 [facility]JS.Removal of tunneled dialysis catheter right internal jugular vein.        Family History   Problem Relation Age of Onset    Heart Disease Mother     Cancer Father         lung       Social History     Socioeconomic History    Marital status:      Spouse name: Not on file    Number of children: Not on file    Years of education: Not on file    Highest education level: Not on file   Occupational History    Not on file   Social Needs    Financial resource strain: Not on file    Food insecurity     Worry: Not on file     Inability: Not on file    Transportation needs     Medical: Not on file     Non-medical: Not on file   Tobacco Use    Smoking status: Never Smoker    Smokeless tobacco: Never Used   Substance and Sexual Activity    Alcohol use: No    Drug use: No    Sexual activity: Not on file   Lifestyle    Physical activity     Days per week: Not on file     Minutes per session: Not on file    Stress: Not on file   Relationships    Social connections     Talks on phone: Not on file     Gets together: Not on file     Attends [employer] service: Not on file     Active member of club or organization: Not on file     Attends meetings of clubs or organizations: Not on file     Relationship status: Not on file    Intimate partner violence     Fear of current or ex partner: Not on file     Emotionally abused: Not on file     Physically abused: Not on file     Forced sexual activity: Not on file   Other Topics Concern    Not on file   Social History Narrative    Not on file       Current Facility-Administered Medications   Medication Dose Route Frequency Provider Last Rate Last Admin    0.9 % sodium chloride infusion   Intravenous PRN Ilir Dickerson MD        norepinephrine (LEVOPHED) 16 mg in sodium chloride 0.9 % 250 mL infusion  2 mcg/min Intravenous Continuous Dolores Manzanares MD 42.2 mL/hr at 01/09/21 1054 45 mcg/min at 01/09/21 1054  propofol injection  10 mcg/kg/min Intravenous Titrated Bridget Dunham MD 5.6 mL/hr at 01/09/21 1342 15 mcg/kg/min at 01/09/21 1342    phenylephrine (KONSTANTIN-SYNEPHRINE) 50 mg in dextrose 5 % 250 mL infusion  100 mcg/min Intravenous Continuous Bridget Dunham MD   Stopped at 01/08/21 1621    fentaNYL 5 mcg/ml in 0.9%  ml infusion  25 mcg/hr Intravenous Continuous Bridget Dunham MD 15 mL/hr at 01/09/21 1000 75 mcg/hr at 01/09/21 1000    dexmedetomidine (PRECEDEX) 400 mcg in sodium chloride 0.9 % 100 mL infusion  0.2 mcg/kg/hr Intravenous Continuous Bridget Dunham MD   Stopped at 01/08/21 1848    dexamethasone (DECADRON) tablet 6 mg  6 mg Oral Daily Robin Palmer MD   6 mg at 01/09/21 1054    ceFAZolin (ANCEF) 1 g in sterile water 10 mL IV syringe  1 g Intravenous Daily Robin Palmer MD   1 g at 01/09/21 1055    [Held by provider] apixaban (ELIQUIS) tablet 2.5 mg  2.5 mg Oral BID Robin Palmer MD   2.5 mg at 01/08/21 2158    melatonin disintegrating tablet 5 mg  5 mg Oral Nightly Roibn Palmer MD   5 mg at 01/08/21 2159    zinc sulfate (ZINCATE) capsule 50 mg  50 mg Oral Daily Robin Palmer MD   50 mg at 01/09/21 1054    [Held by provider] aspirin chewable tablet 81 mg  81 mg Oral Daily Robin Palmer MD   81 mg at 01/07/21 0928    [Held by provider] clopidogrel (PLAVIX) tablet 75 mg  75 mg Oral Daily Robin Palmer MD   75 mg at 01/07/21 0929    lisinopril (PRINIVIL;ZESTRIL) tablet 2.5 mg  2.5 mg Oral Daily Robin Palmer MD   2.5 mg at 01/07/21 4467    docusate sodium (COLACE) capsule 100 mg  100 mg Oral Daily Robin Palmer MD   100 mg at 01/07/21 0928    senna (SENOKOT) tablet 8.6 mg  1 tablet Oral Nightly Robin Palmer MD   8.6 mg at 01/07/21 2022    lactulose (CHRONULAC) 10 GM/15ML solution 20 g  20 g Oral TID Robin Palmer MD   20 g at 12/31/20 0932    bisacodyl (DULCOLAX) suppository 10 mg  10 mg Rectal Daily PRN Robin Palmer MD        0.9 % sodium chloride bolus  30 mL Intravenous PRN Juan Luis Bhatia MD  dextrose 5 % solution  100 mL/hr Intravenous PRN Robin Palmer  mL/hr at 01/09/21 1342 100 mL/hr at 01/09/21 1342    Benzocaine-Menthol (CEPACOL) 1 lozenge  1 lozenge Oral Q2H PRN Robin Palmer MD        phenol 1.4 % mouth spray 1 spray  1 spray Mouth/Throat Q2H PRN Robin Palmer MD   1 spray at 01/07/21 1830    budesonide-formoterol (SYMBICORT) 160-4.5 MCG/ACT inhaler 2 puff  2 puff Inhalation BID Robin Palmer MD   2 puff at 01/09/21 1056         sodium chloride      norepinephrine 45 mcg/min (01/09/21 1054)    propofol 15 mcg/kg/min (01/09/21 1342)    phenylephrine (KONSTANTIN-SYNEPHRINE) 50mg/250mL infusion Stopped (01/08/21 1621)    fentaNYL 5 mcg/ml in 0.9%  ml infusion 75 mcg/hr (01/09/21 1000)    dexmedetomidine HCl in NaCl Stopped (01/08/21 1848)    dextrose 100 mL/hr (01/09/21 1342)        Objective:   BP (!) 95/46   Pulse 92   Temp 97 °F (36.1 °C) (Temporal)   Resp 18   Ht 5' 11\" (1.803 m)   Wt 137 lb 3.2 oz (62.2 kg)   SpO2 93%   BMI 19.14 kg/m²     Exam not performed in an effort to preserve PPE however patient seen from doorway and case discussed in detail with unit staff    Recent Labs     01/07/21  0345 01/08/21  0323 01/09/21  0400   WBC 2.4* 2.1* 2.8*   RBC 2.91* 3.15* 2.41*   HGB 9.1* 9.9* 8.2*   HCT 27.8* 30.2* 24.1*   MCV 95.5* 95.9* 100.0*   MCH 31.3* 31.4* 34.0*   MCHC 32.7* 32.8* 34.0   PLT 64* 36* 22*     Recent Labs     01/07/21  0345 01/08/21  0323 01/08/21  0323 01/08/21  1537 01/08/21 2052 01/09/21  0400   * 133*  --   --   --  123*   K 5.3* 5.4*  5.4*   < > 5.6 5.9 5.9*   ANIONGAP 19 21*  --   --   --  17   CL 88* 90*  --   --   --  85*   CO2 22 22  --   --   --  21*   BUN 87* 91*  --   --   --  98*   CREATININE 7.4* 7.6*  --   --   --  7.6*   GLUCOSE 148* 147*  --   --   --  110*   CALCIUM 8.0* 7.9*  --   --   --  6.4*    < > = values in this interval not displayed. No results for input(s): MG, PHOS in the last 72 hours.   Recent Labs     01/07/21 S/p port removal 12/30/2020 for bacteremia    ESRD on PD  Renal following    Pancytopenia  No evidence of active bleed  Follow CBC  Transfuse as warranted  Peripheral smear  Avoid offending agents  Hematology consult as warranted    Hypoglycemia  Dextrose containing fluids  Frequent Accu-Cheks  Glucagon as warranted    DVT prophylaxis  Eliquis held  SCDs    Total critical care time: 57 minutes    Bridget Dunham MD   1/9/2021 2:05 PM

## 2021-01-10 NOTE — PROGRESS NOTES
Infectious Diseases Progress Note    Patient:  Adrain Skiff  YOB: 1945  MRN: [id number]   Admit date: 12/22/2020   Admitting Physician: Elmer Goodpasture, MD  Primary Care Physician: No primary care provider on file. Chief Complaint/Interval History: He is currently in prone position. FiO2 is at 90%. He has had some labile blood pressure. He has had low glucose this morning. He has had some pushes of D50. Now on a glucose drip. He has poor IV access (18 and 20-gauge). Possible institution of tube feeds. He has had a decline in platelet count. Critically ill. In/Out    Intake/Output Summary (Last 24 hours) at 1/9/2021 2000  Last data filed at 1/9/2021 1800  Gross per 24 hour   Intake 1909.9 ml   Output 0 ml   Net 1909.9 ml     Allergies:    Allergies   Allergen Reactions    Diphenhydramine      Other reaction(s): Blacked out    Sulfa Antibiotics      made me feel like I had the flu     Current Meds:     0.9 % sodium chloride infusion, PRN      dexamethasone (PF) (DECADRON) injection 6 mg, Q12H      norepinephrine (LEVOPHED) 16 mg in sodium chloride 0.9 % 250 mL infusion, Continuous      propofol injection, Titrated      phenylephrine (KONSTANTIN-SYNEPHRINE) 50 mg in dextrose 5 % 250 mL infusion, Continuous      fentaNYL 5 mcg/ml in 0.9%  ml infusion, Continuous      dexmedetomidine (PRECEDEX) 400 mcg in sodium chloride 0.9 % 100 mL infusion, Continuous      ceFAZolin (ANCEF) 1 g in sterile water 10 mL IV syringe, Daily      [Held by provider] apixaban (ELIQUIS) tablet 2.5 mg, BID      melatonin disintegrating tablet 5 mg, Nightly      zinc sulfate (ZINCATE) capsule 50 mg, Daily      [Held by provider] aspirin chewable tablet 81 mg, Daily      [Held by provider] clopidogrel (PLAVIX) tablet 75 mg, Daily      [Held by provider] lisinopril (PRINIVIL;ZESTRIL) tablet 2.5 mg, Daily      docusate sodium (COLACE) capsule 100 mg, Daily      senna (SENOKOT) tablet 8.6 mg, Nightly   [Held by provider] lactulose (CHRONULAC) 10 GM/15ML solution 20 g, TID      bisacodyl (DULCOLAX) suppository 10 mg, Daily PRN      0.9 % sodium chloride bolus, PRN      darbepoetin jerald-polysorbate (ARANESP) injection 60 mcg, Weekly      ALPRAZolam (XANAX) tablet 0.25 mg, Nightly PRN      atorvastatin (LIPITOR) tablet 40 mg, Nightly      [Held by provider] carvedilol (COREG) tablet 3.125 mg, BID WC      levothyroxine (SYNTHROID) tablet 50 mcg, Daily      sevelamer (RENVELA) tablet 800 mg, TID      vitamin D (ERGOCALCIFEROL) capsule 50,000 Units, Q7 Days      acetaminophen (TYLENOL) tablet 650 mg, Q6H PRN    Or      acetaminophen (TYLENOL) suppository 650 mg, Q6H PRN      guaiFENesin-dextromethorphan (ROBITUSSIN DM) 100-10 MG/5ML syrup 5 mL, Q4H PRN      Vitamin D (CHOLECALCIFEROL) tablet 6,000 Units, Daily      albuterol sulfate  (90 Base) MCG/ACT inhaler 2 puff, Q6H PRN      insulin lispro (HUMALOG) injection vial 0-6 Units, TID WC      insulin lispro (HUMALOG) injection vial 0-3 Units, Nightly      glucose (GLUTOSE) 40 % oral gel 15 g, PRN      dextrose 50 % IV solution, PRN      glucagon (rDNA) injection 1 mg, PRN      dextrose 5 % solution, PRN      Benzocaine-Menthol (CEPACOL) 1 lozenge, Q2H PRN      phenol 1.4 % mouth spray 1 spray, Q2H PRN      budesonide-formoterol (SYMBICORT) 160-4.5 MCG/ACT inhaler 2 puff, BID      Review of Systems unobtainable from patient due to sedation and mechanical ventilation    VitalSigns:  BP (!) 133/52   Pulse 88   Temp 97.7 °F (36.5 °C) (Temporal)   Resp 28   Ht 5' 11\" (1.803 m)   Wt 137 lb 3.2 oz (62.2 kg)   SpO2 99%   BMI 19.14 kg/m²      Physical Exam  Currently in prone positioning. Sedated on mechanical ventilation. Per discussion with nursing no crackles or wheezes.     Lab Results:  CBC:   Recent Labs     01/07/21  0345 01/08/21  0323 01/09/21  0400   WBC 2.4* 2.1* 2.8*   HGB 9.1* 9.9* 8.2*   PLT 64* 36* 22*     BMP:

## 2021-01-10 NOTE — SIGNIFICANT EVENT
Extensive discussion with patient's wife Ezio Colón) on 1/10/2021 in regards to goals of care. All questions sought and answered. Christy Goode stated that her  expressed to her before being admitted that he would not want to be on life support. Therefore, as patient is requiring full life support, she requests comfort care measures only.     Brenda Pond MD  1/10/2021

## 2021-01-10 NOTE — PROGRESS NOTES
Patient terminally extubated per the wishes of the spouse. Orders verified. Respiratory at bedside for extubation. At 1044, no heart tones, no breath sounds, no EKG rhythm demonstrated, no palpable pulses. Clinical House and physician notified. Post mortem care provided.

## 2021-01-10 NOTE — PROGRESS NOTES
Nephrology ([facility]) Progress Note    Patient:  Niraj Robert  YOB: 1945  Date of Service: 1/10/2021  MRN: [id number]   Acct: [de-identified]   Primary Care Physician: No primary care provider on file. Advance Directive: DNR-CC  Admit Date: 12/22/2020       Hospital Day: 23  Referring Provider: Dolores Manznaares MD    Patient independently seen and examined, Chart, Consults, Notes, Operative notes, Labs, Cardiology, and Radiology studies reviewed as available. Chief complaint: End-stage renal disease. Subjective:  Niraj Robert is a 77 y.o. male  whom we were consulted for end-stage renal disease. Patient is currently on CCPD and has hypertensive nephrosclerosis. Presenting to [facility] hospital with increasing shortness of breath as well as dysphagia. Patient was diagnosed with COVID-19/pneumonia. His respiratory status, slowly improving and he is back to nasal cannula from 100% nonrebreather. Patient is growing MSSA in bloodstream.  He is status post removal of infected Mediport. On January 8, he was transferred to CCU due to severe respiratory failure. He was intubated, needing vasopressors. This morning he was seen in CCU, currently on 100% FiO2 with high PEEP. Family has considered to make him DNR and hospice. He is in the process of extubation.     Allergies:  Diphenhydramine and Sulfa antibiotics    Medicines:  Current Facility-Administered Medications   Medication Dose Route Frequency Provider Last Rate Last Admin    0.9 % sodium chloride infusion   Intravenous PRN Ilir Dickerson MD        LORazepam (ATIVAN) injection 1 mg  1 mg Intravenous Q4H PRN Dolores Manzanares MD        scopolamine (TRANSDERM-SCOP) transdermal patch 1 patch  1 patch Transdermal Q72H Dolores Manzanares MD        0.9 % sodium chloride infusion   Intravenous PRN Ilir Dickerson MD  dexamethasone (PF) (DECADRON) injection 6 mg  6 mg Intravenous Q12H Glenis Atkinson MD   6 mg at 01/10/21 0825    propofol injection  10 mcg/kg/min Intravenous Titrated Glenis Atkinson MD 5.6 mL/hr at 01/10/21 0604 15 mcg/kg/min at 01/10/21 0604    fentaNYL 5 mcg/ml in 0.9%  ml infusion  25 mcg/hr Intravenous Continuous Glenis Atkinson MD 15 mL/hr at 01/09/21 2302 75 mcg/hr at 01/09/21 2302    dexmedetomidine (PRECEDEX) 400 mcg in sodium chloride 0.9 % 100 mL infusion  0.2 mcg/kg/hr Intravenous Continuous Glenis Atkinson MD   Stopped at 01/08/21 1848    docusate sodium (COLACE) capsule 100 mg  100 mg Oral Daily Robin Palmer MD   100 mg at 01/10/21 0825    senna (SENOKOT) tablet 8.6 mg  1 tablet Oral Nightly Robin Palmer MD   8.6 mg at 01/09/21 2047    [Held by provider] lactulose (CHRONULAC) 10 GM/15ML solution 20 g  20 g Oral TID Robin Palmer MD   20 g at 12/31/20 0932    bisacodyl (DULCOLAX) suppository 10 mg  10 mg Rectal Daily PRN Robin Palmer MD        0.9 % sodium chloride bolus  30 mL Intravenous PRN Robin Palmer MD        ALPRAZolam Eppie Ambrosio) tablet 0.25 mg  0.25 mg Oral Nightly PRN Robin Palmer MD   0.25 mg at 01/01/21 0014    levothyroxine (SYNTHROID) tablet 50 mcg  50 mcg Oral Daily Robin Palmer MD   50 mcg at 01/10/21 0825    sevelamer (RENVELA) tablet 800 mg  800 mg Oral TID Robin Palmer MD   800 mg at 01/10/21 0825    acetaminophen (TYLENOL) tablet 650 mg  650 mg Oral Q6H PRN Robin Palmer MD   650 mg at 01/02/21 2049    Or    acetaminophen (TYLENOL) suppository 650 mg  650 mg Rectal Q6H PRN Robin Palmer MD        guaiFENesin-dextromethorphan (ROBITUSSIN DM) 100-10 MG/5ML syrup 5 mL  5 mL Oral Q4H PRN Robin Palmer MD   5 mL at 01/10/21 0825    albuterol sulfate  (90 Base) MCG/ACT inhaler 2 puff  2 puff Inhalation Q6H PRN Robin Palmer MD   2 puff at 12/23/20 0584  insulin lispro (HUMALOG) injection vial 0-6 Units  0-6 Units Subcutaneous TID WC Robin Palmer MD   2 Units at 12/31/20 0936    insulin lispro (HUMALOG) injection vial 0-3 Units  0-3 Units Subcutaneous Nightly Robin Palmer MD   1 Units at 12/29/20 2129    glucose (GLUTOSE) 40 % oral gel 15 g  15 g Oral PRN Robin Palmer MD        dextrose 50 % IV solution  12.5 g Intravenous PRN Robin Palmer MD   12.5 g at 01/09/21 1211    glucagon (rDNA) injection 1 mg  1 mg Intramuscular PRN Robin Palmer MD        dextrose 5 % solution  100 mL/hr Intravenous PRN Robin Palmer MD   Stopped at 01/10/21 0429    Benzocaine-Menthol (CEPACOL) 1 lozenge  1 lozenge Oral Q2H PRN Robin Palmer MD        phenol 1.4 % mouth spray 1 spray  1 spray Mouth/Throat Q2H PRN Robin Palmer MD   1 spray at 01/07/21 1830    budesonide-formoterol (SYMBICORT) 160-4.5 MCG/ACT inhaler 2 puff  2 puff Inhalation BID Robin Palmer MD   2 puff at 01/10/21 0712       Past Medical History:  Past Medical History:   Diagnosis Date    Anxiety     Asthma     Benign essential HTN     Benign hypertensive kidney disease     CAD (coronary artery disease)     sees dr. Cathie Guaman Chronic kidney disease, stage IV (severe) ([facility] 7/18/2016    ESRD (end stage renal disease) ([facility]     no dialysis at present.  Hemodialysis patient [facility])     mon wed fri at [address] of blood transfusion     Hyperlipidemia     Palliative care patient 11/29/2018    Prolonged emergence from general anesthesia     c/o dizzy and \"over sedated\" with prostate \"scope\".     Renal osteodystrophy     Skin cancer 2018    facial    Throat cancer ([facility] [facility] 2012    Thyroid disease        Past Surgical History:  Past Surgical History:   Procedure Laterality Date    CYSTOSCOPY      HERNIA REPAIR      umbilical    LAPAROSCOPY INSERTION PERITONEAL CATHETER N/A 7/18/2016    CATHETER INSERTION PERITONEAL DIALYSIS LAPAROSCOPIC performed by Ike Ruth MD at [address]  LAPAROSCOPY INSERTION PERITONEAL CATHETER N/A 1/28/2019    LAPAROSCOPIC REVISION OF PD CATHETER performed by Bryanna Lopez MD at [address] N/A 12/30/2020    PORT REMOVAL performed by Patricia Garrison MD at [facility] CATH DIAL OPEN N/A 11/28/2018    PLACEMENT OF TUNNELED HEMODIALYSIS CATHETER performed by Bird Garcia MD at [facility] TUNNELED INTRAPERITONEAL CATHETER N/A 10/22/2018    PERITONEAL DIALYSIS CATHETER EXTERIORIZATION performed by Bryanna Lopez MD at [facility]    TUNNELED VENOUS PORT PLACEMENT      VASCULAR SURGERY  07/10/2019    SJS. Removal of tunneled dialysis catheter right internal jugular vein.        Family History  Family History   Problem Relation Age of Onset    Heart Disease Mother     Cancer Father         lung       Social History  Social History     Socioeconomic History    Marital status:      Spouse name: Not on file    Number of children: Not on file    Years of education: Not on file    Highest education level: Not on file   Occupational History    Not on file   Social Needs    Financial resource strain: Not on file    Food insecurity     Worry: Not on file     Inability: Not on file    Transportation needs     Medical: Not on file     Non-medical: Not on file   Tobacco Use    Smoking status: Never Smoker    Smokeless tobacco: Never Used   Substance and Sexual Activity    Alcohol use: No    Drug use: No    Sexual activity: Not on file   Lifestyle    Physical activity     Days per week: Not on file     Minutes per session: Not on file    Stress: Not on file   Relationships    Social connections     Talks on phone: Not on file     Gets together: Not on file     Attends [employer] service: Not on file     Active member of club or organization: Not on file     Attends meetings of clubs or organizations: Not on file Relationship status: Not on file    Intimate partner violence     Fear of current or ex partner: Not on file     Emotionally abused: Not on file     Physically abused: Not on file     Forced sexual activity: Not on file   Other Topics Concern    Not on file   Social History Narrative    Not on file         Review of Systems:  Intubated/sedated and review of system is unobtainable.       Objective:  Patient Vitals for the past 24 hrs:   BP Temp Temp src Pulse Resp SpO2   01/10/21 1035    74 14 91 %   01/10/21 1012    80 22 94 %   01/10/21 1000 (!) 143/53   80 18 97 %   01/10/21 0950 (!) 155/57   79 21 94 %   01/10/21 0900 (!) 148/54   82 26 97 %   01/10/21 0800 (!) 139/49 96.2 °F (35.7 °C) Temporal 82 25 93 %   01/10/21 0728 (!) 134/52   80 24 96 %   01/10/21 0727    80 25 95 %   01/10/21 0725 (!) 134/52 96.9 °F (36.1 °C) Temporal 84 26    01/10/21 0721 (!) 122/50   79 25 93 %   01/10/21 0700 (!) 130/49   84 26 94 %   01/10/21 0630 (!) 127/45   84 24 91 %   01/10/21 0600 (!) 122/49   84 24 95 %   01/10/21 0530 (!) 125/47 96.5 °F (35.8 °C) Temporal 87 24 96 %   01/10/21 0515 (!) 121/49   86 24 94 %   01/10/21 0500 (!) 130/49   85 25 96 %   01/10/21 0445 (!) 124/48   87 25 93 %   01/10/21 0430 (!) 116/47   86 25 92 %   01/10/21 0415 (!) 113/48   88 25 98 %   01/10/21 0400 (!) 132/48 96.7 °F (35.9 °C) Temporal 88 21 96 %   01/10/21 0330 (!) 128/47   88 24 96 %   01/10/21 0300 (!) 131/52   88 24 98 %   01/10/21 0230 (!) 126/46   88 27 97 %   01/10/21 0200 (!) 136/47   90 17 98 %   01/10/21 0130 (!) 129/49   90 23 97 %   01/10/21 0100 (!) 134/56   91 23 98 %   01/10/21 0030 (!) 139/51   92 20 97 %   01/10/21 0000 (!) 129/49   88 26 95 %   01/09/21 2330 (!) 139/54   90 20 96 %   01/09/21 2316    89     01/09/21 2300 (!) 139/54 96.4 °F (35.8 °C) Temporal 89 23 99 %   01/09/21 2230 (!) 118/49   88 21 97 %   01/09/21 2200 (!) 135/52   89 23 98 % BNP:  No results for input(s): BNP in the last 72 hours. Ionized Calcium:No results for input(s): IONCA in the last 72 hours. Magnesium:  Recent Labs     01/10/21  0130   MG 2.0     Phosphorus:No results for input(s): PHOS in the last 72 hours. HgbA1C: No results for input(s): LABA1C in the last 72 hours. Hepatic:   Recent Labs     01/09/21  0300 01/09/21  0400 01/10/21  0130   ALKPHOS 66 63 92   ALT 89* 5 <5*   AST 60* 5 71*   PROT 5.4* 4.4* 4.4*   BILITOT <0.2 <0.2 <0.2   LABALBU 1.9* 0.3* 1.1*     Lactic Acid: No results for input(s): LACTA in the last 72 hours. Troponin: No results for input(s): CKTOTAL, CKMB, TROPONINT in the last 72 hours. ABGs:   Lab Results   Component Value Date    PHART 7.230 01/08/2021    PO2ART 55.0 01/08/2021    VFB3GDI 63.0 01/08/2021     CRP:  No results for input(s): CRP in the last 72 hours. Sed Rate:  No results for input(s): SEDRATE in the last 72 hours. Culture Results:   Blood Culture Recent:   Recent Labs     12/31/20  0500   BC No growth after 5 days of incubation. Urine Culture Recent : No results for input(s): LABURIN in the last 720 hours. Radiology reports as per the Radiologist  Radiology: Xr Chest Portable    Result Date: 12/22/2020  XR CHEST PORTABLE 12/22/2020 7:03 PM History: Short of breath. Covid positive. Portable chest x-ray compared with 4 March 2019. Chronic interstitial lung disease. Patchy pneumonia within the right upper lobe, left lower lobe, and within the base of the left upper lobe. Less prominent disease within the right lower lobe. Relative sparing of the left apex. Stable heart and mediastinum. Aortic arch calcification. Unchanged left subclavian port. 1. Bilateral pneumonia compatible with the history of Covid positive.  Signed by Dr Yumiko Feliz on 12/22/2020 7:04 PM    Cta Pulmonary W Contrast    Result Date: 12/24/2020 EXAMINATION:  CTA PULMONARY W CONTRAST  12/24/2020 2:04 PM HISTORY: Hypoxia. Elevated d-dimer. Covid 19 infection. COMPARISON: No comparison study. DLP: 315 mGy-cm. Automated exposure control was utilized. TECHNIQUE: Spiral CT angiography was performed of the chest with contrast. Sagittal, coronal and 3-D images were reconstructed. MEDIASTINUM/VASCULAR: There is atheromatous disease of the thoracic aorta and coronary arteries. There is cardiomegaly. There is no CT evidence of pulmonary embolus. The pulmonary arteries are dilated with main pulmonary artery segment measuring 3.5 cm. There are small mediastinal lymph nodes. LUNGS: There are moderate groundglass appearing infiltrates in both lungs. Bronchiectasis is noted. There is no significant pleural effusion. There is mild dependent atelectasis in the lung bases posteriorly. BONES/SOFT TISSUES/: There are degenerative changes of the spine and shoulders. UPPER ABDOMEN: There is ascites in the upper abdomen. There is a 1.4 cm probable cyst in the upper pole left kidney. There is another 7 mm probable cyst in the upper pole left kidney. These areas are not fully evaluated. 1. No CT evidence of pulmonary embolus. Dilated pulmonary arteries. 2. Atheromatous disease of the thoracic aorta and coronary arteries. Cardiomegaly. 3. Moderate groundglass infiltrates bilaterally consistent with the patient's history of Covid 19 infection. Other etiologies less likely. There is also bronchiectasis. There is dependent atelectasis. 4. There is ascites in the abdomen. 5. Probable renal cysts on the left, not fully evaluated. Signed by Dr Jackie Pagan on 12/24/2020 2:11 PM       Assessment   1. End-stage renal disease/on CCPD. 2.  Hypertensive nephrosclerosis. 3.  Bilateral COVID-19 pneumonia. 4.  Acute respiratory failure/intubated. 5.  Hyponatremia. 6.  Anemia of chronic kidney disease. 7. MSSA bacteremia. 8.  Mild hyperkalemia. 9.  Sepsis/septic shock/on pressors. Plan:  1. Extubation today. 3.  Hospice/comfort care.       Francesca Moreno MD  01/10/21  12:13 PM

## 2021-01-10 NOTE — PROGRESS NOTES
o2 sat dropped to 93%. Pt suctioned via et tube and orally. Not much returned. Air added to et tube cuff. fio2 increased to 100%. sats slowly coming up.

## (undated) DEVICE — Device

## (undated) DEVICE — CLIP LIG M BLU TI HRT SHP WIRE HORZ 180 PER BX

## (undated) DEVICE — SUTURE PERMAHAND SZ 2-0 L30IN NONABSORBABLE BLK SILK W/O A305H

## (undated) DEVICE — SUTURE ETHLN SZ 3-0 L18IN NONABSORBABLE BLK FS-1 L24MM 3/8 663H

## (undated) DEVICE — GOWN,PREVENTION PLUS,XL,ST,24/CS: Brand: MEDLINE

## (undated) DEVICE — GLOVE SURG SZ 9 L12IN FNGR THK126MIL CRM LTX FREE

## (undated) DEVICE — SUTURE MCRYL SZ 4-0 L18IN ABSRB UD L19MM PS-2 3/8 CIR PRIM Y496G

## (undated) DEVICE — GENERAL LAP CDS

## (undated) DEVICE — SOLUTION IV IRRIG POUR BRL 0.9% SODIUM CHL 2F7124

## (undated) DEVICE — LARYNGOSCOPE BLDE MAC HNDL M SZ 35 ST CURAPLEX CURAVIEW LED

## (undated) DEVICE — SPONGE LAP W18XL18IN WHT COT 4 PLY FLD STRUNG RADPQ DISP ST

## (undated) DEVICE — MINOR CDS: Brand: MEDLINE INDUSTRIES, INC.

## (undated) DEVICE — SUTURE VCRL SZ 3-0 L27IN ABSRB UD L26MM SH 1/2 CIR J416H

## (undated) DEVICE — DECANTER FLD 9IN ST BG FOR ASEP TRNSF OF FLD

## (undated) DEVICE — PINNACLE INTRODUCER SHEATH: Brand: PINNACLE

## (undated) DEVICE — STERILE POLYISOPRENE POWDER-FREE SURGICAL GLOVES: Brand: PROTEXIS

## (undated) DEVICE — CONNECTOR CATH TWO PART AD FOR PERITONEAL DLYS FLX NK

## (undated) DEVICE — GLOVE SURG SZ 8 L12IN FNGR THK13MIL BRN LTX SYN POLYMER W

## (undated) DEVICE — GLOVE SURG SZ 75 CRM LTX FREE POLYISOPRENE POLYMER BEAD ANTI

## (undated) DEVICE — TROCAR ENDOSCP L100MM DIA5MM BLDELSS STBL SL OBT RADLUC

## (undated) DEVICE — RADIFOCUS GLIDEWIRE: Brand: GLIDEWIRE

## (undated) DEVICE — SYRINGE MED 10ML LUERLOCK TIP W/O SFTY DISP

## (undated) DEVICE — AMBU AURA-I U SIZE 5, DISPOSABLE LARYNGEAL MASK: Brand: AURA-I

## (undated) DEVICE — SUTURE PERMAHAND SZ 3-0 L30IN NONABSORBABLE BLK SILK BRAID A304H

## (undated) DEVICE — C-ARM: Brand: UNBRANDED

## (undated) DEVICE — SUTURE VCRL SZ 0 L36IN ABSRB UD L36MM CT-1 1/2 CIR J946H

## (undated) DEVICE — ADHESIVE SKIN CLSR 0.7ML TOP DERMBND ADV

## (undated) DEVICE — GOWN,PREVENTION PLUS,2XL,ST,22/CS: Brand: MEDLINE

## (undated) DEVICE — SYRINGE 20ML LL S/C 50

## (undated) DEVICE — AMBU AURA-I U SIZE 4, DISPOSABLE LARYNGEAL MASK: Brand: AURA-I

## (undated) DEVICE — SUTURE PERMAHAND SZ 4-0 L12X30IN NONABSORBABLE BLK SILK A303H

## (undated) DEVICE — EQUISTREAM XK HEMODIALYSIS CATH W/STYLET, ST, AIRGUARD, 16 FR. 23CM: Brand: EQUISTREAM XK LONG-TERM HEMODIALYSIS CATHETER WITH PRELOADED STYLET

## (undated) DEVICE — TOWEL,OR,DSP,ST,BLUE,DLX,2/PK,40PK/CS: Brand: MEDLINE

## (undated) DEVICE — CATHETER KIT 5 FR 21 GAX7 CM MICROINTRODUCER GUIDEWIRE STIFF

## (undated) DEVICE — SENSOR PLSE OXMTR AD CBL L3FT ADH TRANSMISSIVE

## (undated) DEVICE — ATRIEVE™ VASCULAR SNARE KIT 7F: Brand: ATRIEVE™ VASCULAR SNARE KIT

## (undated) DEVICE — TOWEL,OR,DSP,ST,BLUE,DLX,4/PK,20PK/CS: Brand: MEDLINE

## (undated) DEVICE — FILTER BACT VIR OD22MM ID22XID15MM 42ML DEAD SPACE FLO

## (undated) DEVICE — SYRINGE MED 10ML TRNSLUC BRL PLUNG BLK MRK POLYPR CTRL

## (undated) DEVICE — SOLUTION IRRIG 1000ML 09% SOD CHL USP PIC PLAS CONTAINER

## (undated) DEVICE — TYMPANIC PROBE: Brand: DEROYAL

## (undated) DEVICE — CUFF BLD PRESSURE 1 TUBE AD 25-34 CM ARM VLY FLEXIPORT DISP

## (undated) DEVICE — GAUZE,SPONGE,8"X4",12PLY,XRAY,STRL,LF: Brand: MEDLINE

## (undated) DEVICE — PACK,UNIVERSAL,NO GOWNS: Brand: MEDLINE

## (undated) DEVICE — Y-TYPE TUR/BLADDER IRRIGATION SET, REGULATING CLAMP

## (undated) DEVICE — GLOVE ORTHO 8   MSG9480

## (undated) DEVICE — SUTURE PERMAHAND SZ 0 L30IN NONABSORBABLE BLK SILK BRAID A306H

## (undated) DEVICE — TUBE ET 7MM NSL ORAL BASIC CUF INTMED MURPHY EYE RADPQ MRK

## (undated) DEVICE — MASK O2 SAMPLING AD M LUER CAPNOXYGEN

## (undated) DEVICE — DRESSING TRNSPAR W5XL4.5IN FLM SHT SEMIPERMEABLE WIND

## (undated) DEVICE — Z DUPLICATE USE 2392649 LARYNGOSCOPE VID TI SPECTRM LOPRO S4 GLIDESCOPE

## (undated) DEVICE — TROCAR ENDOSCP L100MM DIA5MM BLDELSS STBL SL THRD OPT VW

## (undated) DEVICE — SUTURE COAT VCRL SZ 4-0 L18IN ABSRB UD L19MM PS-2 1/2 CIR J496G

## (undated) DEVICE — MEDIA CONTRAST INJ VISIPAQUE 320MG/ML 100 ML